# Patient Record
Sex: FEMALE | NOT HISPANIC OR LATINO | Employment: FULL TIME | URBAN - METROPOLITAN AREA
[De-identification: names, ages, dates, MRNs, and addresses within clinical notes are randomized per-mention and may not be internally consistent; named-entity substitution may affect disease eponyms.]

---

## 2017-01-04 ENCOUNTER — TELEPHONE (OUTPATIENT)
Dept: FAMILY MEDICINE | Facility: CLINIC | Age: 47
End: 2017-01-04

## 2017-01-04 NOTE — TELEPHONE ENCOUNTER
Patient has a referral to [facility] for  Dr. Gonzalez from 12/16/16. Calling to clarify if this is the right one prior to faxing.   Patient stated it was. Needing it to be faxed to [phone].  Vivi Bowman RN  [facility]

## 2017-01-04 NOTE — TELEPHONE ENCOUNTER
Reason for Call:  Needing a referral to Dr Gonzalez at the [facility](fax number is [phone]    Detailed comments: this is in regards to patient left heel pain    Phone Number Patient can be reached at: [phone]      Best Time: tommorrow    Can we leave a detailed message on this number? No        Call taken on 1/4/2017 at 4:27 PM by Erma Polk

## 2017-01-06 ENCOUNTER — PRE VISIT (OUTPATIENT)
Dept: ORTHOPEDICS | Facility: CLINIC | Age: 47
End: 2017-01-06

## 2017-01-06 NOTE — TELEPHONE ENCOUNTER
1.  Date/reason for appt: 1/13/17 -- audrey's deformity of left heel  2.  Referring provider: Vasquez Denson  3.  Call to patient (Yes / No - short description): no, referred  4.  Previous care at / records requested from: [facility] -- referral and records in [facility].

## 2017-01-11 ENCOUNTER — OFFICE VISIT (OUTPATIENT)
Dept: OTOLARYNGOLOGY | Facility: CLINIC | Age: 47
End: 2017-01-11
Payer: COMMERCIAL

## 2017-01-11 VITALS — WEIGHT: 184 LBS | BODY MASS INDEX: 33.86 KG/M2 | RESPIRATION RATE: 16 BRPM | HEIGHT: 62 IN

## 2017-01-11 DIAGNOSIS — R13.13 PHARYNGEAL DYSPHAGIA: ICD-10-CM

## 2017-01-11 DIAGNOSIS — E04.9 GOITER: Primary | ICD-10-CM

## 2017-01-11 PROCEDURE — 99213 OFFICE O/P EST LOW 20 MIN: CPT | Performed by: OTOLARYNGOLOGY

## 2017-01-11 ASSESSMENT — PAIN SCALES - GENERAL: PAINLEVEL: NO PAIN (0)

## 2017-01-11 NOTE — PROGRESS NOTES
Chief Complaint - 1.) dysphagia 2.) thyroid nodules    History of Present Illness - Rhonda Cordero is a 46 year old female who has been having over a year of dysphagia. Feels solids get stuck. No real pain. Swallow study was normal. She has a history of a multinodular goiter, but one prominent nodule on the left. FNA 3/2015 was benign. FNA of some lymph nodes were also benign 3/2016. Repeat u/s in 2/2016 showed no growth. TSH a few weeks ago was 1.22. No breathing issues. They deny symptoms of hoarseness, neck or throat pain, or hemoptysis. The patient notes a positive family history of thyroid cancer. She does eat late at night and lies down. However, she tried prilosec and this didn't seem to help. Also feels like she never had reflux. No globus.     Past Medical History -   Patient Active Problem List   Diagnosis     CARDIOVASCULAR SCREENING; LDL GOAL LESS THAN 130     Impingement syndrome, shoulder     Rotator cuff tendonitis     Hashimoto's thyroiditis     Nontoxic multinodular goiter     Shoulder joint pain     Gastroesophageal reflux disease, esophagitis presence not specified     Cervical adenopathy     Hypertension goal BP (blood pressure) < 140/90     Essential hypertension with goal blood pressure less than 140/90       Current Medications -   Current outpatient prescriptions:      clobetasol (TEMOVATE) 0.05 % cream, Apply sparingly to affected area twice daily for two weeks, then use twice a week, Disp: 60 g, Rfl: 2     lisinopril (PRINIVIL,ZESTRIL) 5 MG tablet, Take 1 tablet (5 mg) by mouth daily, Disp: 90 tablet, Rfl: 3     omeprazole 20 MG tablet, Take 1 tablet (20 mg) by mouth daily Take 30-60 minutes before a meal., Disp: 30 tablet, Rfl: 3     order for DME, Equipment being ordered: bilateral velcro wrist splints, Disp: 2 Device, Rfl: 0     aspirin 81 MG tablet, Take 1 tablet by mouth daily., Disp: 100 tablet, Rfl: 3     HYDROcodone-acetaminophen (NORCO) 5-325 MG per tablet, Take 1 tablet by  "mouth every 6 hours as needed for moderate to severe pain, Disp: 10 tablet, Rfl: 0    Allergies -   Allergies   Allergen Reactions     Advil [Alkylamines]      Cephalexin Itching       Social History -   History     Social History     Marital Status:      Spouse Name: N/A     Number of Children: 2     Years of Education: 12     Occupational History           Social History Main Topics     Smoking status: Never Smoker      Smokeless tobacco: Never Used     Alcohol Use: Yes      Comment: occ     Drug Use: No     Sexual Activity:     Partners: Male     Birth Control/ Protection: Surgical     Other Topics Concern     Parent/Sibling W/ Cabg, Mi Or Angioplasty Before 65f 55m? No      Service No     Blood Transfusions Yes     had 3 units 10/23/2009     Caffeine Concern No     Occupational Exposure No     Hobby Hazards No     Sleep Concern Not Asked     sleeps about 5-6 hours a night     Special Diet No     Exercise Yes     walk     Bike Helmet Not Asked     doesn't ride a bike     Seat Belt Yes     Self-Exams No     info given on SBE     Social History Narrative       Family History -   Family History   Problem Relation Age of Onset     CEREBROVASCULAR DISEASE Mother 50     CEREBROVASCULAR DISEASE Father 49     CANCER No family hx of      DIABETES No family hx of      Hypertension Father      Thyroid Disease No family hx of      Glaucoma No family hx of      Macular Degeneration No family hx of        Review of Systems - As per HPI and PMHx, otherwise 7 system review of the head and neck negative.    Physical Exam  Resp 16  Ht 1.575 m (5' 2\")  Wt 83.462 kg (184 lb)  BMI 33.65 kg/m2  LMP 11/23/2009  General - The patient is in no distress. Alert and oriented x3, answers questions and cooperates with examination appropriately.   Voice and Breathing - The patient was breathing comfortably without the use of accessory muscles. There was no wheezing, stridor, or stertor.  The patients voice was clear and " strong.  Head and Face - Normocephalic and atraumatic.    Eyes - Extraocular movements intact. Sclera were not icteric or injected, conjunctiva were pink and moist.  Neurologic - Cranial nerves II-XII are grossly intact. Specifically, the facial nerve is intact, House-Brackmann grade 1 of 6.   Mouth - Examination of the oral cavity showed pink, healthy oral mucosa. No lesions or ulcerations noted. The tongue was mobile and protrudes midline.  Oropharynx - The walls of the oropharynx were smooth, symmetric, and had no lesions or ulcerations. The uvula was midline and the palate raised symmetrically.   Neck -  Palpation of the occipital, submental, submandibular, internal jugular chain, and supraclavicular nodes did not demonstrate any abnormal lymph nodes or masses. The parotid glands were without masses. Palpation of the thyroid revealed a large prominent left thyroid nodule, firm, about 4 cm. There was no significant pain with palpation, but uncomfortable. The trachea was midline.      A/P - Rhonda Cordero is a 46 year old female with dysphagia that could represent growing of her left thyroid nodule. It doesn't seem to be reflux. prilosec didn't help. I will have her see Dr. Costa to see if she wants to have this removed.       Ernst Rosas MD  Otolaryngology  [facility]

## 2017-01-11 NOTE — NURSING NOTE
"Chief Complaint   Patient presents with     RECHECK     Throat/Reflux       Initial Resp 16  Ht 1.575 m (5' 2\")  Wt 83.462 kg (184 lb)  BMI 33.65 kg/m2  LMP 11/23/2009 Estimated body mass index is 33.65 kg/(m^2) as calculated from the following:    Height as of this encounter: 1.575 m (5' 2\").    Weight as of this encounter: 83.462 kg (184 lb).  BP completed using cuff size: NA (Not Taken)    Leslye Weiss MA    "

## 2017-01-11 NOTE — PATIENT INSTRUCTIONS
General Scheduling Information  To schedule your CT/MRI scan, please contact [place] at [phone]   [address] [place] [address]    To schedule your Surgery, please contact our Specialty Schedulers at [phone]    ENT Clinic Locations Clinic Hours Telephone Number     [facility]  [address] [place] [address]   Tuesday:       8:00am -- 4:00pm    Wednesday:  8:00am - 4:00pm   To schedule an appointment with   Dr. Rosas,   please contact our   Specialty Scheduling Department at:     [phone]       [facility]  [address] [place] [address]   Friday:          8:00am - 4:00pm         Urgent Care Locations Clinic Hours Telephone Numbers     [facility]  [address] [place] [address]     Monday-Friday:     11:00pm - 9:00pm    Saturday-Sunday:  9:00am - 5:00pm   [phone]     [place] [facility]  [address] [place] [address]     Monday-Friday:      5:00pm - 9:00pm     Saturday-Sunday:  9:00am - 5:00pm   [phone]

## 2017-01-11 NOTE — MR AVS SNAPSHOT
After Visit Summary   1/11/2017    Rhonda Cordero    MRN: [id number]           Patient Information     Date Of Birth          1970        Visit Information        Provider Department      1/11/2017 2:15 PM Ernst Rosas MD [facility]        Today's Diagnoses     Goiter    -  1     Pharyngeal dysphagia           Care Instructions    General Scheduling Information  To schedule your CT/MRI scan, please contact [place] [facility] at [phone] [address]  [place] [address]    To schedule your Surgery, please contact our Specialty Schedulers at [phone]    ENT Clinic Locations Clinic Hours Telephone Number     [facility]  [address]  [place] [address]   Tuesday:       8:00am -- 4:00pm    Wednesday:  8:00am - 4:00pm   To schedule an appointment with   Dr. Rosas,   please contact our   Specialty Scheduling Department at:     [phone]       [facility]  [address] [place] [address]   Friday:          8:00am - 4:00pm         Urgent Care Locations Clinic Hours Telephone Numbers     [facility]  [address] [place] [address]     Monday-Friday:     11:00pm - 9:00pm    Saturday-Sunday:  9:00am - 5:00pm   [phone]     [facility]  [address] [place] [address]     Monday-Friday:      5:00pm - 9:00pm     Saturday-Sunday:  9:00am - 5:00pm   [phone]             Follow-ups after your visit        Additional Services     OTOLARYNGOLOGY REFERRAL       Your provider has referred you to: [facility]: Adult [facility] Nose [facility] Th[facility] Clinic (Otolaryngology) - [place] [phone]  [email or web address]    Dr. Costa for thyroidectomy    Please be aware that coverage of these services is subject to the terms and limitations of your health insurance plan.  Call member services at your health plan with any benefit or coverage questions.      Please bring the  "following with you to your appointment:    (1) Any X-Rays, CTs or MRIs which have been performed.  Contact the facility where they were done to arrange for  prior to your scheduled appointment.   (2) List of current medications  (3) This referral request   (4) Any documents/labs given to you for this referral                  Your next 10 appointments already scheduled     Jan 13, 2017  2:20 PM   (Arrive by 2:05 PM)   NEW FOOT/ANKLE with Bacilio Meeks DPM   [facility] ([facility])    [address] [place] [address]  [address] [facility] [address] [phone]              Who to contact     If you have questions or need follow up information about today's clinic visit or your schedule please contact [facility] directly at [phone].  Normal or non-critical lab and imaging results will be communicated to you by MyChart, letter or phone within 4 business days after the clinic has received the results. If you do not hear from us within 7 days, please contact the clinic through MyChart or phone. If you have a critical or abnormal lab result, we will notify you by phone as soon as possible.  Submit refill requests through [employer] or call your pharmacy and they will forward the refill request to us. Please allow 3 business days for your refill to be completed.          Additional Information About Your Visit        Care EveryWhere ID     This is your Care EveryWhere ID. This could be used by other organizations to access your [facility] medical records  [id number]        Your Vitals Were     Respirations Height BMI (Body Mass Index) Last Period          16 1.575 m (5' 2\") 33.65 kg/m2 11/23/2009         Blood Pressure from Last 3 Encounters:   12/16/16 130/78   11/09/16 136/82   06/29/16 117/76    Weight from Last 3 Encounters:   01/11/17 83.462 kg (184 lb)   12/16/16 81.194 kg (179 lb)   11/09/16 82.101 kg (181 lb)              We Performed the " Following     OTOLARYNGOLOGY REFERRAL        Primary Care Provider Office Phone # Fax #    Vasquez Denson [phone]       [address] [address]        Thank you!     Thank you for choosing [facility]  for your care. Our goal is always to provide you with excellent care. Hearing back from our patients is one way we can continue to improve our services. Please take a few minutes to complete the written survey that you may receive in the mail after your visit with us. Thank you!             Your Updated Medication List - Protect others around you: Learn how to safely use, store and throw away your medicines at www.disposemymeds.org.          This list is accurate as of: 1/11/17  2:36 PM.  Always use your most recent med list.                   Brand Name Dispense Instructions for use    aspirin 81 MG tablet     100 tablet    Take 1 tablet by mouth daily.       clobetasol 0.05 % cream    TEMOVATE    60 g    Apply sparingly to affected area twice daily for two weeks, then use twice a week       HYDROcodone-acetaminophen 5-325 MG per tablet    NORCO    10 tablet    Take 1 tablet by mouth every 6 hours as needed for moderate to severe pain       lisinopril 5 MG tablet    PRINIVIL/ZESTRIL    90 tablet    Take 1 tablet (5 mg) by mouth daily       omeprazole 20 MG tablet     30 tablet    Take 1 tablet (20 mg) by mouth daily Take 30-60 minutes before a meal.       order for DME     2 Device    Equipment being ordered: bilateral velcro wrist splints

## 2017-01-13 ENCOUNTER — OFFICE VISIT (OUTPATIENT)
Dept: ORTHOPEDICS | Facility: CLINIC | Age: 47
End: 2017-01-13

## 2017-01-13 DIAGNOSIS — M92.62 HAGLUND'S DEFORMITY OF LEFT HEEL: Primary | ICD-10-CM

## 2017-01-13 DIAGNOSIS — M62.89 MUSCLE TIGHTNESS: ICD-10-CM

## 2017-01-13 ASSESSMENT — ENCOUNTER SYMPTOMS
JOINT SWELLING: 0
STIFFNESS: 0
MUSCLE CRAMPS: 0
MUSCLE WEAKNESS: 0
SINUS CONGESTION: 0
TROUBLE SWALLOWING: 1
SINUS PAIN: 0
BACK PAIN: 0
ARTHRALGIAS: 1
SORE THROAT: 1
NECK PAIN: 1
SMELL DISTURBANCE: 0
MYALGIAS: 0
HOARSE VOICE: 0
NECK MASS: 1
TASTE DISTURBANCE: 0

## 2017-01-13 NOTE — PROGRESS NOTES
Date of Service: 1/13/2017    Chief Complaint:   Chief Complaint   Patient presents with     Consult     Haglunds deformity of Left heel. Pt stated that she has a lot of pain in her Left heel. Referred by: Vasquez Denson.         HPI: Rhonda is a 46 year old female who presents today for further evaluation of Anup's deformity. Sue has been having heel pain for about 1 year. She noticed a few months ago that a bump appeared. She tried new tennis shoes and Dr. Willoughby's inserts without relief. She went to her PCP to be evaluated and he diagnosed a Anup's deformity and referred her to us. She brought her x-rays recently taken of her left foot. They show a prominence at the posterior calcaneus consistent with Anup's deformity. Denies swelling, redness, pain in the right foot or other bony abnormalities of her feet.    Review of Systems:   Answers for HPI/ROS submitted by the patient on 1/13/2017   General Symptoms: No  Skin Symptoms: No  HENT Symptoms: Yes  EYE SYMPTOMS: No  HEART SYMPTOMS: No  LUNG SYMPTOMS: No  INTESTINAL SYMPTOMS: No  URINARY SYMPTOMS: No  GYNECOLOGIC SYMPTOMS: No  BREAST SYMPTOMS: No  SKELETAL SYMPTOMS: Yes  BLOOD SYMPTOMS: No  NERVOUS SYSTEM SYMPTOMS: No  MENTAL HEALTH SYMPTOMS: No  Ear pain: No  Ear discharge: No  Hearing loss: No  Tinnitus: No  Nosebleeds: No  Congestion: No  Sinus pain: No  Trouble swallowing: Yes   Voice hoarseness: No  Mouth sores: No  Sore throat: Yes  Tooth pain: No  Gum tenderness: No  Bleeding gums: No  Change in taste: No  Change in sense of smell: No  Dry mouth: No  Hearing aid used: No  Neck lump: Yes  Back pain: No  Muscle aches: No  Neck pain: Yes  Swollen joints: No  Joint pain: Yes  Bone pain: Yes  Muscle cramps: No  Muscle weakness: No  Joint stiffness: No  Bone fracture: No        PMH:   Past Medical History   Diagnosis Date     Anemia      Hypertension goal BP (blood pressure) < 130/80 5/6/2011     Rotator cuff tendonitis 2/10/2015       PSxH:    Past Surgical History   Procedure Laterality Date     Laparoscopy,tubal cautery  2005     Salpingo oophorectomy,r/l/ellen  2005     left     C total abdom hysterectomy  12/9/09     [facility]       Allergies: Advil and Cephalexin    SH:   Social History     Social History     Marital Status:      Spouse Name: N/A     Number of Children: 2     Years of Education: 12     Occupational History           Social History Main Topics     Smoking status: Never Smoker      Smokeless tobacco: Never Used     Alcohol Use: Yes      Comment: occ     Drug Use: No     Sexual Activity:     Partners: Male     Birth Control/ Protection: Surgical     Other Topics Concern     Parent/Sibling W/ Cabg, Mi Or Angioplasty Before 65f 55m? No      Service No     Blood Transfusions Yes     had 3 units 10/23/2009     Caffeine Concern No     Occupational Exposure No     Hobby Hazards No     Sleep Concern Not Asked     sleeps about 5-6 hours a night     Special Diet No     Exercise Yes     walk     Bike Helmet Not Asked     doesn't ride a bike     Seat Belt Yes     Self-Exams No     info given on SBE     Social History Narrative     FH:   Family History   Problem Relation Age of Onset     CEREBROVASCULAR DISEASE Mother 50     CEREBROVASCULAR DISEASE Father 49     CANCER No family hx of      DIABETES No family hx of      Hypertension Father      Thyroid Disease No family hx of      Glaucoma No family hx of      Macular Degeneration No family hx of        Objective:  PT and DP pulses are 2/4 bilaterally. CRT is < 3 seconds. Positive pedal hair.   Gross sensation is intact bilaterally.   Equinus severe Left foot, mild right foot. No pain with active or passive ROM of the ankle, MTJ, 1st ray, or halluces bilaterally. Immobile, hard lump at Left lateral posterior calcaneus that is contiguous with bone of calcaneus.   Nails normal bilaterally. No open lesions are noted. Skin dry.    Assessment:   - Haglunds deformity  -  Equinus    Plan:  - Pt seen and evaluated. Diagnosis and treatment options discussed in detail with patient.  - X-rays reviewed with patient.  - Advised patient to purchase [employer]'s regular heel cups over the counter for both shoes. Wear at all times while ambulating.  - Educated patient on stretches for the calf muscles.   - NSAIDs or ice for pain.  - Gave patient work note to work only 5 hours per day for 2 weeks. Return to full 8 hours afterwards.  - We spoke about intermediate and long term planning. It is possible that she may fail all conservative treatment. If she has no relief from the treatments today, we can move to PT to see if we can increase her ankle dorsiflexion to decrease the tension at the insertion into the posterior calcaneus. If that also fails, consider surgical consult with Dr. Gonzalez.   - RTC 4-6 weeks.

## 2017-01-13 NOTE — Clinical Note
1/13/2017       RE: Rhonda Cordero  [address] [facility] [address]     Dear Colleague,    Thank you for referring your patient, Rhonda Cordero, to the [facility] at [facility]. Please see a copy of my visit note below.    Date of Service: 1/13/2017    Chief Complaint:   Chief Complaint   Patient presents with     Consult     Haglunds deformity of Left heel. Pt stated that she has a lot of pain in her Left heel. Referred by: Vasquez Denson.         HPI: Rhonda is a 46 year old female who presents today for further evaluation of Anup's deformity. Sue has been having heel pain for about 1 year. She noticed a few months ago that a bump appeared. She tried new tennis shoes and Dr. Willoughby's inserts without relief. She went to her PCP to be evaluated and he diagnosed a Anup's deformity and referred her to us. She brought her x-rays recently taken of her left foot. They show a prominence at the posterior calcaneus consistent with Anup's deformity. Denies swelling, redness, pain in the right foot or other bony abnormalities of her feet.    Review of Systems:   Answers for HPI/ROS submitted by the patient on 1/13/2017   General Symptoms: No  Skin Symptoms: No  HENT Symptoms: Yes  EYE SYMPTOMS: No  HEART SYMPTOMS: No  LUNG SYMPTOMS: No  INTESTINAL SYMPTOMS: No  URINARY SYMPTOMS: No  GYNECOLOGIC SYMPTOMS: No  BREAST SYMPTOMS: No  SKELETAL SYMPTOMS: Yes  BLOOD SYMPTOMS: No  NERVOUS SYSTEM SYMPTOMS: No  MENTAL HEALTH SYMPTOMS: No  Ear pain: No  Ear discharge: No  Hearing loss: No  Tinnitus: No  Nosebleeds: No  Congestion: No  Sinus pain: No  Trouble swallowing: Yes   Voice hoarseness: No  Mouth sores: No  Sore throat: Yes  Tooth pain: No  Gum tenderness: No  Bleeding gums: No  Change in taste: No  Change in sense of smell: No  Dry mouth: No  Hearing aid used: No  Neck lump: Yes  Back pain: No  Muscle aches: No  Neck pain: Yes  Swollen  joints: No  Joint pain: Yes  Bone pain: Yes  Muscle cramps: No  Muscle weakness: No  Joint stiffness: No  Bone fracture: No        PMH:   Past Medical History   Diagnosis Date     Anemia      Hypertension goal BP (blood pressure) < 130/80 5/6/2011     Rotator cuff tendonitis 2/10/2015       PSxH:   Past Surgical History   Procedure Laterality Date     Laparoscopy,tubal cautery  2005     Salpingo oophorectomy,r/l/ellen  2005     left     C total abdom hysterectomy  12/9/09     [facility]       Allergies: Advil and Cephalexin    SH:   Social History     Social History     Marital Status:      Spouse Name: N/A     Number of Children: 2     Years of Education: 12     Occupational History           Social History Main Topics     Smoking status: Never Smoker      Smokeless tobacco: Never Used     Alcohol Use: Yes      Comment: occ     Drug Use: No     Sexual Activity:     Partners: Male     Birth Control/ Protection: Surgical     Other Topics Concern     Parent/Sibling W/ Cabg, Mi Or Angioplasty Before 65f 55m? No      Service No     Blood Transfusions Yes     had 3 units 10/23/2009     Caffeine Concern No     Occupational Exposure No     Hobby Hazards No     Sleep Concern Not Asked     sleeps about 5-6 hours a night     Special Diet No     Exercise Yes     walk     Bike Helmet Not Asked     doesn't ride a bike     Seat Belt Yes     Self-Exams No     info given on SBE     Social History Narrative     FH:   Family History   Problem Relation Age of Onset     CEREBROVASCULAR DISEASE Mother 50     CEREBROVASCULAR DISEASE Father 49     CANCER No family hx of      DIABETES No family hx of      Hypertension Father      Thyroid Disease No family hx of      Glaucoma No family hx of      Macular Degeneration No family hx of        Objective:  PT and DP pulses are 2/4 bilaterally. CRT is < 3 seconds. Positive pedal hair.   Gross sensation is intact bilaterally.   Equinus severe Left foot, mild right foot. No pain  with active or passive ROM of the ankle, MTJ, 1st ray, or halluces bilaterally. Immobile, hard lump at Left lateral posterior calcaneus that is contiguous with bone of calcaneus.   Nails normal bilaterally. No open lesions are noted. Skin dry.    Assessment:   - Haglunds deformity  - Equinus    Plan:  - Pt seen and evaluated. Diagnosis and treatment options discussed in detail with patient.  - X-rays reviewed with patient.  - Advised patient to purchase [employer]'s regular heel cups over the counter for both shoes. Wear at all times while ambulating.  - Educated patient on stretches for the calf muscles.   - NSAIDs or ice for pain.  - Gave patient work note to work only 5 hours per day for 2 weeks. Return to full 8 hours afterwards.  - We spoke about intermediate and long term planning. It is possible that she may fail all conservative treatment. If she has no relief from the treatments today, we can move to PT to see if we can increase her ankle dorsiflexion to decrease the tension at the insertion into the posterior calcaneus. If that also fails, consider surgical consult with Dr. Gonzalez.   - RTC 4-6 weeks.            Again, thank you for allowing me to participate in the care of your patient.      Sincerely,    Bacilio Meeks DPM

## 2017-01-13 NOTE — NURSING NOTE
Reason For Visit:   Chief Complaint   Patient presents with     Consult     Haglunds deformity of Left heel. Pt stated that she has a lot of pain in her Left heel. Referred by: Vasquez Denson.        Primary MD: Vasquez Denson  Ref. MD: Vasquez Denson    Age: 46 year old    ?  No          Pain Assessment  Patient Currently in Pain: Denies (Pt stated that her heel hurts after walking. Especially after working an 8 hour shift. )                Current Outpatient Prescriptions   Medication Sig Dispense Refill     clobetasol (TEMOVATE) 0.05 % cream Apply sparingly to affected area twice daily for two weeks, then use twice a week 60 g 2     HYDROcodone-acetaminophen (NORCO) 5-325 MG per tablet Take 1 tablet by mouth every 6 hours as needed for moderate to severe pain 10 tablet 0     lisinopril (PRINIVIL,ZESTRIL) 5 MG tablet Take 1 tablet (5 mg) by mouth daily 90 tablet 3     omeprazole 20 MG tablet Take 1 tablet (20 mg) by mouth daily Take 30-60 minutes before a meal. 30 tablet 3     order for DME Equipment being ordered: bilateral velcro wrist splints 2 Device 0     aspirin 81 MG tablet Take 1 tablet by mouth daily. 100 tablet 3       Allergies   Allergen Reactions     Advil [Ibuprofen] Swelling     Hands swelled     Cephalexin Itching

## 2017-01-13 NOTE — Clinical Note
Work Restrictions    2017     Seen today: yes    Patient:  Rhonda Cordero  :   1970  MRN:     [id number]  Physician: BCAILIO CONTRERAS Gil is to be limited to 5 hour work days for 2 weeks.     Diagnosis: Anup's deformity Left     The next clinic appointment is scheduled for 4-6 weeks    Electronically signed by Bacilio Contreras DPM

## 2017-02-13 DIAGNOSIS — I10 HYPERTENSION GOAL BP (BLOOD PRESSURE) < 140/90: ICD-10-CM

## 2017-02-13 RX ORDER — LISINOPRIL 5 MG/1
5 TABLET ORAL DAILY
Qty: 90 TABLET | Refills: 1 | Status: SHIPPED | OUTPATIENT
Start: 2017-02-13 | End: 2017-08-30

## 2017-02-13 NOTE — TELEPHONE ENCOUNTER
Prescription approved per [facility] Refill Protocol.    ~Thank you  Tita Francisco, PharmD  Retail Pharmacist  For [facility]

## 2017-02-13 NOTE — TELEPHONE ENCOUNTER
Lisinopril 5mg      Last Written Prescription Date: 1/21/16  Last Fill Quantity: 90, # refills: 3  Last Office Visit with G, P or [facility] prescribing provider: 12/16/16       Potassium   Date Value Ref Range Status   12/16/2016 3.9 3.4 - 5.3 mmol/L Final     Creatinine   Date Value Ref Range Status   12/16/2016 0.62 0.52 - 1.04 mg/dL Final     BP Readings from Last 3 Encounters:   12/16/16 130/78   11/09/16 136/82   06/29/16 117/76     Fabien Chi CPhT  [facility]    On behalf of [facility]

## 2017-02-24 ENCOUNTER — OFFICE VISIT (OUTPATIENT)
Dept: ORTHOPEDICS | Facility: CLINIC | Age: 47
End: 2017-02-24

## 2017-02-24 VITALS — WEIGHT: 182.4 LBS | BODY MASS INDEX: 31.14 KG/M2 | HEIGHT: 64 IN

## 2017-02-24 DIAGNOSIS — M92.62 HAGLUND'S DEFORMITY OF LEFT HEEL: Primary | ICD-10-CM

## 2017-02-24 NOTE — MR AVS SNAPSHOT
"              After Visit Summary   2/24/2017    Rhonda Cordero    MRN: [id number]           Patient Information     Date Of Birth          1970        Visit Information        Provider Department      2/24/2017 8:00 AM Bacilio Meeks DPM [facility]        Today's Diagnoses     Anup's deformity of left heel    -  1       Follow-ups after your visit        Who to contact     Please call your clinic at [phone] to:    Ask questions about your health    Make or cancel appointments    Discuss your medicines    Learn about your test results    Speak to your doctor   If you have compliments or concerns about an experience at your clinic, or if you wish to file a complaint, please contact [facility] Patient Relations at [phone] or email us at [email or web address]         Additional Information About Your Visit        Care EveryWhere ID     This is your Care EveryWhere ID. This could be used by other organizations to access your [place] medical records  [id number]        Your Vitals Were     Height Last Period BMI (Body Mass Index)             1.613 m (5' 3.5\") 11/23/2009 31.8 kg/m2          Blood Pressure from Last 3 Encounters:   12/16/16 130/78   11/09/16 136/82   06/29/16 117/76    Weight from Last 3 Encounters:   02/24/17 82.7 kg (182 lb 6.4 oz)   01/11/17 83.5 kg (184 lb)   12/16/16 81.2 kg (179 lb)              Today, you had the following     No orders found for display       Primary Care Provider Office Phone # Fax #    Vasquez Denson [phone]       [facility] [address] [facility] [address]        Thank you!     Thank you for choosing [facility]  for your care. Our goal is always to provide you with excellent care. Hearing back from our patients is one way we can continue to improve our services. Please take a few minutes to complete the written survey that " you may receive in the mail after your visit with us. Thank you!             Your Updated Medication List - Protect others around you: Learn how to safely use, store and throw away your medicines at www.disposemymeds.org.          This list is accurate as of: 2/24/17  8:20 AM.  Always use your most recent med list.                   Brand Name Dispense Instructions for use    aspirin 81 MG tablet     100 tablet    Take 1 tablet by mouth daily.       clobetasol 0.05 % cream    TEMOVATE    60 g    Apply sparingly to affected area twice daily for two weeks, then use twice a week       HYDROcodone-acetaminophen 5-325 MG per tablet    NORCO    10 tablet    Take 1 tablet by mouth every 6 hours as needed for moderate to severe pain       lisinopril 5 MG tablet    PRINIVIL/ZESTRIL    90 tablet    Take 1 tablet (5 mg) by mouth daily       omeprazole 20 MG tablet     30 tablet    Take 1 tablet (20 mg) by mouth daily Take 30-60 minutes before a meal.       order for DME     2 Device    Equipment being ordered: bilateral velcro wrist splints

## 2017-02-24 NOTE — NURSING NOTE
"Reason For Visit:   Chief Complaint   Patient presents with     Foot Problems     f/u left foot       Ht 1.613 m (5' 3.5\")  Wt 82.7 kg (182 lb 6.4 oz)  LMP 11/23/2009  BMI 31.8 kg/m2    Pain Assessment  Patient Currently in Pain: No  "

## 2017-02-24 NOTE — PROGRESS NOTES
Chief Complaint: No chief complaint on file.         Allergies   Allergen Reactions     Advil [Ibuprofen] Swelling     Hands swelled     Cephalexin Itching         Subjective: Sue is a 46 year old female who presents to the clinic today for a follow up of left posterior heel pain. She is having no pain today! She relates that she did get the heel cups, but she didn't really like those. She switched to a pair of Sketchers and after she did that, the pain went away completely. She is back to working full time shifts. She is still stretching.    Objective    No pain noted with the left posterior calcaneus. No palpable bursa noted to the area. Equinus noted BL. No pain along the courses of the Achilles, peroneal, or PT tendons.     Assessment: left Anup's deformity - asymptomatic    Plan:   - Pt seen and evaluated  - Continue with shoes that do not irritate. Cont with stretching  - Pt to return to clinic PRN.

## 2017-02-24 NOTE — LETTER
2/24/2017       RE: Rhonda Cordero  [address] [facility] [address]     Dear Colleague,    Thank you for referring your patient, Rhonda Cordero, to the [facility] at [facility]. Please see a copy of my visit note below.    Chief Complaint: No chief complaint on file.         Allergies   Allergen Reactions     Advil [Ibuprofen] Swelling     Hands swelled     Cephalexin Itching         Subjective: Sue is a 46 year old female who presents to the clinic today for a follow up of left posterior heel pain. She is having no pain today! She relates that she did get the heel cups, but she didn't really like those. She switched to a pair of Sketchers and after she did that, the pain went away completely. She is back to working full time shifts. She is still stretching.    Objective    No pain noted with the left posterior calcaneus. No palpable bursa noted to the area. Equinus noted BL. No pain along the courses of the Achilles, peroneal, or PT tendons.     Assessment: left Anup's deformity - asymptomatic    Plan:   - Pt seen and evaluated  - Continue with shoes that do not irritate. Cont with stretching  - Pt to return to clinic PRN.       Again, thank you for allowing me to participate in the care of your patient.      Sincerely,    Bacilio Meeks DPM

## 2017-03-12 ENCOUNTER — OFFICE VISIT (OUTPATIENT)
Dept: URGENT CARE | Facility: URGENT CARE | Age: 47
End: 2017-03-12
Payer: COMMERCIAL

## 2017-03-12 VITALS
WEIGHT: 181 LBS | HEART RATE: 80 BPM | SYSTOLIC BLOOD PRESSURE: 132 MMHG | BODY MASS INDEX: 31.56 KG/M2 | TEMPERATURE: 97.4 F | OXYGEN SATURATION: 99 % | DIASTOLIC BLOOD PRESSURE: 80 MMHG

## 2017-03-12 DIAGNOSIS — J01.90 ACUTE SINUSITIS WITH SYMPTOMS > 10 DAYS: Primary | ICD-10-CM

## 2017-03-12 PROCEDURE — 99213 OFFICE O/P EST LOW 20 MIN: CPT | Performed by: FAMILY MEDICINE

## 2017-03-12 RX ORDER — DOXYCYCLINE 100 MG/1
100 CAPSULE ORAL 2 TIMES DAILY
Qty: 14 CAPSULE | Refills: 0 | Status: SHIPPED | OUTPATIENT
Start: 2017-03-12 | End: 2017-03-19

## 2017-03-12 NOTE — PROGRESS NOTES
"No chief complaint on file.      Initial LMP 11/23/2009 Estimated body mass index is 31.8 kg/(m^2) as calculated from the following:    Height as of 2/24/17: 1.613 m (5' 3.5\").    Weight as of 2/24/17: 82.7 kg (182 lb 6.4 oz).  Medication Reconciliation: figueroa Brar CMA        SUBJECTIVE:                                                    Rhonda Cordero is a 46 year old female who presents to clinic today for the following health issues:      RESPIRATORY SYMPTOMS      Duration:  2 weeks ago.     Description  nasal congestion, rhinorrhea, facial pain/pressure, chills, headache and fatigue/malaise    Severity: moderate    Accompanying signs and symptoms: None    History (predisposing factors):  none    Precipitating or alleviating factors: None    Therapies tried and outcome:  rest and fluids oral decongestant antihistamine     \"flu\" a couple of weeks ago however not tested and did not see the doctor.     Problem list and histories reviewed & adjusted, as indicated.  Additional history: as documented    Patient Active Problem List   Diagnosis     CARDIOVASCULAR SCREENING; LDL GOAL LESS THAN 130     Impingement syndrome, shoulder     Rotator cuff tendonitis     Hashimoto's thyroiditis     Nontoxic multinodular goiter     Shoulder joint pain     Gastroesophageal reflux disease, esophagitis presence not specified     Cervical adenopathy     Hypertension goal BP (blood pressure) < 140/90     Essential hypertension with goal blood pressure less than 140/90     Anup's deformity of left heel     Past Surgical History   Procedure Laterality Date     Laparoscopy,tubal cautery  2005     Salpingo oophorectomy,r/l/ellen  2005     left     [facility] abdom [facility]  12/9/09     [facility]       Social History   Substance Use Topics     Smoking status: Never Smoker     Smokeless tobacco: Never Used     Alcohol use Yes      Comment: occ     Family History   Problem Relation Age of Onset     CEREBROVASCULAR " DISEASE Mother 50     CEREBROVASCULAR DISEASE Father 49     Hypertension Father                    CANCER No family hx of      DIABETES No family hx of      Thyroid Disease No family hx of      Glaucoma No family hx of      Macular Degeneration No family hx of          Current Outpatient Prescriptions   Medication Sig Dispense Refill     lisinopril (PRINIVIL/ZESTRIL) 5 MG tablet Take 1 tablet (5 mg) by mouth daily 90 tablet 1     aspirin 81 MG tablet Take 1 tablet by mouth daily. (Patient not taking: Reported on 3/12/2017) 100 tablet 3     Allergies   Allergen Reactions     Advil [Ibuprofen] Swelling     Hands swelled     Cephalexin Itching       Reviewed and updated as needed this visit by clinical staff       Reviewed and updated as needed this visit by Provider         ROS:  Constitutional, HEENT, cardiovascular, pulmonary, gi and gu systems are negative, except as otherwise noted.    OBJECTIVE:                                                    /80 (BP Location: Left arm, Patient Position: Chair, Cuff Size: Adult Large)  Pulse 80  Temp 97.4  F (36.3  C) (Oral)  Wt 82.1 kg (181 lb)  LMP 11/23/2009  SpO2 99%  BMI 31.56 kg/m2  Body mass index is 31.56 kg/(m^2).  GENERAL: healthy, alert and no distress  NECK: no adenopathy, no asymmetry, masses, or scars and thyroid normal to palpation  Mucosal nasal congestion noted with erythema.   RESP: lungs clear to auscultation - no rales, rhonchi or wheezes  CV: regular rate and rhythm, normal S1 S2, no S3 or S4, no murmur, click or rub, no peripheral edema and peripheral pulses strong  ABDOMEN: soft, nontender, no hepatosplenomegaly, no masses and bowel sounds normal  MS: no gross musculoskeletal defects noted, no edema    Diagnostic Test Results:  none      ASSESSMENT/PLAN:                                                        ICD-10-CM    1. Acute sinusitis with symptoms > 10 days J01.90 doxycycline (VIBRAMYCIN) 100 MG capsule        PLAN  Adjunctive measures  discussed including nasal saline and OTC decongestant  Patient educational/instructional material provided including reasons for follow-up   The patient indicates understanding of these issues and agrees with the plan.    Anthony Morataya MD  [facility]

## 2017-03-12 NOTE — MR AVS SNAPSHOT
After Visit Summary   3/12/2017    Rhonda Cordero    MRN: [id number]           Patient Information     Date Of Birth          1970        Visit Information        Provider Department      3/12/2017 2:20 PM Anthony Morataya MD [facility]        Today's Diagnoses     Acute sinusitis with symptoms > 10 days    -  1       Follow-ups after your visit        Your next 10 appointments already scheduled     Mar 16, 2017  7:00 AM CDT   Office Visit with Vasquez Denson MD   [facility]    [address] [place] [address] [id number] [phone]           Bring a current list of meds and any records pertaining to this visit.  For Physicals, please bring immunization records and any forms needing to be filled out.  Please arrive 10 minutes early to complete paperwork.              Who to contact     If you have questions or need follow up information about today's clinic visit or your schedule please contact [facility] directly at [phone].  Normal or non-critical lab and imaging results will be communicated to you by MyChart, letter or phone within 4 business days after the clinic has received the results. If you do not hear from us within 7 days, please contact the clinic through MyChart or phone. If you have a critical or abnormal lab result, we will notify you by phone as soon as possible.  Submit refill requests through [employer] or call your pharmacy and they will forward the refill request to us. Please allow 3 business days for your refill to be completed.          Additional Information About Your Visit        Care EveryWhere ID     This is your Care EveryWhere ID. This could be used by other organizations to access your [place] medical records  [id number]        Your Vitals Were     Pulse Temperature Last Period Pulse Oximetry BMI (Body Mass Index)       80 97.4  F  (36.3  C) (Oral) 11/23/2009 99% 31.56 kg/m2        Blood Pressure from Last 3 Encounters:   03/12/17 132/80   12/16/16 130/78   11/09/16 136/82    Weight from Last 3 Encounters:   03/12/17 82.1 kg (181 lb)   02/24/17 82.7 kg (182 lb 6.4 oz)   01/11/17 83.5 kg (184 lb)              Today, you had the following     No orders found for display         Today's Medication Changes          These changes are accurate as of: 3/12/17  3:03 PM.  If you have any questions, ask your nurse or doctor.               Start taking these medicines.        Dose/Directions    doxycycline 100 MG capsule   Commonly known as:  VIBRAMYCIN   Used for:  Acute sinusitis with symptoms > 10 days   Started by:  Anthony Morataya MD        Dose:  100 mg   Take 1 capsule (100 mg) by mouth 2 times daily for 7 days   Quantity:  14 capsule   Refills:  0         Stop taking these medicines if you haven't already. Please contact your care team if you have questions.     clobetasol 0.05 % cream   Commonly known as:  TEMOVATE   Stopped by:  Anthony Morataya MD           HYDROcodone-acetaminophen 5-325 MG per tablet   Commonly known as:  NORCO   Stopped by:  Anthony Morataya MD           omeprazole 20 MG tablet   Stopped by:  Anthony Morataya MD           order for DME   Stopped by:  Anthony Morataya MD                Where to get your medicines      These medications were sent to [facility] - [place], [place] - [address]  [address], [facility] [address]     Phone:  [phone]     doxycycline 100 MG capsule                Primary Care Provider Office Phone # Fax #    Vasquez Denson [phone]       [facility] [address]  [facility] [address]        Thank you!     Thank you for choosing [facility]  for your care. Our goal is always to provide you with excellent care. Hearing back from our patients is one way we can  continue to improve our services. Please take a few minutes to complete the written survey that you may receive in the mail after your visit with us. Thank you!             Your Updated Medication List - Protect others around you: Learn how to safely use, store and throw away your medicines at www.disposemymeds.org.          This list is accurate as of: 3/12/17  3:03 PM.  Always use your most recent med list.                   Brand Name Dispense Instructions for use    aspirin 81 MG tablet     100 tablet    Take 1 tablet by mouth daily.       doxycycline 100 MG capsule    VIBRAMYCIN    14 capsule    Take 1 capsule (100 mg) by mouth 2 times daily for 7 days       lisinopril 5 MG tablet    PRINIVIL/ZESTRIL    90 tablet    Take 1 tablet (5 mg) by mouth daily

## 2017-04-19 ENCOUNTER — RADIANT APPOINTMENT (OUTPATIENT)
Dept: MAMMOGRAPHY | Facility: CLINIC | Age: 47
End: 2017-04-19
Payer: COMMERCIAL

## 2017-04-19 ENCOUNTER — OFFICE VISIT (OUTPATIENT)
Dept: OBGYN | Facility: CLINIC | Age: 47
End: 2017-04-19
Payer: COMMERCIAL

## 2017-04-19 VITALS
HEIGHT: 62 IN | DIASTOLIC BLOOD PRESSURE: 87 MMHG | SYSTOLIC BLOOD PRESSURE: 138 MMHG | OXYGEN SATURATION: 99 % | BODY MASS INDEX: 33.86 KG/M2 | WEIGHT: 184 LBS | HEART RATE: 76 BPM

## 2017-04-19 DIAGNOSIS — E04.9 THYROID ENLARGED: ICD-10-CM

## 2017-04-19 DIAGNOSIS — Z01.411 ENCOUNTER FOR GYNECOLOGICAL EXAMINATION WITH ABNORMAL FINDING: Primary | ICD-10-CM

## 2017-04-19 DIAGNOSIS — R10.32 LLQ ABDOMINAL PAIN: ICD-10-CM

## 2017-04-19 DIAGNOSIS — Z12.31 VISIT FOR SCREENING MAMMOGRAM: ICD-10-CM

## 2017-04-19 PROCEDURE — G0202 SCR MAMMO BI INCL CAD: HCPCS | Mod: TC

## 2017-04-19 PROCEDURE — 99396 PREV VISIT EST AGE 40-64: CPT | Performed by: OBSTETRICS & GYNECOLOGY

## 2017-04-19 NOTE — PROGRESS NOTES
Rhonda Cordero is a 46 year old female , who presents for annual exam.   No unusual bleeding, no incontinence, or unusual discharge.   She has been having some LLQ pain when she bends over or lifts things, like a gallon milk.  Upon asking about hot flashes, she notes that she has bad sweats and hot sensation.       Last cholesterol:   Recent Labs   Lab Test  13   0852  11   1015   CHOL  186  159   HDL  46*  51   LDL  123  98   TRIG  84  50   CHOLHDLRATIO  4.1  3.1     Past Medical History:   Diagnosis Date     Anemia      Hypertension goal BP (blood pressure) < 130/80 2011     Rotator cuff tendonitis 2/10/2015       Past Surgical History:   Procedure Laterality Date     C TOTAL ABDOM HYSTERECTOMY  09    [facility]     LAPAROSCOPY,TUBAL CAUTERY       SALPINGO OOPHORECTOMY,R/L/LUIS      left       Obstetric History       T0      TAB0   SAB0   E0   M0   L2       # Outcome Date GA Lbr Roc/2nd Weight Sex Delivery Anes PTL Lv   2 Para            1 Para                   Gyn History:  Gynecological History         Patient's last menstrual period was 2009.     no STD/no PID/no IUD      history of abnormal pap smear:  Last pap:   Lab Results   Component Value Date    PAP NIL 2014           Current Outpatient Prescriptions   Medication Sig Dispense Refill     lisinopril (PRINIVIL/ZESTRIL) 5 MG tablet Take 1 tablet (5 mg) by mouth daily 90 tablet 1     aspirin 81 MG tablet Take 1 tablet by mouth daily. 100 tablet 3       Allergies   Allergen Reactions     Advil [Ibuprofen] Swelling     Hands swelled     Cephalexin Itching       Social History     Social History     Marital status:      Spouse name: N/A     Number of children: 2     Years of education: 12     Occupational History      [employer]     Social History Main Topics     Smoking status: Never Smoker     Smokeless tobacco: Never Used     Alcohol use Yes      Comment: occ     Drug  "use: No     Sexual activity: Not Currently     Partners: Male     Birth control/ protection: Surgical     Other Topics Concern     Parent/Sibling W/ Cabg, Mi Or Angioplasty Before 65f 55m? No      Service No     Blood Transfusions Yes     had 3 units 10/23/2009     Caffeine Concern No     Occupational Exposure No     Hobby Hazards No     Sleep Concern Not Asked     sleeps about 5-6 hours a night     Special Diet No     Exercise Yes     walk     Bike Helmet Not Asked     doesn't ride a bike     Seat Belt Yes     Self-Exams No     info given on SBE     Social History Narrative       Family History   Problem Relation Age of Onset     CEREBROVASCULAR DISEASE Mother 50     CEREBROVASCULAR DISEASE Father 49     Hypertension Father                    CANCER No family hx of      DIABETES No family hx of      Thyroid Disease No family hx of      Glaucoma No family hx of      Macular Degeneration No family hx of          ROS:  All negative except as above.      EXAM:  /87 (BP Location: Right arm, Cuff Size: Adult Regular)  Pulse 76  Ht 5' 2\" (1.575 m)  Wt 184 lb (83.5 kg)  LMP 11/23/2009  SpO2 99%  Breastfeeding? No  BMI 33.65 kg/m2  General:  WNWD female, NAD  Alert  Oriented x 3  Gait:  Normal  Skin:  Normal skin turgor  Neurologic:  CN grossly intact, good sensation.    HEENT:  NC/AT, EOMI  Neck:  No masses palpated, symmetrical, carotids +2/4, no bruits heard  Heart:  RRR  Lungs:  Clear   Breasts:  Symmetrical, no dimpling noted, no masses palpated, no discharge expressed  Abdomen:  Non-tender, non-distended.  Vulva: No external lesions, normal hair distribution, no adenopathy  BUS:  Normal, no masses noted  Urethra:  No hypermobility noted  Urethral meatus:  No masses noted  Vagina: Moist, pink, no abnormal discharge, well rugated, no lesions  Cervix: absent   Uterus:  Absent   Ovaries: No mass, non-tender, mobile  Perianal:  No masses noted.    Rectal exam:  Declined   Extremities:  No clubbing, " cyanosis, or edema      ASSESSMENT/PLAN   Annual examination   LLQ pain, schedule for ultrasound    She had her mammogram today.  Results pending.   Low fat diet, weight bearing exercises and self breast exams on a monthly basis have been recommended.  I have discussed with patient the risks, benefits, medications, treatment options and modalities.   I have instructed the patient to call or schedule a follow-up appointment if any problems.

## 2017-04-19 NOTE — MR AVS SNAPSHOT
After Visit Summary   4/19/2017    Rhonda Cordero    MRN: [id number]           Patient Information     Date Of Birth          1970        Visit Information        Provider Department      4/19/2017 3:15 PM Cristobal Tobar MD [facility]        Today's Diagnoses     Encounter for gynecological examination with abnormal finding    -  1    Thyroid enlarged        LLQ abdominal pain          Care Instructions      Please call [phone] to schedule for the ultrasound              Follow-ups after your visit        Follow-up notes from your care team     Return in about 1 year (around 4/19/2018) for Physical Exam.      Your next 10 appointments already scheduled     May 08, 2017  3:00 PM CDT   (Arrive by 2:45 PM)   New Patient Visit with Skylar Costa MD   [facility] ([facility] [place])    [address] [place] [address] [phone]              Future tests that were ordered for you today     Open Future Orders        Priority Expected Expires Ordered    US Pelvic Complete with Transvaginal Routine  4/19/2018 4/19/2017            Who to contact     If you have questions or need follow up information about today's clinic visit or your schedule please contact [facility] directly at [phone].  Normal or non-critical lab and imaging results will be communicated to you by MyChart, letter or phone within 4 business days after the clinic has received the results. If you do not hear from us within 7 days, please contact the clinic through MyChart or phone. If you have a critical or abnormal lab result, we will notify you by phone as soon as possible.  Submit refill requests through [employer] or call your pharmacy and they will forward the refill request to us. Please allow 3 business days for your refill to be completed.          Additional Information About Your Visit        MyChart  "Information     [employer] lets you send messages to your doctor, view your test results, renew your prescriptions, schedule appointments and more. To sign up, go to [email or web address] . Click on \"Log in\" on the left side of the screen, which will take you to the Welcome page. Then click on \"Sign up Now\" on the right side of the page.     You will be asked to enter the access code listed below, as well as some personal information. Please follow the directions to create your username and password.     Your access code is: [email or web address]  Expires: 2017  4:06 PM     Your access code will  in 90 days. If you need help or a new code, please call your [place] clinic or [phone].        Care EveryWhere ID     This is your Care EveryWhere ID. This could be used by other organizations to access your [place] medical records  [id number]        Your Vitals Were     Pulse Height Last Period Pulse Oximetry Breastfeeding? BMI (Body Mass Index)    76 5' 2\" (1.575 m) 2009 99% No 33.65 kg/m2       Blood Pressure from Last 3 Encounters:   17 138/87   17 132/80   16 130/78    Weight from Last 3 Encounters:   17 184 lb (83.5 kg)   17 181 lb (82.1 kg)   17 182 lb 6.4 oz (82.7 kg)               Primary Care Provider Office Phone # Fax #    Vasquez Denson [phone]       [address] [address]        Thank you!     Thank you for choosing [facility]  for your care. Our goal is always to provide you with excellent care. Hearing back from our patients is one way we can continue to improve our services. Please take a few minutes to complete the written survey that you may receive in the mail after your visit with us. Thank you!             Your Updated Medication List - Protect others around you: Learn how to safely use, store and throw away your medicines at www.disposemymeds.org.          This " list is accurate as of: 4/19/17  4:06 PM.  Always use your most recent med list.                   Brand Name Dispense Instructions for use    aspirin 81 MG tablet     100 tablet    Take 1 tablet by mouth daily.       lisinopril 5 MG tablet    PRINIVIL/ZESTRIL    90 tablet    Take 1 tablet (5 mg) by mouth daily

## 2017-04-19 NOTE — NURSING NOTE
"Chief Complaint   Patient presents with     Physical     Annual Female       Initial /87 (BP Location: Right arm, Cuff Size: Adult Regular)  Pulse 76  Ht 5' 2\" (1.575 m)  Wt 184 lb (83.5 kg)  LMP 11/23/2009  SpO2 99%  Breastfeeding? No  BMI 33.65 kg/m2 Estimated body mass index is 33.65 kg/(m^2) as calculated from the following:    Height as of this encounter: 5' 2\" (1.575 m).    Weight as of this encounter: 184 lb (83.5 kg).  Medication Reconciliation: complete   ECTOR Tate 4/19/2017         "

## 2017-04-21 ENCOUNTER — RADIANT APPOINTMENT (OUTPATIENT)
Dept: ULTRASOUND IMAGING | Facility: CLINIC | Age: 47
End: 2017-04-21
Attending: OBSTETRICS & GYNECOLOGY
Payer: COMMERCIAL

## 2017-04-21 DIAGNOSIS — R10.32 LLQ ABDOMINAL PAIN: ICD-10-CM

## 2017-04-21 PROCEDURE — 76856 US EXAM PELVIC COMPLETE: CPT

## 2017-04-21 PROCEDURE — 76830 TRANSVAGINAL US NON-OB: CPT

## 2017-05-08 ENCOUNTER — OFFICE VISIT (OUTPATIENT)
Dept: OTOLARYNGOLOGY | Facility: CLINIC | Age: 47
End: 2017-05-08

## 2017-05-08 VITALS — HEIGHT: 63 IN | BODY MASS INDEX: 32.25 KG/M2 | WEIGHT: 182 LBS

## 2017-05-08 DIAGNOSIS — E04.2 NONTOXIC MULTINODULAR GOITER: Primary | ICD-10-CM

## 2017-05-08 ASSESSMENT — PAIN SCALES - GENERAL: PAINLEVEL: NO PAIN (0)

## 2017-05-08 NOTE — NURSING NOTE
Relevant Diagnosis: thyroid goiter   Teaching Topic: total thyroidectomy   Person(s) involved in teaching: Patient and       Teaching Concerns Addressed:  Pre op teaching included the need for an H&P, NPO status pre op, hospital routines, expected recovery, activity  restrictions, antimicrobial scrub, s/s of infection, pain control methods and the importance of follow up appointments.  The patient voiced an understanding of all instructions and will call with questions.     Motivation Level:  Asks Questions:   Yes  Eager to Learn:   Yes  Cooperative:   Yes  Receptive (willing/able to accept information):   Yes     Patient  demonstrates understanding of the following:  Reason for the appointment, diagnosis and treatment plan:   Yes  Knowledge of proper use of medications and conditions for which they are ordered (with special attention to potential side effects or drug interactions):   Yes  Which situations necessitate calling provider and whom to contact:   Yes        Proper use and care of  (medical equip, care aids, etc.):   NA  Nutritional needs and diet plan:   Yes  Pain management techniques:   Yes  Patient instructed on hand hygiene:  Yes  How and/when to access community resources:   NA     Infection Prevention:  Patient   demonstrates understanding of the following:  Surgical procedure site care taught   Signs and symptoms of infection taught Yes  Wound care taught Yes     Instructional Materials Used/Given: Pre op booklet.

## 2017-05-08 NOTE — PATIENT INSTRUCTIONS
Nurse teaching given on total thyroidectomy and the patient expresses understanding and acceptance of instructions. Alberto Lemons 5/8/2017 3:41 PM

## 2017-05-08 NOTE — MR AVS SNAPSHOT
After Visit Summary   5/8/2017    Rhonda Codrero    MRN: [id number]           Patient Information     Date Of Birth          1970        Visit Information        Provider Department      5/8/2017 3:00 PM Skylar Costa MD [facility]        Today's Diagnoses     Nontoxic multinodular goiter    -  1      Care Instructions    Nurse teaching given on total thyroidectomy and the patient expresses understanding and acceptance of instructions. Alberto JIM LagunaLemons 5/8/2017 3:41 PM        Follow-ups after your visit        Your next 10 appointments already scheduled     May 31, 2017  3:00 PM CDT   Office Visit with Cristobal Tobar MD   [facility] ([address] [id number] [phone]           Bring a current list of meds and any records pertaining to this visit.  For Physicals, please bring immunization records and any forms needing to be filled out.  Please arrive 10 minutes early to complete paperwork.            Aug 18, 2017  4:00 PM CDT   (Arrive by 3:45 PM)   PAC PHONE RN ASSESSMENT with  Pac Rn   [facility] ([facility] [place])    [address] [place] [address]  4th [facility] [address] [phone]           Note: this is not an onsite visit; there is no need to come to the facility.            Aug 22, 2017   Procedure with Skylar Costa MD   [facility] ([facility] [place])    [address] [place] [address]  5th [facility] [id number] [phone]           Located in the Clinics and Surgery Center at [address] [place] [address].   parking is very convenient and highly recommended.  is a $6 flat rate fee.  Both  and self parkers should enter the main arrival plaza from [facility]; parking attendants will direct you based on your parking preference.            Sep  "2017  1:30 PM CDT   (Arrive by 1:15 PM)   Return Visit with Skylar Costa MD   [facility] ([facility] [place])    [address] [address] [phone]              Who to contact     Please call your clinic at [phone] to:    Ask questions about your health    Make or cancel appointments    Discuss your medicines    Learn about your test results    Speak to your doctor   If you have compliments or concerns about an experience at your clinic, or if you wish to file a complaint, please contact [facility] Patient Relations at [phone] or email us at [email or web address]         Additional Information About Your Visit        [facility] Information     [employer] is an electronic gateway that provides easy, online access to your medical records. With [employer], you can request a clinic appointment, read your test results, renew a prescription or communicate with your care team.     To sign up for [employer] visit the website at [email or web address]   You will be asked to enter the access code listed below, as well as some personal information. Please follow the directions to create your username and password.     Your access code is: [email or web address]  Expires: 2017  4:06 PM     Your access code will  in 90 days. If you need help or a new code, please contact your [facility] Clinic or call [phone] for assistance.        Care EveryWhere ID     This is your Care EveryWhere ID. This could be used by other organizations to access your [place] medical records  [id number]        Your Vitals Were     Height Last Period BMI (Body Mass Index)             1.6 m (5' 2.99\") 2009 32.25 kg/m2          Blood Pressure from Last 3 Encounters:   17 138/87   17 132/80   16 130/78    Weight from Last 3 Encounters:   17 82.6 kg (182 lb)   17 83.5 " kg (184 lb)   03/12/17 82.1 kg (181 lb)              We Performed the Following     Diana-Operative Worksheet (Head & Neck)        Primary Care Provider Office Phone # Fax #    Vasquez Denson [phone]       [address] [facility] [address]        Thank you!     Thank you for choosing [facility]  for your care. Our goal is always to provide you with excellent care. Hearing back from our patients is one way we can continue to improve our services. Please take a few minutes to complete the written survey that you may receive in the mail after your visit with us. Thank you!             Your Updated Medication List - Protect others around you: Learn how to safely use, store and throw away your medicines at www.disposemymeds.org.          This list is accurate as of: 5/8/17 11:59 PM.  Always use your most recent med list.                   Brand Name Dispense Instructions for use    aspirin 81 MG tablet     100 tablet    Take 1 tablet by mouth daily.       lisinopril 5 MG tablet    PRINIVIL/ZESTRIL    90 tablet    Take 1 tablet (5 mg) by mouth daily

## 2017-05-08 NOTE — LETTER
5/8/2017     RE: Rhonda Cordero  [address]  [facility] [address]     Dear Colleague,    Thank you for referring your patient, Rhonda Cordero, to the [facility] at [facility]. Please see a copy of my visit note below.    REASON FOR CONSULTATION:  I was asked by Dr. Yonatan Rosas to see this patient for surgical options for symptomatic thyroid goiter.      This is a 46-year-old female who was actually noted to have a thyroid multinodular goiter in 2015.  The concerning nodules were biopsied noted to be benign.  This then led to repeat ultrasound 1 year later in 2016 that revealed no growth.  Her thyroid function tests have remained normal.  However, over the past year or so she states that she is having some more swallowing problem.  What has been described in physician's notes in the past is a dysphagia.  She feels as if the food gets stuck and she feels a lump every time with swallowing.  Swallow studies have been done and have been noted to be normal.  She had a flexible laryngoscopy with no abnormalities identified.  The patient denies any problems with voice changes.  She does state when she lies down she feels a fullness in her neck, almost like a pressure.  With the swallowing issues and this pressure, she has tried Prilosec but feels that this has not resolved her symptoms.      The patient has no symptoms of hypo- or hyperthyroidism.  She does have a positive family history of thyroid carcinoma.  No previous head and neck radiation exposure.  No previous thyroid carcinoma.      PAST MEDICAL, SURGICAL HISTORY AND MEDICATIONS:  Have been reviewed and are available in the EMR.      REVIEW OF SYSTEMS:  A 10-point review of systems is pertinent for that noted in the HPI.      PHYSICAL EXAMINATION:  Just inspecting the neck, she clearly has an enlarged thyroid gland also noted quite prominently with swallowing.  In palpating the neck, there  is an enlarged thyroid gland measuring about 4 cm in height and about 7 cm in width.  There are multiple nodules present within it that are soft.  The superior and inferior borders are palpable.  The patient does have positive Clinton sign.  Yet I feel this might be a subjective finding.      The laboratory data included a TSH from 2016, 1.22.  Radiographic images from 2016 identify a multinodular goiter, largest nodule on the right measuring about 1 cm.      ASSESSMENT:  Symptomatic thyroid goiter.      PLAN:  I had a long discussion with the patient that indeed this is a benign thyroid goiter that has not changed in size but is becoming more symptomatic.  In this case, it is reasonable to proceed with a total thyroidectomy.  I discussed with her and her daughter at length the surgical procedure as well as lifelong requirement of thyroid hormone replacement.  We also discussed the risks to include but not limited to bleeding, infection, injury to the recurrent laryngeal nerve or nerves, potential permanent hypocalcemia or loss of airway.  The patient appears aware of this and has agreed to proceed with surgery and she would like to schedule surgery in the near future.         SID ALLEN MD           D: 2017 15:20   T: 2017 15:50   MT: nh    Name:     DINESH MUÑIZ   MRN:      [id number]        Account:      [id number]   :      1970           Service Date: 2017    Document: [id number]

## 2017-05-19 NOTE — PROGRESS NOTES
REASON FOR CONSULTATION:  I was asked by Dr. Yonatan Rosas to see this patient for surgical options for symptomatic thyroid goiter.      This is a 46-year-old female who was actually noted to have a thyroid multinodular goiter in 2015.  The concerning nodules were biopsied noted to be benign.  This then led to repeat ultrasound 1 year later in 2016 that revealed no growth.  Her thyroid function tests have remained normal.  However, over the past year or so she states that she is having some more swallowing problem.  What has been described in physician's notes in the past is a dysphagia.  She feels as if the food gets stuck and she feels a lump every time with swallowing.  Swallow studies have been done and have been noted to be normal.  She had a flexible laryngoscopy with no abnormalities identified.  The patient denies any problems with voice changes.  She does state when she lies down she feels a fullness in her neck, almost like a pressure.  With the swallowing issues and this pressure, she has tried Prilosec but feels that this has not resolved her symptoms.      The patient has no symptoms of hypo- or hyperthyroidism.  She does have a positive family history of thyroid carcinoma.  No previous head and neck radiation exposure.  No previous thyroid carcinoma.      PAST MEDICAL, SURGICAL HISTORY AND MEDICATIONS:  Have been reviewed and are available in the EMR.      REVIEW OF SYSTEMS:  A 10-point review of systems is pertinent for that noted in the HPI.      PHYSICAL EXAMINATION:  Just inspecting the neck, she clearly has an enlarged thyroid gland also noted quite prominently with swallowing.  In palpating the neck, there is an enlarged thyroid gland measuring about 4 cm in height and about 7 cm in width.  There are multiple nodules present within it that are soft.  The superior and inferior borders are palpable.  The patient does have positive Javier sign.  Yet I feel this might be a subjective finding.      The  laboratory data included a TSH from 2016, 1.22.  Radiographic images from 2016 identify a multinodular goiter, largest nodule on the right measuring about 1 cm.      ASSESSMENT:  Symptomatic thyroid goiter.      PLAN:  I had a long discussion with the patient that indeed this is a benign thyroid goiter that has not changed in size but is becoming more symptomatic.  In this case, it is reasonable to proceed with a total thyroidectomy.  I discussed with her and her daughter at length the surgical procedure as well as lifelong requirement of thyroid hormone replacement.  We also discussed the risks to include but not limited to bleeding, infection, injury to the recurrent laryngeal nerve or nerves, potential permanent hypocalcemia or loss of airway.  The patient appears aware of this and has agreed to proceed with surgery and she would like to schedule surgery in the near future.         SID ALLEN MD             D: 2017 15:20   T: 2017 15:50   MT: nh      Name:     DINESH MUÑIZ   MRN:      [id number]        Account:      [id number]   :      1970           Service Date: 2017      Document: [id number]

## 2017-05-31 ENCOUNTER — OFFICE VISIT (OUTPATIENT)
Dept: OBGYN | Facility: CLINIC | Age: 47
End: 2017-05-31
Payer: COMMERCIAL

## 2017-05-31 VITALS
WEIGHT: 183 LBS | OXYGEN SATURATION: 96 % | SYSTOLIC BLOOD PRESSURE: 128 MMHG | DIASTOLIC BLOOD PRESSURE: 88 MMHG | BODY MASS INDEX: 32.43 KG/M2 | HEART RATE: 93 BPM

## 2017-05-31 DIAGNOSIS — R10.32 LLQ ABDOMINAL PAIN: Primary | ICD-10-CM

## 2017-05-31 PROCEDURE — 99214 OFFICE O/P EST MOD 30 MIN: CPT | Performed by: OBSTETRICS & GYNECOLOGY

## 2017-05-31 NOTE — PROGRESS NOTES
Rhonda Cordero is a 46 year old female,  who presents today for follow up of LLQ pain.  The pain can be sharp and occurs when she bends over or lifts things, even a gallon of milk, or with stretching.  No alleviating factors.  It does not seem to be associated with bowel function.   She had a left salpingo-oophorectomy and right salpingectomy with me, at [facility] in .  She had a total abdominal hysterectomy in , for symptomatic uterine leiomyoma, menorrhagia, anemia. I reviewed the operative report with her.  The operative report does suggests that she had a left ovary as well as the right.     The pathology results from the  surgery show a leiomyoma,  Mucinous cystadenoma of the left ovary, the left tube and the right tube.  [facility] did not go onto [facility] until Labor Day , so I am not able to review the operative report.    Last month after seeing me for her annual exam, the ultrasound was ordered. The following report and the ultrasound films are reviewed with the patient and her .      PELVIC ULTRASOUND   2017 1:19 PM  HISTORY: Left lower quadrant pain.  COMPARISON; None.  HISTORY: Patient states a history of hysterectomy and left oophorectomy.  TECHNIQUE: Transabdominal and endovaginal technique to better  visualize adnexa.  FINDINGS: 2.6 cm right ovary with 1.7 cm cyst. There is an oval  structure in the left adnexa region which would be consistent with a  left ovary, although indeterminate as the patient has a history of  left oophorectomy. No free fluid. Uterus is absent.  IMPRESSION:  1. Small right ovarian cyst. The indeterminate oval structure left  adnexa region 2 x 3.5 x 1.6 cm in size. Appearance is nonspecific, but  would be consistent with a left ovary, although since the patient has  a history of left oophorectomy, this is indeterminate.      US Pelvis Klycctha16/  [facility]  Result Narrative   PELVIC ULTRASOUND,  10/23/09     INDICATION: Bleeding, pain and tenderness.     TECHNIQUE: Endovaginal and transcutaneous pelvic ultrasound. Endovaginal   scan performed for evaluation of the uterus.     FINDINGS: There appears to be myomatous/fibroid involvement diffusely   throughout the uterus.  It is difficult to identify specific focal uterine fibroid, with overall heterogeneous hyperechoic appearance.  Overall uterine size difficult to measure, however, appears to be approximately  12.5 cm x 7.3 cm x 8.2 cm.  It is difficult to define an endometrial complex due to the distortion from the presumed multiple fibroids; however, this possibly measures about 5 mm on one of the views. Left ovary not seen.  No adnexal masses.  Small amount of free fluid.  There appears to be a 19 mm cyst with differential internal echoes suggestive of a  hemorrhagic cyst.  Normal flow to the right ovary identified with nothing  specific to suggest ovarian torsion.        Past Medical History:   Diagnosis Date     Anemia      Hypertension goal BP (blood pressure) < 130/80 5/6/2011     Rotator cuff tendonitis 2/10/2015     Past Surgical History:   Procedure Laterality Date     C TOTAL ABDOM HYSTERECTOMY  12/9/09    [facility]     LAPAROSCOPY,TUBAL CAUTERY  2005     SALPINGO OOPHORECTOMY,R/L/LUIS  2005    left        Allergies   Allergen Reactions     Advil [Ibuprofen] Swelling     Hands swelled     Cephalexin Itching       Current Outpatient Prescriptions   Medication Sig Dispense Refill     lisinopril (PRINIVIL/ZESTRIL) 5 MG tablet Take 1 tablet (5 mg) by mouth daily 90 tablet 1     aspirin 81 MG tablet Take 1 tablet by mouth daily. 100 tablet 3       Social History     Social History     Marital status:      Spouse name: N/A     Number of children: 2     Years of education: 12     Occupational History      [employer]     Social History Main Topics     Smoking status: Never Smoker     Smokeless tobacco: Never Used     Alcohol use Yes       Comment: occ     Drug use: No     Sexual activity: Not Currently     Partners: Male     Birth control/ protection: Surgical     Other Topics Concern     Parent/Sibling W/ Cabg, Mi Or Angioplasty Before 65f 55m? No      Service No     Blood Transfusions Yes     had 3 units 10/23/2009     Caffeine Concern No     Occupational Exposure No     Hobby Hazards No     Sleep Concern Not Asked     sleeps about 5-6 hours a night     Special Diet No     Exercise Yes     walk     Bike Helmet Not Asked     doesn't ride a bike     Seat Belt Yes     Self-Exams No     info given on SBE     Social History Narrative       Family History   Problem Relation Age of Onset     CEREBROVASCULAR DISEASE Mother 50     CEREBROVASCULAR DISEASE Father 49     Hypertension Father                    CANCER No family hx of      DIABETES No family hx of      Thyroid Disease No family hx of      Glaucoma No family hx of      Macular Degeneration No family hx of        Review of Systems:  10 point ROS of systems including Constitutional, Eyes, Respiratory, Cardiovascular, Gastroenterology, Genitourinary, Integumentary, Muscularskeletal, Psychiatric were all negative except for pertinent positives noted in my HPI and in the PMH.      Exam  /88 (BP Location: Right arm, Cuff Size: Adult Regular)  Pulse 93  Wt 183 lb (83 kg)  LMP 11/23/2009  SpO2 96%  BMI 32.43 kg/m2  General:  WNWD female, NAD  Alert  Oriented x 3  Gait:  Normal  Skin:  Normal skin turgor  HEENT:  NC/AT, EOMI  Abdomen:  Non-tender, non-distended.  Pelvic exam:  Not performed  Extremities:  No clubbing, no cyanosis and no edema.      Assessment  LLQ pain      Plan  We reviewed the operative reports and the pathology reports that are available.  I am not able to view the 2005 op report to determine if the ultrasound findings correlate with the prior surgeries.    I will attempt to have the paper chart sent to me and if not, then they will request the op report from [facility].       TT 25+ min  CT and review of records, greater than 80% (the majority of the time was used to review the op report, pathology results and the scans and symptoms)    Cristobal Tobar MD            Obtain the 2005 op report through [place] paper chart.

## 2017-05-31 NOTE — NURSING NOTE
"Chief Complaint   Patient presents with     RECHECK     follow up ultrasound       Initial /88 (BP Location: Right arm, Cuff Size: Adult Regular)  Pulse 93  Wt 183 lb (83 kg)  LMP 11/23/2009  SpO2 96%  BMI 32.43 kg/m2 Estimated body mass index is 32.43 kg/(m^2) as calculated from the following:    Height as of 5/8/17: 5' 2.99\" (1.6 m).    Weight as of this encounter: 183 lb (83 kg).  Medication Reconciliation: figueroa Tate MA 5/31/2017         "

## 2017-05-31 NOTE — MR AVS SNAPSHOT
After Visit Summary   5/31/2017    Rhonda Cordero    MRN: [id number]           Patient Information     Date Of Birth          1970        Visit Information        Provider Department      5/31/2017 3:00 PM Cristobal Tobar MD [facility]        Today's Diagnoses     LLQ abdominal pain    -  1       Follow-ups after your visit        Your next 10 appointments already scheduled     Aug 18, 2017  4:00 PM CDT   (Arrive by 3:45 PM)   PAC PHONE RN ASSESSMENT with Uc Pac Rn   [facility] Preoperative Assessment Center ([facility])    [address] [place] [address]  4th [facility] [address] [phone]           Note: this is not an onsite visit; there is no need to come to the facility.            Aug 22, 2017   Procedure with Skylar Costa MD   [facility] Surgery [facility] Procedure Center ([facility])    [address] [place] [address]  5th [facility] [id number] [phone]           Located in the Clinics and Surgery Center at [address] [place] [address].   parking is very convenient and highly recommended.  is a $6 flat rate fee.  Both  and self parkers should enter the main arrival plaza from [facility]; parking attendants will direct you based on your parking preference.            Sep 11, 2017  1:30 PM CDT   (Arrive by 1:15 PM)   Return Visit with Skylar Costa MD   [facility] Ear Nose and Throat ([facility])    [address] [place] [address] [phone]              Who to contact     If you have questions or need follow up information about today's clinic visit or your schedule please contact [facility] directly at [phone].  Normal or non-critical lab and imaging results will be communicated to you by MyChart, letter or phone within 4 business days after the clinic has received the results. If you do not hear  "from us within 7 days, please contact the clinic through [employer]iplex or phone. If you have a critical or abnormal lab result, we will notify you by phone as soon as possible.  Submit refill requests through [employer] or call your pharmacy and they will forward the refill request to us. Please allow 3 business days for your refill to be completed.          Additional Information About Your Visit        Value Payment SystemsharCree Information     Mobiplex lets you send messages to your doctor, view your test results, renew your prescriptions, schedule appointments and more. To sign up, go to [email or web address] . Click on \"Log in\" on the left side of the screen, which will take you to the Welcome page. Then click on \"Sign up Now\" on the right side of the page.     You will be asked to enter the access code listed below, as well as some personal information. Please follow the directions to create your username and password.     Your access code is: [email or web address]  Expires: 2017  4:06 PM     Your access code will  in 90 days. If you need help or a new code, please call your [place] clinic or [phone].        Care EveryWhere ID     This is your Care EveryWhere ID. This could be used by other organizations to access your [place] medical records  [id number]        Your Vitals Were     Pulse Last Period Pulse Oximetry BMI (Body Mass Index)          93 2009 96% 32.43 kg/m2         Blood Pressure from Last 3 Encounters:   17 128/88   17 138/87   17 132/80    Weight from Last 3 Encounters:   17 183 lb (83 kg)   17 182 lb (82.6 kg)   17 184 lb (83.5 kg)              Today, you had the following     No orders found for display       Primary Care Provider Office Phone # Fax #    Vasquez Denson [phone]       [address] [facility] [address]        Thank you!     Thank you for choosing [facility]  for your care. Our goal " is always to provide you with excellent care. Hearing back from our patients is one way we can continue to improve our services. Please take a few minutes to complete the written survey that you may receive in the mail after your visit with us. Thank you!             Your Updated Medication List - Protect others around you: Learn how to safely use, store and throw away your medicines at www.disposemymeds.org.          This list is accurate as of: 5/31/17 11:59 PM.  Always use your most recent med list.                   Brand Name Dispense Instructions for use    aspirin 81 MG tablet     100 tablet    Take 1 tablet by mouth daily.       lisinopril 5 MG tablet    PRINIVIL/ZESTRIL    90 tablet    Take 1 tablet (5 mg) by mouth daily

## 2017-08-11 ENCOUNTER — OFFICE VISIT (OUTPATIENT)
Dept: FAMILY MEDICINE | Facility: CLINIC | Age: 47
End: 2017-08-11
Payer: COMMERCIAL

## 2017-08-11 VITALS
SYSTOLIC BLOOD PRESSURE: 137 MMHG | DIASTOLIC BLOOD PRESSURE: 87 MMHG | HEART RATE: 71 BPM | OXYGEN SATURATION: 99 % | WEIGHT: 180.38 LBS | BODY MASS INDEX: 31.96 KG/M2 | TEMPERATURE: 98 F

## 2017-08-11 DIAGNOSIS — Z01.818 PREOP GENERAL PHYSICAL EXAM: Primary | ICD-10-CM

## 2017-08-11 DIAGNOSIS — E04.9 ENLARGED THYROID: ICD-10-CM

## 2017-08-11 LAB
ALBUMIN SERPL-MCNC: 3.4 G/DL (ref 3.4–5)
ALP SERPL-CCNC: 95 U/L (ref 40–150)
ALT SERPL W P-5'-P-CCNC: 39 U/L (ref 0–50)
ANION GAP SERPL CALCULATED.3IONS-SCNC: 5 MMOL/L (ref 3–14)
AST SERPL W P-5'-P-CCNC: 15 U/L (ref 0–45)
BASOPHILS # BLD AUTO: 0 10E9/L (ref 0–0.2)
BASOPHILS NFR BLD AUTO: 0.2 %
BILIRUB SERPL-MCNC: 0.3 MG/DL (ref 0.2–1.3)
BUN SERPL-MCNC: 19 MG/DL (ref 7–30)
CALCIUM SERPL-MCNC: 8.9 MG/DL (ref 8.5–10.1)
CHLORIDE SERPL-SCNC: 105 MMOL/L (ref 94–109)
CO2 SERPL-SCNC: 30 MMOL/L (ref 20–32)
CREAT SERPL-MCNC: 0.74 MG/DL (ref 0.52–1.04)
DIFFERENTIAL METHOD BLD: ABNORMAL
EOSINOPHIL # BLD AUTO: 0.2 10E9/L (ref 0–0.7)
EOSINOPHIL NFR BLD AUTO: 1.7 %
ERYTHROCYTE [DISTWIDTH] IN BLOOD BY AUTOMATED COUNT: 13.4 % (ref 10–15)
GFR SERPL CREATININE-BSD FRML MDRD: 84 ML/MIN/1.7M2
GLUCOSE SERPL-MCNC: 108 MG/DL (ref 70–99)
HCT VFR BLD AUTO: 43.3 % (ref 35–47)
HGB BLD-MCNC: 14.1 G/DL (ref 11.7–15.7)
LYMPHOCYTES # BLD AUTO: 4.1 10E9/L (ref 0.8–5.3)
LYMPHOCYTES NFR BLD AUTO: 36.6 %
MCH RBC QN AUTO: 27.5 PG (ref 26.5–33)
MCHC RBC AUTO-ENTMCNC: 32.6 G/DL (ref 31.5–36.5)
MCV RBC AUTO: 85 FL (ref 78–100)
MONOCYTES # BLD AUTO: 0.6 10E9/L (ref 0–1.3)
MONOCYTES NFR BLD AUTO: 5.2 %
NEUTROPHILS # BLD AUTO: 6.2 10E9/L (ref 1.6–8.3)
NEUTROPHILS NFR BLD AUTO: 56.3 %
PLATELET # BLD AUTO: 271 10E9/L (ref 150–450)
POTASSIUM SERPL-SCNC: 4.2 MMOL/L (ref 3.4–5.3)
PROT SERPL-MCNC: 8 G/DL (ref 6.8–8.8)
RBC # BLD AUTO: 5.12 10E12/L (ref 3.8–5.2)
SODIUM SERPL-SCNC: 140 MMOL/L (ref 133–144)
TSH SERPL DL<=0.005 MIU/L-ACNC: 1.03 MU/L (ref 0.4–4)
WBC # BLD AUTO: 11.1 10E9/L (ref 4–11)

## 2017-08-11 PROCEDURE — 99214 OFFICE O/P EST MOD 30 MIN: CPT | Performed by: FAMILY MEDICINE

## 2017-08-11 PROCEDURE — 80050 GENERAL HEALTH PANEL: CPT | Performed by: FAMILY MEDICINE

## 2017-08-11 PROCEDURE — 36415 COLL VENOUS BLD VENIPUNCTURE: CPT | Performed by: FAMILY MEDICINE

## 2017-08-11 ASSESSMENT — PAIN SCALES - GENERAL: PAINLEVEL: NO PAIN (0)

## 2017-08-11 NOTE — MR AVS SNAPSHOT
After Visit Summary   8/11/2017    Rhonda Cordero    MRN: [id number]           Patient Information     Date Of Birth          1970        Visit Information        Provider Department      8/11/2017 3:00 PM Vasquez Denson MD [facility]        Today's Diagnoses     Preop general physical exam    -  1    Enlarged thyroid          Care Instructions      Before Your Surgery      Call your surgeon if there is any change in your health. This includes signs of a cold or flu (such as a sore throat, runny nose, cough, rash or fever).    Do not smoke, drink alcohol or take over the counter medicine (unless your surgeon or primary care doctor tells you to) for the 24 hours before and after surgery.    If you take prescribed drugs: Follow your doctor s orders about which medicines to take and which to stop until after surgery.    Eating and drinking prior to surgery: follow the instructions from your surgeon    Take a shower or bath the night before surgery. Use the soap your surgeon gave you to gently clean your skin. If you do not have soap from your surgeon, use your regular soap. Do not shave or scrub the surgery site.  Wear clean pajamas and have clean sheets on your bed.       Hold aspirin for one week prior    Hold the lisinopril just the morning of procedure    If you need anything for pain the week prior, take tylenol ( acetaminophen )    We will send you lab results    Call with problems/ questions                    Follow-ups after your visit        Your next 10 appointments already scheduled     Aug 18, 2017  4:00 PM CDT   (Arrive by 3:45 PM)   PAC PHONE RN ASSESSMENT with Uc Pac Rn   [facility] Preoperative Assessment Center ([facility])    [address] [place] [address]  4th Floor  [facility] [address] [phone]           Note: this is not an onsite visit; there is no need to come to the facility.            Aug 22, 2017   Procedure with  "Skylar Costa MD   [facility] Surgery [facility] Procedure Center ([facility])    [address] [place] [address]  5th [facility] [address] [phone]           Located in the Clinics and Surgery Center at [address] [place] [address].   parking is very convenient and highly recommended.  is a $6 flat rate fee.  Both  and self parkers should enter the main arrival plaza from [facility]; parking attendants will direct you based on your parking preference.            Sep 11, 2017  1:30 PM CDT   (Arrive by 1:15 PM)   Return Visit with Skylar Costa MD   [facility] Ear Nose and Throat ([facility] [place])    [address] [place] [address]  4th [facility] [address] [phone]              Who to contact     If you have questions or need follow up information about today's clinic visit or your schedule please contact [facility] directly at [phone].  Normal or non-critical lab and imaging results will be communicated to you by SeeClickFixhart, letter or phone within 4 business days after the clinic has received the results. If you do not hear from us within 7 days, please contact the clinic through SeeClickFixhart or phone. If you have a critical or abnormal lab result, we will notify you by phone as soon as possible.  Submit refill requests through [employer]itter or call your pharmacy and they will forward the refill request to us. Please allow 3 business days for your refill to be completed.          Additional Information About Your Visit        Benefitter Information     Benefitter lets you send messages to your doctor, view your test results, renew your prescriptions, schedule appointments and more. To sign up, go to [email or web address] . Click on \"Log in\" on the left side of the screen, which will take you to the Welcome page. Then click on \"Sign up Now\" on the right side of the page.     You will be asked to enter the access " code listed below, as well as some personal information. Please follow the directions to create your username and password.     Your access code is: [email or web address]  Expires: 2017  3:28 PM     Your access code will  in 90 days. If you need help or a new code, please call your [place] clinic or [phone].        Care EveryWhere ID     This is your Care EveryWhere ID. This could be used by other organizations to access your [place] medical records  [id number]        Your Vitals Were     Pulse Temperature Last Period Pulse Oximetry Breastfeeding? BMI (Body Mass Index)    71 98  F (36.7  C) (Oral) 2009 99% No 31.96 kg/m2       Blood Pressure from Last 3 Encounters:   17 137/87   17 128/88   17 138/87    Weight from Last 3 Encounters:   17 180 lb 6 oz (81.8 kg)   17 183 lb (83 kg)   17 182 lb (82.6 kg)              We Performed the Following     CBC with platelets differential     Comprehensive metabolic panel     TSH with free T4 reflex        Primary Care Provider Office Phone # Fax #    Vasquez Denson [phone]       [address] [facility] [address]        Equal Access to Services     KAMRYN RODRIGUEZ : Hadii robert ku hadasho Soomaali, waaxda luqadaha, qaybta kaalmada adeegyada, keesha idiin haygregg tenorio . So [facility] [phone].    ATENCIÓN: Si habla español, tiene a soto disposición servicios gratuitos de asistencia lingüística. ame al [phone].    We comply with applicable federal civil rights laws and [place] laws. We do not discriminate on the basis of race, color, national origin, age, disability sex, sexual orientation or gender identity.            Thank you!     Thank you for choosing [facility]  for your care. Our goal is always to provide you with excellent care. Hearing back from our patients is one way we can continue to improve our services. Please take a few minutes to complete  the written survey that you may receive in the mail after your visit with us. Thank you!             Your Updated Medication List - Protect others around you: Learn how to safely use, store and throw away your medicines at www.disposemymeds.org.          This list is accurate as of: 8/11/17  3:28 PM.  Always use your most recent med list.                   Brand Name Dispense Instructions for use Diagnosis    aspirin 81 MG tablet     100 tablet    Take 1 tablet by mouth daily.    Family history of stroke       lisinopril 5 MG tablet    PRINIVIL/ZESTRIL    90 tablet    Take 1 tablet (5 mg) by mouth daily    Hypertension goal BP (blood pressure) < 140/90

## 2017-08-11 NOTE — LETTER
[facility]  [address] [place]  [address]  [phone]        August 15, 2017    Rhonda Cordero  [address] [facility] [address]        Dear Rhonda,    Your pre-op labs are okay.     Results for orders placed or performed in visit on 08/11/17   CBC with platelets differential   Result Value Ref Range    WBC 11.1 (H) 4.0 - 11.0 10e9/L    RBC Count 5.12 3.8 - 5.2 10e12/L    Hemoglobin 14.1 11.7 - 15.7 g/dL    Hematocrit 43.3 35.0 - 47.0 %    MCV 85 78 - 100 fl    MCH 27.5 26.5 - 33.0 pg    MCHC 32.6 31.5 - 36.5 g/dL    RDW 13.4 10.0 - 15.0 %    Platelet Count 271 150 - 450 10e9/L    Diff Method Automated Method     % Neutrophils 56.3 %    % Lymphocytes 36.6 %    % Monocytes 5.2 %    % Eosinophils 1.7 %    % Basophils 0.2 %    Absolute Neutrophil 6.2 1.6 - 8.3 10e9/L    Absolute Lymphocytes 4.1 0.8 - 5.3 10e9/L    Absolute Monocytes 0.6 0.0 - 1.3 10e9/L    Absolute Eosinophils 0.2 0.0 - 0.7 10e9/L    Absolute Basophils 0.0 0.0 - 0.2 10e9/L   Comprehensive metabolic panel   Result Value Ref Range    Sodium 140 133 - 144 mmol/L    Potassium 4.2 3.4 - 5.3 mmol/L    Chloride 105 94 - 109 mmol/L    Carbon Dioxide 30 20 - 32 mmol/L    Anion Gap 5 3 - 14 mmol/L    Glucose 108 (H) 70 - 99 mg/dL    Urea Nitrogen 19 7 - 30 mg/dL    Creatinine 0.74 0.52 - 1.04 mg/dL    GFR Estimate 84 >60 mL/min/1.7m2    GFR Estimate If Black >90   GFR Calc   >60 mL/min/1.7m2    Calcium 8.9 8.5 - 10.1 mg/dL    Bilirubin Total 0.3 0.2 - 1.3 mg/dL    Albumin 3.4 3.4 - 5.0 g/dL    Protein Total 8.0 6.8 - 8.8 g/dL    Alkaline Phosphatase 95 40 - 150 U/L    ALT 39 0 - 50 U/L    AST 15 0 - 45 U/L   TSH with free T4 reflex   Result Value Ref Range    TSH 1.03 0.40 - 4.00 mU/L       If you have any questions please call the clinic at [phone].    Sincerely,    Vasquez LOCKEL

## 2017-08-11 NOTE — PROGRESS NOTES
[address] [address]  [phone]  Dept: [phone]    PRE-OP EVALUATION:  Today's date: 2017    Rhonda Cordero (: 1970) presents for pre-operative evaluation assessment as requested by Dr. Costa.  She requires evaluation and anesthesia risk assessment prior to undergoing surgery/procedure for treatment of thyroid mass .  Proposed procedure: thyroidectomy    Date of Surgery/ Procedure: 2017  Time of Surgery/ Procedure: am  Hospital/Surgical Facility: [facility]  Fax number for surgical facility:   Primary Physician: Vasquez Denson  Type of Anesthesia Anticipated: General    Patient has a Health Care Directive or Living Will:  NO    Preop Questions 2017   1.  Do you have a history of heart attack, stroke, stent, bypass or surgery on an artery in the head, neck, heart or legs? No   2.  Do you ever have any pain or discomfort in your chest? No   3.  Do you have a history of  Heart Failure? No   4.   Are you troubled by shortness of breath when:  walking on a level surface, or up a slight hill, or at night? No   5.  Do you currently have a cold, bronchitis or other respiratory infection? No   6.  Do you have a cough, shortness of breath, or wheezing? No   7.  Do you sometimes get pains in the calves of your legs when you walk? No   8. Do you or anyone in your family have previous history of blood clots? No   9.  Do you or does anyone in your family have a serious bleeding problem such as prolonged bleeding following surgeries or cuts? No   10. Have you ever had problems with anemia or been told to take iron pills? No   11. Have you had any abnormal blood loss such as black, tarry or bloody stools, or abnormal vaginal bleeding? No   12. Have you ever had a blood transfusion? YES -  due to heavy uterine bleeding   13. Have you or any of your relatives ever had problems with anesthesia? No   14. Do you have  sleep apnea, excessive snoring or daytime drowsiness? No   15. Do you have any prosthetic heart valves? No   16. Do you have prosthetic joints? No   17. Is there any chance that you may be pregnant? No           HPI:                                                      Brief HPI related to upcoming procedure: 46 year old female with gradually enlarging thyroid for years.  Now even causing swallowing problems. To have thyroidectomy.           MEDICAL HISTORY:                                                    Patient Active Problem List    Diagnosis Date Noted     Anup's deformity of left heel 02/24/2017     Priority: Medium     Essential hypertension with goal blood pressure less than 140/90 11/09/2016     Priority: Medium     Hypertension goal BP (blood pressure) < 140/90 01/21/2016     Priority: Medium     Gastroesophageal reflux disease, esophagitis presence not specified 01/15/2016     Priority: Medium     Cervical adenopathy 01/15/2016     Priority: Medium     Shoulder joint pain 03/15/2015     Priority: Medium     Hashimoto's thyroiditis 03/04/2015     Priority: Medium     Nontoxic multinodular goiter 03/04/2015     Priority: Medium     Impingement syndrome, shoulder 02/10/2015     Priority: Medium     Rotator cuff tendonitis 02/10/2015     Priority: Medium     CARDIOVASCULAR SCREENING; LDL GOAL LESS THAN 130 09/13/2013     Priority: Medium      Past Medical History:   Diagnosis Date     Anemia      Hypertension goal BP (blood pressure) < 130/80 5/6/2011     Rotator cuff tendonitis 2/10/2015     Past Surgical History:   Procedure Laterality Date     C TOTAL ABDOM HYSTERECTOMY  12/9/09    [facility]     LAPAROSCOPY,TUBAL CAUTERY  2005     SALPINGO OOPHORECTOMY,R/L/LUIS  2005    left     Current Outpatient Prescriptions   Medication Sig Dispense Refill     lisinopril (PRINIVIL/ZESTRIL) 5 MG tablet Take 1 tablet (5 mg) by mouth daily 90 tablet 1     aspirin 81 MG tablet Take 1 tablet by mouth daily. 100  tablet 3     OTC products: None, except as noted above    Allergies   Allergen Reactions     Advil [Ibuprofen] Swelling     Hands swelled     Cephalexin Itching      Latex Allergy: NO    Social History   Substance Use Topics     Smoking status: Never Smoker     Smokeless tobacco: Never Used     Alcohol use Yes      Comment: occ     History   Drug Use No       REVIEW OF SYSTEMS:                                                    Constitutional, neuro, ENT, endocrine, pulmonary, cardiac, gastrointestinal, genitourinary, musculoskeletal, integument and psychiatric systems are negative, except as otherwise noted.    No recent illnesses          EXAM:                                                    /87 (BP Location: Left arm, Patient Position: Chair, Cuff Size: Adult Regular)  Pulse 71  Temp 98  F (36.7  C) (Oral)  Wt 180 lb 6 oz (81.8 kg)  LMP 11/23/2009  SpO2 99%  Breastfeeding? No  BMI 31.96 kg/m2    GENERAL APPEARANCE: healthy, alert and no distress     EYES: EOMI, PERRL     HENT: ear canals and TM's normal and nose and mouth without ulcers or lesions     NECK: no adenopathy and enlarged thyroid present     RESP: lungs clear to auscultation - no rales, rhonchi or wheezes     CV: regular rates and rhythm, normal S1 S2, no S3 or S4 and no murmur, click or rub     ABDOMEN:  soft, nontender, no HSM or masses and bowel sounds normal     MS: extremities normal- no gross deformities noted, no evidence of inflammation in joints, FROM in all extremities.     SKIN: no suspicious lesions or rashes     NEURO: Normal strength and tone, sensory exam grossly normal, mentation intact and speech normal     PSYCH: mentation appears normal. and affect normal/bright     LYMPHATICS: No axillary, cervical, or supraclavicular nodes    DIAGNOSTICS:                                                    No ekg needed for this patient    She did have normal stress echo several years ago    Labs drawn, pending        Recent Labs    Lab Test  12/16/16   1124  02/04/15   1604   HGB  13.8  13.4   PLT  285  256   NA  137  140   POTASSIUM  3.9  3.6   CR  0.62  0.54        IMPRESSION:                                                    Reason for surgery/procedure: enlarged thyroid, to have thyroidectomy  Diagnosis/reason for consult: pre-op clearance    The proposed surgical procedure is considered INTERMEDIATE risk.    REVISED CARDIAC RISK INDEX  The patient has the following serious cardiovascular risks for perioperative complications such as (MI, PE, VFib and 3  AV Block):  No serious cardiac risks  INTERPRETATION: 0 risks: Class I (very low risk - 0.4% complication rate)    The patient has the following additional risks for perioperative complications:  No identified additional risks      ICD-10-CM    1. Preop general physical exam Z01.818        RECOMMENDATIONS:                                                           --Patient is to take all scheduled medications on the day of surgery EXCEPT for modifications listed below.    Patient will hold lisinopril morning of procedure    Patient will  Hold aspirin for one week prior    Not on any ibuprofen etc.    APPROVAL GIVEN to proceed with proposed procedure, without further diagnostic evaluation, providing labs are okay.    No history of heart problems.  No suspicious symptoms.            Signed Electronically by: Vasquez Denson MD    Copy of this evaluation report is provided to requesting physician.    [facility] Preop Guidelines

## 2017-08-11 NOTE — NURSING NOTE
"Chief Complaint   Patient presents with     Pre-Op Exam       Initial /87 (BP Location: Left arm, Patient Position: Chair, Cuff Size: Adult Regular)  Pulse 71  Temp 98  F (36.7  C) (Oral)  Wt 180 lb 6 oz (81.8 kg)  LMP 11/23/2009  SpO2 99%  Breastfeeding? No  BMI 31.96 kg/m2 Estimated body mass index is 31.96 kg/(m^2) as calculated from the following:    Height as of 5/8/17: 5' 2.99\" (1.6 m).    Weight as of this encounter: 180 lb 6 oz (81.8 kg).  Medication Reconciliation: complete   Bette Houston CMA      "

## 2017-08-11 NOTE — PATIENT INSTRUCTIONS
Before Your Surgery      Call your surgeon if there is any change in your health. This includes signs of a cold or flu (such as a sore throat, runny nose, cough, rash or fever).    Do not smoke, drink alcohol or take over the counter medicine (unless your surgeon or primary care doctor tells you to) for the 24 hours before and after surgery.    If you take prescribed drugs: Follow your doctor s orders about which medicines to take and which to stop until after surgery.    Eating and drinking prior to surgery: follow the instructions from your surgeon    Take a shower or bath the night before surgery. Use the soap your surgeon gave you to gently clean your skin. If you do not have soap from your surgeon, use your regular soap. Do not shave or scrub the surgery site.  Wear clean pajamas and have clean sheets on your bed.       Hold aspirin for one week prior    Hold the lisinopril just the morning of procedure    If you need anything for pain the week prior, take tylenol ( acetaminophen )    We will send you lab results    Call with problems/ questions

## 2017-08-22 ENCOUNTER — HOSPITAL ENCOUNTER (OUTPATIENT)
Facility: AMBULATORY SURGERY CENTER | Age: 47
End: 2017-08-22
Attending: SURGERY

## 2017-08-22 ENCOUNTER — ANESTHESIA EVENT (OUTPATIENT)
Dept: SURGERY | Facility: AMBULATORY SURGERY CENTER | Age: 47
End: 2017-08-22

## 2017-08-22 ENCOUNTER — ANESTHESIA (OUTPATIENT)
Dept: SURGERY | Facility: AMBULATORY SURGERY CENTER | Age: 47
End: 2017-08-22

## 2017-08-22 VITALS
WEIGHT: 180.6 LBS | TEMPERATURE: 97.2 F | SYSTOLIC BLOOD PRESSURE: 138 MMHG | DIASTOLIC BLOOD PRESSURE: 87 MMHG | OXYGEN SATURATION: 95 % | HEIGHT: 62 IN | BODY MASS INDEX: 33.23 KG/M2 | RESPIRATION RATE: 18 BRPM

## 2017-08-22 DIAGNOSIS — E04.2 GOITER, NONTOXIC, MULTINODULAR: Primary | ICD-10-CM

## 2017-08-22 DIAGNOSIS — E04.2 NONTOXIC MULTINODULAR GOITER: ICD-10-CM

## 2017-08-22 LAB — PTH-INTACT SERPL-MCNC: <3 PG/ML (ref 12–72)

## 2017-08-22 RX ORDER — LEVOTHYROXINE SODIUM 137 UG/1
137 TABLET ORAL DAILY
Qty: 30 TABLET | Refills: 3 | Status: SHIPPED | OUTPATIENT
Start: 2017-08-22 | End: 2017-12-27

## 2017-08-22 RX ORDER — ONDANSETRON 4 MG/1
4 TABLET, ORALLY DISINTEGRATING ORAL EVERY 30 MIN PRN
Status: DISCONTINUED | OUTPATIENT
Start: 2017-08-22 | End: 2017-08-23 | Stop reason: HOSPADM

## 2017-08-22 RX ORDER — ONDANSETRON 2 MG/ML
INJECTION INTRAMUSCULAR; INTRAVENOUS PRN
Status: DISCONTINUED | OUTPATIENT
Start: 2017-08-22 | End: 2017-08-22

## 2017-08-22 RX ORDER — NALOXONE HYDROCHLORIDE 0.4 MG/ML
.1-.4 INJECTION, SOLUTION INTRAMUSCULAR; INTRAVENOUS; SUBCUTANEOUS
Status: DISCONTINUED | OUTPATIENT
Start: 2017-08-22 | End: 2017-08-23 | Stop reason: HOSPADM

## 2017-08-22 RX ORDER — HYDROCODONE BITARTRATE AND ACETAMINOPHEN 5; 325 MG/1; MG/1
1-2 TABLET ORAL EVERY 4 HOURS PRN
Qty: 20 TABLET | Refills: 0 | Status: SHIPPED | OUTPATIENT
Start: 2017-08-22 | End: 2017-08-29

## 2017-08-22 RX ORDER — LIDOCAINE 40 MG/G
CREAM TOPICAL
Status: DISCONTINUED | OUTPATIENT
Start: 2017-08-22 | End: 2017-08-22 | Stop reason: HOSPADM

## 2017-08-22 RX ORDER — FENTANYL CITRATE 50 UG/ML
25-50 INJECTION, SOLUTION INTRAMUSCULAR; INTRAVENOUS
Status: DISCONTINUED | OUTPATIENT
Start: 2017-08-22 | End: 2017-08-22 | Stop reason: HOSPADM

## 2017-08-22 RX ORDER — ACETAMINOPHEN 325 MG/1
975 TABLET ORAL ONCE
Status: COMPLETED | OUTPATIENT
Start: 2017-08-22 | End: 2017-08-22

## 2017-08-22 RX ORDER — ACETAMINOPHEN 325 MG/1
975 TABLET ORAL ONCE
Status: DISCONTINUED | OUTPATIENT
Start: 2017-08-22 | End: 2017-08-22 | Stop reason: HOSPADM

## 2017-08-22 RX ORDER — SODIUM CHLORIDE, SODIUM LACTATE, POTASSIUM CHLORIDE, CALCIUM CHLORIDE 600; 310; 30; 20 MG/100ML; MG/100ML; MG/100ML; MG/100ML
INJECTION, SOLUTION INTRAVENOUS CONTINUOUS
Status: DISCONTINUED | OUTPATIENT
Start: 2017-08-22 | End: 2017-08-23 | Stop reason: HOSPADM

## 2017-08-22 RX ORDER — LIDOCAINE HYDROCHLORIDE 20 MG/ML
INJECTION, SOLUTION INFILTRATION; PERINEURAL PRN
Status: DISCONTINUED | OUTPATIENT
Start: 2017-08-22 | End: 2017-08-22

## 2017-08-22 RX ORDER — PROPOFOL 10 MG/ML
INJECTION, EMULSION INTRAVENOUS CONTINUOUS PRN
Status: DISCONTINUED | OUTPATIENT
Start: 2017-08-22 | End: 2017-08-22

## 2017-08-22 RX ORDER — KETAMINE HYDROCHLORIDE 10 MG/ML
INJECTION, SOLUTION INTRAMUSCULAR; INTRAVENOUS PRN
Status: DISCONTINUED | OUTPATIENT
Start: 2017-08-22 | End: 2017-08-22

## 2017-08-22 RX ORDER — CALCITRIOL 0.25 UG/1
0.25 CAPSULE, LIQUID FILLED ORAL 3 TIMES DAILY
Qty: 90 CAPSULE | Refills: 3 | Status: SHIPPED | OUTPATIENT
Start: 2017-08-22 | End: 2018-02-07

## 2017-08-22 RX ORDER — GABAPENTIN 300 MG/1
300 CAPSULE ORAL ONCE
Status: DISCONTINUED | OUTPATIENT
Start: 2017-08-22 | End: 2017-08-22 | Stop reason: HOSPADM

## 2017-08-22 RX ORDER — BUPIVACAINE HYDROCHLORIDE 2.5 MG/ML
INJECTION, SOLUTION EPIDURAL; INFILTRATION; INTRACAUDAL PRN
Status: DISCONTINUED | OUTPATIENT
Start: 2017-08-22 | End: 2017-08-22 | Stop reason: HOSPADM

## 2017-08-22 RX ORDER — ONDANSETRON 2 MG/ML
4 INJECTION INTRAMUSCULAR; INTRAVENOUS EVERY 30 MIN PRN
Status: DISCONTINUED | OUTPATIENT
Start: 2017-08-22 | End: 2017-08-23 | Stop reason: HOSPADM

## 2017-08-22 RX ORDER — HYDROCODONE BITARTRATE AND ACETAMINOPHEN 5; 325 MG/1; MG/1
1 TABLET ORAL EVERY 6 HOURS PRN
Status: DISCONTINUED | OUTPATIENT
Start: 2017-08-22 | End: 2017-08-23 | Stop reason: HOSPADM

## 2017-08-22 RX ORDER — DEXAMETHASONE SODIUM PHOSPHATE 10 MG/ML
INJECTION, SOLUTION INTRAMUSCULAR; INTRAVENOUS PRN
Status: DISCONTINUED | OUTPATIENT
Start: 2017-08-22 | End: 2017-08-22

## 2017-08-22 RX ORDER — SODIUM CHLORIDE, SODIUM LACTATE, POTASSIUM CHLORIDE, CALCIUM CHLORIDE 600; 310; 30; 20 MG/100ML; MG/100ML; MG/100ML; MG/100ML
INJECTION, SOLUTION INTRAVENOUS CONTINUOUS
Status: DISCONTINUED | OUTPATIENT
Start: 2017-08-22 | End: 2017-08-22 | Stop reason: HOSPADM

## 2017-08-22 RX ORDER — ALBUMIN, HUMAN INJ 5% 5 %
SOLUTION INTRAVENOUS CONTINUOUS PRN
Status: DISCONTINUED | OUTPATIENT
Start: 2017-08-22 | End: 2017-08-22

## 2017-08-22 RX ORDER — OXYCODONE HYDROCHLORIDE 5 MG/1
5-10 TABLET ORAL
Status: DISCONTINUED | OUTPATIENT
Start: 2017-08-22 | End: 2017-08-23 | Stop reason: HOSPADM

## 2017-08-22 RX ORDER — GABAPENTIN 300 MG/1
300 CAPSULE ORAL ONCE
Status: COMPLETED | OUTPATIENT
Start: 2017-08-22 | End: 2017-08-22

## 2017-08-22 RX ORDER — PROPOFOL 10 MG/ML
INJECTION, EMULSION INTRAVENOUS PRN
Status: DISCONTINUED | OUTPATIENT
Start: 2017-08-22 | End: 2017-08-22

## 2017-08-22 RX ORDER — OXYCODONE HYDROCHLORIDE 5 MG/1
5 TABLET ORAL ONCE
Status: DISCONTINUED | OUTPATIENT
Start: 2017-08-22 | End: 2017-08-23 | Stop reason: HOSPADM

## 2017-08-22 RX ADMIN — SODIUM CHLORIDE, SODIUM LACTATE, POTASSIUM CHLORIDE, CALCIUM CHLORIDE: 600; 310; 30; 20 INJECTION, SOLUTION INTRAVENOUS at 06:57

## 2017-08-22 RX ADMIN — LIDOCAINE HYDROCHLORIDE 80 MG: 20 INJECTION, SOLUTION INFILTRATION; PERINEURAL at 09:06

## 2017-08-22 RX ADMIN — GABAPENTIN 300 MG: 300 CAPSULE ORAL at 06:56

## 2017-08-22 RX ADMIN — PROPOFOL 200 MCG/KG/MIN: 10 INJECTION, EMULSION INTRAVENOUS at 09:06

## 2017-08-22 RX ADMIN — Medication 150 MCG: at 09:35

## 2017-08-22 RX ADMIN — PROPOFOL 30 MG: 10 INJECTION, EMULSION INTRAVENOUS at 09:15

## 2017-08-22 RX ADMIN — HYDROCODONE BITARTRATE AND ACETAMINOPHEN 1 TABLET: 5; 325 TABLET ORAL at 11:58

## 2017-08-22 RX ADMIN — Medication 0.5 MG: at 09:29

## 2017-08-22 RX ADMIN — ACETAMINOPHEN 975 MG: 325 TABLET ORAL at 06:56

## 2017-08-22 RX ADMIN — DEXAMETHASONE SODIUM PHOSPHATE 10 MG: 10 INJECTION, SOLUTION INTRAMUSCULAR; INTRAVENOUS at 09:06

## 2017-08-22 RX ADMIN — ALBUMIN HUMAN: 50 SOLUTION INTRAVENOUS at 09:39

## 2017-08-22 RX ADMIN — Medication 0.5 MG: at 09:17

## 2017-08-22 RX ADMIN — PROPOFOL 70 MG: 10 INJECTION, EMULSION INTRAVENOUS at 09:29

## 2017-08-22 RX ADMIN — KETAMINE HYDROCHLORIDE 10 MG: 10 INJECTION, SOLUTION INTRAMUSCULAR; INTRAVENOUS at 10:49

## 2017-08-22 RX ADMIN — PROPOFOL: 10 INJECTION, EMULSION INTRAVENOUS at 10:14

## 2017-08-22 RX ADMIN — PROPOFOL: 10 INJECTION, EMULSION INTRAVENOUS at 09:42

## 2017-08-22 RX ADMIN — SODIUM CHLORIDE, SODIUM LACTATE, POTASSIUM CHLORIDE, CALCIUM CHLORIDE: 600; 310; 30; 20 INJECTION, SOLUTION INTRAVENOUS at 10:17

## 2017-08-22 RX ADMIN — PROPOFOL 170 MG: 10 INJECTION, EMULSION INTRAVENOUS at 09:06

## 2017-08-22 RX ADMIN — Medication 150 MCG: at 10:03

## 2017-08-22 RX ADMIN — Medication 100 MCG: at 09:26

## 2017-08-22 RX ADMIN — ONDANSETRON 4 MG: 2 INJECTION INTRAMUSCULAR; INTRAVENOUS at 09:06

## 2017-08-22 NOTE — ANESTHESIA PREPROCEDURE EVALUATION
Anesthesia Evaluation     . Pt has had prior anesthetic. Type: General    No history of anesthetic complications          ROS/MED HX    ENT/Pulmonary:     (+)OZZY risk factors hypertension, , . .    Neurologic:  - neg neurologic ROS     Cardiovascular: Comment: HTN:  Well-controlled.  On lisinopril 5 mg po daily as an outpatient.      (+) hypertension----. : . . . :. . Previous cardiac testing date:results:Stress Testdate:11/16/2012 results:Normal exercise stress echocardiogram. date: results: date: results:          METS/Exercise Tolerance:  >4 METS   Hematologic:  - neg hematologic  ROS       Musculoskeletal:  - neg musculoskeletal ROS       GI/Hepatic:  - neg GI/hepatic ROS       Renal/Genitourinary:  - ROS Renal section negative       Endo:     (+) Obesity, .      Psychiatric:  - neg psychiatric ROS       Infectious Disease:  - neg infectious disease ROS       Malignancy:      - no malignancy   Other:    - neg other ROS                 Physical Exam  Normal systems: pulmonary and dental    Airway   Mallampati: II  TM distance: >3 FB  Neck ROM: full    Dental     Cardiovascular   Rhythm and rate: regular and normal      Pulmonary                     Anesthesia Plan      History & Physical Review  History and physical reviewed and following examination; no interval change.    ASA Status:  2 .    NPO Status:  > 8 hours    Plan for General and ETT with Intravenous induction. Maintenance will be Balanced.    PONV prophylaxis:  Ondansetron (or other 5HT-3) and Dexamethasone or Solumedrol  Additional equipment: Videolaryngoscope     Plan for GETA with videolaryngoscope at induction, PIV x 1, continuous remifentanil infusion secondary to request for recurrent laryngeal nerve monitoring.      Fabricio Sanchez MD  Anesthesiologist  8:09 AM  August 22, 2017        Postoperative Care  Postoperative pain management:  Multi-modal analgesia.      Consents  Anesthetic plan, risks, benefits and alternatives discussed with:   Patient.  Use of blood products discussed: No .   .                          .

## 2017-08-22 NOTE — IP AVS SNAPSHOT
MRN:[id number]                      After Visit Summary   8/22/2017    Rhonda Cordero    MRN: [id number]           Thank you!     Thank you for choosing [facility] for your care. Our goal is always to provide you with excellent care. Hearing back from our patients is one way we can continue to improve our services. Please take a few minutes to complete the written survey that you may receive in the mail after you visit with us. Thank you!        Patient Information     Date Of Birth          1970        About your hospital stay     You were admitted on:  August 22, 2017 You last received care in the:  [facility]    You were discharged on:  August 22, 2017       Who to Call     For medical emergencies, please call 911.  For non-urgent questions about your medical care, please call your primary care provider or clinic, [phone]  For questions related to your surgery, please call your surgery clinic        Attending Provider     Provider Specialty    Skylar Costa MD General Surgery       Primary Care Provider Office Phone # Fax #    Vasquez Denson [phone]      After Care Instructions     Diet Instructions       Resume pre procedure diet            Discharge Instructions - Lifting restrictions       Can lift as tolerated.            No Alcohol       No alcohol while taking pain medications.            Notify Physician        If bleeding or dressing becomes loose or dislodged            Wound care       You may shower 24-48 hours after the procedure. Do not soak your neck in water for at least two weeks.                  Your next 10 appointments already scheduled     Sep 11, 2017  1:30 PM CDT   (Arrive by 1:15 PM)   Return Visit with Skylar Costa MD   [facility] ([facility])    [address] [place] [address] [phone]              Further  "instructions from your care team       [facility]  Home Care Following Anesthesia  For 24 hours after surgery:  1. Get plenty of rest.  A responsible adult must stay with you for at least 24 hours after you leave the surgery center.  2. Do not drive or use heavy equipment.  If you have weakness or tingling, don't drive or use heavy equipment until this feeling goes away.   3. Do not drink alcohol.   4. Avoid strenuous or risky activities.  Ask for help when climbing stairs.  5. You may feel lightheaded.  IF so, sit for a few minutes before standing.  Have someone help you get up.   6. If you have nausea (feel sick to your stomach): Drink only clear liquids such as apple juice, ginger ale, broth or 7-Up.  Rest may also help.  Be sure to drink enough fluids.  Move to a regular diet as you feel able.   7. You may have a slight fever.  Call the doctor if your fever is over 100 F (37.7 C) (taken under the tongue) or lasts longer than 24 hours.  8. You may have a dry mouth, a sore throat, muscle aches or trouble sleeping. These should go away after 24 hours.  9. Do not make important or legal decisions.        Today you received a Marcaine or bupivacaine block to numb the nerves near your surgery site.  This is a block using local anesthetic or \"numbing\" medication injected around the nerves to anesthetize or \"numb\" the area supplied by those nerves.  This block is injected into the muscle layer near your surgical site.  The medication may numb the location where you had surgery for 6-18 hours, but may last up to 24 hours.  If your surgical site is an arm or leg you should be careful with your affected limb, since it is possible to injure your limb without being aware of it due to the numbing.  Until full feeling returns, you should guard against bumping or hitting your limb, and avoid extreme hot or cold temperatures on the skin.  As the block wears off, the feeling will return as a " tingling or prickly sensation near your surgical site.  You will experience more discomfort from your incision as the feeling returns.  You may want to take a pain pill (a narcotic or Tylenol if this was prescribed by your surgeon) when you start to experience mild pain before the pain beccomes more severe.  If your pain medications do not control your pain you should notifiy your surgeon.    Tips for taking pain medications  To get the best pain relief possible, remember these points:    Take pain medications as directed, before pain becomes severe.    Pain medication can upset your stomach: taking it with food may help.    Constipation is a common side effect of pain medication. Drink plenty of  fluids.    Eat foods high in fiber. Take a stool softener if recommended by your doctor or pharmacist.    Do not drink alcohol, drive or operate machinery while taking pain medications.    Ask about other ways to control pain, such as with heat, ice or relaxation.    Tylenol/Acetaminophen Consumption  To help encourage the safe use of acetaminophen, the makers of TYLENOL  have lowered the maximum daily dose for single-ingredient Extra Strength TYLENOL  (acetaminophen) products sold in the [place] from 8 pills per day (4,000 mg) to 6 pills per day (3,000 mg). The dosing interval has also changed from 2 pills every 4-6 hours to 2 pills every 6 hours.    If you feel your pain relief is insufficient, you may take Tylenol/Acetaminophen in addition to your narcotic pain medication.     Be careful not to exceed 3,000 mg of Tylenol/Acetaminophen in a 24 hour period from all sources.    If you are taking extra strength Tylenol/acetaminophen (500 mg), the maximum dose is 6 tablets in 24 hours.    If you are taking regular strength acetaminophen (325 mg), the maximum dose is 9 tablets in 24 hours.    Call a doctor for any of the followin. Signs of infection (fever, growing tenderness at the surgery site, a large amount of drainage  "or bleeding, severe pain, foul-smelling drainage, redness, swelling).  2. It has been over 8 to 10 hours since surgery and you are still not able to urinate (pass water).  3. Headache for over 24 hours.  4. Numbness, tingling or weakness the day after surgery (if you had spinal anesthesia).  Your doctor is:  Dr. Skylar Costa, ENT Otolaryngology: [phone]                 Or dial [phone] and ask for the resident on call for:  ENT Otolaryngology  For emergency care, call the:  [facility]:  [phone] (TTY for hearing impaired: [phone])                Pending Results     Date and Time Order Name Status Description    2017 1130 Parathyroid Hormone Intact In process     2017 1051 Surgical pathology exam In process             Admission Information     Date & Time Provider Department Dept. Phone    2017 Skylar Costa MD [facility] [phone]      Your Vitals Were     Blood Pressure Temperature Respirations Height Weight Last Period    130/64 98.4  F (36.9  C) (Temporal) 18 1.575 m (5' 2\") 81.9 kg (180 lb 9.6 oz) 2009    Pulse Oximetry BMI (Body Mass Index)                100% 33.03 kg/m2          [employer] Information     BagThat is an electronic gateway that provides easy, online access to your medical records. With BagThat, you can request a clinic appointment, read your test results, renew a prescription or communicate with your care team.     To sign up for BagThat visit the website at [email or web address]   You will be asked to enter the access code listed below, as well as some personal information. Please follow the directions to create your username and password.     Your access code is: [email or web address]  Expires: 2017  3:28 PM     Your access code will  in 90 days. If you need help or a new code, please contact your [facility] or call [phone] for assistance.        Care " EveryWhere ID     This is your Care EveryWhere ID. This could be used by other organizations to access your [facility] medical records  [id number]        Equal Access to Services     KAMRYN RODRIGUEZ : Nan Brewer, estela flores, katjaepi zamanlevtrenton mejiaashlitrenton, waxgato abiin hayaabreann mejiachristiana khoury la'chinmaybreann triana. So [facility] [phone].    ATENCIÓN: Si habla español, tiene a soto disposición servicios gratuitos de asistencia lingüística. Llame al [phone].    We comply with applicable federal civil rights laws and [place] laws. We do not discriminate on the basis of race, color, national origin, age, disability sex, sexual orientation or gender identity.               Review of your medicines      START taking        Dose / Directions    calcitRIOL 0.25 MCG capsule   Commonly known as:  ROCALTROL   Used for:  Goiter, nontoxic, multinodular, Nontoxic multinodular goiter        Dose:  0.25 mcg   Take 1 capsule (0.25 mcg) by mouth 3 times daily   Quantity:  90 capsule   Refills:  3       calcium carbonate antacid 1000 MG Chew   Used for:  Goiter, nontoxic, multinodular, Nontoxic multinodular goiter        Dose:  2 tablet   Take 2 tablets by mouth 3 times daily   Quantity:  60 tablet   Refills:  3       HYDROcodone-acetaminophen 5-325 MG per tablet   Commonly known as:  NORCO   Used for:  Goiter, nontoxic, multinodular        Dose:  1-2 tablet   Take 1-2 tablets by mouth every 4 hours as needed for moderate to severe pain Maximum 12 tablet(s) per day   Quantity:  20 tablet   Refills:  0       levothyroxine 137 MCG tablet   Commonly known as:  SYNTHROID/LEVOTHROID   Used for:  Goiter, nontoxic, multinodular, Nontoxic multinodular goiter        Dose:  137 mcg   Take 1 tablet (137 mcg) by mouth daily   Quantity:  30 tablet   Refills:  3         CONTINUE these medicines which have NOT CHANGED        Dose / Directions    lisinopril 5 MG tablet   Commonly known as:  PRINIVIL/ZESTRIL   Used for:  Hypertension goal BP (blood  pressure) < 140/90        Dose:  5 mg   Take 1 tablet (5 mg) by mouth daily   Quantity:  90 tablet   Refills:  1         STOP taking     aspirin 81 MG tablet                Where to get your medicines      Some of these will need a paper prescription and others can be bought over the counter. Ask your nurse if you have questions.     Bring a paper prescription for each of these medications     calcitRIOL 0.25 MCG capsule    calcium carbonate antacid 1000 MG Chew    HYDROcodone-acetaminophen 5-325 MG per tablet    levothyroxine 137 MCG tablet                Protect others around you: Learn how to safely use, store and throw away your medicines at www.disposemymeds.org.             Medication List: This is a list of all your medications and when to take them. Check marks below indicate your daily home schedule. Keep this list as a reference.      Medications           Morning Afternoon Evening Bedtime As Needed    calcitRIOL 0.25 MCG capsule   Commonly known as:  ROCALTROL   Take 1 capsule (0.25 mcg) by mouth 3 times daily                                calcium carbonate antacid 1000 MG Chew   Take 2 tablets by mouth 3 times daily                                HYDROcodone-acetaminophen 5-325 MG per tablet   Commonly known as:  NORCO   Take 1-2 tablets by mouth every 4 hours as needed for moderate to severe pain Maximum 12 tablet(s) per day                                levothyroxine 137 MCG tablet   Commonly known as:  SYNTHROID/LEVOTHROID   Take 1 tablet (137 mcg) by mouth daily                                lisinopril 5 MG tablet   Commonly known as:  PRINIVIL/ZESTRIL   Take 1 tablet (5 mg) by mouth daily

## 2017-08-22 NOTE — ANESTHESIA POSTPROCEDURE EVALUATION
Patient: Rhonda Cordero    Procedure(s):  Total Thyroidectomy - Wound Class: I-Clean    Diagnosis:Thyroid Goiter  Diagnosis Additional Information: No value filed.    Anesthesia Type:  General, ETT    Note:  Anesthesia Post Evaluation    Patient location during evaluation: Phase 2  Patient participation: Able to fully participate in evaluation  Level of consciousness: awake and alert  Pain management: adequate  Airway patency: patent  Cardiovascular status: acceptable and hemodynamically stable  Respiratory status: acceptable  Hydration status: acceptable  PONV: none     Anesthetic complications: None          Last vitals:  Vitals:    08/22/17 1130 08/22/17 1140 08/22/17 1149   BP: 130/64 (!) 138/92 143/86   Resp: 18 18 18   Temp:  36.6  C (97.9  F) 36.2  C (97.2  F)   SpO2: 100% 97% 95%         Electronically Signed By: Fabricio Sanchez MD  August 22, 2017  11:58 AM

## 2017-08-22 NOTE — ANESTHESIA CARE TRANSFER NOTE
Patient: Rhonda Cordero    Procedure(s):  Total Thyroidectomy - Wound Class: I-Clean    Diagnosis: Thyroid Goiter  Diagnosis Additional Information: No value filed.    Anesthesia Type:   General, ETT     Note:  Airway :Face Mask  Patient transferred to:PACU  Comments: Paitent to PACU on 10L O2 via FM.Patient is awake and opens eyes to command. Report to RN and transfer of care. BP: 132/82 HR: 82 RR: 20 Temp: 98.4 SpO2: 100% in RA      Vitals: (Last set prior to Anesthesia Care Transfer)    CRNA VITALS  8/22/2017 1046 - 8/22/2017 1121      8/22/2017             Pulse: 72    SpO2: 99 %    Resp Rate (observed): 30                Electronically Signed By: MADELEINE Weldon CRNA  August 22, 2017  11:21 AM

## 2017-08-22 NOTE — IP AVS SNAPSHOT
[facility]    [address] [place] [address]    Phone:  [phone]    Fax:  [phone]                                       After Visit Summary   8/22/2017    Rhonda Cordero    MRN: [id number]           After Visit Summary Signature Page     I have received my discharge instructions, and my questions have been answered. I have discussed any challenges I see with this plan with the nurse or doctor.    ..........................................................................................................................................  Patient/Patient Representative Signature      ..........................................................................................................................................  Patient Representative Print Name and Relationship to Patient    ..................................................               ................................................  Date                                            Time    ..........................................................................................................................................  Reviewed by Signature/Title    ...................................................              ..............................................  Date                                                            Time

## 2017-08-22 NOTE — BRIEF OP NOTE
[facility]    Orthopaedic Surgery  Brief Operative Note    Pre-operative diagnosis: Thyroid Goiter   Post-operative diagnosis Nontoxic, multinodular goiter   Procedure: Procedure(s):  Total Thyroidectomy - Wound Class: I-Clean   Surgeon: Skylar Costa MD   Assistants(s): Rose Raya MD   Anesthesia: General    Estimated blood loss: 10ml   Total IV fluids: (See anesthesia record)   Blood transfusion: (See anesthesia record)   Total urine output: (See anesthesia record)   Drains: None   Specimens:   ID Type Source Tests Collected by Time Destination   A :  Tissue Thyroid SURGICAL PATHOLOGY EXAM Skylar Costa MD 8/22/2017 10:23 AM       Findings: Multinodular goiter. See full operative report for details   Complications: None.    Implants: None

## 2017-08-22 NOTE — DISCHARGE INSTRUCTIONS
"[facility]  Home Care Following Anesthesia  For 24 hours after surgery:  1. Get plenty of rest.  A responsible adult must stay with you for at least 24 hours after you leave the surgery center.  2. Do not drive or use heavy equipment.  If you have weakness or tingling, don't drive or use heavy equipment until this feeling goes away.   3. Do not drink alcohol.   4. Avoid strenuous or risky activities.  Ask for help when climbing stairs.  5. You may feel lightheaded.  IF so, sit for a few minutes before standing.  Have someone help you get up.   6. If you have nausea (feel sick to your stomach): Drink only clear liquids such as apple juice, ginger ale, broth or 7-Up.  Rest may also help.  Be sure to drink enough fluids.  Move to a regular diet as you feel able.   7. You may have a slight fever.  Call the doctor if your fever is over 100 F (37.7 C) (taken under the tongue) or lasts longer than 24 hours.  8. You may have a dry mouth, a sore throat, muscle aches or trouble sleeping. These should go away after 24 hours.  9. Do not make important or legal decisions.        Today you received a Marcaine or bupivacaine block to numb the nerves near your surgery site.  This is a block using local anesthetic or \"numbing\" medication injected around the nerves to anesthetize or \"numb\" the area supplied by those nerves.  This block is injected into the muscle layer near your surgical site.  The medication may numb the location where you had surgery for 6-18 hours, but may last up to 24 hours.  If your surgical site is an arm or leg you should be careful with your affected limb, since it is possible to injure your limb without being aware of it due to the numbing.  Until full feeling returns, you should guard against bumping or hitting your limb, and avoid extreme hot or cold temperatures on the skin.  As the block wears off, the feeling will return as a tingling or prickly sensation near your " surgical site.  You will experience more discomfort from your incision as the feeling returns.  You may want to take a pain pill (a narcotic or Tylenol if this was prescribed by your surgeon) when you start to experience mild pain before the pain beccomes more severe.  If your pain medications do not control your pain you should notifiy your surgeon.    Tips for taking pain medications  To get the best pain relief possible, remember these points:    Take pain medications as directed, before pain becomes severe.    Pain medication can upset your stomach: taking it with food may help.    Constipation is a common side effect of pain medication. Drink plenty of  fluids.    Eat foods high in fiber. Take a stool softener if recommended by your doctor or pharmacist.    Do not drink alcohol, drive or operate machinery while taking pain medications.    Ask about other ways to control pain, such as with heat, ice or relaxation.    Tylenol/Acetaminophen Consumption  To help encourage the safe use of acetaminophen, the makers of TYLENOL  have lowered the maximum daily dose for single-ingredient Extra Strength TYLENOL  (acetaminophen) products sold in the [place] from 8 pills per day (4,000 mg) to 6 pills per day (3,000 mg). The dosing interval has also changed from 2 pills every 4-6 hours to 2 pills every 6 hours.    If you feel your pain relief is insufficient, you may take Tylenol/Acetaminophen in addition to your narcotic pain medication.     Be careful not to exceed 3,000 mg of Tylenol/Acetaminophen in a 24 hour period from all sources.    If you are taking extra strength Tylenol/acetaminophen (500 mg), the maximum dose is 6 tablets in 24 hours.    If you are taking regular strength acetaminophen (325 mg), the maximum dose is 9 tablets in 24 hours.    You can resume normal activities as tolerated. You can resume a regular diet as tolerated. Please do not drive a motor vehicle while taking pain medications. Your follow-up  appointment has been scheduled for you in two weeks.    Call a doctor for any of the followin. Signs of infection (fever, growing tenderness at the surgery site, a large amount of drainage or bleeding, severe pain, foul-smelling drainage, redness, swelling).  2. It has been over 8 to 10 hours since surgery and you are still not able to urinate (pass water).  3. Headache for over 24 hours.  4. Numbness, tingling or weakness the day after surgery (if you had spinal anesthesia).  Your doctor is:  Dr. Skylar Costa, ENT Otolaryngology: [phone]  Or dial [phone] and ask for the resident on call for:  ENT Otolaryngology  For emergency care, call the:  [facility]:  [phone] (TTY for hearing impaired: [phone])

## 2017-08-24 LAB — COPATH REPORT: NORMAL

## 2017-08-30 ENCOUNTER — TELEPHONE (OUTPATIENT)
Dept: OTOLARYNGOLOGY | Facility: CLINIC | Age: 47
End: 2017-08-30

## 2017-08-30 NOTE — TELEPHONE ENCOUNTER
This pt's  called ENT triage to request a return to work letter.  Pt would like to return to work on Monday.  RN will contact [employer] for Dr. Costa to assist in obtaining documentation.    Yanique BROWNN, RN  [phone]  [facility]   Head & Neck Surgery   8/30/2017 3:44 PM

## 2017-09-03 NOTE — OP NOTE
DATE OF PROCEDURE:  08/22/2017      PREOPERATIVE DIAGNOSIS:  Symptomatic thyroid goiter.      POSTOPERATIVE DIAGNOSIS:  Symptomatic thyroid goiter.      SURGICAL PROCEDURE PERFORMED:  Total thyroidectomy with 60 minutes of intraoperative recurrent laryngeal nerve monitoring.      SURGEON:  Skylar Costa MD      ASSISTANT:  Rose Raya MD      ANESTHESIA:  General endotracheal with nerve monitoring endotracheal tube.      COMPLICATIONS:  None.      ESTIMATED BLOOD LOSS:  Less than 5 mL.      CLINICAL INDICATIONS FOR THE PROCEDURE:  This is a 46-year-old female who was noted to have a multinodular goiter many years ago.  She felt that it is now causing her to feel as if she has food gets stuck in her throat and a lump each time with swallowing.  It is becoming more symptomatic.  Previous biopsies of these nodules have been benign.  Based on the fact that there were symptoms, the patient felt that she had incapacitating symptoms, and it was recommended the patient undergo a total thyroidectomy.  The surgical procedure was discussed with the patient, including but not limited to the risks of bleeding, infection, injury to the recurrent laryngeal nerve or nerves, potential permanent hypocalcemia.  The patient is aware of this and agreed to proceed with surgery and consent was obtained.  The site was marked.      DETAILS OF THE PROCEDURE:  The patient was brought to the operating room in stable condition, placed on the operating table in supine position.  After appropriate general anesthesia was obtained, the patient was prepped and draped in sterile fashion.  A timeout was then performed.  A 4 cm incision was made over the anterior neck following a natural skin crease.  The platysma was then divided and subplatysmal planes were then created.  The strap muscles were divided at the midline and retracted laterally.  We concentrated on dissecting the right lobe of thyroid gland first.  As we entered the neck, the  superior pole of the right lobe of the thyroid gland was retracted inferolaterally.  The superior thyroidal vessels were then skeletonized, clipped and then divided.  We carried our dissection inferolaterally to identify the right middle thyroid vein.  This was then skeletonized, clipped and divided.  As I rotated the gland from lateral to medial manner, I identified the right superior parathyroid gland.  A portion of this was adherent to the thyroid.  However, I was able to salvage almost all of it and maintain its viability.  The right recurrent laryngeal nerve was then identified as it entered into the cricothyroid membrane.  We followed this from a superior to inferior manner, dissecting the thyroid gland off it anteriorly.  We were able to dissect the right inferior parathyroid gland and maintain its viability.  The right inferior thyroidal vessels were skeletonized, clipped and divided.  We continued rotating the gland from lateral to medial manner, dissecting of the anterior portion of the trachea.  The left lobe of the thyroid gland was dissected in a similar manner.  However, the left parathyroid glands did not appear to be adherent to the thyroid gland.  Once the thyroid gland was completely removed, the area was copiously irrigated.  All 4 parathyroid glands appeared to be intact and viable at the conclusion of the case.  The left parathyroid glands appeared more viable than those on the right.  Both right and left recurrent laryngeal nerves were intact visibly and confirmed to be intact with nerve stimulation.  The strap muscles were then approximated at the midline using a 3-0 chromic interrupted suture.  The platysma was approximated using a 3-0 chromic interrupted suture.  The skin incision was approximated using 5-0 Monocryl running subcuticular suture.  The wound was dressed with Dermabond.  The patient was then extubated and returned to the recovery room in stable condition.  I was present during  the entire surgical procedure.         SID ALLEN MD             D: 2017 00:54   T: 2017 09:29   MT: dolores      Name:     DINESH MUÑIZ   MRN:      [id number]        Account:        [id number]   :      1970           Procedure Date: 2017      Document: [id number]

## 2017-09-11 ENCOUNTER — OFFICE VISIT (OUTPATIENT)
Dept: OTOLARYNGOLOGY | Facility: CLINIC | Age: 47
End: 2017-09-11

## 2017-09-11 VITALS — WEIGHT: 180 LBS | BODY MASS INDEX: 33.13 KG/M2 | HEIGHT: 62 IN

## 2017-09-11 DIAGNOSIS — E89.0 S/P COMPLETE THYROIDECTOMY: Primary | ICD-10-CM

## 2017-09-11 LAB — CALCIUM SERPL-MCNC: 7.7 MG/DL (ref 8.5–10.1)

## 2017-09-11 ASSESSMENT — PAIN SCALES - GENERAL: PAINLEVEL: MILD PAIN (2)

## 2017-09-11 NOTE — MR AVS SNAPSHOT
After Visit Summary   2017    Rhonda Cordero    MRN: [id number]           Patient Information     Date Of Birth          1970        Visit Information        Provider Department      2017 1:30 PM Skylar Costa MD [facility]        Today's Diagnoses     S/P complete thyroidectomy    -  1      Care Instructions    Follow up with Dr Costa is as needed. For any questions or concerns please call the RN care coordinator.   Alberto Lemons RN  [phone]              Follow-ups after your visit        Your next 10 appointments already scheduled     Sep 27, 2017  1:00 PM CDT   New Visit with Mary Pryor OD   [facility] ([facility])    [address] [place] [address] [phone]              Who to contact     Please call your clinic at [phone] to:    Ask questions about your health    Make or cancel appointments    Discuss your medicines    Learn about your test results    Speak to your doctor   If you have compliments or concerns about an experience at your clinic, or if you wish to file a complaint, please contact [facility] Patient Relations at [phone] or email us at [email or web address]         Additional Information About Your Visit        MyChart Information     Feedback-Machine is an electronic gateway that provides easy, online access to your medical records. With Feedback-Machine, you can request a clinic appointment, read your test results, renew a prescription or communicate with your care team.     To sign up for CareOnet visit the website at [email or web address]   You will be asked to enter the access code listed below, as well as some personal information. Please follow the directions to create your username and password.     Your access code is: [email or web address]  Expires: 2017  3:28 PM     Your access code will  in 90 days. If you need help or a  "new code, please contact your [facility] or call [phone] for assistance.        Care EveryWhere ID     This is your Care EveryWhere ID. This could be used by other organizations to access your [facility] medical records  [id number]        Your Vitals Were     Height Last Period BMI (Body Mass Index)             1.575 m (5' 2.01\") 11/23/2009 32.91 kg/m2          Blood Pressure from Last 3 Encounters:   08/22/17 138/87   08/11/17 137/87   05/31/17 128/88    Weight from Last 3 Encounters:   09/11/17 81.6 kg (180 lb)   08/22/17 81.9 kg (180 lb 9.6 oz)   08/11/17 81.8 kg (180 lb 6 oz)              We Performed the Following     Calcium        Primary Care Provider Office Phone # Fax #    Vasquez Denson [phone]       [address] [facility] [place] [address]        Equal Access to Services     [facility] Center[facility] : Hadii aad ku hadasho Soomaali, waaxda luqadaha, qaybta kaalmada adeegyada, waxay idiin haychinmayn erendira tenorio . So [facility] [place] [phone].    ATENCIÓN: Si habla español, tiene a soto disposición servicios gratuitos de asistencia lingüística. Llame al [phone].    We comply with applicable federal civil rights laws and [place] laws. We do not discriminate on the basis of race, color, national origin, age, disability sex, sexual orientation or gender identity.            Thank you!     Thank you for choosing [facility]  for your care. Our goal is always to provide you with excellent care. Hearing back from our patients is one way we can continue to improve our services. Please take a few minutes to complete the written survey that you may receive in the mail after your visit with us. Thank you!             Your Updated Medication List - Protect others around you: Learn how to safely use, store and throw away your medicines at www.disposemymeds.org.          This list is accurate as of: 9/11/17 11:59 PM.  Always use your most recent " med list.                   Brand Name Dispense Instructions for use Diagnosis    calcitRIOL 0.25 MCG capsule    ROCALTROL    90 capsule    Take 1 capsule (0.25 mcg) by mouth 3 times daily    Goiter, nontoxic, multinodular, Nontoxic multinodular goiter       calcium carbonate antacid 1000 MG Chew     60 tablet    Take 2 tablets by mouth 3 times daily    Goiter, nontoxic, multinodular, Nontoxic multinodular goiter       levothyroxine 137 MCG tablet    SYNTHROID/LEVOTHROID    30 tablet    Take 1 tablet (137 mcg) by mouth daily    Goiter, nontoxic, multinodular, Nontoxic multinodular goiter       lisinopril 5 MG tablet    PRINIVIL/ZESTRIL    90 tablet    TAKE ONE TABLET (5 MG) BY MOUTH EVERY DAY    Hypertension goal BP (blood pressure) < 140/90

## 2017-09-11 NOTE — LETTER
2017       RE: Dinesh Muñiz   [address]  [facility] [address]     Dear Colleague,    Thank you for referring your patient, Dinesh Muñiz, to the [facility] at [facility]. Please see a copy of my visit note below.    HISTORY OF PRESENT ILLNESS:  This is a 46-year-old female who underwent a total thyroidectomy on 2017 for symptomatic thyroid nodules.  She was discharged to home the same day.  Since the surgery, she has had no problems with inspiration or swallowing, but does admit that her voice sounds deeper to her.  She gets some voice fatigue, particularly at nighttime.  She has no problems with her voice during the day or with coughing.  No signs of aspiration.  She tolerates thin liquids and regular food without problems.  She has no symptoms of hypocalcemia.  No symptoms of hypo or hyperthyroidism.      MEDICATIONS:  Levothyroxine 137 mcg daily, 2 Tums 3 times a day and calcitriol 0.25 t.i.d.      PHYSICAL EXAMINATION:  The wound is healing well.  The Dermabond has already come off.  There is no evidence of hematoma, seroma or infection.      Pathology was reviewed with the patient.  No evidence of malignancy.      ASSESSMENT:  Followup status post total thyroidectomy.      PLAN:   1.  I reviewed with the patient wound management and wound massage.   2.  Today, we will check a calcium to determine if we can wean her calcium or not.   3.  The patient should have thyroid function tests checked at 6-8 weeks postop.  She will have them checked at her [facility].   4.  Regarding her voice, we discussed that if she is not completely satisfied with her voice then we would do an evaluation at about 2 months postop.         SID ALLEN MD             D: 2017 14:26   T: 2017 14:49   MT: tracee      Name:     DINESH MUÑIZ   MRN:      [id number]        Account:      [id number]   :       1970           Service Date: 09/11/2017      Document: [id number]

## 2017-09-11 NOTE — PROGRESS NOTES
HISTORY OF PRESENT ILLNESS:  This is a 46-year-old female who underwent a total thyroidectomy on 2017 for symptomatic thyroid nodules.  She was discharged to home the same day.  Since the surgery, she has had no problems with inspiration or swallowing, but does admit that her voice sounds deeper to her.  She gets some voice fatigue, particularly at nighttime.  She has no problems with her voice during the day or with coughing.  No signs of aspiration.  She tolerates thin liquids and regular food without problems.  She has no symptoms of hypocalcemia.  No symptoms of hypo or hyperthyroidism.      MEDICATIONS:  Levothyroxine 137 mcg daily, 2 Tums 3 times a day and calcitriol 0.25 t.i.d.      PHYSICAL EXAMINATION:  The wound is healing well.  The Dermabond has already come off.  There is no evidence of hematoma, seroma or infection.      Pathology was reviewed with the patient.  No evidence of malignancy.      ASSESSMENT:  Followup status post total thyroidectomy.      PLAN:   1.  I reviewed with the patient wound management and wound massage.   2.  Today, we will check a calcium to determine if we can wean her calcium or not.   3.  The patient should have thyroid function tests checked at 6-8 weeks postop.  She will have them checked at her [facility].   4.  Regarding her voice, we discussed that if she is not completely satisfied with her voice then we would do an evaluation at about 2 months postop.         SID ALLEN MD             D: 2017 14:26   T: 2017 14:49   MT: tracee      Name:     DINESH MUÑIZ   MRN:      [id number]        Account:      [id number]   :      1970           Service Date: 2017      Document: [id number]

## 2017-09-11 NOTE — PATIENT INSTRUCTIONS
Follow up with Dr Costa is as needed. For any questions or concerns please call the RN care coordinator.   Alberto Lemons RN  [phone]

## 2017-09-12 ENCOUNTER — TELEPHONE (OUTPATIENT)
Dept: FAMILY MEDICINE | Facility: CLINIC | Age: 47
End: 2017-09-12

## 2017-09-12 NOTE — TELEPHONE ENCOUNTER
Forms received from: [employer]   Phone number listed: [phone]   Fax listed: [phone]  Date received: 9-12-17  Form description: STD  Once forms are completed, please return to Nellie via fax.  Is patient requesting to be contacted when forms are completed: n/a  Form placed: provider desk  Lupis Draper

## 2017-09-26 ENCOUNTER — OFFICE VISIT (OUTPATIENT)
Dept: FAMILY MEDICINE | Facility: CLINIC | Age: 47
End: 2017-09-26
Payer: COMMERCIAL

## 2017-09-26 VITALS — HEART RATE: 64 BPM | OXYGEN SATURATION: 100 % | WEIGHT: 178 LBS | TEMPERATURE: 98.3 F | BODY MASS INDEX: 32.55 KG/M2

## 2017-09-26 DIAGNOSIS — S11.90XA OPEN NECK WOUND, INITIAL ENCOUNTER: Primary | ICD-10-CM

## 2017-09-26 DIAGNOSIS — E83.51 HYPOCALCEMIA: ICD-10-CM

## 2017-09-26 DIAGNOSIS — E89.0 HISTORY OF THYROIDECTOMY: ICD-10-CM

## 2017-09-26 DIAGNOSIS — R53.83 FATIGUE, UNSPECIFIED TYPE: ICD-10-CM

## 2017-09-26 PROBLEM — Z98.890 HISTORY OF THYROIDECTOMY: Status: ACTIVE | Noted: 2017-09-26

## 2017-09-26 PROBLEM — Z90.89 HISTORY OF THYROIDECTOMY: Status: ACTIVE | Noted: 2017-09-26

## 2017-09-26 LAB
ALBUMIN SERPL-MCNC: 3.5 G/DL (ref 3.4–5)
ALP SERPL-CCNC: 87 U/L (ref 40–150)
ALT SERPL W P-5'-P-CCNC: 28 U/L (ref 0–50)
ANION GAP SERPL CALCULATED.3IONS-SCNC: 8 MMOL/L (ref 3–14)
AST SERPL W P-5'-P-CCNC: 13 U/L (ref 0–45)
BASOPHILS # BLD AUTO: 0 10E9/L (ref 0–0.2)
BASOPHILS NFR BLD AUTO: 0.2 %
BILIRUB SERPL-MCNC: 0.4 MG/DL (ref 0.2–1.3)
BUN SERPL-MCNC: 19 MG/DL (ref 7–30)
CALCIUM SERPL-MCNC: 8.1 MG/DL (ref 8.5–10.1)
CHLORIDE SERPL-SCNC: 104 MMOL/L (ref 94–109)
CO2 SERPL-SCNC: 28 MMOL/L (ref 20–32)
CREAT SERPL-MCNC: 0.5 MG/DL (ref 0.52–1.04)
DIFFERENTIAL METHOD BLD: ABNORMAL
EOSINOPHIL # BLD AUTO: 0.2 10E9/L (ref 0–0.7)
EOSINOPHIL NFR BLD AUTO: 1.6 %
ERYTHROCYTE [DISTWIDTH] IN BLOOD BY AUTOMATED COUNT: 13 % (ref 10–15)
GFR SERPL CREATININE-BSD FRML MDRD: >90 ML/MIN/1.7M2
GLUCOSE SERPL-MCNC: 101 MG/DL (ref 70–99)
GRAM STN SPEC: NORMAL
GRAM STN SPEC: NORMAL
HCT VFR BLD AUTO: 39.6 % (ref 35–47)
HGB BLD-MCNC: 12.9 G/DL (ref 11.7–15.7)
LYMPHOCYTES # BLD AUTO: 5.2 10E9/L (ref 0.8–5.3)
LYMPHOCYTES NFR BLD AUTO: 40.3 %
Lab: NORMAL
MCH RBC QN AUTO: 27.5 PG (ref 26.5–33)
MCHC RBC AUTO-ENTMCNC: 32.6 G/DL (ref 31.5–36.5)
MCV RBC AUTO: 84 FL (ref 78–100)
MONOCYTES # BLD AUTO: 0.8 10E9/L (ref 0–1.3)
MONOCYTES NFR BLD AUTO: 6 %
NEUTROPHILS # BLD AUTO: 6.7 10E9/L (ref 1.6–8.3)
NEUTROPHILS NFR BLD AUTO: 51.9 %
PLATELET # BLD AUTO: 243 10E9/L (ref 150–450)
POTASSIUM SERPL-SCNC: 4.5 MMOL/L (ref 3.4–5.3)
PROT SERPL-MCNC: 7.9 G/DL (ref 6.8–8.8)
PTH-INTACT SERPL-MCNC: 7 PG/ML (ref 12–72)
RBC # BLD AUTO: 4.69 10E12/L (ref 3.8–5.2)
SODIUM SERPL-SCNC: 140 MMOL/L (ref 133–144)
SPECIMEN SOURCE: NORMAL
T4 FREE SERPL-MCNC: 1.67 NG/DL (ref 0.76–1.46)
TSH SERPL DL<=0.005 MIU/L-ACNC: 0.02 MU/L (ref 0.4–4)
WBC # BLD AUTO: 12.9 10E9/L (ref 4–11)

## 2017-09-26 PROCEDURE — 99213 OFFICE O/P EST LOW 20 MIN: CPT | Performed by: FAMILY MEDICINE

## 2017-09-26 PROCEDURE — 83970 ASSAY OF PARATHORMONE: CPT | Performed by: FAMILY MEDICINE

## 2017-09-26 PROCEDURE — 87070 CULTURE OTHR SPECIMN AEROBIC: CPT | Performed by: FAMILY MEDICINE

## 2017-09-26 PROCEDURE — 36415 COLL VENOUS BLD VENIPUNCTURE: CPT | Performed by: FAMILY MEDICINE

## 2017-09-26 PROCEDURE — 80050 GENERAL HEALTH PANEL: CPT | Performed by: FAMILY MEDICINE

## 2017-09-26 PROCEDURE — 87205 SMEAR GRAM STAIN: CPT | Performed by: FAMILY MEDICINE

## 2017-09-26 PROCEDURE — 84439 ASSAY OF FREE THYROXINE: CPT | Performed by: FAMILY MEDICINE

## 2017-09-26 ASSESSMENT — PAIN SCALES - GENERAL: PAINLEVEL: NO PAIN (0)

## 2017-09-26 NOTE — LETTER
[facility]  [address] [facility] [place]  [address]  [phone]        September 29, 2017    Rhonda Cordero  [address] [facility] [address]        Dear Rhonda,    Here are the results we discussed by phone.     No infection based on wound culture.  See the surgeon as planned.     Also call and schedule an appointment with endocrinology to address the thyroid and parathyroid labs.     Results for orders placed or performed in visit on 09/26/17   TSH   Result Value Ref Range    TSH 0.02 (L) 0.40 - 4.00 mU/L   T4 free   Result Value Ref Range    T4 Free 1.67 (H) 0.76 - 1.46 ng/dL   Comprehensive metabolic panel   Result Value Ref Range    Sodium 140 133 - 144 mmol/L    Potassium 4.5 3.4 - 5.3 mmol/L    Chloride 104 94 - 109 mmol/L    Carbon Dioxide 28 20 - 32 mmol/L    Anion Gap 8 3 - 14 mmol/L    Glucose 101 (H) 70 - 99 mg/dL    Urea Nitrogen 19 7 - 30 mg/dL    Creatinine 0.50 (L) 0.52 - 1.04 mg/dL    GFR Estimate >90 >60 mL/min/1.7m2    GFR Estimate If Black >90 >60 mL/min/1.7m2    Calcium 8.1 (L) 8.5 - 10.1 mg/dL    Bilirubin Total 0.4 0.2 - 1.3 mg/dL    Albumin 3.5 3.4 - 5.0 g/dL    Protein Total 7.9 6.8 - 8.8 g/dL    Alkaline Phosphatase 87 40 - 150 U/L    ALT 28 0 - 50 U/L    AST 13 0 - 45 U/L   CBC with platelets differential   Result Value Ref Range    WBC 12.9 (H) 4.0 - 11.0 10e9/L    RBC Count 4.69 3.8 - 5.2 10e12/L    Hemoglobin 12.9 11.7 - 15.7 g/dL    Hematocrit 39.6 35.0 - 47.0 %    MCV 84 78 - 100 fl    MCH 27.5 26.5 - 33.0 pg    MCHC 32.6 31.5 - 36.5 g/dL    RDW 13.0 10.0 - 15.0 %    Platelet Count 243 150 - 450 10e9/L    Diff Method Automated Method     % Neutrophils 51.9 %    % Lymphocytes 40.3 %    % Monocytes 6.0 %    % Eosinophils 1.6 %    % Basophils 0.2 %    Absolute Neutrophil 6.7 1.6 - 8.3 10e9/L    Absolute Lymphocytes 5.2 0.8 - 5.3 10e9/L    Absolute Monocytes 0.8 0.0 - 1.3 10e9/L    Absolute Eosinophils 0.2 0.0 - 0.7 10e9/L    Absolute  Basophils 0.0 0.0 - 0.2 10e9/L   Parathyroid Hormone Intact   Result Value Ref Range    Parathyroid Hormone Intact 7 (L) 12 - 72 pg/mL   Wound Culture Aerobic Bacterial   Result Value Ref Range    Specimen Description Neck Wound     Special Requests Specimen collected in eSwab transport (white cap)     Culture Micro No growth    Gram stain   Result Value Ref Range    Specimen Description Neck Wound     Special Requests Specimen collected in eSwab transport (white cap)     Gram Stain No organisms seen     Gram Stain No  WBC'S seen          If you have any questions please call the clinic at [phone].    Sincerely,    Vasquez Denson MD  [facility]

## 2017-09-26 NOTE — PATIENT INSTRUCTIONS
We will notify you of culture results and lab results    Hold off on antibiotics    Call for endocrinology consult

## 2017-09-26 NOTE — NURSING NOTE
"Chief Complaint   Patient presents with     Surgical Followup     Health Maintenance       Initial Pulse 64  Temp 98.3  F (36.8  C) (Oral)  Wt 178 lb (80.7 kg)  LMP 11/23/2009  SpO2 100%  Breastfeeding? No  BMI 32.55 kg/m2 Estimated body mass index is 32.55 kg/(m^2) as calculated from the following:    Height as of 9/11/17: 5' 2.01\" (1.575 m).    Weight as of this encounter: 178 lb (80.7 kg).  Medication Reconciliation: complete   Bette Houston CMA      "

## 2017-09-26 NOTE — MR AVS SNAPSHOT
After Visit Summary   9/26/2017    Rhonda Cordero    MRN: [id number]           Patient Information     Date Of Birth          1970        Visit Information        Provider Department      9/26/2017 8:00 AM Vasquez Denson MD [facility]'s Diagnoses     Open neck wound, initial encounter    -  1    History of thyroidectomy        Hypocalcemia        Fatigue, unspecified type          Care Instructions    We will notify you of culture results and lab results    Hold off on antibiotics    Call for endocrinology consult           Follow-ups after your visit        Additional Services     ENDOCRINOLOGY ADULT REFERRAL       Your provider has referred you to: FMG: [facility] [phone]   http://www.[facility]/  FMG: [facility] [phone]   http://www.[place].[facility]/[place]/  FMG: [facility] [phone]   http://www.[facility]/[place]/  UMP: Endocrinology and Diabetes Clinic [facility] [phone]   http://www.[facility]/endocrinology-and-diabetes-clinic/  UMP: [facility] [phone]   http://www.[facility].org/[facility]/irzzp-uctix-sgotpcd-[place]/  N: Endocrinology Clinic [facility] [phone]   [email or web address]  Chet Bronson M.D. [facility] [phone]  [place] [phone]  [place] [phone]      Please be aware that coverage of these services is subject to the terms and limitations of your health insurance plan.  Call member services at your health plan with any benefit or coverage questions.      Please bring the following to your appointment:    >>   Any x-rays, CTs or MRIs which have been performed.  Contact the facility where they were done to arrange for  prior to your scheduled appointment.    >>   List of  "current medications   >>   This referral request   >>   Any documents/labs given to you for this referral                  Your next 10 appointments already scheduled     Sep 27, 2017  1:00 PM CDT   New Visit with Mary Pryor OD   [facility]    [address] [place] [address]  [place] [id number] [phone]              Who to contact     If you have questions or need follow up information about today's clinic visit or your schedule please contact [facility] directly at [phone].  Normal or non-critical lab and imaging results will be communicated to you by [employer], letter or phone within 4 business days after the clinic has received the results. If you do not hear from us within 7 days, please contact the clinic through [employer] or phone. If you have a critical or abnormal lab result, we will notify you by phone as soon as possible.  Submit refill requests through Naonext or call your pharmacy and they will forward the refill request to us. Please allow 3 business days for your refill to be completed.          Additional Information About Your Visit        MyChart Information     Naonext lets you send messages to your doctor, view your test results, renew your prescriptions, schedule appointments and more. To sign up, go to [email or web address] . Click on \"Log in\" on the left side of the screen, which will take you to the Welcome page. Then click on \"Sign up Now\" on the right side of the page.     You will be asked to enter the access code listed below, as well as some personal information. Please follow the directions to create your username and password.     Your access code is: [email or web address]  Expires: 2017  3:28 PM     Your access code will  in 90 days. If you need help or a new code, please call your [facility] or [phone].        Care EveryWhere ID     This is your Care EveryWhere ID. This could be used by other " organizations to access your [place] medical records  [id number]        Your Vitals Were     Pulse Temperature Last Period Pulse Oximetry Breastfeeding? BMI (Body Mass Index)    64 98.3  F (36.8  C) (Oral) 11/23/2009 100% No 32.55 kg/m2       Blood Pressure from Last 3 Encounters:   08/22/17 138/87   08/11/17 137/87   05/31/17 128/88    Weight from Last 3 Encounters:   09/26/17 178 lb (80.7 kg)   09/11/17 180 lb (81.6 kg)   08/22/17 180 lb 9.6 oz (81.9 kg)              We Performed the Following     CBC with platelets differential     Comprehensive metabolic panel     ENDOCRINOLOGY ADULT REFERRAL     Gram stain     Parathyroid Hormone Intact     T4 free     TSH     Wound Culture Aerobic Bacterial        Primary Care Provider Office Phone # Fax #    Vasquez Denson [phone]       [address] [facility] [address]        Equal Access to Services     KAMRYN RODRIGUEZ : Hadii aad ku hadasho Solaura, waaxda luqadaha, qaybta kaalmada adeegyada, keesha tenorio . So [facility] [phone].    ATENCIÓN: Si habla español, tiene a soto disposición servicios gratuitos de asistencia lingüística. Llame al [phone].    We comply with applicable federal civil rights laws and [place] laws. We do not discriminate on the basis of race, color, national origin, age, disability sex, sexual orientation or gender identity.            Thank you!     Thank you for choosing [facility]  for your care. Our goal is always to provide you with excellent care. Hearing back from our patients is one way we can continue to improve our services. Please take a few minutes to complete the written survey that you may receive in the mail after your visit with us. Thank you!             Your Updated Medication List - Protect others around you: Learn how to safely use, store and throw away your medicines at www.disposemymeds.org.          This list is accurate as of: 9/26/17   8:28 AM.  Always use your most recent med list.                   Brand Name Dispense Instructions for use Diagnosis    calcitRIOL 0.25 MCG capsule    ROCALTROL    90 capsule    Take 1 capsule (0.25 mcg) by mouth 3 times daily    Goiter, nontoxic, multinodular, Nontoxic multinodular goiter       calcium carbonate antacid 1000 MG Chew     60 tablet    Take 2 tablets by mouth 3 times daily    Goiter, nontoxic, multinodular, Nontoxic multinodular goiter       levothyroxine 137 MCG tablet    SYNTHROID/LEVOTHROID    30 tablet    Take 1 tablet (137 mcg) by mouth daily    Goiter, nontoxic, multinodular, Nontoxic multinodular goiter       lisinopril 5 MG tablet    PRINIVIL/ZESTRIL    90 tablet    TAKE ONE TABLET (5 MG) BY MOUTH EVERY DAY    Hypertension goal BP (blood pressure) < 140/90

## 2017-09-26 NOTE — PROGRESS NOTES
SUBJECTIVE:   Rhonda Cordero is a 46 year old female who presents to clinic today for the following health issues:       Her wound from her throat surgery has been draining and burning in the area since Sunday.     none    Problem list and histories reviewed & adjusted, as indicated.  Additional history: as documented         Reviewed and updated as needed this visit by clinical staff  Tobacco  Allergies  Meds  Problems  Med Hx  Surg Hx  Fam Hx  Soc Hx        Reviewed and updated as needed this visit by Provider          surgery was August 22, over a month    2 days ago started draining    Spot on right side of wound    Some pain, minor       Full physical not done     Mentation and affect are fine    No tremor of speech or extremity    On neck overall well healed wound present horizontal on neck, in skin fold    On right side of wound is an area that is just slightly open.  Can't really see inside at all.  On expressing pressure toward this, only obtiain a drop of clearish fluid.   Serosanguinous.    No pus, no odor, no surrounding redness    Labs pending    ASSESSMENT / PLAN:  (S11.90XA) Open neck wound, initial encounter  (primary encounter diagnosis)  Comment: did do swab of area but hold off on antibiotics for now.    Plan: Wound Culture Aerobic Bacterial, Gram stain         Follow up if not resolving     (E89.0) History of thyroidectomy  Comment: of note advised patient she should see endocrinology.  S/p thyroidectomy and apparently they had to remove a parathyroid gland also.  Calcium off last time checked  Plan: ENDOCRINOLOGY ADULT REFERRAL, TSH, T4 free             (E83.51) Hypocalcemia  Comment: as above   Plan: ENDOCRINOLOGY ADULT REFERRAL, Parathyroid         Hormone Intact        Patient to schedule     (R53.83) Fatigue, unspecified type  Comment: check   Plan: Comprehensive metabolic panel, CBC with         platelets differential        Labs pending       I reviewed the patient's  medications, allergies, medical history, family history, and social history.    Vasquez Denson MD

## 2017-09-27 ENCOUNTER — OFFICE VISIT (OUTPATIENT)
Dept: OPTOMETRY | Facility: CLINIC | Age: 47
End: 2017-09-27
Payer: COMMERCIAL

## 2017-09-27 DIAGNOSIS — Z01.00 EXAMINATION OF EYES AND VISION: Primary | ICD-10-CM

## 2017-09-27 DIAGNOSIS — H52.4 PRESBYOPIA: ICD-10-CM

## 2017-09-27 PROCEDURE — 92015 DETERMINE REFRACTIVE STATE: CPT | Performed by: OPTOMETRIST

## 2017-09-27 PROCEDURE — 92014 COMPRE OPH EXAM EST PT 1/>: CPT | Performed by: OPTOMETRIST

## 2017-09-27 ASSESSMENT — TONOMETRY
OD_IOP_MMHG: 17
IOP_METHOD: APPLANATION
OS_IOP_MMHG: 16

## 2017-09-27 ASSESSMENT — SLIT LAMP EXAM - LIDS
COMMENTS: NORMAL
COMMENTS: NORMAL

## 2017-09-27 ASSESSMENT — REFRACTION_WEARINGRX
OD_SPHERE: +1.25
OS_CYLINDER: SPHERE
OS_SPHERE: +1.25
OD_CYLINDER: SPHERE
SPECS_TYPE: READERS

## 2017-09-27 ASSESSMENT — VISUAL ACUITY
OD_SC: 20/80
OS_SC: 20/20
OD_SC: 20/20
METHOD: SNELLEN - LINEAR
OS_SC: 20/60

## 2017-09-27 ASSESSMENT — REFRACTION_MANIFEST
OD_CYLINDER: SPHERE
OD_ADD: +1.50
OS_SPHERE: PLANO
OS_ADD: +1.50
OD_SPHERE: PLANO
OS_CYLINDER: SPHERE

## 2017-09-27 ASSESSMENT — EXTERNAL EXAM - RIGHT EYE: OD_EXAM: NORMAL

## 2017-09-27 ASSESSMENT — CONF VISUAL FIELD
OS_NORMAL: 1
OD_NORMAL: 1

## 2017-09-27 ASSESSMENT — EXTERNAL EXAM - LEFT EYE: OS_EXAM: NORMAL

## 2017-09-27 ASSESSMENT — CUP TO DISC RATIO
OD_RATIO: 0.3
OS_RATIO: 0.3

## 2017-09-27 NOTE — MR AVS SNAPSHOT
"              After Visit Summary   9/27/2017    Rhonda Cordero    MRN: [id number]           Patient Information     Date Of Birth          1970        Visit Information        Provider Department      9/27/2017 1:00 PM Mary Pryor OD [facility]        Today's Diagnoses     Presbyopia    -  1      Care Instructions        A final glasses prescription was given for reading, but okay to continue with over the counter reading glasses, +1.50  Return to clinic 1 year for Comprehensive Vision Exam      Mary Pryor O.D  [facility]  [address] [place] [address]. [place]  [place]  [address]    [phone]                  Follow-ups after your visit        Follow-up notes from your care team     Return in about 1 year (around 9/27/2018) for Eye Exam.      Who to contact     If you have questions or need follow up information about today's clinic visit or your schedule please contact [facility] directly at [phone].  Normal or non-critical lab and imaging results will be communicated to you by Bizakhart, letter or phone within 4 business days after the clinic has received the results. If you do not hear from us within 7 days, please contact the clinic through Bizakhart or phone. If you have a critical or abnormal lab result, we will notify you by phone as soon as possible.  Submit refill requests through Cell[employer] or call your pharmacy and they will forward the refill request to us. Please allow 3 business days for your refill to be completed.          Additional Information About Your Visit        Bizakhart Information     CellVir lets you send messages to your doctor, view your test results, renew your prescriptions, schedule appointments and more. To sign up, go to [email or web address] . Click on \"Log in\" on the left side of the screen, which will take you to the Welcome page. Then click on \"Sign up Now\" on the right side of the page.     You will be asked to enter the " access code listed below, as well as some personal information. Please follow the directions to create your username and password.     Your access code is: [email or web address]  Expires: 2017  3:28 PM     Your access code will  in 90 days. If you need help or a new code, please call your [place] clinic or [phone].        Care EveryWhere ID     This is your Care EveryWhere ID. This could be used by other organizations to access your [place] medical records  [id number]        Your Vitals Were     Last Period                   2009            Blood Pressure from Last 3 Encounters:   17 138/87   17 137/87   17 128/88    Weight from Last 3 Encounters:   17 80.7 kg (178 lb)   17 81.6 kg (180 lb)   17 81.9 kg (180 lb 9.6 oz)              We Performed the Following     EYE EXAM (SIMPLE-NONBILLABLE)     REFRACTION        Primary Care Provider Office Phone # Fax #    Vasquez Denson [phone]       [address] [facility] [address]        Equal Access to Services     [facility] AH: Hadii aad ku hadasho Soomaali, waaxda luqadaha, qaybta kaalmada adeegyada, keesha alexin haychinmayn erendira tenorio . So [facility] [place] [phone].    ATENCIÓN: Si habla español, tiene a soto disposición servicios gratuitos de asistencia lingüística. Llame al [phone].    We comply with applicable federal civil rights laws and [place] laws. We do not discriminate on the basis of race, color, national origin, age, disability sex, sexual orientation or gender identity.            Thank you!     Thank you for choosing [facility] [place]  for your care. Our goal is always to provide you with excellent care. Hearing back from our patients is one way we can continue to improve our services. Please take a few minutes to complete the written survey that you may receive in the mail after your visit with us. Thank you!             Your Updated Medication List - Protect  others around you: Learn how to safely use, store and throw away your medicines at www.disposemymeds.org.          This list is accurate as of: 9/27/17  1:48 PM.  Always use your most recent med list.                   Brand Name Dispense Instructions for use Diagnosis    calcitRIOL 0.25 MCG capsule    ROCALTROL    90 capsule    Take 1 capsule (0.25 mcg) by mouth 3 times daily    Goiter, nontoxic, multinodular, Nontoxic multinodular goiter       calcium carbonate antacid 1000 MG Chew     60 tablet    Take 2 tablets by mouth 3 times daily    Goiter, nontoxic, multinodular, Nontoxic multinodular goiter       levothyroxine 137 MCG tablet    SYNTHROID/LEVOTHROID    30 tablet    Take 1 tablet (137 mcg) by mouth daily    Goiter, nontoxic, multinodular, Nontoxic multinodular goiter       lisinopril 5 MG tablet    PRINIVIL/ZESTRIL    90 tablet    TAKE ONE TABLET (5 MG) BY MOUTH EVERY DAY    Hypertension goal BP (blood pressure) < 140/90

## 2017-09-27 NOTE — PATIENT INSTRUCTIONS
A final glasses prescription was given for reading, but okay to continue with over the counter reading glasses, +1.50  Return to clinic 1 year for Comprehensive Vision Exam      Mary Pryor O.D  [address]  [place]  [address]    [phone]

## 2017-09-27 NOTE — PROGRESS NOTES
Chief Complaint   Patient presents with     COMPREHENSIVE EYE EXAM      Accompanied by self  Last Eye Exam: 1 years ago  Dilated Previously: Yes    What are you currently using to see?  glasses       Distance Vision Acuity: Satisfied with vision    Near Vision Acuity: Not satisfied     Eye Comfort: good  Do you use eye drops? 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       OBJECTIVE: See Ophthalmology exam    ASSESSMENT:    ICD-10-CM    1. Examination of eyes and vision Z01.00    2. Presbyopia H52.4 EYE EXAM (SIMPLE-NONBILLABLE)     REFRACTION      PLAN:   A final glasses prescription was given for reading, but okay to continue with over the counter reading glasses, +1.50  Return to clinic 1 year for Comprehensive Vision Exam      Mary Pryor O.D  [address]  [address]    [phone]

## 2017-09-28 ENCOUNTER — TELEPHONE (OUTPATIENT)
Dept: FAMILY MEDICINE | Facility: CLINIC | Age: 47
End: 2017-09-28

## 2017-09-28 LAB
BACTERIA SPEC CULT: NO GROWTH
Lab: NORMAL
SPECIMEN SOURCE: NORMAL

## 2017-09-28 NOTE — TELEPHONE ENCOUNTER
Reason for Call:  Request for results:    Name of test or procedure: Patient called for test results done at most recent appointment.  Surgical would is worsening.     Date of test of procedure: 9/26/17    Location of the test or procedure: [facility]    OK to leave the result message on voice mail or with a family member? YES    Phone number Patient can be reached at:  Home number on file [phone] (home)    Additional comments: Any    Call taken on 9/28/2017 at 3:26 PM by Mohsen Josue

## 2017-09-28 NOTE — TELEPHONE ENCOUNTER
Labs not yet reviewed by Dr. Denson who saw patient on 9/26 for draining neck wound.    See note from that visit:    On neck overall well healed wound present horizontal on neck, in skin fold     On right side of wound is an area that is just slightly open.  Can't really see inside at all.  On expressing pressure toward this, only obtiain a drop of clearish fluid.   Serosanguinous.     No pus, no odor, no surrounding redness     Labs pending     ASSESSMENT / PLAN:  (S11.90XA) Open neck wound, initial encounter  (primary encounter diagnosis)  Comment: did do swab of area but hold off on antibiotics for now.    Plan: Wound Culture Aerobic Bacterial, Gram stain         Follow up if not resolving             Wound culture is negative but not resulted by provider.    Attempted to call patient at home number, left message on answering service requesting call back to clinic to discuss the status of her wound and advise provider will respond with results.  Routed to Dr. Denson to advise on the lab results.  Annika Nice RN  [facility]

## 2017-09-29 NOTE — PROGRESS NOTES
Here are the results we discussed by phone.    No infection based on wound culture.  See the surgeon as planned.    Also call and schedule an appointment with endocrinology to address the thyroid and parathyroid labs.    Vasquez Denson MD

## 2017-09-29 NOTE — TELEPHONE ENCOUNTER
Patient called back.    Discussed in detail the lab results.    Patient has appointment to see surgeon early next week.    No infection based on wound culture.    Also I urged patient to schedule with endocrinology to address parathyroid and thyroid issues      Vasquez Denson MD

## 2017-12-27 ENCOUNTER — TELEPHONE (OUTPATIENT)
Dept: FAMILY MEDICINE | Facility: CLINIC | Age: 47
End: 2017-12-27

## 2017-12-27 DIAGNOSIS — E04.2 NONTOXIC MULTINODULAR GOITER: ICD-10-CM

## 2017-12-27 DIAGNOSIS — E04.2 GOITER, NONTOXIC, MULTINODULAR: ICD-10-CM

## 2017-12-27 RX ORDER — LEVOTHYROXINE SODIUM 137 UG/1
137 TABLET ORAL DAILY
Qty: 30 TABLET | Refills: 0 | Status: SHIPPED | OUTPATIENT
Start: 2017-12-27 | End: 2018-01-08

## 2017-12-27 NOTE — TELEPHONE ENCOUNTER
Requested Prescriptions   Pending Prescriptions Disp Refills     levothyroxine (SYNTHROID/LEVOTHROID) 137 MCG tablet 30 tablet 3     Sig: Take 1 tablet (137 mcg) by mouth daily    There is no refill protocol information for this order        levothyroxine     Last Written Prescription Date: 8/22/2017  Last Quantity: 30, # refills: 3  Last Office Visit with G, [facility] or [facility] prescribing provider: 9/26/2017        TSH   Date Value Ref Range Status   09/26/2017 0.02 (L) 0.40 - 4.00 mU/L Final     Routing refill request to provider for review/approval because:  Labs out of range:  TSH      Latrice Dial RN  [facility]

## 2017-12-27 NOTE — TELEPHONE ENCOUNTER
Reason for Call:  Medication or medication refill:    Do you use a [place] [facility]?  Name of the pharmacy and phone number for the current request:  [facility]     Name of the medication requested: levothyroxine (SYNTHROID/LEVOTHROID) 137 MCG tablet    Other request:     Can we leave a detailed message on this number? YES    Phone number patient can be reached at: Home number on file [phone] (home)    Best Time:     Call taken on 12/27/2017 at 4:56 PM by Юлия Sage

## 2017-12-28 NOTE — TELEPHONE ENCOUNTER
I okayed one month, but please call patient to clarify if she saw the endocrinologist or not.  We had done a referral for in late September.  She had abnormal thyroid and also parathyroid labs.  If she hasn't already, she should see the endocrinologist.  If needed give her the numbers to call ( listed on referral from Sept 26).    Thanks    Vasquez Denson MD

## 2017-12-28 NOTE — TELEPHONE ENCOUNTER
Attempt # 1  Called patient at home number.  Was call answered?  Yes, relayed below information and gave phone number for [place] at request of patient. Strongly encouraged patient to see the Endocrinologist. Patient verbalized understanding and agreement with plan.     Latrice Dial RN  [facility]        Note     Your provider has referred you to: FMG: [facility] [phone]   http://www.[facility]/  FMG: [facility] [phone]   http://www.[place].[facility]/[place]/  FMG: [facility] [phone]   http://www.[place].[facility]/[place]/  UMP: Endocrinology and Diabetes Clinic [facility] [phone]   http://www.[facility].org/[facility]/endocrinology-and-diabetes-clinic/  UMP: [facility] [place] [phone]   http://www.[facility].org/Clinics/cxpex-xaxri-ubsjooq-[place]/  N: Endocrinology Clinic [facility] [phone]   [email or web address]  [facility] [place] [phone]  [place] [phone]  [place] [phone]

## 2018-01-08 ENCOUNTER — OFFICE VISIT (OUTPATIENT)
Dept: FAMILY MEDICINE | Facility: CLINIC | Age: 48
End: 2018-01-08
Payer: COMMERCIAL

## 2018-01-08 VITALS
OXYGEN SATURATION: 97 % | HEART RATE: 80 BPM | HEIGHT: 62 IN | DIASTOLIC BLOOD PRESSURE: 87 MMHG | SYSTOLIC BLOOD PRESSURE: 138 MMHG | BODY MASS INDEX: 32.57 KG/M2 | WEIGHT: 177 LBS | TEMPERATURE: 97.9 F

## 2018-01-08 DIAGNOSIS — L85.3 XEROSIS CUTIS: ICD-10-CM

## 2018-01-08 DIAGNOSIS — E89.0 HISTORY OF THYROIDECTOMY: Primary | ICD-10-CM

## 2018-01-08 DIAGNOSIS — E04.2 GOITER, NONTOXIC, MULTINODULAR: ICD-10-CM

## 2018-01-08 DIAGNOSIS — E04.2 NONTOXIC MULTINODULAR GOITER: ICD-10-CM

## 2018-01-08 DIAGNOSIS — R53.83 FATIGUE, UNSPECIFIED TYPE: ICD-10-CM

## 2018-01-08 DIAGNOSIS — E83.51 HYPOCALCEMIA: ICD-10-CM

## 2018-01-08 DIAGNOSIS — R73.01 IMPAIRED FASTING GLUCOSE: ICD-10-CM

## 2018-01-08 DIAGNOSIS — I10 ESSENTIAL HYPERTENSION WITH GOAL BLOOD PRESSURE LESS THAN 140/90: ICD-10-CM

## 2018-01-08 LAB
ALBUMIN SERPL-MCNC: 3.8 G/DL (ref 3.4–5)
ALP SERPL-CCNC: 96 U/L (ref 40–150)
ALT SERPL W P-5'-P-CCNC: 31 U/L (ref 0–50)
ANION GAP SERPL CALCULATED.3IONS-SCNC: 11 MMOL/L (ref 3–14)
AST SERPL W P-5'-P-CCNC: 15 U/L (ref 0–45)
BASOPHILS # BLD AUTO: 0 10E9/L (ref 0–0.2)
BASOPHILS NFR BLD AUTO: 0.2 %
BILIRUB SERPL-MCNC: 0.2 MG/DL (ref 0.2–1.3)
BUN SERPL-MCNC: 22 MG/DL (ref 7–30)
CALCIUM SERPL-MCNC: 8.5 MG/DL (ref 8.5–10.1)
CHLORIDE SERPL-SCNC: 104 MMOL/L (ref 94–109)
CO2 SERPL-SCNC: 26 MMOL/L (ref 20–32)
CREAT SERPL-MCNC: 0.69 MG/DL (ref 0.52–1.04)
DIFFERENTIAL METHOD BLD: ABNORMAL
EOSINOPHIL # BLD AUTO: 0.2 10E9/L (ref 0–0.7)
EOSINOPHIL NFR BLD AUTO: 1.9 %
ERYTHROCYTE [DISTWIDTH] IN BLOOD BY AUTOMATED COUNT: 13 % (ref 10–15)
GFR SERPL CREATININE-BSD FRML MDRD: >90 ML/MIN/1.7M2
GLUCOSE SERPL-MCNC: 83 MG/DL (ref 70–99)
HBA1C MFR BLD: 5.8 % (ref 4.3–6)
HCT VFR BLD AUTO: 42.5 % (ref 35–47)
HGB BLD-MCNC: 13.6 G/DL (ref 11.7–15.7)
LYMPHOCYTES # BLD AUTO: 5.5 10E9/L (ref 0.8–5.3)
LYMPHOCYTES NFR BLD AUTO: 44.9 %
MCH RBC QN AUTO: 26.5 PG (ref 26.5–33)
MCHC RBC AUTO-ENTMCNC: 32 G/DL (ref 31.5–36.5)
MCV RBC AUTO: 83 FL (ref 78–100)
MONOCYTES # BLD AUTO: 0.6 10E9/L (ref 0–1.3)
MONOCYTES NFR BLD AUTO: 5.2 %
NEUTROPHILS # BLD AUTO: 5.9 10E9/L (ref 1.6–8.3)
NEUTROPHILS NFR BLD AUTO: 47.8 %
PLATELET # BLD AUTO: 297 10E9/L (ref 150–450)
POTASSIUM SERPL-SCNC: 4.7 MMOL/L (ref 3.4–5.3)
PROT SERPL-MCNC: 8.4 G/DL (ref 6.8–8.8)
PTH-INTACT SERPL-MCNC: 14 PG/ML (ref 12–72)
RBC # BLD AUTO: 5.13 10E12/L (ref 3.8–5.2)
SODIUM SERPL-SCNC: 141 MMOL/L (ref 133–144)
T4 FREE SERPL-MCNC: 1.66 NG/DL (ref 0.76–1.46)
TSH SERPL DL<=0.005 MIU/L-ACNC: <0.01 MU/L (ref 0.4–4)
WBC # BLD AUTO: 12.3 10E9/L (ref 4–11)

## 2018-01-08 PROCEDURE — 36415 COLL VENOUS BLD VENIPUNCTURE: CPT | Performed by: FAMILY MEDICINE

## 2018-01-08 PROCEDURE — 80050 GENERAL HEALTH PANEL: CPT | Performed by: FAMILY MEDICINE

## 2018-01-08 PROCEDURE — 83970 ASSAY OF PARATHORMONE: CPT | Performed by: FAMILY MEDICINE

## 2018-01-08 PROCEDURE — 84439 ASSAY OF FREE THYROXINE: CPT | Performed by: FAMILY MEDICINE

## 2018-01-08 PROCEDURE — 99214 OFFICE O/P EST MOD 30 MIN: CPT | Performed by: FAMILY MEDICINE

## 2018-01-08 PROCEDURE — 83036 HEMOGLOBIN GLYCOSYLATED A1C: CPT | Performed by: FAMILY MEDICINE

## 2018-01-08 RX ORDER — LEVOTHYROXINE SODIUM 137 UG/1
137 TABLET ORAL DAILY
Qty: 30 TABLET | Refills: 0 | Status: SHIPPED | OUTPATIENT
Start: 2018-01-08 | End: 2018-02-21 | Stop reason: DRUGHIGH

## 2018-01-08 RX ORDER — LISINOPRIL 10 MG/1
10 TABLET ORAL DAILY
Qty: 90 TABLET | Refills: 1 | Status: SHIPPED | OUTPATIENT
Start: 2018-01-08 | End: 2018-07-20

## 2018-01-08 ASSESSMENT — PAIN SCALES - GENERAL: PAINLEVEL: NO PAIN (0)

## 2018-01-08 NOTE — NURSING NOTE
"Chief Complaint   Patient presents with     Derm Problem     Health Maintenance       Initial /83 (BP Location: Left arm, Patient Position: Chair, Cuff Size: Adult Regular)  Pulse 80  Temp 97.9  F (36.6  C) (Oral)  Ht 5' 2.01\" (1.575 m)  Wt 177 lb (80.3 kg)  LMP 11/23/2009  SpO2 97%  Breastfeeding? No  BMI 32.37 kg/m2 Estimated body mass index is 32.37 kg/(m^2) as calculated from the following:    Height as of this encounter: 5' 2.01\" (1.575 m).    Weight as of this encounter: 177 lb (80.3 kg).  Medication Reconciliation: figueroa Houston CMA      "

## 2018-01-08 NOTE — LETTER
[facility]  [address] [place]  [address]  [phone]        January 10, 2018    Rhonda Cordero  [address]  [facility] [id number]        Dear Rhonda,    The thyroid tests are similar to last time.  As we discussed, advise you see an endocrinologist soon.   Call to schedule appointment.  If you can't get in with them in the next few weeks, let me know.     Other labs are stable.     Results for orders placed or performed in visit on 01/08/18   TSH   Result Value Ref Range    TSH <0.01 (L) 0.40 - 4.00 mU/L   T4 free   Result Value Ref Range    T4 Free 1.66 (H) 0.76 - 1.46 ng/dL   Comprehensive metabolic panel   Result Value Ref Range    Sodium 141 133 - 144 mmol/L    Potassium 4.7 3.4 - 5.3 mmol/L    Chloride 104 94 - 109 mmol/L    Carbon Dioxide 26 20 - 32 mmol/L    Anion Gap 11 3 - 14 mmol/L    Glucose 83 70 - 99 mg/dL    Urea Nitrogen 22 7 - 30 mg/dL    Creatinine 0.69 0.52 - 1.04 mg/dL    GFR Estimate >90 >60 mL/min/1.7m2    GFR Estimate If Black >90 >60 mL/min/1.7m2    Calcium 8.5 8.5 - 10.1 mg/dL    Bilirubin Total 0.2 0.2 - 1.3 mg/dL    Albumin 3.8 3.4 - 5.0 g/dL    Protein Total 8.4 6.8 - 8.8 g/dL    Alkaline Phosphatase 96 40 - 150 U/L    ALT 31 0 - 50 U/L    AST 15 0 - 45 U/L   Parathyroid Hormone Intact   Result Value Ref Range    Parathyroid Hormone Intact 14 12 - 72 pg/mL   CBC with platelets differential   Result Value Ref Range    WBC 12.3 (H) 4.0 - 11.0 10e9/L    RBC Count 5.13 3.8 - 5.2 10e12/L    Hemoglobin 13.6 11.7 - 15.7 g/dL    Hematocrit 42.5 35.0 - 47.0 %    MCV 83 78 - 100 fl    MCH 26.5 26.5 - 33.0 pg    MCHC 32.0 31.5 - 36.5 g/dL    RDW 13.0 10.0 - 15.0 %    Platelet Count 297 150 - 450 10e9/L    Diff Method Automated Method     % Neutrophils 47.8 %    % Lymphocytes 44.9 %    % Monocytes 5.2 %    % Eosinophils 1.9 %    % Basophils 0.2 %    Absolute Neutrophil 5.9 1.6 - 8.3 10e9/L    Absolute Lymphocytes 5.5 (H) 0.8 - 5.3 10e9/L     Absolute Monocytes 0.6 0.0 - 1.3 10e9/L    Absolute Eosinophils 0.2 0.0 - 0.7 10e9/L    Absolute Basophils 0.0 0.0 - 0.2 10e9/L   Hemoglobin A1c   Result Value Ref Range    Hemoglobin A1C 5.8 4.3 - 6.0 %       If you have any questions please call the clinic at [phone].    Sincerely,    Vasquez LOCKEL

## 2018-01-08 NOTE — PATIENT INSTRUCTIONS
Use lots of moisturizing lotion    Try not to scratch at skin    We will send you lab results    See ENT     Call to schedule endocrinology appointment    Increase lisinopril to 10 mg daily

## 2018-01-08 NOTE — MR AVS SNAPSHOT
After Visit Summary   1/8/2018    Rhonda Cordero    MRN: [id number]           Patient Information     Date Of Birth          1970        Visit Information        Provider Department      1/8/2018 2:40 PM Vasquez Denson MD [facility]        Today's Diagnoses     History of thyroidectomy    -  1    Hypocalcemia        Impaired fasting glucose        Fatigue, unspecified type        Xerosis cutis        Goiter, nontoxic, multinodular        Nontoxic multinodular goiter        Essential hypertension with goal blood pressure less than 140/90          Care Instructions    Use lots of moisturizing lotion    Try not to scratch at skin    We will send you lab results    See ENT     Call to schedule endocrinology appointment    Increase lisinopril to 10 mg daily              Follow-ups after your visit        Additional Services     ENDOCRINOLOGY ADULT REFERRAL       Your provider has referred you to: FMG: [facility] [phone]   [email or web address]  [facility]: [facility] [phone]   [email or web address]  FMG: [facility] [phone]   [email or web address]  UMP: [facility] [phone]   [email or web address]  [place]: [facility] [phone]   [email or web address]  N: End[facility]ology Clinic [facility] [phone]   [email or web address]  Chet Bronson M.D. PA [facility] [place] [phone]  [place] [phone]  [place] [phone]      Please be aware that coverage of these services is subject to the terms and limitations of your health insurance plan.  Call member services at your health plan with any benefit or coverage  "questions.      Please bring the following to your appointment:    >>   Any x-rays, CTs or MRIs which have been performed.  Contact the facility where they were done to arrange for  prior to your scheduled appointment.    >>   List of current medications   >>   This referral request   >>   Any documents/labs given to you for this referral                  Your next 10 appointments already scheduled     Gerhard 10, 2018  2:30 PM CST   Return Visit with Ernst Rosas MD   [facility] ([facility]    [address] [place] [id number] [phone]              Who to contact     If you have questions or need follow up information about today's clinic visit or your schedule please contact [facility] directly at [phone].  Normal or non-critical lab and imaging results will be communicated to you by MyChart, letter or phone within 4 business days after the clinic has received the results. If you do not hear from us within 7 days, please contact the clinic through MyChart or phone. If you have a critical or abnormal lab result, we will notify you by phone as soon as possible.  Submit refill requests through [employer] or call your pharmacy and they will forward the refill request to us. Please allow 3 business days for your refill to be completed.          Additional Information About Your Visit        [employer] Information     [employer] lets you send messages to your doctor, view your test results, renew your prescriptions, schedule appointments and more. To sign up, go to [email or web address] . Click on \"Log in\" on the left side of the screen, which will take you to the Welcome page. Then click on \"Sign up Now\" on the right side of the page.     You will be asked to enter the access code listed below, as well as some personal information. Please follow the directions to create your username and password.     Your access code is: " [email or web address]  Expires: 2018  3:04 PM     Your access code will  in 90 days. If you need help or a new code, please call your [place] clinic or [phone].        Care EveryWhere ID     This is your Care EveryWhere ID. This could be used by other organizations to access your [place] medical records  [id number]        Your Vitals Were     Pulse Temperature Height Last Period Pulse Oximetry Breastfeeding?    80 97.9  F (36.6  C) (Oral) 5' 2.01\" (1.575 m) 2009 97% No    BMI (Body Mass Index)                   32.37 kg/m2            Blood Pressure from Last 3 Encounters:   18 138/87   17 138/87   17 137/87    Weight from Last 3 Encounters:   18 177 lb (80.3 kg)   17 178 lb (80.7 kg)   17 180 lb (81.6 kg)              We Performed the Following     CBC with platelets differential     Comprehensive metabolic panel     ENDOCRINOLOGY ADULT REFERRAL     Hemoglobin A1c     Parathyroid Hormone Intact     T4 free     TSH          Today's Medication Changes          These changes are accurate as of: 18  3:07 PM.  If you have any questions, ask your nurse or doctor.               These medicines have changed or have updated prescriptions.        Dose/Directions    lisinopril 10 MG tablet   Commonly known as:  PRINIVIL/ZESTRIL   This may have changed:  See the new instructions.   Used for:  Essential hypertension with goal blood pressure less than 140/90   Changed by:  Vasquez Denson MD        Dose:  10 mg   Take 1 tablet (10 mg) by mouth daily   Quantity:  90 tablet   Refills:  1            Where to get your medicines      These medications were sent to [facility] [place], [place] - [address]  [address], [facility] [address]     Phone:  [phone]     levothyroxine 137 MCG tablet    lisinopril 10 MG tablet                Primary Care Provider Office Phone # Fax #    Vasquez Denson [phone] " [phone]       [address] [facility] [address]        Equal Access to Services     KAMRYN RODRIGUEZ : Hadii aad ku hadclarkreji Solaura, waaxda luqadaha, qaybta kaalmada adeyuan, keesha isra jessicabreann mejiachristiana moralesolga triana. So [facility] [phone].    ATENCIÓN: Si habla español, tiene a soto disposición servicios gratuitos de asistencia lingüística. Llame al [phone].    We comply with applicable federal civil rights laws and [place] laws. We do not discriminate on the basis of race, color, national origin, age, disability, sex, sexual orientation, or gender identity.            Thank you!     Thank you for choosing [facility]  for your care. Our goal is always to provide you with excellent care. Hearing back from our patients is one way we can continue to improve our services. Please take a few minutes to complete the written survey that you may receive in the mail after your visit with us. Thank you!             Your Updated Medication List - Protect others around you: Learn how to safely use, store and throw away your medicines at www.disposemymeds.org.          This list is accurate as of: 1/8/18  3:07 PM.  Always use your most recent med list.                   Brand Name Dispense Instructions for use Diagnosis    calcitRIOL 0.25 MCG capsule    ROCALTROL    90 capsule    Take 1 capsule (0.25 mcg) by mouth 3 times daily    Goiter, nontoxic, multinodular, Nontoxic multinodular goiter       calcium carbonate antacid 1000 MG Chew     60 tablet    Take 2 tablets by mouth 3 times daily    Goiter, nontoxic, multinodular, Nontoxic multinodular goiter       levothyroxine 137 MCG tablet    SYNTHROID/LEVOTHROID    30 tablet    Take 1 tablet (137 mcg) by mouth daily    Goiter, nontoxic, multinodular, Nontoxic multinodular goiter       lisinopril 10 MG tablet    PRINIVIL/ZESTRIL    90 tablet    Take 1 tablet (10 mg) by mouth daily    Essential hypertension with goal blood pressure less  than 140/90

## 2018-01-08 NOTE — PROGRESS NOTES
SUBJECTIVE:   Rhonda Cordero is a 47 year old female who presents to clinic today for the following health issues:       Rash on back and arm  Discuss blood work    none    Problem list and histories reviewed & adjusted, as indicated.  Additional history: as documented         Reviewed and updated as needed this visit by clinical staff       Reviewed and updated as needed this visit by Provider          nose running all the time when not at home    Had flu type symptoms xmas and new years weeks    Itch all over 2 months      Had small areas of irritation, but now bigger     Not using lotion    Generally, energy level has been good    Tired today    Sleeping well    Full physical not done     Mentation and affect are fine    No tremor of speech or extremity    Patient has quite dry skin throughout    Several mildly rough areas of skin texture wise but not much in distinctive rash    Neck area looks okay    No obvious mass; nontender    Reviewed labs in detail    ASSESSMENT / PLAN:  (E89.0) History of thyroidectomy  (primary encounter diagnosis)  Comment: prudent to see endocrinology again. Patient to schedule.  Did refill med, check lab  Plan: ENDOCRINOLOGY ADULT REFERRAL, TSH, T4 free             (E83.51) Hypocalcemia  Comment: check labs  Plan: ENDOCRINOLOGY ADULT REFERRAL, Comprehensive         metabolic panel, Parathyroid Hormone Intact        See endocrinology     (R73.01) Impaired fasting glucose  Comment: check   Plan: Hemoglobin A1c             (R53.83) Fatigue, unspecified type  Comment: check   Plan: CBC with platelets differential             (L85.3) Xerosis cutis  Comment: increase lotion usage and don't scratch   Plan: follow up prn     (E04.2) Goiter, nontoxic, multinodular  Comment: needs refill   Plan: levothyroxine (SYNTHROID/LEVOTHROID) 137 MCG         tablet        But also see above     (E04.2) Nontoxic multinodular goiter  Comment:    Plan: levothyroxine (SYNTHROID/LEVOTHROID) 137 MCG          tablet        As above     (I10) Essential hypertension with goal blood pressure less than 140/90  Comment:  Increase lisinopril to 10 mg   Plan: lisinopril (PRINIVIL/ZESTRIL) 10 MG tablet               I reviewed the patient's medications, allergies, medical history, family history, and social history.    Vasquez Denson MD

## 2018-01-10 ENCOUNTER — OFFICE VISIT (OUTPATIENT)
Dept: OTOLARYNGOLOGY | Facility: CLINIC | Age: 48
End: 2018-01-10
Payer: COMMERCIAL

## 2018-01-10 VITALS — HEIGHT: 62 IN | BODY MASS INDEX: 32.9 KG/M2 | TEMPERATURE: 98.4 F | WEIGHT: 178.8 LBS

## 2018-01-10 DIAGNOSIS — E89.0 S/P COMPLETE THYROIDECTOMY: ICD-10-CM

## 2018-01-10 DIAGNOSIS — R07.0 THROAT PAIN: Primary | ICD-10-CM

## 2018-01-10 PROCEDURE — 99213 OFFICE O/P EST LOW 20 MIN: CPT | Performed by: OTOLARYNGOLOGY

## 2018-01-10 NOTE — PROGRESS NOTES
The thyroid tests are similar to last time.  As we discussed, advise you see an endocrinologist soon.   Call to schedule appointment.  If you can't get in with them in the next few weeks, let me know.    Other labs are stable.    Vasuqez Denson MD

## 2018-01-10 NOTE — LETTER
1/10/2018         RE: Rhonda Cordero  [address]  [facility] [address]        Dear Colleague,    Thank you for referring your patient, Rhonda Cordero, to the [facility]. Please see a copy of my visit note below.    Chief Complaint - left throat pain    History of Present Illness - Rhonda Cordero is a 47 year old female who presents with a left throat pain for one week. She feels maybe mild fever. Has been feeling ill for 2 weeks, fatigue. CBC was mildly elevated. TSH <0.01 and T4 1.66. Voicing has seemed normal. Has lost a little weight. No cough, but some odynaphagia. She has a history of goiter removed by Dr. Costa 8/2017. No issues from this.     Past Medical History -   Patient Active Problem List   Diagnosis     CARDIOVASCULAR SCREENING; LDL GOAL LESS THAN 130     Impingement syndrome, shoulder     Rotator cuff tendonitis     Hashimoto's thyroiditis     Nontoxic multinodular goiter     Shoulder joint pain     Gastroesophageal reflux disease, esophagitis presence not specified     Cervical adenopathy     Hypertension goal BP (blood pressure) < 140/90     Essential hypertension with goal blood pressure less than 140/90     Anup's deformity of left heel     History of thyroidectomy       Current Medications -   Current Outpatient Prescriptions:      levothyroxine (SYNTHROID/LEVOTHROID) 137 MCG tablet, Take 1 tablet (137 mcg) by mouth daily, Disp: 30 tablet, Rfl: 0     lisinopril (PRINIVIL/ZESTRIL) 10 MG tablet, Take 1 tablet (10 mg) by mouth daily, Disp: 90 tablet, Rfl: 1     calcium carbonate antacid 1000 MG CHEW, Take 2 tablets by mouth 3 times daily, Disp: 60 tablet, Rfl: 3     calcitRIOL (ROCALTROL) 0.25 MCG capsule, Take 1 capsule (0.25 mcg) by mouth 3 times daily, Disp: 90 capsule, Rfl: 3    Allergies -   Allergies   Allergen Reactions     Advil [Ibuprofen] Swelling     Hands swelled     Cephalexin Itching       Social History -   Social History  "    Social History     Marital status:      Spouse name: N/A     Number of children: 2     Years of education: 12     Occupational History      [employer]     Social History Main Topics     Smoking status: Never Smoker     Smokeless tobacco: Never Used     Alcohol use Yes      Comment: occ     Drug use: No     Sexual activity: Not Currently     Partners: Male     Birth control/ protection: Surgical     Other Topics Concern     Parent/Sibling W/ Cabg, Mi Or Angioplasty Before 65f 55m? No      Service No     Blood Transfusions Yes     had 3 units 10/23/2009     Caffeine Concern No     Occupational Exposure No     Hobby Hazards No     Sleep Concern Not Asked     sleeps about 5-6 hours a night     Special Diet No     Exercise Yes     walk     Bike Helmet Not Asked     doesn't ride a bike     Seat Belt Yes     Self-Exams No     info given on SBE     Social History Narrative       Family History -   Family History   Problem Relation Age of Onset     CEREBROVASCULAR DISEASE Mother 50     CEREBROVASCULAR DISEASE Father 49     Hypertension Father      CANCER No family hx of      DIABETES No family hx of      Thyroid Disease No family hx of      Glaucoma No family hx of      Macular Degeneration No family hx of        Review of Systems - As per HPI and PMHx, otherwise 7 system review is negative.    Physical Exam  Temp 98.4  F (36.9  C) (Oral)  Ht 1.575 m (5' 2\")  Wt 81.1 kg (178 lb 12.8 oz)  LMP 11/23/2009  BMI 32.7 kg/m2  General - The patient is in no distress. Alert and oriented to person and place, answers questions and cooperates with examination appropriately.   Voice and Breathing - The patient was breathing comfortably without the use of accessory muscles. There was no wheezing, stridor, or stertor.  The patients voice was clear and strong.  Eyes - Extraocular movements intact.  Sclera were not icteric or injected, conjunctiva were pink and moist.  Mouth - Examination of the oral cavity " showed pink, healthy oral mucosa. No lesions or ulcerations noted.  The tongue was mobile and midline.  Throat - The walls of the oropharynx were smooth, symmetric, and had no lesions or ulcerations.  The tonsillar pillars and soft palate were symmetric.  The uvula was midline on elevation.   Nose - External contour is symmetric, no gross deflection or scars.  Nasal mucosa is pink and moist with no abnormal mucus.  The septum was midline and non-obstructive, turbinates of normal size and position.  No polyps, masses, or purulence noted on examination.  Neck -  Palpation of the occipital, submental, submandibular, internal jugular chain, and supraclavicular nodes did not demonstrate any abnormal lymph nodes or masses. No parotid masses. Palpation of the thyroid was soft and smooth, with no nodules or goiter appreciated.  The trachea was mobile and midline.  Neurologic - CN II-XII are grossly intact, no focal neurologic deficits.       A/P - Rhonda Cordero is a 47 year old female with left throat pain. It came on with her illness and has only bee a week. I recommend warm salt water gargles and warm drinks like tea and honey. It should improve, if not, return. Has elevated T4 from synthroid. She can see our new endocrinologist.     Dr. Ernst Rosas  Otolaryngology  [facility]      Again, thank you for allowing me to participate in the care of your patient.        Sincerely,        Ernst Rosas MD

## 2018-01-10 NOTE — PROGRESS NOTES
Chief Complaint - left throat pain    History of Present Illness - Rhonda Cordero is a 47 year old female who presents with a left throat pain for one week. She feels maybe mild fever. Has been feeling ill for 2 weeks, fatigue. CBC was mildly elevated. TSH <0.01 and T4 1.66. Voicing has seemed normal. Has lost a little weight. No cough, but some odynaphagia. She has a history of goiter removed by Dr. Costa 8/2017. No issues from this.     Past Medical History -   Patient Active Problem List   Diagnosis     CARDIOVASCULAR SCREENING; LDL GOAL LESS THAN 130     Impingement syndrome, shoulder     Rotator cuff tendonitis     Hashimoto's thyroiditis     Nontoxic multinodular goiter     Shoulder joint pain     Gastroesophageal reflux disease, esophagitis presence not specified     Cervical adenopathy     Hypertension goal BP (blood pressure) < 140/90     Essential hypertension with goal blood pressure less than 140/90     Anup's deformity of left heel     History of thyroidectomy       Current Medications -   Current Outpatient Prescriptions:      levothyroxine (SYNTHROID/LEVOTHROID) 137 MCG tablet, Take 1 tablet (137 mcg) by mouth daily, Disp: 30 tablet, Rfl: 0     lisinopril (PRINIVIL/ZESTRIL) 10 MG tablet, Take 1 tablet (10 mg) by mouth daily, Disp: 90 tablet, Rfl: 1     calcium carbonate antacid 1000 MG CHEW, Take 2 tablets by mouth 3 times daily, Disp: 60 tablet, Rfl: 3     calcitRIOL (ROCALTROL) 0.25 MCG capsule, Take 1 capsule (0.25 mcg) by mouth 3 times daily, Disp: 90 capsule, Rfl: 3    Allergies -   Allergies   Allergen Reactions     Advil [Ibuprofen] Swelling     Hands swelled     Cephalexin Itching       Social History -   Social History     Social History     Marital status:      Spouse name: N/A     Number of children: 2     Years of education: 12     Occupational History      [employer]     Social History Main Topics     Smoking status: Never Smoker     Smokeless tobacco: Never  "Used     Alcohol use Yes      Comment: occ     Drug use: No     Sexual activity: Not Currently     Partners: Male     Birth control/ protection: Surgical     Other Topics Concern     Parent/Sibling W/ Cabg, Mi Or Angioplasty Before 65f 55m? No      Service No     Blood Transfusions Yes     had 3 units 10/23/2009     Caffeine Concern No     Occupational Exposure No     Hobby Hazards No     Sleep Concern Not Asked     sleeps about 5-6 hours a night     Special Diet No     Exercise Yes     walk     Bike Helmet Not Asked     doesn't ride a bike     Seat Belt Yes     Self-Exams No     info given on SBE     Social History Narrative       Family History -   Family History   Problem Relation Age of Onset     CEREBROVASCULAR DISEASE Mother 50     CEREBROVASCULAR DISEASE Father 49     Hypertension Father      CANCER No family hx of      DIABETES No family hx of      Thyroid Disease No family hx of      Glaucoma No family hx of      Macular Degeneration No family hx of        Review of Systems - As per HPI and PMHx, otherwise 7 system review is negative.    Physical Exam  Temp 98.4  F (36.9  C) (Oral)  Ht 1.575 m (5' 2\")  Wt 81.1 kg (178 lb 12.8 oz)  LMP 11/23/2009  BMI 32.7 kg/m2  General - The patient is in no distress. Alert and oriented to person and place, answers questions and cooperates with examination appropriately.   Voice and Breathing - The patient was breathing comfortably without the use of accessory muscles. There was no wheezing, stridor, or stertor.  The patients voice was clear and strong.  Eyes - Extraocular movements intact.  Sclera were not icteric or injected, conjunctiva were pink and moist.  Mouth - Examination of the oral cavity showed pink, healthy oral mucosa. No lesions or ulcerations noted.  The tongue was mobile and midline.  Throat - The walls of the oropharynx were smooth, symmetric, and had no lesions or ulcerations.  The tonsillar pillars and soft palate were symmetric.  The uvula " was midline on elevation.   Nose - External contour is symmetric, no gross deflection or scars.  Nasal mucosa is pink and moist with no abnormal mucus.  The septum was midline and non-obstructive, turbinates of normal size and position.  No polyps, masses, or purulence noted on examination.  Neck -  Palpation of the occipital, submental, submandibular, internal jugular chain, and supraclavicular nodes did not demonstrate any abnormal lymph nodes or masses. No parotid masses. Palpation of the thyroid was soft and smooth, with no nodules or goiter appreciated.  The trachea was mobile and midline.  Neurologic - CN II-XII are grossly intact, no focal neurologic deficits.       A/P - Rhonda Cordero is a 47 year old female with left throat pain. It came on with her illness and has only bee a week. I recommend warm salt water gargles and warm drinks like tea and honey. It should improve, if not, return. Has elevated T4 from synthroid. She can see our new endocrinologist.     Dr. Ernst Rosas  Otolaryngology  [facility]

## 2018-01-10 NOTE — PATIENT INSTRUCTIONS
General Scheduling Information  To schedule your CT/MRI scan, please contact [place] [facility] at [phone]   [address] [place] [address]    To schedule your Surgery, please contact our Specialty Schedulers at [phone]    ENT Clinic Locations Clinic Hours Telephone Number     [facility]  [address] [place] [address]   Tuesday:       8:00am -- 4:00pm    Wednesday:  8:00am - 4:00pm   To schedule an appointment with   Dr. Rosas,   please contact our   Specialty Scheduling Department at:     [phone]       [facility]  [address] [place] [address]   Friday:          8:00am - 4:00pm         Urgent Care Locations Clinic Hours Telephone Numbers     [place] [facility]  [address] [place] [address]     Monday-Friday:     11:00pm - 9:00pm    Saturday-Sunday:  9:00am - 5:00pm   [phone]     [place] [facility]  [address] [place] [address]     Monday-Friday:      5:00pm - 9:00pm     Saturday-Sunday:  9:00am - 5:00pm   [phone]

## 2018-01-10 NOTE — MR AVS SNAPSHOT
After Visit Summary   1/10/2018    Rhonda Cordero    MRN: [id number]           Patient Information     Date Of Birth          1970        Visit Information        Provider Department      1/10/2018 2:30 PM [facility]        Today's Diagnoses     Throat pain    -  1    S/P complete thyroidectomy          Care Instructions    General Scheduling Information  To schedule your CT/MRI scan, please contact [place] [facility] at [phone] [address] [place] [address]    To schedule your Surgery, please contact our Specialty Schedulers at [phone]    ENT Clinic Locations Clinic Hours Telephone Number     [facility]  [address]  [place] [address]   Tuesday:       8:00am -- 4:00pm    Wednesday:  8:00am - 4:00pm   To schedule an appointment with   Dr. Rosas,   please contact our   Specialty Scheduling Department at:     [phone]       [facility]  [address]   [place] [address]   Friday:          8:00am - 4:00pm         Urgent Care Locations Clinic Hours Telephone Numbers     [place]  [address]  [place] [address]     Monday-Friday:     11:00pm - 9:00pm    Saturday-Sunday:  9:00am - 5:00pm   [phone]     [place]  [address]   [place] [address]     Monday-Friday:      5:00pm - 9:00pm     Saturday-Sunday:  9:00am - 5:00pm   [phone]                 Follow-ups after your visit        Who to contact     If you have questions or need follow up information about today's clinic visit or your schedule please contact [facility] directly at [phone].  Normal or non-critical lab and imaging results will be communicated to you by MyChart, letter or phone within 4 business days after the clinic has received the results. If you do not hear from us within 7 days, please contact the clinic through MyChart or phone. If you have a critical or abnormal lab  "result, we will notify you by phone as soon as possible.  Submit refill requests through [facility] or call your pharmacy and they will forward the refill request to us. Please allow 3 business days for your refill to be completed.          Additional Information About Your Visit        [employer] Information     [facility] lets you send messages to your doctor, view your test results, renew your prescriptions, schedule appointments and more. To sign up, go to [email or web address] . Click on \"Log in\" on the left side of the screen, which will take you to the Welcome page. Then click on \"Sign up Now\" on the right side of the page.     You will be asked to enter the access code listed below, as well as some personal information. Please follow the directions to create your username and password.     Your access code is: [email or web address]  Expires: 2018  3:04 PM     Your access code will  in 90 days. If you need help or a new code, please call your [facility] clinic or [phone].        Care EveryWhere ID     This is your Care EveryWhere ID. This could be used by other organizations to access your [place] medical records  [id number]        Your Vitals Were     Temperature Height Last Period BMI (Body Mass Index)          98.4  F (36.9  C) (Oral) 1.575 m (5' 2\") 2009 32.7 kg/m2         Blood Pressure from Last 3 Encounters:   18 138/87   17 138/87   17 137/87    Weight from Last 3 Encounters:   01/10/18 81.1 kg (178 lb 12.8 oz)   18 80.3 kg (177 lb)   17 80.7 kg (178 lb)              Today, you had the following     No orders found for display       Primary Care Provider Office Phone # Fax #    Vasquez Denson [phone]       [address] [facility] [address]        Equal Access to Services     KAMRYN RODRIGUEZ AH: Hadii robert Brewer, estela flores, keesha aranash la'aan ah. So [facility] " [phone].    ATENCIÓN: Si patrice mancia, tiene a soto disposición servicios gratuitos de asistencia lingüística. Yaritza goins [phone].    We comply with applicable federal civil rights laws and [place] laws. We do not discriminate on the basis of race, color, national origin, age, disability, sex, sexual orientation, or gender identity.            Thank you!     Thank you for choosing [facility]  for your care. Our goal is always to provide you with excellent care. Hearing back from our patients is one way we can continue to improve our services. Please take a few minutes to complete the written survey that you may receive in the mail after your visit with us. Thank you!             Your Updated Medication List - Protect others around you: Learn how to safely use, store and throw away your medicines at www.disposemymeds.org.          This list is accurate as of: 1/10/18  5:27 PM.  Always use your most recent med list.                   Brand Name Dispense Instructions for use Diagnosis    calcitRIOL 0.25 MCG capsule    ROCALTROL    90 capsule    Take 1 capsule (0.25 mcg) by mouth 3 times daily    Goiter, nontoxic, multinodular, Nontoxic multinodular goiter       calcium carbonate antacid 1000 MG Chew     60 tablet    Take 2 tablets by mouth 3 times daily    Goiter, nontoxic, multinodular, Nontoxic multinodular goiter       levothyroxine 137 MCG tablet    SYNTHROID/LEVOTHROID    30 tablet    Take 1 tablet (137 mcg) by mouth daily    Goiter, nontoxic, multinodular, Nontoxic multinodular goiter       lisinopril 10 MG tablet    PRINIVIL/ZESTRIL    90 tablet    Take 1 tablet (10 mg) by mouth daily    Essential hypertension with goal blood pressure less than 140/90

## 2018-02-07 ENCOUNTER — OFFICE VISIT (OUTPATIENT)
Dept: FAMILY MEDICINE | Facility: CLINIC | Age: 48
End: 2018-02-07
Payer: COMMERCIAL

## 2018-02-07 ENCOUNTER — RADIANT APPOINTMENT (OUTPATIENT)
Dept: GENERAL RADIOLOGY | Facility: CLINIC | Age: 48
End: 2018-02-07
Attending: FAMILY MEDICINE
Payer: COMMERCIAL

## 2018-02-07 VITALS
SYSTOLIC BLOOD PRESSURE: 144 MMHG | BODY MASS INDEX: 32.74 KG/M2 | WEIGHT: 179 LBS | OXYGEN SATURATION: 99 % | HEART RATE: 86 BPM | TEMPERATURE: 99 F | DIASTOLIC BLOOD PRESSURE: 92 MMHG

## 2018-02-07 DIAGNOSIS — M79.671 PAIN OF RIGHT HEEL: ICD-10-CM

## 2018-02-07 DIAGNOSIS — M79.671 PAIN OF RIGHT HEEL: Primary | ICD-10-CM

## 2018-02-07 DIAGNOSIS — Z23 TETANUS-DIPHTHERIA (TD) VACCINATION: ICD-10-CM

## 2018-02-07 PROCEDURE — 73650 X-RAY EXAM OF HEEL: CPT | Mod: RT

## 2018-02-07 PROCEDURE — 90714 TD VACC NO PRESV 7 YRS+ IM: CPT | Performed by: FAMILY MEDICINE

## 2018-02-07 PROCEDURE — 90471 IMMUNIZATION ADMIN: CPT | Performed by: FAMILY MEDICINE

## 2018-02-07 PROCEDURE — 99213 OFFICE O/P EST LOW 20 MIN: CPT | Mod: 25 | Performed by: FAMILY MEDICINE

## 2018-02-07 ASSESSMENT — PAIN SCALES - GENERAL: PAINLEVEL: EXTREME PAIN (9)

## 2018-02-07 NOTE — NURSING NOTE
"Chief Complaint   Patient presents with     Foot Pain     heel pain     Health Maintenance       Initial BP (!) 144/92 (BP Location: Left arm, Patient Position: Chair, Cuff Size: Adult Regular)  Pulse 86  Temp 99  F (37.2  C) (Oral)  Wt 179 lb (81.2 kg)  LMP 11/23/2009  SpO2 99%  Breastfeeding? No  BMI 32.74 kg/m2 Estimated body mass index is 32.74 kg/(m^2) as calculated from the following:    Height as of 1/10/18: 5' 2\" (1.575 m).    Weight as of this encounter: 179 lb (81.2 kg).  Medication Reconciliation: complete   Bette [place] CMA      "

## 2018-02-07 NOTE — PATIENT INSTRUCTIONS
See podiatry     Whatever cushion/ insole helps the most is okay for now    Could try some over the counter ibuprofen with food as needed

## 2018-02-07 NOTE — PROGRESS NOTES
SUBJECTIVE:   Rhonda Cordero is a 47 year old female who presents to clinic today for the following health issues:       Right heel pain for the past 3 weeks    none    Problem list and histories reviewed & adjusted, as indicated.  Additional history: as documented         Reviewed and updated as needed this visit by clinical staff       Reviewed and updated as needed this visit by Provider          no history of heel pain    No trauma    Tried different shoes, insoles, gels, etc      Even had to use cane      Patient very sore over center of plantar heel area.  Not tender over insertion of plantar fascia nor over fascia itself    No swelling/ discoloration noted    Also some soreness on back and sides of calcaneus    No point tenderness over achilles    Achilles not swollen    Good dors pedis pulses bilat    Sensation and strength are fine bilat feet    No point tenderness elsewhere except as noted above    ASSESSMENT / PLAN:  (M79.671) Pain of right heel  (primary encounter diagnosis)  Comment: prudent to have patient see podiatry since she has already tried insoles/ gel cushions/ heel cups/ chanaged shoes  Plan: XR Calcaneus Right G/E 2 Views, PODIATRY/FOOT &        ANKLE SURGERY REFERRAL             (Z23) Tetanus-diphtheria (Td) vaccination  Comment: needs   Plan: VACCINE ADMINISTRATION, INITIAL, TD (ADULT, 7+)        PRESERVE FREE        Given       I reviewed the patient's medications, allergies, medical history, family history, and social history.    Vasquez Denson MD

## 2018-02-07 NOTE — MR AVS SNAPSHOT
After Visit Summary   2/7/2018    Rhonda Cordero    MRN: [id number]           Patient Information     Date Of Birth          1970        Visit Information        Provider Department      2/7/2018 3:00 PM Vasquez Denson MD [facility]        Today's Diagnoses     Pain of right heel    -  1    Tetanus-diphtheria (Td) vaccination          Care Instructions    See podiatry     Whatever cushion/ insole helps the most is okay for now    Could try some over the counter ibuprofen with food as needed              Follow-ups after your visit        Additional Services     PODIATRY/FOOT & ANKLE SURGERY REFERRAL       Your provider has referred you to: [facility] [phone]   [email or web address]  [facility]: [facility] [place] [facility] [place] [phone]   [email or web address]  FMG: [facility] [phone]   [email or web address]/  FMG: [facility] [phone]   [email or web address]/  FMG: [facility] [phone]   [email or web address]/[place]/  FMG: [facility] [phone]   [email or web address]/    Please be aware that coverage of these services is subject to the terms and limitations of your health insurance plan.  Call member services at your health plan with any benefit or coverage questions.      Please bring the following to your appointment:  >>   Any x-rays, CTs or MRIs which have been performed.  Contact the facility where they were done to arrange for  prior to your scheduled appointment.    >>   List of current medications   >>   This referral request   >>   Any documents/labs given to you for this referral                  Your next 10 appointments already scheduled     Feb  "15, 2018  5:15 PM CST   [facility] Otis [facility] DPM   [facility]    [address]  [place] [address] [phone]            2018  2:30 PM CST   New Visit with Fabricio Zhou MD   [facility] ([facility]    [address]  [place] [address] [phone]              Who to contact     If you have questions or need follow up information about today's clinic visit or your schedule please contact [facility] directly at [phone].  Normal or non-critical lab and imaging results will be communicated to you by [employer], letter or phone within 4 business days after the clinic has received the results. If you do not hear from us within 7 days, please contact the clinic through MyChart or phone. If you have a critical or abnormal lab result, we will notify you by phone as soon as possible.  Submit refill requests through [facility] or call your pharmacy and they will forward the refill request to us. Please allow 3 business days for your refill to be completed.          Additional Information About Your Visit        MyChart Information     [facility] lets you send messages to your doctor, view your test results, renew your prescriptions, schedule appointments and more. To sign up, go to [email or web address] . Click on \"Log in\" on the left side of the screen, which will take you to the Welcome page. Then click on \"Sign up Now\" on the right side of the page.     You will be asked to enter the access code listed below, as well as some personal information. Please follow the directions to create your username and password.     Your access code is: [email or web address]  Expires: 2018  3:04 PM     Your access code will  in 90 days. If you need help or a new code, please call your [facility] or [phone].        Care EveryWhere ID     This is your Care EveryWhere ID. This could be used " by other organizations to access your [place] medical records  [id number]        Your Vitals Were     Pulse Temperature Last Period Pulse Oximetry Breastfeeding? BMI (Body Mass Index)    86 99  F (37.2  C) (Oral) 11/23/2009 99% No 32.74 kg/m2       Blood Pressure from Last 3 Encounters:   02/07/18 (!) 144/92   01/08/18 138/87   08/22/17 138/87    Weight from Last 3 Encounters:   02/07/18 179 lb (81.2 kg)   01/10/18 178 lb 12.8 oz (81.1 kg)   01/08/18 177 lb (80.3 kg)              We Performed the Following     PODIATRY/FOOT & ANKLE SURGERY REFERRAL     TD (ADULT, 7+) PRESERVE FREE     VACCINE ADMINISTRATION, INITIAL        Primary Care Provider Office Phone # Fax #    Vasquez Denson [phone]       [address] [facility] [address]        Equal Access to Services     KAMRYN RODRIGUEZ : Hadii robert reeveso Solaura, waaxda luqadaha, qaybta kaalmada adeegyada, keesha tenorio . So [facility] [phone].    ATENCIÓN: Si habla español, tiene a soto disposición servicios gratuitos de asistencia lingüística. Llame al [phone].    We comply with applicable federal civil rights laws and [place] laws. We do not discriminate on the basis of race, color, national origin, age, disability, sex, sexual orientation, or gender identity.            Thank you!     Thank you for choosing [facility]  for your care. Our goal is always to provide you with excellent care. Hearing back from our patients is one way we can continue to improve our services. Please take a few minutes to complete the written survey that you may receive in the mail after your visit with us. Thank you!             Your Updated Medication List - Protect others around you: Learn how to safely use, store and throw away your medicines at www.disposemymeds.org.          This list is accurate as of 2/7/18  3:37 PM.  Always use your most recent med list.                   Brand Name  Dispense Instructions for use Diagnosis    calcium carbonate antacid 1000 MG Chew     60 tablet    Take 2 tablets by mouth 3 times daily    Goiter, nontoxic, multinodular, Nontoxic multinodular goiter       levothyroxine 137 MCG tablet    SYNTHROID/LEVOTHROID    30 tablet    Take 1 tablet (137 mcg) by mouth daily    Goiter, nontoxic, multinodular, Nontoxic multinodular goiter       lisinopril 10 MG tablet    PRINIVIL/ZESTRIL    90 tablet    Take 1 tablet (10 mg) by mouth daily    Essential hypertension with goal blood pressure less than 140/90

## 2018-02-15 ENCOUNTER — OFFICE VISIT (OUTPATIENT)
Dept: PODIATRY | Facility: CLINIC | Age: 48
End: 2018-02-15
Payer: COMMERCIAL

## 2018-02-15 VITALS — BODY MASS INDEX: 32.74 KG/M2 | WEIGHT: 179 LBS | OXYGEN SATURATION: 100 % | HEART RATE: 74 BPM

## 2018-02-15 DIAGNOSIS — M72.2 PLANTAR FASCIAL FIBROMATOSIS: Primary | ICD-10-CM

## 2018-02-15 PROCEDURE — 29540 STRAPPING ANKLE &/FOOT: CPT | Mod: RT | Performed by: PODIATRIST

## 2018-02-15 PROCEDURE — 99243 OFF/OP CNSLTJ NEW/EST LOW 30: CPT | Mod: 25 | Performed by: PODIATRIST

## 2018-02-15 RX ORDER — METHYLPREDNISOLONE 4 MG
4 TABLET, DOSE PACK ORAL SEE ADMIN INSTRUCTIONS
Qty: 21 TABLET | Refills: 0 | Status: SHIPPED | OUTPATIENT
Start: 2018-02-15 | End: 2019-01-16

## 2018-02-15 NOTE — PATIENT INSTRUCTIONS
We wish you continued good healing. If you have any questions or concerns, please do not hesitate to contact us at [phone]    Please remember to call and schedule a follow up appointment if one was recommended at your earliest convenience.   PODIATRY CLINIC HOURS  TELEPHONE NUMBER    Dr. Otis Carson D.P.M [facility]    Clinics:  [facility]    [facility]   Tuesday 1PM-6PM  [place]  Wednesday 7AM-2PM  [facility]  Thursday 10AM-6PM  [place]  Friday 7AM-3PM  [place]  Specialty schedulers:   [phone] to make an appointment with any Specialty Provider.        Urgent Care locations:    [facility] Monday-Friday 5 pm - 9 pm. Saturday-Sunday 9 am -5pm    Monday-Friday 11 am - 9 pm Saturday 9 am - 5 pm     Monday-Sunday 12 noon-8PM [phone]     If you need a medication refill, please contact us you may need lab work and/or a follow up visit prior to your refill (i.e. Antifungal medications).    [employer] (secure e-mail communication and access to your chart) to send a message or to make an appointment.    If MRI needed please call [place] [facility] at [phone]        Weight management plan: Patient was referred to their PCP to discuss a diet and exercise plan.

## 2018-02-15 NOTE — LETTER
2/15/2018         RE: Rhonda Cordero  [address]  [place] [address]        Dear Colleague,    Thank you for referring your patient, Rhonda Cordero, to the [facility]. Please see a copy of my visit note below.    Subjective:    Patient is seen today in consult from Dr. Denson with a 1 month(s) hx of left heel pain.  Points to the plantarmedial calcaneal tubercle.  Most painful upon rising in a.m. or after prolonged sitting.  Aggravated by activity and relieved by rest.  Has tried changing shoewear, OTC inserts, without much success.  Denies erythema, edema, ecchymosis, numbness, loss of strength.  Standing 100 % of time at work, 9 hour days, 6 days per week.  Wears slippers around the house.  Also has history of Anup's.  Taking tylenol for pain.        A 10-point review of systems was performed and is positive for that noted in the HPI and as seen below.  All other areas are negative.        Allergies   Allergen Reactions     Advil [Ibuprofen] Swelling     Hands swelled     Cephalexin Itching       Current Outpatient Prescriptions   Medication Sig Dispense Refill     methylPREDNISolone (MEDROL DOSEPAK) 4 MG tablet Take 1 tablet (4 mg) by mouth See Admin Instructions follow package directions 21 tablet 0     levothyroxine (SYNTHROID/LEVOTHROID) 137 MCG tablet Take 1 tablet (137 mcg) by mouth daily 30 tablet 0     lisinopril (PRINIVIL/ZESTRIL) 10 MG tablet Take 1 tablet (10 mg) by mouth daily 90 tablet 1     calcium carbonate antacid 1000 MG CHEW Take 2 tablets by mouth 3 times daily 60 tablet 3       Patient Active Problem List   Diagnosis     CARDIOVASCULAR SCREENING; LDL GOAL LESS THAN 130     Impingement syndrome, shoulder     Rotator cuff tendonitis     Hashimoto's thyroiditis     Nontoxic multinodular goiter     Shoulder joint pain     Gastroesophageal reflux disease, esophagitis presence not specified     Cervical adenopathy     Hypertension goal BP (blood pressure)  < 140/90     Essential hypertension with goal blood pressure less than 140/90     Anup's deformity of left heel     History of thyroidectomy       Past Medical History:   Diagnosis Date     Anemia      Hypertension goal BP (blood pressure) < 130/80 5/6/2011     Rotator cuff tendonitis 2/10/2015       Past Surgical History:   Procedure Laterality Date     C TOTAL ABDOM HYSTERECTOMY  12/9/09    [facility]     LAPAROSCOPY,TUBAL CAUTERY  2005     SALPINGO OOPHORECTOMY,R/L/LUIS  2005    left     THYROIDECTOMY N/A 8/22/2017    Procedure: THYROIDECTOMY;  Total Thyroidectomy;  Surgeon: Skylar Costa MD;  Location:  OR       Family History   Problem Relation Age of Onset     CEREBROVASCULAR DISEASE Mother 50     CEREBROVASCULAR DISEASE Father 49     Hypertension Father      CANCER No family hx of      DIABETES No family hx of      Thyroid Disease No family hx of      Glaucoma No family hx of      Macular Degeneration No family hx of        Social History   Substance Use Topics     Smoking status: Never Smoker     Smokeless tobacco: Never Used     Alcohol use Yes      Comment: occ         Objective:    Vitals: Pulse 74  Wt 179 lb (81.2 kg)  LMP 11/23/2009  SpO2 100%  BMI 32.74 kg/m2  BMI: Body mass index is 32.74 kg/(m^2).  Height: Data Unavailable    Constitutional/ general:  Pt is in no apparent distress, appears well-nourished.  Cooperative with history and physical exam.     Psych:  The patient answered questions appropriately.  Normal affect.  Seems to have reasonable expectations, in terms of treatment.     Eyes:  Visual scanning/ tracking without deficit.     Ears:  Response to auditory stimuli is normal.  No  hearing aid devices.  Auricles in proper alignment.     Lymphatic:  Popliteal lymph nodes not enlarged.     Lungs:  Non labored breathing, non labored speech. No cough.  No audible wheezing. Even, quiet breathing.       Vascular:  Pedal pulses are palpable bilaterally for both the DP and PT  arteries.  CFT < 3 sec.  No edema.  Pedal hair growth noted.     Neuro:  Alert and oriented x 3. Coordinated gait.  Light touch sensation is intact to the L4, L5, S1 distributions. No obvious deficits.  No evidence of neurological-based weakness, spasticity, or contracture in the lower extremities.     Derm: Normal texture and turgor.  No erythema, ecchymosis, or cyanosis.  No open lesions.     Musculoskeletal:    Lower extremity muscle strength is normal.  Patient is ambulatory without an assistive device or brace.  No gross deformities.      Normal arch with weightbearing.   ROM is within normal limits.  Negative tinel's sign.  No side to side compression pain of the calcaneus.  Pain upon palpation to the right plantarmedial  of the calcaneus.  No erythema, edema, ecchymosis, or subcutaneous masses noted.  No pain on palpation or stressing any tendons.  Negative Tinel's sign      Radiographic Exam:  X-Ray Findings:  I personally reviewed the films.  normal    Assessment:  Plantar Fasciitis right foot     Plan:  X-rays personally reviewed.  Discussed etiology and treatment options with the patient.  The potential causes and nature of plantar fasciitis were discussed with the patient.  We reviewed the natural history/prognosis of the condition and risks if left untreated.  These include chronic pain, other sites of pain due to gait changes, and potential plantar fascial rupture.      We discussed possible causes of the condition as it relates to the patients specific situation.      Conservative treatment options were reviewed:  appropriate shoes, avoidance of barefoot walking, inserts/orthoses, stretching, ice, massage, immobilization and NSAIDs.     We also reviewed the options of injection therapy and surgery.  However, it was made clear that surgery is only considered when conservative therapy fails.  The risks and benefits of injection therapy, and surgery were discussed.  Dispensed handout.       After  thorough discussion and answering all questions, the patient elected to modifying activities, supportive shoes, ice, stretching, and not going barefoot.  Good house shoes at all times and I made recommendations.  Will get better work shoes.  Lo dye strap applied to foot.  Rx for orthotics.  Rx for medrol dosepak and discussed possible side effects.   Thank you for allowing me participate in the care of this patient.        Otis Carson DPM, FACFAS      Again, thank you for allowing me to participate in the care of your patient.        Sincerely,        Otis Carson DPM

## 2018-02-15 NOTE — MR AVS SNAPSHOT
After Visit Summary   2/15/2018    Rhonda Cordero    MRN: [id number]           Patient Information     Date Of Birth          1970        Visit Information        Provider Department      2/15/2018 5:15 PM Otis Carson DPM [facility]        Care Instructions    We wish you continued good healing. If you have any questions or concerns, please do not hesitate to contact us at [phone]    Please remember to call and schedule a follow up appointment if one was recommended at your earliest convenience.   PODIATRY CLINIC HOURS  TELEPHONE NUMBER    Dr. Otis Carson DTomaszPNALDO FAC FAS    Clinics:  [facility]    [facility]   Tuesday 1PM-6PM  [place] [facility]  Wednesday 7AM-2PM  [place]  Thursday 10AM-6PM  [place]  Friday 7AM-3PM  [place]  Specialty schedulers:   [phone] to make an appointment with any Specialty Provider.        Urgent Care locations:    [facility] Monday-Friday 5 pm - 9 pm. Saturday-Sunday 9 am -5pm    Monday-Friday 11 am - 9 pm Saturday 9 am - 5 pm     Monday-Sunday 12 noon-8PM [phone]     If you need a medication refill, please contact us you may need lab work and/or a follow up visit prior to your refill (i.e. Antifungal medications).    Oakmonkeyhart (secure e-mail communication and access to your chart) to send a message or to make an appointment.    If MRI needed please call [facility] at [phone]        Weight management plan: Patient was referred to their PCP to discuss a diet and exercise plan.            Follow-ups after your visit        Your next 10 appointments already scheduled     Feb 15, 2018  5:15 PM CST   [facility]   [facility]    [address] [address] [phone]            Feb 21, 2018   "2:30 PM CST   New Visit with Fabricio Zhou MD   [facility] ([facility]    [address]  [place] [address] [phone]              Who to contact     If you have questions or need follow up information about today's clinic visit or your schedule please contact [facility] directly at [phone].  Normal or non-critical lab and imaging results will be communicated to you by Nouvolahart, letter or phone within 4 business days after the clinic has received the results. If you do not hear from us within 7 days, please contact the clinic through Nouvolahart or phone. If you have a critical or abnormal lab result, we will notify you by phone as soon as possible.  Submit refill requests through Nexterra or call your pharmacy and they will forward the refill request to us. Please allow 3 business days for your refill to be completed.          Additional Information About Your Visit        NouvolaharCoinify Information     Nexterra lets you send messages to your doctor, view your test results, renew your prescriptions, schedule appointments and more. To sign up, go to [email or web address] . Click on \"Log in\" on the left side of the screen, which will take you to the Welcome page. Then click on \"Sign up Now\" on the right side of the page.     You will be asked to enter the access code listed below, as well as some personal information. Please follow the directions to create your username and password.     Your access code is: [email or web address]  Expires: 2018  3:04 PM     Your access code will  in 90 days. If you need help or a new code, please call your [place] clinic or [phone].        Care EveryWhere ID     This is your Care EveryWhere ID. This could be used by other organizations to access your [place] medical records  [id number]        Your Vitals Were     Pulse Last Period Pulse Oximetry BMI (Body Mass Index)          74 2009 100% 32.74 kg/m2         " Blood Pressure from Last 3 Encounters:   02/07/18 (!) 144/92   01/08/18 138/87   08/22/17 138/87    Weight from Last 3 Encounters:   02/15/18 179 lb (81.2 kg)   02/07/18 179 lb (81.2 kg)   01/10/18 178 lb 12.8 oz (81.1 kg)              Today, you had the following     No orders found for display       Primary Care Provider Office Phone # Fax #    Vasquez Denson [phone]       [address] [facility] [address]        Equal Access to Services     [facility]: Hadii aad ku hadasho Soomaali, waaxda luqadaha, qaybta kaalmada adechristianayada, keesha tenorio . So [facility] [phone].    ATENCIÓN: Si habla español, tiene a soto disposición servicios gratuitos de asistencia lingüística. [facility] [phone].    We comply with applicable federal civil rights laws and [place] laws. We do not discriminate on the basis of race, color, national origin, age, disability, sex, sexual orientation, or gender identity.            Thank you!     Thank you for choosing [facility] [place]  for your [facility]. Our goal is always to provide you with excellent care. Hearing back from our patients is one way we can continue to improve our services. Please take a few minutes to complete the written survey that you may receive in the mail after your visit with us. Thank you!             Your Updated Medication List - Protect others around you: Learn how to safely use, store and throw away your medicines at www.disposemymeds.org.          This list is accurate as of 2/15/18  5:03 PM.  Always use your most recent med list.                   Brand Name Dispense Instructions for use Diagnosis    calcium carbonate antacid 1000 MG Chew     60 tablet    Take 2 tablets by mouth 3 times daily    Goiter, nontoxic, multinodular, Nontoxic multinodular goiter       levothyroxine 137 MCG tablet    SYNTHROID/LEVOTHROID    30 tablet    Take 1 tablet (137 mcg) by mouth daily    Goiter, nontoxic,  multinodular, Nontoxic multinodular goiter       lisinopril 10 MG tablet    PRINIVIL/ZESTRIL    90 tablet    Take 1 tablet (10 mg) by mouth daily    Essential hypertension with goal blood pressure less than 140/90

## 2018-02-15 NOTE — PROGRESS NOTES
Subjective:    Patient is seen today in consult from Dr. Denson with a 1 month(s) hx of right heel pain.  Points to the plantarmedial calcaneal tubercle.  Most painful upon rising in a.m. or after prolonged sitting.  Aggravated by activity and relieved by rest.  Has tried changing shoewear, OTC inserts, without much success.  Denies erythema, edema, ecchymosis, numbness, loss of strength.  Standing 100 % of time at work, 9 hour days, 6 days per week.  Wears slippers around the house.  Also has history of Anup's.  Taking tylenol for pain.        A 10-point review of systems was performed and is positive for that noted in the HPI and as seen below.  All other areas are negative.        Allergies   Allergen Reactions     Advil [Ibuprofen] Swelling     Hands swelled     Cephalexin Itching       Current Outpatient Prescriptions   Medication Sig Dispense Refill     methylPREDNISolone (MEDROL DOSEPAK) 4 MG tablet Take 1 tablet (4 mg) by mouth See Admin Instructions follow package directions 21 tablet 0     levothyroxine (SYNTHROID/LEVOTHROID) 137 MCG tablet Take 1 tablet (137 mcg) by mouth daily 30 tablet 0     lisinopril (PRINIVIL/ZESTRIL) 10 MG tablet Take 1 tablet (10 mg) by mouth daily 90 tablet 1     calcium carbonate antacid 1000 MG CHEW Take 2 tablets by mouth 3 times daily 60 tablet 3       Patient Active Problem List   Diagnosis     CARDIOVASCULAR SCREENING; LDL GOAL LESS THAN 130     Impingement syndrome, shoulder     Rotator cuff tendonitis     Hashimoto's thyroiditis     Nontoxic multinodular goiter     Shoulder joint pain     Gastroesophageal reflux disease, esophagitis presence not specified     Cervical adenopathy     Hypertension goal BP (blood pressure) < 140/90     Essential hypertension with goal blood pressure less than 140/90     Anup's deformity of left heel     History of thyroidectomy       Past Medical History:   Diagnosis Date     Anemia      Hypertension goal BP (blood pressure) < 130/80  5/6/2011     Rotator cuff tendonitis 2/10/2015       Past Surgical History:   Procedure Laterality Date     C TOTAL ABDOM HYSTERECTOMY  12/9/09    [facility]     LAPAROSCOPY,TUBAL CAUTERY  2005     SALPINGO OOPHORECTOMY,R/L/LUIS  2005    left     THYROIDECTOMY N/A 8/22/2017    Procedure: THYROIDECTOMY;  Total Thyroidectomy;  Surgeon: Skylar Costa MD;  Location: [facility]       Family History   Problem Relation Age of Onset     CEREBROVASCULAR DISEASE Mother 50     CEREBROVASCULAR DISEASE Father 49     Hypertension Father      CANCER No family hx of      DIABETES No family hx of      Thyroid Disease No family hx of      Glaucoma No family hx of      Macular Degeneration No family hx of        Social History   Substance Use Topics     Smoking status: Never Smoker     Smokeless tobacco: Never Used     Alcohol use Yes      Comment: occ         Objective:    Vitals: Pulse 74  Wt 179 lb (81.2 kg)  LMP 11/23/2009  SpO2 100%  BMI 32.74 kg/m2  BMI: Body mass index is 32.74 kg/(m^2).  Height: Data Unavailable    Constitutional/ general:  Pt is in no apparent distress, appears well-nourished.  Cooperative with history and physical exam.     Psych:  The patient answered questions appropriately.  Normal affect.  Seems to have reasonable expectations, in terms of treatment.     Eyes:  Visual scanning/ tracking without deficit.     Ears:  Response to auditory stimuli is normal.  No  hearing aid devices.  Auricles in proper alignment.     Lymphatic:  Popliteal lymph nodes not enlarged.     Lungs:  Non labored breathing, non labored speech. No cough.  No audible wheezing. Even, quiet breathing.       Vascular:  Pedal pulses are palpable bilaterally for both the DP and PT arteries.  CFT < 3 sec.  No edema.  Pedal hair growth noted.     Neuro:  Alert and oriented x 3. Coordinated gait.  Light touch sensation is intact to the L4, L5, S1 distributions. No obvious deficits.  No evidence of neurological-based weakness,  spasticity, or contracture in the lower extremities.     Derm: Normal texture and turgor.  No erythema, ecchymosis, or cyanosis.  No open lesions.     Musculoskeletal:    Lower extremity muscle strength is normal.  Patient is ambulatory without an assistive device or brace.  No gross deformities.      Normal arch with weightbearing.   ROM is within normal limits.  Negative tinel's sign.  No side to side compression pain of the calcaneus.  Pain upon palpation to the right plantarmedial  of the calcaneus.  No erythema, edema, ecchymosis, or subcutaneous masses noted.  No pain on palpation or stressing any tendons.  Negative Tinel's sign      Radiographic Exam:  X-Ray Findings:  I personally reviewed the films.  normal    Assessment:  Plantar Fasciitis right foot     Plan:  X-rays personally reviewed.  Discussed etiology and treatment options with the patient.  The potential causes and nature of plantar fasciitis were discussed with the patient.  We reviewed the natural history/prognosis of the condition and risks if left untreated.  These include chronic pain, other sites of pain due to gait changes, and potential plantar fascial rupture.      We discussed possible causes of the condition as it relates to the patients specific situation.      Conservative treatment options were reviewed:  appropriate shoes, avoidance of barefoot walking, inserts/orthoses, stretching, ice, massage, immobilization and NSAIDs.     We also reviewed the options of injection therapy and surgery.  However, it was made clear that surgery is only considered when conservative therapy fails.  The risks and benefits of injection therapy, and surgery were discussed.  Dispensed handout.       After thorough discussion and answering all questions, the patient elected to modifying activities, supportive shoes, ice, stretching, and not going barefoot.  Good house shoes at all times and I made recommendations.  Will get better work shoes.  Lo dye strap  applied to foot.  Rx for orthotics.  Rx for medrol dosepak and discussed possible side effects.   Thank you for allowing me participate in the care of this patient.        Otis Carson DPM, FACFAS

## 2018-02-21 ENCOUNTER — OFFICE VISIT (OUTPATIENT)
Dept: ENDOCRINOLOGY | Facility: CLINIC | Age: 48
End: 2018-02-21
Attending: FAMILY MEDICINE
Payer: COMMERCIAL

## 2018-02-21 VITALS
BODY MASS INDEX: 34.36 KG/M2 | HEART RATE: 64 BPM | WEIGHT: 182 LBS | HEIGHT: 61 IN | SYSTOLIC BLOOD PRESSURE: 172 MMHG | DIASTOLIC BLOOD PRESSURE: 93 MMHG

## 2018-02-21 DIAGNOSIS — Z86.39 HISTORY OF GOITER: ICD-10-CM

## 2018-02-21 DIAGNOSIS — E83.51 HYPOCALCEMIA: ICD-10-CM

## 2018-02-21 DIAGNOSIS — E89.0 POSTOPERATIVE HYPOTHYROIDISM: Primary | ICD-10-CM

## 2018-02-21 PROCEDURE — 99243 OFF/OP CNSLTJ NEW/EST LOW 30: CPT | Performed by: INTERNAL MEDICINE

## 2018-02-21 RX ORDER — LEVOTHYROXINE SODIUM 125 UG/1
125 TABLET ORAL DAILY
Qty: 30 TABLET | Refills: 11 | Status: SHIPPED | OUTPATIENT
Start: 2018-02-21 | End: 2018-04-29

## 2018-02-21 NOTE — PATIENT INSTRUCTIONS
Discontinue the levothyroxine 0.137 mg pills  Lower dose as levothyroxine 0.125 mg pill each morning  Restart calcium 500 mg as two pills at suppertime daily  Monitor for symptom changes, call if questions  See Dr. DE LEON in approx 6 weeks

## 2018-02-21 NOTE — NURSING NOTE
"Chief Complaint   Patient presents with     Thyroid Problem       Initial BP (!) 172/93 (BP Location: Left arm, Cuff Size: Adult Regular)  Pulse 64  Ht 5' 1\" (1.549 m)  Wt 182 lb (82.6 kg)  LMP 11/23/2009  BMI 34.39 kg/m2 Estimated body mass index is 34.39 kg/(m^2) as calculated from the following:    Height as of this encounter: 5' 1\" (1.549 m).    Weight as of this encounter: 182 lb (82.6 kg).  Medication Reconciliation: complete       Paula Blackwood      "

## 2018-02-21 NOTE — MR AVS SNAPSHOT
"              After Visit Summary   2/21/2018    Rhonda Cordero    MRN: [id number]           Patient Information     Date Of Birth          1970        Visit Information        Provider Department      2/21/2018 2:30 PM Fabricio Zhou MD [facility]        Care Instructions    Discontinue the levothyroxine 0.137 mg pills  Lower dose as levothyroxine 0.125 mg pill each morning  Restart calcium 500 mg as two pills at suppertime daily  Monitor for symptom changes, call if questions  See Dr. DE LEON in approx 6 weeks          Follow-ups after your visit        Follow-up notes from your care team     Return in about 6 weeks (around 4/4/2018).      Who to contact     If you have questions or need follow up information about today's clinic visit or your schedule please contact [facility] directly at [phone].  Normal or non-critical lab and imaging results will be communicated to you by MyChart, letter or phone within 4 business days after the clinic has received the results. If you do not hear from us within 7 days, please contact the clinic through [place] or phone. If you have a critical or abnormal lab result, we will notify you by phone as soon as possible.  Submit refill requests through Fourandhalf or call your pharmacy and they will forward the refill request to us. Please allow 3 business days for your refill to be completed.          Additional Information About Your Visit        MyChart Information     Fourandhalf lets you send messages to your doctor, view your test results, renew your prescriptions, schedule appointments and more. To sign up, go to [email or web address] . Click on \"Log in\" on the left side of the screen, which will take you to the Welcome page. Then click on \"Sign up Now\" on the right side of the page.     You will be asked to enter the access code listed below, as well as some personal information. Please follow the directions to create your username and " "password.     Your access code is: [email or web address]  Expires: 2018  3:04 PM     Your access code will  in 90 days. If you need help or a new code, please call your [place] clinic or [phone].        Care EveryWhere ID     This is your Care EveryWhere ID. This could be used by other organizations to access your [place] medical records  [id number]        Your Vitals Were     Pulse Height Last Period BMI (Body Mass Index)          64 5' 1\" (1.549 m) 2009 34.39 kg/m2         Blood Pressure from Last 3 Encounters:   18 (!) 172/93   18 (!) 144/92   18 138/87    Weight from Last 3 Encounters:   18 182 lb (82.6 kg)   02/15/18 179 lb (81.2 kg)   18 179 lb (81.2 kg)              Today, you had the following     No orders found for display       Primary Care Provider Office Phone # Fax #    Vasquez Denson [phone]       [address] [address]        Equal Access to Services     [facility]: Hadii aad ku hadasho Sooliviaali, waaxda luqadaha, qaybta kaalmada adeegyada, keesha tenorio ah. So [facility] [phone].    ATENCIÓN: Si habla español, tiene a soto disposición servicios gratuitos de asistencia lingüística. Ll[facility] [phone].    We comply with applicable federal civil rights laws and [place] laws. We do not discriminate on the basis of race, color, national origin, age, disability, sex, sexual orientation, or gender identity.            Thank you!     Thank you for choosing [facility]  for your care. Our goal is always to provide you with excellent care. Hearing back from our patients is one way we can continue to improve our services. Please take a few minutes to complete the written survey that you may receive in the mail after your visit with us. Thank you!             Your Updated Medication List - Protect others around you: Learn how to safely use, store and throw away your medicines at " www.disposemymeds.org.          This list is accurate as of 2/21/18  3:02 PM.  Always use your most recent med list.                   Brand Name Dispense Instructions for use Diagnosis    calcium carbonate antacid 1000 MG Chew     60 tablet    Take 2 tablets by mouth 3 times daily    Goiter, nontoxic, multinodular, Nontoxic multinodular goiter       levothyroxine 137 MCG tablet    SYNTHROID/LEVOTHROID    30 tablet    Take 1 tablet (137 mcg) by mouth daily    Goiter, nontoxic, multinodular, Nontoxic multinodular goiter       lisinopril 10 MG tablet    PRINIVIL/ZESTRIL    90 tablet    Take 1 tablet (10 mg) by mouth daily    Essential hypertension with goal blood pressure less than 140/90       methylPREDNISolone 4 MG tablet    MEDROL DOSEPAK    21 tablet    Take 1 tablet (4 mg) by mouth See Admin Instructions follow package directions    Plantar fascial fibromatosis

## 2018-02-21 NOTE — LETTER
2/21/2018         RE: hRonda Cordero  [address] [facility] [id number]        Dear Colleague,    Thank you for referring your patient, Rhonda Cordero, to the [facility]. Please see a copy of my visit note below.    Name: Rhonda Cordero    Patient seen at the request of Vasquez Denson for evaluation of postsurgical hypothyroidism, hypocalcemia     Chief Complaint   Patient presents with     Thyroid Problem       HPI:  Recent issues:  Patient had thyroidectomy surgery 8/2017, then postop hypothyroidism and hypocalcemia        ~2015.  Noticed left thyroid lump, then medical evaluations  Recalls periodic thyroid u/s imaging x4, also thyroid FNA biopsies:  2/6/15 Thyroid u/s:   Right lobe 5.0x 2.3x 2.0 cm and left lobe 4.6x 3.2x 2.4 cm   Right lobe nodules:  0.7 cm RML, 0.9 cm RLL   Left lobe nodules:  0.9 cm BROOKE, 2.4 cm LML   Left side of isthmus:  0.9 cm nodule  3/3/15 labs included high TPOAb 620  3/2015. Left thyroid lobe nodule FNA:  Benign  3/2016 Thyroid nodule FNA:  Benign  No previous use of thyroid medication  Progressive growth, then problems with food sticking sensation during swallowing  1/11/17. Saw Dr. 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 Juares  8/22/17. Thyroid surgery at [facility] with Dr. RICHARD Juares  Path:     Benign MNG   Focal lymphocytic thyroiditis   2 fragments of benign parathyroid tissue, 3 benign LN's   Negative for malignancy  Postop treatment with levothyroxine, recalls the 0.137 mg QD dose   Also took calcium 3-4x/day postop for few weeks, then discontinued  9/26/17 labs:  TSH 0.02, FT4 1.67, Ca 8.1, PTH 7, alb 3.5  1/8/18 labs:  TSH <0.01, FT4 1.66, Ca 8.5, PTH 14, alb 3.8  FamHx thyroid cancer:  yes  Current dose:  Levothyroxine 0.137 mg QAM  Not taking any calcium supplements    Sees Dr. Vasquez Denson for gen med evaluations    PMH/PSH:  Past Medical History:   Diagnosis Date      Anemia      Hypertension goal BP (blood pressure) < 130/80 5/6/2011     Rotator cuff tendonitis 2/10/2015     Past Surgical History:   Procedure Laterality Date     C TOTAL ABDOM HYSTERECTOMY  12/9/09    [facility]     LAPAROSCOPY,TUBAL CAUTERY  2005     SALPINGO OOPHORECTOMY,R/L/LUIS  2005    left     THYROIDECTOMY N/A 8/22/2017    Procedure: THYROIDECTOMY;  Total Thyroidectomy;  Surgeon: Skylar Costa MD;  [facility]       Family Hx:  Family History   Problem Relation Age of Onset     CEREBROVASCULAR DISEASE Mother 50     CEREBROVASCULAR DISEASE Father 49     Hypertension Father      CANCER No family hx of      DIABETES No family hx of      Thyroid Disease No family hx of      Glaucoma No family hx of      Macular Degeneration No family hx of          Social Hx:  Social History     Social History     Marital status:      Spouse name: N/A     Number of children: 2     Years of education: 12     Occupational History      [employer]     Social History Main Topics     Smoking status: Never Smoker     Smokeless tobacco: Never Used     Alcohol use Yes      Comment: occ     Drug use: No     Sexual activity: Not Currently     Partners: Male     Birth control/ protection: Surgical     Other Topics Concern     Parent/Sibling W/ Cabg, Mi Or Angioplasty Before 65f 55m? No      Service No     Blood Transfusions Yes     had 3 units 10/23/2009     Caffeine Concern No     Occupational Exposure No     Hobby Hazards No     Sleep Concern Not Asked     sleeps about 5-6 hours a night     Special Diet No     Exercise Yes     walk     Bike Helmet Not Asked     doesn't ride a bike     Seat Belt Yes     Self-Exams No     info given on SBE     Social History Narrative          MEDICATIONS:  has a current medication list which includes the following prescription(s): methylprednisolone, levothyroxine, lisinopril, and calcium carbonate antacid.    ROS:     ROS: 10 point ROS neg other than the symptoms  "noted above in the HPI.    GENERAL: gradual weight gain, some fatigue; denies fevers, chills, night sweats.   HEENT: no recent dysphagia, odonophagia, diplopia, neck pain  THYROID:  no apparent hyper or hypothyroid symptoms  CV: no chest pain, pressure, palpitations  LUNGS: no SOB, DEAN, cough, wheezing   ABDOMEN: no diarrhea, constipation, abdominal pain  EXTREMITIES: occasional hand tremors; no rashes, ulcers, edema  NEUROLOGY: no headaches, denies changes in vision, tingling, extremitiy numbness   MSK: no muscle aches or pains, weakness  SKIN: no rashes or lesions  :  No menses since hysterectomy ~2005, denies hotflashes  PSYCH:  Some anxiety; denies depression  ENDOCRINE: no heat or cold intolerance    Physical Exam   VS: BP (!) 172/93 (BP Location: Left arm, Cuff Size: Adult Regular)  Pulse 64  Ht 5' 1\" (1.549 m)  Wt 182 lb (82.6 kg)  LMP 11/23/2009  BMI 34.39 kg/m2  GENERAL: AXOX3, NAD, well dressed, answering questions appropriately, appears stated age.  THYROID:  Low horizontal anterior neck scar healed, no palpable thyroid tissue or neck nodules  HEENT: neck non-tender, no exopthalmous, no proptosis, EOMI; negative Chvostek's sign at face  CV: RRR, no rubs, gallops, no murmurs  LUNGS: CTAB, no wheezes, rales, or ronchi  ABDOMEN: obese, soft, nontender, nondistended, no organomegaly  EXTREMITIES: no edema, +pedal pulses, no lesions  NEUROLOGY: CN grossly intact, no tremors  MSK: grossly intact  SKIN: dark skin complexion; no rashes, no lesions    LABS:    All pertinent notes, labs, and images personally reviewed by me.     A/P:  Encounter Diagnoses   Name Primary?     Postoperative hypothyroidism Yes     History of goiter      Hypocalcemia        Comments:  I am pleased patient feeling well postop, though suspect her morning fatigue, mild tremors and anxiety relate to the elevated thyroid levels.  Recent calcium level normal, no symptoms of hypocalcemia    Plan:  Discussed general issues with the " postsurgical hypothyroidism diagnosis and management  We reviewed thyroid gland anatomy and hormone physiology  Discussed lab tests used to assess patient thyroid hormone levels  Discussed potential symptoms of high and low thyroid hormone levels  Reviewed treatment options including levothyroxine medication, ideal dosing    Recommend:  Previous high-normal FT4 and low TSH levels indicate overtreatment with levothyroxine medication  Stop the levothyroxine 0.137 mg dose  Change to levothyroxine 0.125 mg po QAM, new Rx sent to her [facility] pharmacy  Restart calcium 500 mg as two tabs QPM, don't take at same time as levothyroxine  Monitor for symptom changes    Needs repeat BP testing with her PCP, may need dose adjustment of ACEI or addition of second med  Keep focus on diet, exercise, weight management  May be due for baseline DEXA scan imaging, review with PCP or GYN     Addressed patient questions today    Labs ordered today: No orders of the defined types were placed in this encounter.    Radiology/Consults ordered today: None    More than 50% of the time spent with Ms. Cordero on counseling / coordinating her care.  Total appointment time was 45 minutes.    Follow-up:  6 weeks    Fabricio Zhou MD  Endocrinology  [facility]  CC: Vasquez Denson       Again, thank you for allowing me to participate in the care of your patient.        Sincerely,        Fabricio Zhou MD

## 2018-02-21 NOTE — PROGRESS NOTES
Name: Rhonda Cordero    Patient seen at the request of Vasquez Denson for evaluation of postsurgical hypothyroidism, hypocalcemia     Chief Complaint   Patient presents with     Thyroid Problem       HPI:  Recent issues:  Patient had thyroidectomy surgery 8/2017, then postop hypothyroidism and hypocalcemia        ~2015.  Noticed left thyroid lump, then medical evaluations  Recalls periodic thyroid u/s imaging x4, also thyroid FNA biopsies:  2/6/15 Thyroid u/s:   Right lobe 5.0x 2.3x 2.0 cm and left lobe 4.6x 3.2x 2.4 cm   Right lobe nodules:  0.7 cm RML, 0.9 cm RLL   Left lobe nodules:  0.9 cm BROOKE, 2.4 cm LML   Left side of isthmus:  0.9 cm nodule  3/3/15 labs included high TPOAb 620  3/2015. Left thyroid lobe nodule FNA:  Benign  3/2016 Thyroid nodule FNA:  Benign  No previous use of thyroid medication  Progressive growth, then problems with food sticking sensation during swallowing  1/11/17. Saw Dr. 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 Juares  8/22/17. Thyroid surgery at [facility] with Dr. RICHARD Juares  Path:     Benign MNG   Focal lymphocytic thyroiditis   2 fragments of benign parathyroid tissue, 3 benign LN's   Negative for malignancy  Postop treatment with levothyroxine, recalls the 0.137 mg QD dose   Also took calcium 3-4x/day postop for few weeks, then discontinued  9/26/17 labs:  TSH 0.02, FT4 1.67, Ca 8.1, PTH 7, alb 3.5  1/8/18 labs:  TSH <0.01, FT4 1.66, Ca 8.5, PTH 14, alb 3.8  FamHx thyroid cancer:  yes  Current dose:  Levothyroxine 0.137 mg QAM  Not taking any calcium supplements    Sees Dr. Vasquez Denson for gen med evaluations    PMH/PSH:  Past Medical History:   Diagnosis Date     Anemia      Hypertension goal BP (blood pressure) < 130/80 5/6/2011     Rotator cuff tendonitis 2/10/2015     Past Surgical History:   Procedure Laterality Date     C TOTAL ABDOM HYSTERECTOMY  12/9/09    [facility]     LAPAROSCOPY,TUBAL CAUTERY   2005     SALPINGO OOPHORECTOMY,R/L/LUIS  2005    left     THYROIDECTOMY N/A 8/22/2017    Procedure: THYROIDECTOMY;  Total Thyroidectomy;  Surgeon: Skylar Costa MD;  [facility]       Family Hx:  Family History   Problem Relation Age of Onset     CEREBROVASCULAR DISEASE Mother 50     CEREBROVASCULAR DISEASE Father 49     Hypertension Father      CANCER No family hx of      DIABETES No family hx of      Thyroid Disease No family hx of      Glaucoma No family hx of      Macular Degeneration No family hx of          Social Hx:  Social History     Social History     Marital status:      Spouse name: N/A     Number of children: 2     Years of education: 12     Occupational History      [employer]     Social History Main Topics     Smoking status: Never Smoker     Smokeless tobacco: Never Used     Alcohol use Yes      Comment: occ     Drug use: No     Sexual activity: Not Currently     Partners: Male     Birth control/ protection: Surgical     Other Topics Concern     Parent/Sibling W/ Cabg, Mi Or Angioplasty Before 65f 55m? No      Service No     Blood Transfusions Yes     had 3 units 10/23/2009     Caffeine Concern No     Occupational Exposure No     Hobby Hazards No     Sleep Concern Not Asked     sleeps about 5-6 hours a night     Special Diet No     Exercise Yes     walk     Bike Helmet Not Asked     doesn't ride a bike     Seat Belt Yes     Self-Exams No     info given on SBE     Social History Narrative          MEDICATIONS:  has a current medication list which includes the following prescription(s): methylprednisolone, levothyroxine, lisinopril, and calcium carbonate antacid.    ROS:     ROS: 10 point ROS neg other than the symptoms noted above in the HPI.    GENERAL: gradual weight gain, some fatigue; denies fevers, chills, night sweats.   HEENT: no recent dysphagia, odonophagia, diplopia, neck pain  THYROID:  no apparent hyper or hypothyroid symptoms  CV: no chest pain, pressure,  "palpitations  LUNGS: no SOB, DEAN, cough, wheezing   ABDOMEN: no diarrhea, constipation, abdominal pain  EXTREMITIES: occasional hand tremors; no rashes, ulcers, edema  NEUROLOGY: no headaches, denies changes in vision, tingling, extremitiy numbness   MSK: no muscle aches or pains, weakness  SKIN: no rashes or lesions  :  No menses since hysterectomy ~2005, denies hotflashes  PSYCH:  Some anxiety; denies depression  ENDOCRINE: no heat or cold intolerance    Physical Exam   VS: BP (!) 172/93 (BP Location: Left arm, Cuff Size: Adult Regular)  Pulse 64  Ht 5' 1\" (1.549 m)  Wt 182 lb (82.6 kg)  LMP 11/23/2009  BMI 34.39 kg/m2  GENERAL: AXOX3, NAD, well dressed, answering questions appropriately, appears stated age.  THYROID:  Low horizontal anterior neck scar healed, no palpable thyroid tissue or neck nodules  HEENT: neck non-tender, no exopthalmous, no proptosis, EOMI; negative Chvostek's sign at face  CV: RRR, no rubs, gallops, no murmurs  LUNGS: CTAB, no wheezes, rales, or ronchi  ABDOMEN: obese, soft, nontender, nondistended, no organomegaly  EXTREMITIES: no edema, +pedal pulses, no lesions  NEUROLOGY: CN grossly intact, no tremors  MSK: grossly intact  SKIN: dark skin complexion; no rashes, no lesions    LABS:    All pertinent notes, labs, and images personally reviewed by me.     A/P:  Encounter Diagnoses   Name Primary?     Postoperative hypothyroidism Yes     History of goiter      Hypocalcemia        Comments:  I am pleased patient feeling well postop, though suspect her morning fatigue, mild tremors and anxiety relate to the elevated thyroid levels.  Recent calcium level normal, no symptoms of hypocalcemia    Plan:  Discussed general issues with the postsurgical hypothyroidism diagnosis and management  We reviewed thyroid gland anatomy and hormone physiology  Discussed lab tests used to assess patient thyroid hormone levels  Discussed potential symptoms of high and low thyroid hormone levels  Reviewed " treatment options including levothyroxine medication, ideal dosing    Recommend:  Previous high-normal FT4 and low TSH levels indicate overtreatment with levothyroxine medication  Stop the levothyroxine 0.137 mg dose  Change to levothyroxine 0.125 mg po QAM, new Rx sent to her [facility] pharmacy  Restart calcium 500 mg as two tabs QPM, don't take at same time as levothyroxine  Monitor for symptom changes    Needs repeat BP testing with her PCP, may need dose adjustment of ACEI or addition of second med  Keep focus on diet, exercise, weight management  May be due for baseline DEXA scan imaging, review with PCP or GYN     Addressed patient questions today    Labs ordered today: No orders of the defined types were placed in this encounter.    Radiology/Consults ordered today: None    More than 50% of the time spent with Ms. Cordero on counseling / coordinating her care.  Total appointment time was 45 minutes.    Follow-up:  6 weeks    Fabricio Zhou MD  Endocrinology  [facility]  CC: Vasquez Denson

## 2018-04-18 ENCOUNTER — OFFICE VISIT (OUTPATIENT)
Dept: ENDOCRINOLOGY | Facility: CLINIC | Age: 48
End: 2018-04-18
Payer: COMMERCIAL

## 2018-04-18 VITALS
WEIGHT: 178 LBS | BODY MASS INDEX: 33.61 KG/M2 | SYSTOLIC BLOOD PRESSURE: 154 MMHG | DIASTOLIC BLOOD PRESSURE: 92 MMHG | HEART RATE: 78 BPM | HEIGHT: 61 IN

## 2018-04-18 DIAGNOSIS — Z86.39 H/O THYROID NODULE: ICD-10-CM

## 2018-04-18 DIAGNOSIS — E89.0 POSTOPERATIVE HYPOTHYROIDISM: ICD-10-CM

## 2018-04-18 DIAGNOSIS — E06.3 HASHIMOTO'S THYROIDITIS: Primary | ICD-10-CM

## 2018-04-18 DIAGNOSIS — E83.51 HYPOCALCEMIA: ICD-10-CM

## 2018-04-18 DIAGNOSIS — I10 HYPERTENSION GOAL BP (BLOOD PRESSURE) < 140/90: ICD-10-CM

## 2018-04-18 LAB
CALCIUM SERPL-MCNC: 8.2 MG/DL (ref 8.5–10.1)
PTH-INTACT SERPL-MCNC: 23 PG/ML (ref 18–80)
T4 FREE SERPL-MCNC: 1.52 NG/DL (ref 0.76–1.46)
TSH SERPL DL<=0.005 MIU/L-ACNC: <0.01 MU/L (ref 0.4–4)

## 2018-04-18 PROCEDURE — 36415 COLL VENOUS BLD VENIPUNCTURE: CPT | Performed by: INTERNAL MEDICINE

## 2018-04-18 PROCEDURE — 82310 ASSAY OF CALCIUM: CPT | Performed by: INTERNAL MEDICINE

## 2018-04-18 PROCEDURE — 99214 OFFICE O/P EST MOD 30 MIN: CPT | Performed by: INTERNAL MEDICINE

## 2018-04-18 PROCEDURE — 84439 ASSAY OF FREE THYROXINE: CPT | Performed by: INTERNAL MEDICINE

## 2018-04-18 PROCEDURE — 83970 ASSAY OF PARATHORMONE: CPT | Performed by: INTERNAL MEDICINE

## 2018-04-18 PROCEDURE — 84443 ASSAY THYROID STIM HORMONE: CPT | Performed by: INTERNAL MEDICINE

## 2018-04-18 NOTE — PATIENT INSTRUCTIONS
Continue current levothyroxine 0.125 mg daily dose  Take the calcium supplement every evening... Don't forget   OK to try the gummy bear calcium gel tablets or the Viactiv flavored calcium tablets  Don't take the calcium pills at same time as the thyroid pill

## 2018-04-18 NOTE — MR AVS SNAPSHOT
After Visit Summary   4/18/2018    Rhonda Cordero    MRN: [id number]           Patient Information     Date Of Birth          1970        Visit Information        Provider Department      4/18/2018 2:30 PM Fabricio Zhou MD [facility]        Today's Diagnoses     Hashimoto's thyroiditis    -  1    Postoperative hypothyroidism        H/O thyroid nodule        Hypocalcemia          Care Instructions    Continue current levothyroxine 0.125 mg daily dose  Take the calcium supplement every evening... Don't forget   OK to try the gummy bear calcium gel tablets or the Viactiv flavored calcium tablets  Don't take the calcium pills at same time as the thyroid pill          Follow-ups after your visit        Your next 10 appointments already scheduled     Apr 25, 2018  2:30 PM CDT   (Arrive by 2:15 PM)   MA SCREENING DIGITAL BILATERAL with [id number]   [place] [facility] ([facility])    [address] [place] [id number]   [phone]           Do not use any powder, lotion or deodorant under your arms or on your breast. If you do, we will ask you to remove it before your exam.  Wear comfortable, two-piece clothing.  If you have any allergies, tell your care team.  Bring any previous mammograms from other facilities or have them mailed to the breast center.            Apr 25, 2018  3:00 PM CDT   PHYSICAL with Cristobal Tobar MD   [place] [facility] ([facility]    [address] [place] [id number] [phone]              Who to contact     If you have questions or need follow up information about today's clinic visit or your schedule please contact [facility] directly at [phone].  Normal or non-critical lab and imaging results will be communicated to you by MyChart, letter or phone within 4 business days after the clinic has received the results. If you do not hear from us within 7 days, please  "contact the clinic through [facility] or phone. If you have a critical or abnormal lab result, we will notify you by phone as soon as possible.  Submit refill requests through [facility] or call your pharmacy and they will forward the refill request to us. Please allow 3 business days for your refill to be completed.          Additional Information About Your Visit        [employer] Information     [facility] lets you send messages to your doctor, view your test results, renew your prescriptions, schedule appointments and more. To sign up, go to [email or web address] . Click on \"Log in\" on the left side of the screen, which will take you to the Welcome page. Then click on \"Sign up Now\" on the right side of the page.     You will be asked to enter the access code listed below, as well as some personal information. Please follow the directions to create your username and password.     Your access code is: [email or web address]  Expires: 2018  2:52 PM     Your access code will  in 90 days. If you need help or a new code, please call your [place] clinic or [phone].        Care EveryWhere ID     This is your Care EveryWhere ID. This could be used by other organizations to access your [place] medical records  [id number]        Your Vitals Were     Pulse Height Last Period BMI (Body Mass Index)          78 5' 1\" (1.549 m) 2009 33.63 kg/m2         Blood Pressure from Last 3 Encounters:   18 (!) 174/92   18 (!) 172/93   18 (!) 144/92    Weight from Last 3 Encounters:   18 178 lb (80.7 kg)   18 182 lb (82.6 kg)   02/15/18 179 lb (81.2 kg)              We Performed the Following     Calcium     Parathyroid Hormone Intact     T4, free     TSH        Primary Care Provider Office Phone # Fax #    Vasquez Denson [phone]       [address] [facility] [id number]        Equal Access to Services     EMY RODRIGUEZ AH: Hadii estela Salter, " sreedhar zamanlevjason beckettgato abiin hayaan adeeg kharash la'aan ah. So [facility] [phone].    ATENCIÓN: Si patrice mancia, tiene a soto disposición servicios gratuitos de asistencia lingüística. Yaritza al [phone].    We comply with applicable federal civil rights laws and [place] laws. We do not discriminate on the basis of race, color, national origin, age, disability, sex, sexual orientation, or gender identity.            Thank you!     Thank you for choosing [facility]  for your care. Our goal is always to provide you with excellent care. Hearing back from our patients is one way we can continue to improve our services. Please take a few minutes to complete the written survey that you may receive in the mail after your visit with us. Thank you!             Your Updated Medication List - Protect others around you: Learn how to safely use, store and throw away your medicines at www.disposemymeds.org.          This list is accurate as of 4/18/18  2:52 PM.  Always use your most recent med list.                   Brand Name Dispense Instructions for use Diagnosis    calcium carbonate antacid 1000 MG Chew     60 tablet    Take 2 tablets by mouth 3 times daily    Goiter, nontoxic, multinodular, Nontoxic multinodular goiter       levothyroxine 125 MCG tablet    SYNTHROID/LEVOTHROID    30 tablet    Take 1 tablet (125 mcg) by mouth daily    Postoperative hypothyroidism       lisinopril 10 MG tablet    PRINIVIL/ZESTRIL    90 tablet    Take 1 tablet (10 mg) by mouth daily    Essential hypertension with goal blood pressure less than 140/90       methylPREDNISolone 4 MG tablet    MEDROL DOSEPAK    21 tablet    Take 1 tablet (4 mg) by mouth See Admin Instructions follow package directions    Plantar fascial fibromatosis

## 2018-04-18 NOTE — NURSING NOTE
"Chief Complaint   Patient presents with     Thyroid Problem       Initial BP (!) 174/92 (BP Location: Right arm, Cuff Size: Adult Regular)  Pulse 78  Ht 5' 1\" (1.549 m)  Wt 178 lb (80.7 kg)  LMP 11/23/2009  BMI 33.63 kg/m2 Estimated body mass index is 33.63 kg/(m^2) as calculated from the following:    Height as of this encounter: 5' 1\" (1.549 m).    Weight as of this encounter: 178 lb (80.7 kg).  Medication Reconciliation: complete       Paula Blackwood      "

## 2018-04-18 NOTE — PROGRESS NOTES
Name: Rhonda Cordero    Chief Complaint   Patient presents with     Thyroid Problem     HPI:  Recent issues:  Patient had thyroidectomy surgery 8/2017, then postop hypothyroidism and hypocalcemia        ~2015.  Noticed left thyroid lump, then medical evaluations  Recalls periodic thyroid u/s imaging x4, also thyroid FNA biopsies:  2/6/15 Thyroid u/s:   Right lobe 5.0x 2.3x 2.0 cm and left lobe 4.6x 3.2x 2.4 cm   Right lobe nodules:  0.7 cm RML, 0.9 cm RLL   Left lobe nodules:  0.9 cm BROOKE, 2.4 cm LML   Left side of isthmus:  0.9 cm nodule  3/3/15 labs included high TPOAb 620  3/2015. Left thyroid lobe nodule FNA:  Benign  3/2016 Thyroid nodule FNA:  Benign  No previous use of thyroid medication  Progressive growth, then problems with food sticking sensation during swallowing  1/11/17. Saw Dr. 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 Juares  8/22/17. Thyroid surgery at [facility] with Dr. RICHARD Juares  Path:     Benign MNG   Focal lymphocytic thyroiditis   2 fragments of benign parathyroid tissue, 3 benign LN's   Negative for malignancy  Postop treatment with levothyroxine, recalls the 0.137 mg QD dose   Also took calcium 3-4x/day postop for few weeks, then discontinued  9/26/17 labs:  TSH 0.02, FT4 1.67, Ca 8.1, PTH 7, alb 3.5  1/8/18 labs:  TSH <0.01, FT4 1.66, Ca 8.5, PTH 14, alb 3.8  FamHx thyroid cancer:  Yes    2/21/18. Initial evaluation with me  Dose reduction of thyroid medication, started calcium supplement use  Calcium supplement dose:  Infrequent dosing as 500 mg 2-tabs po QPM  Current dose:  Levothyroxine 0.125 mg QAM    Sees Dr. Vasquez Denson for general medicine evaluations    PMH/PSH:  Past Medical History:   Diagnosis Date     Anemia      Hypertension goal BP (blood pressure) < 130/80 5/6/2011     Rotator cuff tendonitis 2/10/2015     Past Surgical History:   Procedure Laterality Date     C TOTAL ABDOM HYSTERECTOMY  12/9/09    [place]  [facility]     LAPAROSCOPY,TUBAL CAUTERY  2005     SALPINGO OOPHORECTOMY,R/L/LUIS  2005    left     THYROIDECTOMY N/A 8/22/2017    Procedure: THYROIDECTOMY;  Total Thyroidectomy;  Surgeon: Skylar Costa MD;  [facility]       Family Hx:  Family History   Problem Relation Age of Onset     CEREBROVASCULAR DISEASE Mother 50     CEREBROVASCULAR DISEASE Father 49     Hypertension Father      CANCER No family hx of      DIABETES No family hx of      Thyroid Disease No family hx of      Glaucoma No family hx of      Macular Degeneration No family hx of          Social Hx:  Social History     Social History     Marital status:      Spouse name: N/A     Number of children: 2     Years of education: 12     Occupational History      [employer]     Social History Main Topics     Smoking status: Never Smoker     Smokeless tobacco: Never Used     Alcohol use Yes      Comment: occ     Drug use: No     Sexual activity: Not Currently     Partners: Male     Birth control/ protection: Surgical     Other Topics Concern     Parent/Sibling W/ Cabg, Mi Or Angioplasty Before 65f 55m? No      Service No     Blood Transfusions Yes     had 3 units 10/23/2009     Caffeine Concern No     Occupational Exposure No     Hobby Hazards No     Sleep Concern Not Asked     sleeps about 5-6 hours a night     Special Diet No     Exercise Yes     walk     Bike Helmet Not Asked     doesn't ride a bike     Seat Belt Yes     Self-Exams No     info given on SBE     Social History Narrative          MEDICATIONS:  has a current medication list which includes the following prescription(s): calcium carbonate antacid, levothyroxine, lisinopril, and methylprednisolone.    ROS:     ROS: 10 point ROS neg other than the symptoms noted above in the HPI.    GENERAL: fatigue, gradual weight gain; denies fevers, chills, night sweats.   HEENT: no recent dysphagia, odonophagia, diplopia, neck pain  THYROID:  no apparent hyper or hypothyroid  "symptoms  CV: no chest pain, pressure, palpitations  LUNGS: no SOB, DEAN, cough, wheezing   ABDOMEN: no diarrhea, constipation, abdominal pain  EXTREMITIES: occasional hand tremors; no rashes, ulcers, edema  NEUROLOGY: hand tingling; no headaches, denies changes in vision  MSK: no muscle aches or pains, weakness  SKIN: no rashes or lesions  :  No menses since hysterectomy ~2005, denies hotflashes  PSYCH:  Some anxiety; denies depression  ENDOCRINE: no heat or cold intolerance    Physical Exam   VS: BP (!) 154/92 (BP Location: Right arm, Cuff Size: Adult Regular)  Pulse 78  Ht 5' 1\" (1.549 m)  Wt 178 lb (80.7 kg)  LMP 11/23/2009  BMI 33.63 kg/m2  GENERAL: AXOX3, NAD, well dressed, answering questions appropriately, appears stated age.  THYROID:  Low horizontal anterior neck scar healed, no palpable thyroid tissue or neck nodules  HEENT: neck non-tender, no exopthalmous, no proptosis, EOMI  ABDOMEN: obese, soft, nontender, nondistended, no organomegaly  EXTREMITIES: no edema, +pedal pulses, no lesions  NEUROLOGY: mildly positive left Chvostek's sign; CN grossly intact, no tremors  SKIN: dark skin complexion; no rashes, no lesions    LABS:    All pertinent notes, labs, and images personally reviewed by me.     A/P:  Encounter Diagnoses   Name Primary?     Hashimoto's thyroiditis Yes     Postoperative hypothyroidism      H/O thyroid nodule      Hypocalcemia      Hypertension goal BP (blood pressure) < 140/90      Comments:  Reviewed health history, thyroid and calcium issues  Suspect hand tingling, fatigue relates to the low calcium levels, postop hypoparathyroidism    Plan:  Discussed general issues with the postsurgical hypothyroidism diagnosis and management  We discussed lab tests used to assess patient thyroid hormone levels  Discussed potential symptoms of high and low thyroid hormone levels  Reviewed treatment options including levothyroxine medication, ideal dosing    Recommend:  Continue the current " (lower dose) levothyroxine 0.125 mg po QAM  Needs better compliance with the calcium supplement dosing   Restart calcium 500 mg as two tabs QPM, don't take at same time as levothyroxine   If calcium carbonate bad taste, use gummy bear calcium or Viactiv (needs 1000 mg daily dose)  Monitor for symptom changes    Keep focus on diet, exercise, weight management  May be due for baseline DEXA scan imaging, review with PCP or GYN   Patient to follow up with Primary Care provider regarding elevated blood pressure.  Needs repeat BP testing with her PCP, may need dose adjustment of ACEI or addition of second med    Addressed patient questions today    Labs ordered today:   Orders Placed This Encounter   Procedures     TSH     T4, free     Calcium     Parathyroid Hormone Intact     Radiology/Consults ordered today: None    More than 50% of the time spent with Ms. Cordero on counseling / coordinating her care.  Total appointment time was 25 minutes.    Follow-up:  3 mo.    Fabricio Zhou MD  Endocrinology  [facility]

## 2018-04-25 ENCOUNTER — RADIANT APPOINTMENT (OUTPATIENT)
Dept: MAMMOGRAPHY | Facility: CLINIC | Age: 48
End: 2018-04-25
Payer: COMMERCIAL

## 2018-04-25 ENCOUNTER — OFFICE VISIT (OUTPATIENT)
Dept: OBGYN | Facility: CLINIC | Age: 48
End: 2018-04-25
Payer: COMMERCIAL

## 2018-04-25 VITALS
SYSTOLIC BLOOD PRESSURE: 125 MMHG | OXYGEN SATURATION: 100 % | DIASTOLIC BLOOD PRESSURE: 89 MMHG | WEIGHT: 177.6 LBS | BODY MASS INDEX: 33.56 KG/M2 | HEART RATE: 98 BPM

## 2018-04-25 DIAGNOSIS — L29.3 ITCHY SKIN OF ANUS AND GENITALS: ICD-10-CM

## 2018-04-25 DIAGNOSIS — Z12.31 VISIT FOR SCREENING MAMMOGRAM: ICD-10-CM

## 2018-04-25 DIAGNOSIS — Z01.411 ENCOUNTER FOR GYNECOLOGICAL EXAMINATION WITH ABNORMAL FINDING: Primary | ICD-10-CM

## 2018-04-25 PROCEDURE — 99396 PREV VISIT EST AGE 40-64: CPT | Performed by: OBSTETRICS & GYNECOLOGY

## 2018-04-25 PROCEDURE — 77067 SCR MAMMO BI INCL CAD: CPT | Mod: TC

## 2018-04-25 RX ORDER — CLOBETASOL PROPIONATE 0.5 MG/G
CREAM TOPICAL
Qty: 60 G | Refills: 2 | Status: SHIPPED | OUTPATIENT
Start: 2018-04-25 | End: 2022-08-12

## 2018-04-25 NOTE — PROGRESS NOTES
Rhonda Cordero is a 47 year old female , who presents for annual exam.   No unusual bleeding, no incontinence, or unusual discharge.   She has had some back skin itching and rash.  She will make appointment with FP or with Derm.     Last cholesterol:   Recent Labs   Lab Test  13   0852  11   1015   CHOL  186  159   HDL  46*  51   LDL  123  98   TRIG  84  50   CHOLHDLRATIO  4.1  3.1     Past Medical History:   Diagnosis Date     Anemia      Hypertension goal BP (blood pressure) < 130/80 2011     Rotator cuff tendonitis 2/10/2015       Past Surgical History:   Procedure Laterality Date     C TOTAL ABDOM HYSTERECTOMY  09    [facility]     LAPAROSCOPY,TUBAL CAUTERY       SALPINGO OOPHORECTOMY,R/L/LUIS      left     THYROIDECTOMY N/A 2017    Procedure: THYROIDECTOMY;  Total Thyroidectomy;  Surgeon: Skylar Costa MD;  [facility]       Obstetric History       T0      L2     SAB0   TAB0   Ectopic0   Multiple0   Live Births0       # Outcome Date GA Lbr Roc/2nd Weight Sex Delivery Anes PTL Lv   2 Para            1 Para                   Gyn History:  Gynecological History         Patient's last menstrual period was 2009.     no STD /no PID/no IUD      history of abnormal pap smear:  no  Last pap:   Lab Results   Component Value Date    PAP NIL 2014           Current Outpatient Prescriptions   Medication Sig Dispense Refill     calcium carbonate antacid 1000 MG CHEW Take 2 tablets by mouth 3 times daily 60 tablet 3     levothyroxine (SYNTHROID/LEVOTHROID) 125 MCG tablet Take 1 tablet (125 mcg) by mouth daily 30 tablet 11     lisinopril (PRINIVIL/ZESTRIL) 10 MG tablet Take 1 tablet (10 mg) by mouth daily 90 tablet 1     methylPREDNISolone (MEDROL DOSEPAK) 4 MG tablet Take 1 tablet (4 mg) by mouth See Admin Instructions follow package directions (Patient not taking: Reported on 2018) 21 tablet 0       Allergies   Allergen  Reactions     Advil [Ibuprofen] Swelling     Hands swelled     Cephalexin Itching       Social History     Social History     Marital status:      Spouse name: N/A     Number of children: 2     Years of education: 12     Occupational History      [employer]pre[employer])     Social History Main Topics     Smoking status: Never Smoker     Smokeless tobacco: Never Used     Alcohol use Yes      Comment: occ     Drug use: No     Sexual activity: Not Currently     Partners: Male     Birth control/ protection: Surgical     Other Topics Concern     Parent/Sibling W/ Cabg, Mi Or Angioplasty Before 65f 55m? No      Service No     Blood Transfusions Yes     had 3 units 10/23/2009     Caffeine Concern No     Occupational Exposure No     Hobby Hazards No     Sleep Concern Not Asked     sleeps about 5-6 hours a night     Special Diet No     Exercise Yes     walk     Bike Helmet Not Asked     doesn't ride a bike     Seat Belt Yes     Self-Exams No     info given on SBE     Social History Narrative       Family History   Problem Relation Age of Onset     CEREBROVASCULAR DISEASE Mother 50     CEREBROVASCULAR DISEASE Father 49     Hypertension Father      CANCER No family hx of      DIABETES No family hx of      Thyroid Disease No family hx of      Glaucoma No family hx of      Macular Degeneration No family hx of          ROS:  All negative except as above.      EXAM:  /89  Pulse 98  Wt 177 lb 9.6 oz (80.6 kg)  LMP 11/23/2009  SpO2 100%  BMI 33.56 kg/m2  General:  WNWD female, NAD  Alert  Oriented x 3  Gait:  Normal  Skin:  Normal skin turgor  Neurologic:  CN grossly intact, good sensation.    HEENT:  NC/AT, EOMI  Neck:  No masses palpated, symmetrical, carotids +2/4, no bruits heard  Heart:  RRR  Lungs:  Clear   Breasts:  Symmetrical, no dimpling noted, no masses palpated, no discharge expressed  Back:  Rash seen  Abdomen:  Non-tender, non-distended.  Vulva: No external lesions, normal hair distribution, but  shaved, no adenopathy  BUS:  Normal, no masses noted  Urethra:  No hypermobility noted  Urethral meatus:  No masses noted  Vagina: Moist, pink, no abnormal discharge, well rugated, no lesions  Cervix: absent   Uterus: absent   Ovaries/Bimanual: No mass, non-tender, mobile  Perianal:  No masses noted.   Extremities:  Small area on the right medial thigh that has appearance of fungal infection of skin.  No clubbing, cyanosis, or edema      ASSESSMENT/PLAN   Annual examination   Skin rash of back, recommend to see FP or Derm  Fungal rash on the medial right thigh, about 2.5 cm.  Recommend to use Monistat topical.  If no benefit, then needs to be seen.   Low fat diet, weight bearing exercises and self breast exams on a monthly basis have been recommended.  I have discussed with patient the risks, benefits, medications, treatment options and modalities.   I have instructed the patient to call or schedule a follow-up appointment if any problems.

## 2018-04-25 NOTE — MR AVS SNAPSHOT
"              After Visit Summary   4/25/2018    Rhonda Cordero    MRN: [id number]           Patient Information     Date Of Birth          1970        Visit Information        Provider Department      4/25/2018 3:00 PM Cristobal Tobar MD [facility]        Today's Diagnoses     Encounter for gynecological examination with abnormal finding    -  1    Itchy skin of anus and genitals           Follow-ups after your visit        Follow-up notes from your care team     Return in about 1 year (around 4/25/2019), or if symptoms worsen or fail to improve, for Physical Exam.      Who to contact     If you have questions or need follow up information about today's clinic visit or your schedule please contact [facility] directly at [phone].  Normal or non-critical lab and imaging results will be communicated to you by MyChart, letter or phone within 4 business days after the clinic has received the results. If you do not hear from us within 7 days, please contact the clinic through MyChart or phone. If you have a critical or abnormal lab result, we will notify you by phone as soon as possible.  Submit refill requests through [employer] or call your pharmacy and they will forward the refill request to us. Please allow 3 business days for your refill to be completed.          Additional Information About Your Visit        MyChart Information     [employer] lets you send messages to your doctor, view your test results, renew your prescriptions, schedule appointments and more. To sign up, go to [email or web address] . Click on \"Log in\" on the left side of the screen, which will take you to the Welcome page. Then click on \"Sign up Now\" on the right side of the page.     You will be asked to enter the access code listed below, as well as some personal information. Please follow the directions to create your username and password.     Your access code is: [email or web address]  Expires: " 2018  2:52 PM     Your access code will  in 90 days. If you need help or a new code, please call your [facility] clinic or [phone].        Care EveryWhere ID     This is your Care EveryWhere ID. This could be used by other organizations to access your [facility] medical records  [id number]        Your Vitals Were     Pulse Last Period Pulse Oximetry BMI (Body Mass Index)          98 2009 100% 33.56 kg/m2         Blood Pressure from Last 3 Encounters:   18 125/89   18 (!) 154/92   18 (!) 172/93    Weight from Last 3 Encounters:   18 177 lb 9.6 oz (80.6 kg)   18 178 lb (80.7 kg)   18 182 lb (82.6 kg)              Today, you had the following     No orders found for display         Today's Medication Changes          These changes are accurate as of 18  3:40 PM.  If you have any questions, ask your nurse or doctor.               Start taking these medicines.        Dose/Directions    clobetasol 0.05 % cream   Commonly known as:  TEMOVATE   Used for:  Itchy skin of anus and genitals   Started by:  Cristobal Tobar MD        Apply sparingly to affected area twice daily for two weeks, then use twice a week   Quantity:  60 g   Refills:  2            Where to get your medicines      These medications were sent to [facility] [place], [place] - [address]  [address], [facility] [address]     Phone:  [phone]     clobetasol 0.05 % cream                Primary Care Provider Office Phone # Fax #    Vasquez Denson [phone]       [address] [facility] [address]        Equal Access to Services     KAMRYN RODRIGUEZ AH: Nan Brewer, estela flores, sreedhar kaalmada domitila, keesha hernandez [facility] [phone].    ATENCIÓN: Si habla español, tiene a soto disposición servicios gratuitos de asistencia lingüística. Llame al  [phone].    We comply with applicable federal civil rights laws and [place] laws. We do not discriminate on the basis of race, color, national origin, age, disability, sex, sexual orientation, or gender identity.            Thank you!     Thank you for choosing [facility]  for your care. Our goal is always to provide you with excellent care. Hearing back from our patients is one way we can continue to improve our services. Please take a few minutes to complete the written survey that you may receive in the mail after your visit with us. Thank you!             Your Updated Medication List - Protect others around you: Learn how to safely use, store and throw away your medicines at www.disposemymeds.org.          This list is accurate as of 4/25/18  3:40 PM.  Always use your most recent med list.                   Brand Name Dispense Instructions for use Diagnosis    calcium carbonate antacid 1000 MG Chew     60 tablet    Take 2 tablets by mouth 3 times daily    Goiter, nontoxic, multinodular, Nontoxic multinodular goiter       clobetasol 0.05 % cream    TEMOVATE    60 g    Apply sparingly to affected area twice daily for two weeks, then use twice a week    Itchy skin of anus and genitals       levothyroxine 125 MCG tablet    SYNTHROID/LEVOTHROID    30 tablet    Take 1 tablet (125 mcg) by mouth daily    Postoperative hypothyroidism       lisinopril 10 MG tablet    PRINIVIL/ZESTRIL    90 tablet    Take 1 tablet (10 mg) by mouth daily    Essential hypertension with goal blood pressure less than 140/90       methylPREDNISolone 4 MG tablet    MEDROL DOSEPAK    21 tablet    Take 1 tablet (4 mg) by mouth See Admin Instructions follow package directions    Plantar fascial fibromatosis

## 2018-04-25 NOTE — PROGRESS NOTES
"Chief Complaint   Patient presents with     Physical       Initial /89  Pulse 98  Wt 177 lb 9.6 oz (80.6 kg)  LMP 11/23/2009  SpO2 100%  BMI 33.56 kg/m2 Estimated body mass index is 33.56 kg/(m^2) as calculated from the following:    Height as of 4/18/18: 5' 1\" (1.549 m).    Weight as of this encounter: 177 lb 9.6 oz (80.6 kg).  Medication Reconciliation: complete     Tesha Bhatia CMA      "

## 2018-04-29 DIAGNOSIS — E89.0 POSTOPERATIVE HYPOTHYROIDISM: ICD-10-CM

## 2018-04-29 RX ORDER — LEVOTHYROXINE SODIUM 112 UG/1
112 TABLET ORAL DAILY
Qty: 30 TABLET | Refills: 11 | Status: SHIPPED | OUTPATIENT
Start: 2018-04-29 | End: 2019-05-14

## 2018-07-25 ENCOUNTER — TELEPHONE (OUTPATIENT)
Dept: FAMILY MEDICINE | Facility: CLINIC | Age: 48
End: 2018-07-25

## 2018-07-26 NOTE — TELEPHONE ENCOUNTER
Reason for Call:  Medication or medication refill:    Do you use a [facility]?  Name of the pharmacy and phone number for the current request:  [facility]     Name of the medication requested: Lisinopril    Other request:     Can we leave a detailed message on this number? Not Applicable    Phone number patient can be reached at: Home number on file [phone] (home)    Best Time:     Call taken on 7/25/2018 at 7:55 PM by Mariangel Yarbrough

## 2018-07-26 NOTE — TELEPHONE ENCOUNTER
Refills were sent to pharmacy on 7/20/18.    Called patient at [phone] (home). Left message on vociemail to return phone call to triage line at [phone].  Skylar Aggarwal RN [facility] Triage.

## 2018-07-31 NOTE — TELEPHONE ENCOUNTER
Attempt # 2  Called patient at home number.  Was call answered?  No answer, left message to call nurse line at [phone]    Latrice Dial RN  [facility]

## 2018-07-31 NOTE — TELEPHONE ENCOUNTER
Called patient at [phone] (home) to notify her of refill sent 7/20. Unable to reach, patient/family was instructed to return call to [facility] RN directly on the RN call back line at [phone].    Vivi Bowman RN  [facility]

## 2018-08-01 NOTE — TELEPHONE ENCOUNTER
Nurse contacted pharmacy and patient picked up this medication on 7/27 no need to contact patient.    Latrice Dial RN  [facility]

## 2018-09-19 ENCOUNTER — OFFICE VISIT (OUTPATIENT)
Dept: ENDOCRINOLOGY | Facility: CLINIC | Age: 48
End: 2018-09-19
Payer: COMMERCIAL

## 2018-09-19 VITALS
HEART RATE: 69 BPM | HEIGHT: 61 IN | DIASTOLIC BLOOD PRESSURE: 82 MMHG | SYSTOLIC BLOOD PRESSURE: 140 MMHG | BODY MASS INDEX: 33.42 KG/M2 | WEIGHT: 177 LBS

## 2018-09-19 DIAGNOSIS — E89.0 POSTOPERATIVE HYPOTHYROIDISM: Primary | ICD-10-CM

## 2018-09-19 DIAGNOSIS — E83.51 HYPOCALCEMIA: ICD-10-CM

## 2018-09-19 LAB
CALCIUM SERPL-MCNC: 7.4 MG/DL (ref 8.5–10.1)
PHOSPHATE SERPL-MCNC: 5.2 MG/DL (ref 2.5–4.5)
PTH-INTACT SERPL-MCNC: 29 PG/ML (ref 18–80)
T4 FREE SERPL-MCNC: 1.35 NG/DL (ref 0.76–1.46)
TSH SERPL DL<=0.005 MIU/L-ACNC: 0.06 MU/L (ref 0.4–4)

## 2018-09-19 PROCEDURE — 82310 ASSAY OF CALCIUM: CPT | Performed by: INTERNAL MEDICINE

## 2018-09-19 PROCEDURE — 84100 ASSAY OF PHOSPHORUS: CPT | Performed by: INTERNAL MEDICINE

## 2018-09-19 PROCEDURE — 83970 ASSAY OF PARATHORMONE: CPT | Performed by: INTERNAL MEDICINE

## 2018-09-19 PROCEDURE — 84443 ASSAY THYROID STIM HORMONE: CPT | Performed by: INTERNAL MEDICINE

## 2018-09-19 PROCEDURE — 99214 OFFICE O/P EST MOD 30 MIN: CPT | Performed by: INTERNAL MEDICINE

## 2018-09-19 PROCEDURE — 84439 ASSAY OF FREE THYROXINE: CPT | Performed by: INTERNAL MEDICINE

## 2018-09-19 PROCEDURE — 36415 COLL VENOUS BLD VENIPUNCTURE: CPT | Performed by: INTERNAL MEDICINE

## 2018-09-19 NOTE — PROGRESS NOTES
Name: Rhonda Cordero    Chief Complaint   Patient presents with     Thyroid Problem     Postsurgical hypothyroidism     HPI:  Recent issues:  Patient had thyroidectomy surgery 8/2017, then postop hypothyroidism and hypocalcemia        ~2015.  Noticed left thyroid lump, then medical evaluations  Recalls periodic thyroid u/s imaging x4, also thyroid FNA biopsies:  2/6/15 Thyroid u/s:   Right lobe 5.0x 2.3x 2.0 cm and left lobe 4.6x 3.2x 2.4 cm   Right lobe nodules:  0.7 cm RML, 0.9 cm RLL   Left lobe nodules:  0.9 cm BROOKE, 2.4 cm LML   Left side of isthmus:  0.9 cm nodule  3/3/15 labs included high TPOAb 620  3/2015. Left thyroid lobe nodule FNA:  Benign  3/2016 Thyroid nodule FNA:  Benign  No previous use of thyroid medication  Progressive growth, then problems with food sticking sensation during swallowing  1/11/17. Saw Dr. 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 [place]  8/22/17.  Total thyroidectomy surgery at [facility] with Dr. RICHARD [place]ares  Path:     Benign MNG, no evidence for malignancy   Focal lymphocytic thyroiditis   2 fragments of benign parathyroid tissue, 3 benign LN's  Postop treatment with levothyroxine, recalls the 0.137 mg QD dose   Also took calcium 3-4x/day postop for few weeks, then discontinued  9/26/17 labs:  TSH 0.02, FT4 1.67, Ca 8.1, PTH 7, alb 3.5  1/8/18 labs:  TSH <0.01, FT4 1.66, Ca 8.5, PTH 14, alb 3.8  FamHx thyroid cancer:  Yes    2/21/18. Initial evaluation with me  Dose reduction of thyroid medication, started calcium supplement use  Calcium supplement dose:  Infrequent dosing as 500 mg 2-tabs po QPM  Current dose:  Levothyroxine 0.112 mg QAM      , lives in [place], [place]  Sees Dr. Vasquez Denson for general medicine evaluations    PMH/PSH:  Past Medical History:   Diagnosis Date     Anemia      Hypertension goal BP (blood pressure) < 130/80 5/6/2011     Hypocalcemia      Multinodular goiter      Postsurgical  hypothyroidism      Rotator cuff tendonitis 2/10/2015     Past Surgical History:   Procedure Laterality Date     C TOTAL ABDOM HYSTERECTOMY  12/9/09    [facility]     LAPAROSCOPY,TUBAL CAUTERY  2005     SALPINGO OOPHORECTOMY,R/L/LUIS  2005    left     THYROIDECTOMY N/A 8/22/2017    Procedure: THYROIDECTOMY;  Total Thyroidectomy;  Surgeon: Skylar Costa MD;  Location: [facility] OR       Family Hx:  Family History   Problem Relation Age of Onset     Cerebrovascular Disease Mother 50     Cerebrovascular Disease Father 49     Hypertension Father      Cancer No family hx of      Diabetes No family hx of      Thyroid Disease No family hx of      Glaucoma No family hx of      Macular Degeneration No family hx of          Social Hx:  Social History     Social History     Marital status:      Spouse name: N/A     Number of children: 2     Years of education: 12     Occupational History      [employer]     Social History Main Topics     Smoking status: Never Smoker     Smokeless tobacco: Never Used     Alcohol use Yes      Comment: occ     Drug use: No     Sexual activity: Not Currently     Partners: Male     Birth control/ protection: Surgical     Other Topics Concern     Parent/Sibling W/ Cabg, Mi Or Angioplasty Before 65f 55m? No      Service No     Blood Transfusions Yes     had 3 units 10/23/2009     Caffeine Concern No     Occupational Exposure No     Hobby Hazards No     Sleep Concern Not Asked     sleeps about 5-6 hours a night     Special Diet No     Exercise Yes     walk     Bike Helmet Not Asked     doesn't ride a bike     Seat Belt Yes     Self-Exams No     info given on SBE     Social History Narrative          MEDICATIONS:  has a current medication list which includes the following prescription(s): calcium carbonate antacid, clobetasol, levothyroxine, lisinopril, and methylprednisolone.    ROS:     ROS: 10 point ROS neg other than the symptoms noted above in the HPI.    GENERAL: less  "fatigue, gradual weight gain; denies fevers, chills, night sweats.   HEENT: no recent dysphagia, odonophagia, diplopia, neck pain  THYROID:  no apparent hyper or hypothyroid symptoms  CV: no chest pain, pressure, palpitations  LUNGS: no SOB, DEAN, cough, wheezing   ABDOMEN: no diarrhea, constipation, abdominal pain  EXTREMITIES: occasional hand tremors; no rashes, ulcers, edema  NEUROLOGY: hand tingling; no headaches, denies changes in vision  MSK: no muscle aches or pains, weakness  SKIN: no rashes or lesions  :  No menses since hysterectomy ~2005, denies hotflashes  PSYCH:  occasional anxiety; denies depression  ENDOCRINE: no heat or cold intolerance    Physical Exam   VS: /82  Pulse 69  Ht 1.549 m (5' 1\")  Wt 80.3 kg (177 lb)  LMP 11/23/2009  BMI 33.44 kg/m2  GENERAL: AXOX3, NAD, well dressed, answering questions appropriately, appears stated age.  THYROID:  low horizontal anterior neck scar healed, no palpable thyroid tissue or neck nodules  HEENT: neck non-tender, no exopthalmous, no proptosis, EOMI  ABDOMEN: obese, soft, nontender, nondistended, no organomegaly  EXTREMITIES: no edema or extremity tremors  NEUROLOGY: negative Chvostek's sign; CN grossly intact  SKIN: dark skin complexion; no rashes, no lesions    LABS:    All pertinent notes, labs, and images personally reviewed by me.     A/P:  Encounter Diagnoses   Name Primary?     Postoperative hypothyroidism Yes     Hypocalcemia      Comments:  Reviewed health history, thyroid and calcium issues  Suspect hand tingling, fatigue relates to the low calcium levels, postop hypoparathyroidism    Plan:  Discussed general issues with the postsurgical hypothyroidism diagnosis and management  We discussed lab tests used to assess patient thyroid hormone levels  Discussed potential symptoms of high and low thyroid hormone levels  Reviewed treatment options including levothyroxine medication, ideal dosing    Recommend:  Continue the current (lower dose) " levothyroxine 0.112 mg po QAM  Continue calcium (gummy bear) 500 mg as two tabs QPM, don't take at same time as levothyroxine  Monitor for symptom changes    Keep focus on diet, exercise, weight management  May be due for baseline DEXA scan imaging, review with PCP or GYN    Addressed patient questions today    Labs ordered today:   Orders Placed This Encounter   Procedures     TSH     T4 free     Phosphorus     Parathyroid Hormone Intact     Calcium     Radiology/Consults ordered today: None    More than 50% of the time spent with Ms. Codrero on counseling / coordinating her care.  Total appointment time was 25 minutes.    Follow-up:  3-4 mo.    Fabricio Zhou MD  Endocrinology  [facility]

## 2018-09-24 ENCOUNTER — OFFICE VISIT (OUTPATIENT)
Dept: FAMILY MEDICINE | Facility: CLINIC | Age: 48
End: 2018-09-24
Payer: COMMERCIAL

## 2018-09-24 VITALS
OXYGEN SATURATION: 99 % | DIASTOLIC BLOOD PRESSURE: 80 MMHG | BODY MASS INDEX: 33.44 KG/M2 | SYSTOLIC BLOOD PRESSURE: 147 MMHG | WEIGHT: 177 LBS | TEMPERATURE: 97.5 F | HEART RATE: 72 BPM

## 2018-09-24 DIAGNOSIS — I10 HYPERTENSION GOAL BP (BLOOD PRESSURE) < 140/90: ICD-10-CM

## 2018-09-24 DIAGNOSIS — M72.2 PLANTAR FASCIITIS OF RIGHT FOOT: ICD-10-CM

## 2018-09-24 DIAGNOSIS — L30.9 DERMATITIS: Primary | ICD-10-CM

## 2018-09-24 PROCEDURE — 99213 OFFICE O/P EST LOW 20 MIN: CPT | Performed by: FAMILY MEDICINE

## 2018-09-24 RX ORDER — TRIAMCINOLONE ACETONIDE 1 MG/G
CREAM TOPICAL
Qty: 80 G | Refills: 1 | Status: SHIPPED | OUTPATIENT
Start: 2018-09-24 | End: 2023-05-05

## 2018-09-24 RX ORDER — AMLODIPINE BESYLATE 5 MG/1
5 TABLET ORAL DAILY
Qty: 30 TABLET | Refills: 1 | Status: SHIPPED | OUTPATIENT
Start: 2018-09-24 | End: 2018-11-25

## 2018-09-24 NOTE — PATIENT INSTRUCTIONS
Use triamcinalone cream up to 3x daily to rash areas    Try not to scratch    Use moisturizing lotion    Stay on lisinopril    Start amlodipine 5 mg once daily    See Dr. Denson in 1-2 months, sooner if needed     See podiatry for the continuing plantar fasciitis

## 2018-09-24 NOTE — MR AVS SNAPSHOT
After Visit Summary   9/24/2018    Rhonda Cordero    MRN: [id number]           Patient Information     Date Of Birth          1970        Visit Information        Provider Department      9/24/2018 3:00 PM Vasquez Denson MD [facility]        Today's Diagnoses     Dermatitis    -  1    Plantar fasciitis of right foot        Hypertension goal BP (blood pressure) < 140/90          Care Instructions    Use triamcinalone cream up to 3x daily to rash areas    Try not to scratch    Use moisturizing lotion    Stay on lisinopril    Start amlodipine 5 mg once daily    See Dr. Denson in 1-2 months, sooner if needed     See podiatry for the continuing plantar fasciitis             Follow-ups after your visit        Additional Services     PODIATRY/FOOT & ANKLE SURGERY REFERRAL       Your provider has referred you to: [facility]: [facility] [phone]   [email or web address]  [facility] [facility] [phone]   [email or web address]  [facility]: [facility] [phone]   [email or web address]    Please be aware that coverage of these services is subject to the terms and limitations of your health insurance plan.  Call member services at your health plan with any benefit or coverage questions.      Please bring the following to your appointment:  >>   Any x-rays, CTs or MRIs which have been performed.  Contact the facility where they were done to arrange for  prior to your scheduled appointment.    >>   List of current medications   >>   This referral request   >>   Any documents/labs given to you for this referral                  Your next 10 appointments already scheduled     Jan 16, 2019  3:30 PM CST   Return Visit with Fabricio Zhou MD   [facility]    [address]  [place] [id number] [phone]              Who to contact     If you have questions or need follow up information about today's clinic visit or your schedule please contact [facility] directly at [phone].  Normal or non-critical lab and imaging results will be communicated to you by MyChart, letter or phone within 4 business days after the clinic has received the results. If you do not hear from us within 7 days, please contact the clinic through MyChart or phone. If you have a critical or abnormal lab result, we will notify you by phone as soon as possible.  Submit refill requests through [employer] or call your pharmacy and they will forward the refill request to us. Please allow 3 business days for your refill to be completed.          Additional Information About Your Visit        Care EveryWhere ID     This is your Care EveryWhere ID. This could be used by other organizations to access your [facility] medical records  [id number]        Your Vitals Were     Pulse Temperature Last Period Pulse Oximetry Breastfeeding? BMI (Body Mass Index)    72 97.5  F (36.4  C) (Oral) 11/23/2009 99% No 33.44 kg/m2       Blood Pressure from Last 3 Encounters:   09/24/18 147/80   09/19/18 140/82   04/25/18 125/89    Weight from Last 3 Encounters:   09/24/18 177 lb (80.3 kg)   09/19/18 177 lb (80.3 kg)   04/25/18 177 lb 9.6 oz (80.6 kg)              We Performed the Following     PODIATRY/FOOT & ANKLE SURGERY REFERRAL          Today's Medication Changes          These changes are accurate as of 9/24/18  3:43 PM.  If you have any questions, ask your nurse or doctor.               Start taking these medicines.        Dose/Directions    amLODIPine 5 MG tablet   Commonly known as:  NORVASC   Used for:  Hypertension goal BP (blood pressure) < 140/90   Started by:  Vasquez Denson MD        Dose:  5 mg   Take 1 tablet (5 mg) by mouth daily   Quantity:  30 tablet   Refills:  1       triamcinolone 0.1  % cream   Commonly known as:  KENALOG   Used for:  Dermatitis   Started by:  Vasquez Denson MD        Apply sparingly to affected area three times daily as needed   Quantity:  80 g   Refills:  1            Where to get your medicines      These medications were sent to [facility] - [place], [place] - [address]  [address], [place]. \[facility]"" [address]     Phone:  [phone]     amLODIPine 5 MG tablet    triamcinolone 0.1 % cream                Primary Care Provider Office Phone # Fax #    Vasquez Denson [phone]       [address] [facility] [address]        Equal Access to Services     [facility]: Hadii aad ku hadasho Soomaali, waaxda luqadaha, qaybta kaalmada adeegyada, keesha alexin haygregg tenorio . So [facility] [phone].    ATENCIÓN: Si habla español, tiene a soto disposición servicios gratuitos de asistencia lingüística. [facility] [phone].    We comply with applicable federal civil rights laws and [place] laws. We do not discriminate on the basis of race, color, national origin, age, disability, sex, sexual orientation, or gender identity.            Thank you!     Thank you for choosing [facility]  for your care. Our goal is always to provide you with excellent care. Hearing back from our patients is one way we can continue to improve our services. Please take a few minutes to complete the written survey that you may receive in the mail after your visit with us. Thank you!             Your Updated Medication List - Protect others around you: Learn how to safely use, store and throw away your medicines at www.disposemymeds.org.          This list is accurate as of 9/24/18  3:43 PM.  Always use your most recent med list.                   Brand Name Dispense Instructions for use Diagnosis    amLODIPine 5 MG tablet    NORVASC    30 tablet    Take 1 tablet (5 mg) by mouth daily     Hypertension goal BP (blood pressure) < 140/90       calcium carbonate antacid 1000 MG Chew     60 tablet    Take 2 tablets by mouth 3 times daily    Goiter, nontoxic, multinodular, Nontoxic multinodular goiter       clobetasol 0.05 % cream    TEMOVATE    60 g    Apply sparingly to affected area twice daily for two weeks, then use twice a week    Itchy skin of anus and genitals       levothyroxine 112 MCG tablet    SYNTHROID/LEVOTHROID    30 tablet    Take 1 tablet (112 mcg) by mouth daily    Postoperative hypothyroidism       lisinopril 10 MG tablet    PRINIVIL/ZESTRIL    90 tablet    TAKE ONE TABLET BY MOUTH EVERY DAY    Essential hypertension with goal blood pressure less than 140/90       methylPREDNISolone 4 MG tablet    MEDROL DOSEPAK    21 tablet    Take 1 tablet (4 mg) by mouth See Admin Instructions follow package directions    Plantar fascial fibromatosis       triamcinolone 0.1 % cream    KENALOG    80 g    Apply sparingly to affected area three times daily as needed    Dermatitis

## 2018-09-24 NOTE — PROGRESS NOTES
SUBJECTIVE:   Rhonda Cordero is a 47 year old female who presents to clinic today for the following health issues:       Itchy skin on back    none    Problem list and histories reviewed & adjusted, as indicated.  Additional history: as documented         Reviewed and updated as needed this visit by clinical staff  Allergies  Meds       Reviewed and updated as needed this visit by Provider          itchy on back only    Months     Not getting worse    Scratch at it a lot     Physical Exam   Constitutional: She is oriented to person, place, and time and well-developed, well-nourished, and in no distress. No distress.   HENT:   Head: Normocephalic and atraumatic.   Neck: Carotid bruit is not present.   Cardiovascular: Normal rate, regular rhythm, normal heart sounds and intact distal pulses.  Exam reveals no gallop and no friction rub.    No murmur heard.  Pulmonary/Chest: Effort normal and breath sounds normal.   Musculoskeletal: She exhibits no edema.   Neurological: She is alert and oriented to person, place, and time.   Skin: She is not diaphoretic.   Psychiatric: Mood and affect normal.         On low back and a little on upper arm is nonspecific dermatitis    Some evidence of excoriation    No generalized swelling  No cellulitis  Not tender or warm    Tender over right foot over insertion of plantar fascia on medial calcaneus;  No tenderness elsewhere    ASSESSMENT / PLAN:  (L30.9) Dermatitis  (primary encounter diagnosis)  Comment: use triamcinalone cream, try not to scratch.  Plan: triamcinolone (KENALOG) 0.1 % cream,         DERMATOLOGY REFERRAL        If not improving then see dermatology.  Patient can call to schedule    (M72.2) Plantar fasciitis of right foot  Comment: discussed in detail.  Urged patient to go back to podiatry   Plan: PODIATRY/FOOT & ANKLE SURGERY REFERRAL             (I10) Hypertension goal BP (blood pressure) < 140/90  Comment: add 2nd agent to blood pressure   Plan: amLODIPine  (NORVASC) 5 MG tablet        See us in next couple months to recheck        I reviewed the patient's medications, allergies, medical history, family history, and social history.    Vasquez Denson MD

## 2018-10-02 DIAGNOSIS — E83.51 HYPOCALCEMIA: ICD-10-CM

## 2018-10-02 DIAGNOSIS — E89.0 POSTOPERATIVE HYPOTHYROIDISM: Primary | ICD-10-CM

## 2018-11-25 DIAGNOSIS — I10 HYPERTENSION GOAL BP (BLOOD PRESSURE) < 140/90: ICD-10-CM

## 2018-11-26 RX ORDER — AMLODIPINE BESYLATE 5 MG/1
TABLET ORAL
Qty: 30 TABLET | Refills: 1 | Status: SHIPPED | OUTPATIENT
Start: 2018-11-26 | End: 2019-01-31

## 2018-11-26 NOTE — TELEPHONE ENCOUNTER
Okayed refills but advise clinic appointment in next couple months    Please inform patient    Vasquez Denson MD

## 2018-11-26 NOTE — TELEPHONE ENCOUNTER
Routing refill request to provider for review/approval because does not meet protocol:  Blood pressures out of range:  Per below      Route to PCP    Meme Rodriguez RN

## 2019-01-16 ENCOUNTER — OFFICE VISIT (OUTPATIENT)
Dept: ENDOCRINOLOGY | Facility: CLINIC | Age: 49
End: 2019-01-16
Payer: COMMERCIAL

## 2019-01-16 VITALS
HEART RATE: 72 BPM | DIASTOLIC BLOOD PRESSURE: 86 MMHG | SYSTOLIC BLOOD PRESSURE: 135 MMHG | HEIGHT: 61 IN | BODY MASS INDEX: 34.36 KG/M2 | WEIGHT: 182 LBS

## 2019-01-16 DIAGNOSIS — Z86.39 H/O THYROID NODULE: ICD-10-CM

## 2019-01-16 DIAGNOSIS — E83.51 HYPOCALCEMIA: ICD-10-CM

## 2019-01-16 DIAGNOSIS — E89.0 POSTOPERATIVE HYPOTHYROIDISM: Primary | ICD-10-CM

## 2019-01-16 DIAGNOSIS — E06.3 HASHIMOTO'S THYROIDITIS: ICD-10-CM

## 2019-01-16 LAB
CALCIUM SERPL-MCNC: 7.5 MG/DL (ref 8.5–10.1)
PTH-INTACT SERPL-MCNC: 30 PG/ML (ref 18–80)
T4 FREE SERPL-MCNC: 1.33 NG/DL (ref 0.76–1.46)
TSH SERPL DL<=0.005 MIU/L-ACNC: 0.23 MU/L (ref 0.4–4)

## 2019-01-16 PROCEDURE — 83970 ASSAY OF PARATHORMONE: CPT | Performed by: INTERNAL MEDICINE

## 2019-01-16 PROCEDURE — 82310 ASSAY OF CALCIUM: CPT | Performed by: INTERNAL MEDICINE

## 2019-01-16 PROCEDURE — 84443 ASSAY THYROID STIM HORMONE: CPT | Performed by: INTERNAL MEDICINE

## 2019-01-16 PROCEDURE — 36415 COLL VENOUS BLD VENIPUNCTURE: CPT | Performed by: INTERNAL MEDICINE

## 2019-01-16 PROCEDURE — 84439 ASSAY OF FREE THYROXINE: CPT | Performed by: INTERNAL MEDICINE

## 2019-01-16 PROCEDURE — 99214 OFFICE O/P EST MOD 30 MIN: CPT | Performed by: INTERNAL MEDICINE

## 2019-01-16 ASSESSMENT — MIFFLIN-ST. JEOR: SCORE: 1392.93

## 2019-01-16 NOTE — PROGRESS NOTES
Name: Rhonda Cordero    Chief Complaint   Patient presents with     Thyroid Problem     hypothyroidism      HPI:  Recent issues:  Here for f/u thyroid evaluation, along with her  today.  Previous thyroidectomy surgery 8/2017, then postop hypothyroidism and hypocalcemia        ~2015.  Noticed left thyroid lump, then medical evaluations  Recalls periodic thyroid u/s imaging x4, also thyroid FNA biopsies:  2/6/15 Thyroid u/s:   Right lobe 5.0x 2.3x 2.0 cm and left lobe 4.6x 3.2x 2.4 cm   Right lobe nodules:  0.7 cm RML, 0.9 cm RLL   Left lobe nodules:  0.9 cm BROOKE, 2.4 cm LML   Left side of isthmus:  0.9 cm nodule  3/3/15 labs included high TPOAb 620  3/2015. Left thyroid lobe nodule FNA:  Benign  3/2016 Thyroid nodule FNA:  Benign  No previous use of thyroid medication  Progressive growth, then problems with food sticking sensation during swallowing  1/11/17. Saw Dr. 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 [place]  8/22/17.  Total thyroidectomy surgery at [facility] with Dr. RICHARD [place]  Path:     Benign MNG, no evidence for malignancy   Focal lymphocytic thyroiditis   2 fragments of benign parathyroid tissue, 3 benign LN's  Postop treatment with levothyroxine, recalls the 0.137 mg QD dose   Also took calcium 3-4x/day postop for few weeks, then discontinued  9/26/17 labs:  TSH 0.02, FT4 1.67, Ca 8.1, PTH 7, alb 3.5  1/8/18 labs:  TSH <0.01, FT4 1.66, Ca 8.5, PTH 14, alb 3.8  FamHx thyroid cancer:  Yes    2/21/18. Initial evaluation with me  Dose reduction of thyroid medication, started calcium supplement use  Calcium supplement dose:  Infrequent dosing as 500 mg 2-tabs po QPM  Subsequent dose increase with gummy bear calcum chews  Recent [facility] labs include:  Lab Results   Component Value Date    TSH 0.23 (L) 01/16/2019    T4 1.33 01/16/2019    FT3 2.6 04/29/2015     (H) 03/03/2015     Lab Results   Component Value Date     01/08/2018     POTASSIUM 4.7 01/08/2018    CHLORIDE 104 01/08/2018    CO2 26 01/08/2018    ANIONGAP 11 01/08/2018    GLC 83 01/08/2018    BUN 22 01/08/2018    CR 0.69 01/08/2018    GFRESTIMATED >90 01/08/2018    GFRESTBLACK >90 01/08/2018    CAROLE 7.5 (L) 01/16/2019    TSH 0.23 (L) 01/16/2019    PTHI 29 09/19/2018     Current dose:  Levothyroxine 0.112 mg QAM   Gummy Bear calcium 3 chews QHS      , lives in [place], [place]  Sees Dr. Vasquez Denson for general medicine evaluations    PMH/PSH:  Past Medical History:   Diagnosis Date     Anemia      Hypertension goal BP (blood pressure) < 130/80 5/6/2011     Hypocalcemia      Multinodular goiter      Postsurgical hypothyroidism      Rotator cuff tendonitis 2/10/2015     Past Surgical History:   Procedure Laterality Date     C TOTAL ABDOM HYSTERECTOMY  12/9/09    [facility]     LAPAROSCOPY,TUBAL CAUTERY  2005     SALPINGO OOPHORECTOMY,R/L/LUIS  2005    left     THYROIDECTOMY N/A 8/22/2017    Procedure: THYROIDECTOMY;  Total Thyroidectomy;  Surgeon: Skylar Costa MD;  Location:  OR       Family Hx:  Family History   Problem Relation Age of Onset     Cerebrovascular Disease Mother 50     Cerebrovascular Disease Father 49     Hypertension Father      Cancer No family hx of      Diabetes No family hx of      Thyroid Disease No family hx of      Glaucoma No family hx of      Macular Degeneration No family hx of          Social Hx:  Social History     Socioeconomic History     Marital status:      Spouse name: Not on file     Number of children: 2     Years of education: 12     Highest education level: Not on file   Social Needs     Financial resource strain: Not on file     Food insecurity - worry: Not on file     Food insecurity - inability: Not on file     Transportation needs - medical: Not on file     Transportation needs - non-medical: Not on file   Occupational History     Employer: [employer]   Tobacco Use     Smoking status: Never Smoker      "Smokeless tobacco: Never Used   Substance and Sexual Activity     Alcohol use: Yes     Comment: occ     Drug use: No     Sexual activity: Not Currently     Partners: Male     Birth control/protection: Surgical   Other Topics Concern     Parent/sibling w/ CABG, MI or angioplasty before 65F 55M? No      Service No     Blood Transfusions Yes     Comment: had 3 units 10/23/2009     Caffeine Concern No     Occupational Exposure No     Hobby Hazards No     Sleep Concern Not Asked     Comment: sleeps about 5-6 hours a night     Stress Concern Not Asked     Weight Concern Not Asked     Special Diet No     Back Care Not Asked     Exercise Yes     Comment: walk     Bike Helmet Not Asked     Comment: doesn't ride a bike     Seat Belt Yes     Self-Exams No     Comment: info given on SBE   Social History Narrative     Not on file          MEDICATIONS:  has a current medication list which includes the following prescription(s): amlodipine, calcium carbonate antacid, clobetasol, levothyroxine, lisinopril, and triamcinolone.    ROS:     ROS: 10 point ROS neg other than the symptoms noted above in the HPI.    GENERAL: less fatigue, gradual weight gain; denies fevers, chills, night sweats.   HEENT: no recent dysphagia, odonophagia, diplopia, neck pain  THYROID:  no apparent hyper or hypothyroid symptoms  CV: no chest pain, pressure, palpitations  LUNGS: no SOB, DEAN, cough, wheezing   ABDOMEN: no diarrhea, constipation, abdominal pain  EXTREMITIES: occasional hand tremors; no rashes, ulcers, edema  NEUROLOGY: hands tingling at night; no headaches, denies changes in vision  MSK: no muscle aches or pains, weakness  SKIN: no rashes or lesions  :  No menses since hysterectomy ~2005, denies hotflashes  PSYCH:  occasional anxiety; denies depression  ENDOCRINE: no heat or cold intolerance    Physical Exam   VS: /86   Pulse 72   Ht 1.549 m (5' 1\")   Wt 82.6 kg (182 lb)   LMP 11/23/2009   BMI 34.39 kg/m    GENERAL: AXOX3, " NAD, well dressed, answering questions appropriately, appears stated age.  THYROID:  low horizontal anterior neck scar healed, no palpable thyroid tissue or neck nodules  HEENT: neck non-tender, no exopthalmous, no proptosis, EOMI  ABDOMEN: obese, soft, nontender, nondistended, no organomegaly  EXTREMITIES: no edema or extremity tremors  NEUROLOGY: negative Chvostek's sign; CN grossly intact  SKIN: dark skin complexion; no rashes, no lesions    LABS:    All pertinent notes, labs, and images personally reviewed by me.     A/P:  Encounter Diagnoses   Name Primary?     Postoperative hypothyroidism Yes     Hypocalcemia      Hashimoto's thyroiditis      H/O thyroid nodule      Comments:  Reviewed health history, thyroid and calcium issues    Plan:  Discussed general issues with the postsurgical hypothyroidism diagnosis and management  We discussed lab tests used to assess patient thyroid hormone levels  Discussed potential symptoms of high and low thyroid hormone levels  Reviewed treatment options including levothyroxine medication, ideal dosing    Recommend:  Continue the current levothyroxine 0.112 mg po QAM  Continue calcium (gummy bear) 500 mg as 3-tabs QPM, don't take at same time as levothyroxine  Monitor for symptom changes     Keep focus on diet, exercise, weight management  May be due for baseline DEXA scan imaging, review with PCP or GYN    Addressed patient questions today    Labs ordered today:   Orders Placed This Encounter   Procedures     TSH     T4 free     Calcium     Parathyroid Hormone Intact     Radiology/Consults ordered today: None    More than 50% of the time spent with Ms. Cordero on counseling / coordinating her care.  Total appointment time was 25 minutes.    Follow-up:  1 yr or prn    Fabricio Zhou MD  Endocrinology  [facility]

## 2019-01-27 DIAGNOSIS — E83.51 HYPOCALCEMIA: ICD-10-CM

## 2019-01-27 DIAGNOSIS — E89.0 POSTOPERATIVE HYPOTHYROIDISM: Primary | ICD-10-CM

## 2019-01-30 ENCOUNTER — OFFICE VISIT (OUTPATIENT)
Dept: OPTOMETRY | Facility: CLINIC | Age: 49
End: 2019-01-30
Payer: COMMERCIAL

## 2019-01-30 DIAGNOSIS — H52.4 PRESBYOPIA: ICD-10-CM

## 2019-01-30 DIAGNOSIS — Z01.00 ENCOUNTER FOR EXAMINATION OF EYES AND VISION WITHOUT ABNORMAL FINDINGS: Primary | ICD-10-CM

## 2019-01-30 DIAGNOSIS — H52.13 MYOPIA OF BOTH EYES: ICD-10-CM

## 2019-01-30 DIAGNOSIS — H52.223 REGULAR ASTIGMATISM OF BOTH EYES: ICD-10-CM

## 2019-01-30 PROCEDURE — 92015 DETERMINE REFRACTIVE STATE: CPT | Performed by: OPTOMETRIST

## 2019-01-30 PROCEDURE — 92014 COMPRE OPH EXAM EST PT 1/>: CPT | Performed by: OPTOMETRIST

## 2019-01-30 ASSESSMENT — TONOMETRY
OD_IOP_MMHG: 20
IOP_METHOD: APPLANATION
OS_IOP_MMHG: 19

## 2019-01-30 ASSESSMENT — CUP TO DISC RATIO
OS_RATIO: 0.3
OD_RATIO: 0.35

## 2019-01-30 ASSESSMENT — REFRACTION_MANIFEST
OS_SPHERE: -0.75
OS_AXIS: 180
OD_AXIS: 170
OD_ADD: +2.00
OS_CYLINDER: +0.50
OD_SPHERE: -0.50
OS_ADD: +2.00
OD_CYLINDER: +1.00

## 2019-01-30 ASSESSMENT — VISUAL ACUITY
METHOD: SNELLEN - LINEAR
OD_SC: 20/40
OS_SC: 20/80
OS_SC: 20/20
OD_SC: 20/120

## 2019-01-30 ASSESSMENT — CONF VISUAL FIELD
OS_NORMAL: 1
OD_NORMAL: 1

## 2019-01-30 ASSESSMENT — REFRACTION_WEARINGRX
OD_CYLINDER: SPHERE
OS_SPHERE: +1.50
OS_CYLINDER: SPHERE
OD_SPHERE: +1.50

## 2019-01-30 ASSESSMENT — SLIT LAMP EXAM - LIDS
COMMENTS: NORMAL
COMMENTS: NORMAL

## 2019-01-30 ASSESSMENT — EXTERNAL EXAM - LEFT EYE: OS_EXAM: NORMAL

## 2019-01-30 ASSESSMENT — EXTERNAL EXAM - RIGHT EYE: OD_EXAM: NORMAL

## 2019-01-30 NOTE — PATIENT INSTRUCTIONS
Rhonda was advised of today's exam findings.  Optional to fill new glasses prescription, mild glasses prescription   Okay to use over the counter reading glasses as needed- Recommend +1.25 power.   Copy of glasses Rx provided today.  Return in 1-2 years for eye exam, or sooner if needed.    The affects of the dilating drops last for 4- 6 hours.  You will be more sensitive to light and vision will be blurry up close.  Mydriatic sunglasses were given if needed.      Major Beasley O.D.  [address]. [facility] [place]  [address]    [phone]

## 2019-01-30 NOTE — LETTER
1/30/2019         RE: Rhonda Cordero  [address] [facility] [place] [address]        Dear Colleague,    Thank you for referring your patient, Rhonda Cordero, to the [facility]. Please see a copy of my visit note below.    Chief Complaint   Patient presents with     Annual Eye Exam      Accompanied by self  Last Eye Exam: 2 years ago  Dilated Previously: Yes    What are you currently using to see?  Readers +1.25       Distance Vision Acuity: Satisfied with vision    Near Vision Acuity: Not satisfied     Eye Comfort: good  Do you use eye drops? 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       OBJECTIVE: See Ophthalmology exam    ASSESSMENT:    ICD-10-CM    1. Encounter for examination of eyes and vision without abnormal findings Z01.00    2. Myopia of both eyes H52.13    3. Regular astigmatism of both eyes H52.223    4. Presbyopia H52.4       PLAN:     Patient Instructions   Rhonda was advised of today's exam findings.  Optional to fill new glasses prescription, mild glasses prescription   Okay to use over the counter reading glasses as needed- Recommend +1.25 power.   Copy of glasses Rx provided today.  Return in 1-2 years for eye exam, or sooner if needed.    The affects of the dilating drops last for 4- 6 hours.  You will be more sensitive to light and vision will be blurry up close.  Mydriatic sunglasses were given if needed.      Major Beasley O.D.  [address]  [place]  [address] [phone]           Again, thank you for allowing me to participate in the care of your patient.        Sincerely,        Major Beasley, CHRISTIE

## 2019-01-31 DIAGNOSIS — I10 HYPERTENSION GOAL BP (BLOOD PRESSURE) < 140/90: ICD-10-CM

## 2019-02-01 RX ORDER — AMLODIPINE BESYLATE 5 MG/1
TABLET ORAL
Qty: 30 TABLET | Refills: 0 | Status: SHIPPED | OUTPATIENT
Start: 2019-02-01 | End: 2019-02-12

## 2019-02-01 NOTE — TELEPHONE ENCOUNTER
Routing refill request to provider for review/approval because:  Overdue for lab.    Skylar Aggarwal, ROBERT [facility] Triage.

## 2019-02-01 NOTE — TELEPHONE ENCOUNTER
"Requested Prescriptions   Pending Prescriptions Disp Refills     amLODIPine (NORVASC) 5 MG tablet [Pharmacy Med Name: AMLODIPINE BESYLATE 5MG TABS] 30 tablet 1    Last Written Prescription Date:  11-26-18  Last Fill Quantity: 30,  # refills: 1   Last office visit: 9/24/2018 with prescribing provider:  9-24-18   Future Office Visit:     Sig: TAKE ONE TABLET BY MOUTH EVERY DAY    Calcium Channel Blockers Protocol  Failed - 1/31/2019  9:41 PM       Failed - Normal serum creatinine on file in past 12 months    Recent Labs   Lab Test 01/08/18  1514   CR 0.69            Passed - Blood pressure under 140/90 in past 12 months    BP Readings from Last 3 Encounters:   01/16/19 135/86   09/24/18 147/80   09/19/18 140/82                Passed - Recent (12 mo) or future (30 days) visit within the authorizing provider's specialty    Patient had office visit in the last 12 months or has a visit in the next 30 days with authorizing provider or within the authorizing provider's specialty.  See \"Patient Info\" tab in inbasket, or \"Choose Columns\" in Meds & Orders section of the refill encounter.             Passed - Medication is active on med list       Passed - Patient is age 18 or older       Passed - No active pregnancy on record       Passed - No positive pregnancy test in past 12 months          "

## 2019-02-12 ENCOUNTER — OFFICE VISIT (OUTPATIENT)
Dept: FAMILY MEDICINE | Facility: CLINIC | Age: 49
End: 2019-02-12
Payer: COMMERCIAL

## 2019-02-12 VITALS
SYSTOLIC BLOOD PRESSURE: 137 MMHG | BODY MASS INDEX: 34.01 KG/M2 | OXYGEN SATURATION: 100 % | WEIGHT: 180 LBS | DIASTOLIC BLOOD PRESSURE: 82 MMHG | HEART RATE: 65 BPM | TEMPERATURE: 97.4 F

## 2019-02-12 DIAGNOSIS — R53.83 FATIGUE, UNSPECIFIED TYPE: ICD-10-CM

## 2019-02-12 DIAGNOSIS — Z13.6 CARDIOVASCULAR SCREENING; LDL GOAL LESS THAN 100: ICD-10-CM

## 2019-02-12 DIAGNOSIS — Z00.00 ENCOUNTER FOR PREVENTATIVE ADULT HEALTH CARE EXAMINATION: Primary | ICD-10-CM

## 2019-02-12 DIAGNOSIS — Z12.31 VISIT FOR SCREENING MAMMOGRAM: ICD-10-CM

## 2019-02-12 DIAGNOSIS — R73.01 IMPAIRED FASTING GLUCOSE: ICD-10-CM

## 2019-02-12 DIAGNOSIS — I10 HYPERTENSION GOAL BP (BLOOD PRESSURE) < 140/90: ICD-10-CM

## 2019-02-12 LAB
ALBUMIN SERPL-MCNC: 3.8 G/DL (ref 3.4–5)
ALP SERPL-CCNC: 95 U/L (ref 40–150)
ALT SERPL W P-5'-P-CCNC: 28 U/L (ref 0–50)
ANION GAP SERPL CALCULATED.3IONS-SCNC: 7 MMOL/L (ref 3–14)
AST SERPL W P-5'-P-CCNC: 17 U/L (ref 0–45)
BASOPHILS # BLD AUTO: 0 10E9/L (ref 0–0.2)
BASOPHILS NFR BLD AUTO: 0.3 %
BILIRUB SERPL-MCNC: 0.4 MG/DL (ref 0.2–1.3)
BUN SERPL-MCNC: 14 MG/DL (ref 7–30)
CALCIUM SERPL-MCNC: 8.4 MG/DL (ref 8.5–10.1)
CHLORIDE SERPL-SCNC: 104 MMOL/L (ref 94–109)
CHOLEST SERPL-MCNC: 212 MG/DL
CO2 SERPL-SCNC: 29 MMOL/L (ref 20–32)
CREAT SERPL-MCNC: 0.52 MG/DL (ref 0.52–1.04)
DIFFERENTIAL METHOD BLD: ABNORMAL
EOSINOPHIL # BLD AUTO: 0.2 10E9/L (ref 0–0.7)
EOSINOPHIL NFR BLD AUTO: 1.7 %
ERYTHROCYTE [DISTWIDTH] IN BLOOD BY AUTOMATED COUNT: 13.2 % (ref 10–15)
GFR SERPL CREATININE-BSD FRML MDRD: >90 ML/MIN/{1.73_M2}
GLUCOSE SERPL-MCNC: 106 MG/DL (ref 70–99)
HBA1C MFR BLD: 6.2 % (ref 0–5.6)
HCT VFR BLD AUTO: 43.1 % (ref 35–47)
HDLC SERPL-MCNC: 54 MG/DL
HGB BLD-MCNC: 13.7 G/DL (ref 11.7–15.7)
LDLC SERPL CALC-MCNC: 142 MG/DL
LYMPHOCYTES # BLD AUTO: 4.8 10E9/L (ref 0.8–5.3)
LYMPHOCYTES NFR BLD AUTO: 40.4 %
MCH RBC QN AUTO: 27 PG (ref 26.5–33)
MCHC RBC AUTO-ENTMCNC: 31.8 G/DL (ref 31.5–36.5)
MCV RBC AUTO: 85 FL (ref 78–100)
MONOCYTES # BLD AUTO: 0.6 10E9/L (ref 0–1.3)
MONOCYTES NFR BLD AUTO: 4.7 %
NEUTROPHILS # BLD AUTO: 6.3 10E9/L (ref 1.6–8.3)
NEUTROPHILS NFR BLD AUTO: 52.9 %
NONHDLC SERPL-MCNC: 158 MG/DL
PLATELET # BLD AUTO: 304 10E9/L (ref 150–450)
POTASSIUM SERPL-SCNC: 4.7 MMOL/L (ref 3.4–5.3)
PROT SERPL-MCNC: 8.5 G/DL (ref 6.8–8.8)
RBC # BLD AUTO: 5.08 10E12/L (ref 3.8–5.2)
SODIUM SERPL-SCNC: 140 MMOL/L (ref 133–144)
TRIGL SERPL-MCNC: 78 MG/DL
WBC # BLD AUTO: 11.9 10E9/L (ref 4–11)

## 2019-02-12 PROCEDURE — 83036 HEMOGLOBIN GLYCOSYLATED A1C: CPT | Performed by: FAMILY MEDICINE

## 2019-02-12 PROCEDURE — 80053 COMPREHEN METABOLIC PANEL: CPT | Performed by: FAMILY MEDICINE

## 2019-02-12 PROCEDURE — 36415 COLL VENOUS BLD VENIPUNCTURE: CPT | Performed by: FAMILY MEDICINE

## 2019-02-12 PROCEDURE — 82306 VITAMIN D 25 HYDROXY: CPT | Performed by: FAMILY MEDICINE

## 2019-02-12 PROCEDURE — 85025 COMPLETE CBC W/AUTO DIFF WBC: CPT | Performed by: FAMILY MEDICINE

## 2019-02-12 PROCEDURE — 99396 PREV VISIT EST AGE 40-64: CPT | Performed by: FAMILY MEDICINE

## 2019-02-12 PROCEDURE — 80061 LIPID PANEL: CPT | Performed by: FAMILY MEDICINE

## 2019-02-12 RX ORDER — LISINOPRIL 10 MG/1
10 TABLET ORAL DAILY
Qty: 90 TABLET | Refills: 3 | Status: SHIPPED | OUTPATIENT
Start: 2019-02-12 | End: 2020-04-28

## 2019-02-12 RX ORDER — AMLODIPINE BESYLATE 5 MG/1
5 TABLET ORAL DAILY
Qty: 90 TABLET | Refills: 3 | Status: SHIPPED | OUTPATIENT
Start: 2019-02-12 | End: 2020-05-13

## 2019-02-12 ASSESSMENT — ENCOUNTER SYMPTOMS
HEARTBURN: 0
DIZZINESS: 0
COUGH: 1
ABDOMINAL PAIN: 0
MYALGIAS: 1
WEAKNESS: 1
PARESTHESIAS: 1
HEMATOCHEZIA: 0
HEMATURIA: 0
DYSURIA: 0
FEVER: 0
BREAST MASS: 0
SHORTNESS OF BREATH: 0
SORE THROAT: 1
EYE PAIN: 0
FREQUENCY: 0
NERVOUS/ANXIOUS: 0
JOINT SWELLING: 1
DIARRHEA: 0
PALPITATIONS: 0
NAUSEA: 0
ARTHRALGIAS: 1
HEADACHES: 0
CONSTIPATION: 0

## 2019-02-12 ASSESSMENT — PAIN SCALES - GENERAL: PAINLEVEL: NO PAIN (0)

## 2019-02-12 NOTE — PATIENT INSTRUCTIONS
Keep working on healthy diet/exercise     We will send you lab results    Stay on same medications    Schedule mammogram to be done after April 26    Call or return to clinic with problems/ questions

## 2019-02-12 NOTE — PROGRESS NOTES
SUBJECTIVE:   CC: Rhonda Cordero is an 48 year old woman who presents for preventive health visit.     Physical   Annual:     Getting at least 3 servings of Calcium per day:  Yes    Bi-annual eye exam:  Yes    Dental care twice a year:  Yes    Sleep apnea or symptoms of sleep apnea:  None    Diet:  Regular (no restrictions)    Frequency of exercise:  None    Taking medications regularly:  Yes    Medication side effects:  None    Additional concerns today:  No    PHQ-2 Total Score: 0          none    Today's PHQ-2 Score:   PHQ-2 ( 1999 Pfizer) 2/12/2019   Q1: Little interest or pleasure in doing things 0   Q2: Feeling down, depressed or hopeless 0   PHQ-2 Score 0   Q1: Little interest or pleasure in doing things Not at all   Q2: Feeling down, depressed or hopeless Not at all   PHQ-2 Score 0       Abuse: Current or Past(Physical, Sexual or Emotional)- No  Do you feel safe in your environment? Yes    Social History     Tobacco Use     Smoking status: Never Smoker     Smokeless tobacco: Never Used   Substance Use Topics     Alcohol use: Yes     Comment: occ     Alcohol Use 2/12/2019   If you drink alcohol do you typically have greater than 3 drinks per day OR greater than 7 drinks per week? No   No flowsheet data found.    Reviewed orders with patient.  Reviewed health maintenance and updated orders accordingly - Yes       Mammogram Screening: Patient under age 50, mutual decision reflected in health maintenance.      Pertinent mammograms are reviewed under the imaging tab.  History of abnormal Pap smear:  No , had hysterectomy    PAP / HPV 2/21/2014 1/25/2011 10/30/2009   PAP NIL NIL NIL     Reviewed and updated as needed this visit by clinical staff  Tobacco  Allergies  Meds  Med Hx  Surg Hx  Fam Hx  Soc Hx        Reviewed and updated as needed this visit by Provider            Review of Systems   Constitutional: Negative for fever.   HENT: Positive for ear pain, hearing loss and sore throat. Negative  for congestion.    Eyes: Negative for pain and visual disturbance.   Respiratory: Positive for cough. Negative for shortness of breath.    Cardiovascular: Negative for chest pain, palpitations and peripheral edema.   Gastrointestinal: Negative for abdominal pain, constipation, diarrhea, heartburn, hematochezia and nausea.   Breasts:  Negative for tenderness, breast mass and discharge.   Genitourinary: Negative for dysuria, frequency, genital sores, hematuria, pelvic pain, urgency, vaginal bleeding and vaginal discharge.   Musculoskeletal: Positive for arthralgias, joint swelling and myalgias.   Skin: Positive for rash.   Neurological: Positive for weakness and paresthesias. Negative for dizziness and headaches.   Psychiatric/Behavioral: Positive for mood changes. The patient is not nervous/anxious.       has to get 2 wisdom teeth out    Left ear pain for a couple months    Cough/ cold symptoms since last week    Left knee hurting    Stiff after work sometimes    No exercise outside of work    Rash comes and goes    Some tingling in hands when sleeping sometimes,  Not during day    Mood variable    biofreeze helps the left knee     OBJECTIVE:   /57 (BP Location: Right arm, Patient Position: Chair, Cuff Size: Adult Regular)   Pulse 65   Temp 97.4  F (36.3  C) (Oral)   Wt 81.6 kg (180 lb)   LMP 11/23/2009   SpO2 100%   BMI 34.01 kg/m    Physical Exam  GENERAL: healthy, alert and no distress  EYES: Eyes grossly normal to inspection, PERRL and conjunctivae and sclerae normal  HENT: ear canals and TM's normal, nose and mouth without ulcers or lesions  NECK: no adenopathy, no asymmetry, masses, or scars and thyroid normal to palpation  RESP: lungs clear to auscultation - no rales, rhonchi or wheezes  CV: regular rate and rhythm, normal S1 S2, no S3 or S4, no murmur, click or rub, no peripheral edema and peripheral pulses strong  ABDOMEN: soft, nontender, no hepatosplenomegaly, no masses and bowel sounds  "normal  MS: no gross musculoskeletal defects noted, no edema  SKIN: no suspicious lesions or rashes  NEURO: Normal strength and tone, mentation intact and speech normal  PSYCH: mentation appears normal, affect normal/bright    Diagnostic Test Results:  none     ASSESSMENT/PLAN:   Rhonda was seen today for physical and health maintenance.    Diagnoses and all orders for this visit:    Encounter for preventative adult health care examination    Visit for screening mammogram  -     *MA Screening Digital Bilateral; Future    Hypertension goal BP (blood pressure) < 140/90  -     amLODIPine (NORVASC) 5 MG tablet; Take 1 tablet (5 mg) by mouth daily  -     lisinopril (PRINIVIL/ZESTRIL) 10 MG tablet; Take 1 tablet (10 mg) by mouth daily    Fatigue, unspecified type  -     Comprehensive metabolic panel  -     Vitamin D Deficiency  -     CBC with platelets differential    CARDIOVASCULAR SCREENING; LDL GOAL LESS THAN 100  -     Lipid panel reflex to direct LDL Fasting    Impaired fasting glucose  -     Hemoglobin A1c    Other orders  -     Cancel: HIV Antigen Antibody Combo  -     Cancel: Lipid panel reflex to direct LDL Fasting  -     Cancel: Basic metabolic panel    overall patient stable  Discussed multiple issues with patient  Knee is manageable but 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    Weight as of this encounter: 81.6 kg (180 lb).      Weight management plan: Discussed healthy diet and exercise " guidelines     reports that  has never smoked. she has never used smokeless tobacco.      Counseling Resources:  ATP IV Guidelines  Pooled Cohorts Equation Calculator  Breast Cancer Risk Calculator  FRAX Risk Assessment  ICSI Preventive Guidelines  Dietary Guidelines for Americans, 2010  USDA's MyPlate  ASA Prophylaxis  Lung CA Screening    Vasquez Denson MD  [facility]

## 2019-02-12 NOTE — LETTER
[address] [address] [phone]              2019    To whom it may concern:    Rhonda Cordero (  [birth year] ) is a long term patient of mine.    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            Sincerely,                    Vasquez Denson MD

## 2019-02-13 LAB — DEPRECATED CALCIDIOL+CALCIFEROL SERPL-MC: 30 UG/L (ref 20–75)

## 2019-02-14 ENCOUNTER — TELEPHONE (OUTPATIENT)
Dept: FAMILY MEDICINE | Facility: CLINIC | Age: 49
End: 2019-02-14

## 2019-02-14 NOTE — TELEPHONE ENCOUNTER
Attempt # 1  Called patient at home [phone]  Was call answered?  No answer, left message to call nurse line at [phone]    Latrice Dial RN  [facility]

## 2019-02-14 NOTE — TELEPHONE ENCOUNTER
Patient returned call, I advised her of result per provider and plan.    Patient verbalized understanding of and agreement with plan.    Annika Nice RN  [facility]

## 2019-02-14 NOTE — RESULT ENCOUNTER NOTE
"Your cholesterol is moderately elevated.    The  Hemoglobin a1c is in the \"prediabetes\" range.    Keep working on healthy diet/exercise and weight loss as you are able.    Other labs are fine.    Vasquez Denson MD  "

## 2019-05-01 ENCOUNTER — ANCILLARY PROCEDURE (OUTPATIENT)
Dept: MAMMOGRAPHY | Facility: CLINIC | Age: 49
End: 2019-05-01
Payer: COMMERCIAL

## 2019-05-01 ENCOUNTER — OFFICE VISIT (OUTPATIENT)
Dept: OBGYN | Facility: CLINIC | Age: 49
End: 2019-05-01
Payer: COMMERCIAL

## 2019-05-01 VITALS
HEART RATE: 75 BPM | BODY MASS INDEX: 33.42 KG/M2 | HEIGHT: 62 IN | WEIGHT: 181.6 LBS | SYSTOLIC BLOOD PRESSURE: 135 MMHG | OXYGEN SATURATION: 100 % | DIASTOLIC BLOOD PRESSURE: 85 MMHG

## 2019-05-01 DIAGNOSIS — B36.9 FUNGAL INFECTION OF SKIN: ICD-10-CM

## 2019-05-01 DIAGNOSIS — Z12.31 VISIT FOR SCREENING MAMMOGRAM: ICD-10-CM

## 2019-05-01 DIAGNOSIS — Z01.411 ENCOUNTER FOR GYNECOLOGICAL EXAMINATION WITH ABNORMAL FINDING: Primary | ICD-10-CM

## 2019-05-01 PROCEDURE — 99396 PREV VISIT EST AGE 40-64: CPT | Performed by: OBSTETRICS & GYNECOLOGY

## 2019-05-01 PROCEDURE — 77067 SCR MAMMO BI INCL CAD: CPT | Mod: TC | Performed by: FAMILY MEDICINE

## 2019-05-01 PROCEDURE — 77063 BREAST TOMOSYNTHESIS BI: CPT | Mod: TC | Performed by: FAMILY MEDICINE

## 2019-05-01 RX ORDER — KETOCONAZOLE 20 MG/G
CREAM TOPICAL DAILY
Qty: 60 G | Refills: 1 | Status: SHIPPED | OUTPATIENT
Start: 2019-05-01 | End: 2022-08-12

## 2019-05-01 ASSESSMENT — MIFFLIN-ST. JEOR: SCORE: 1406.98

## 2019-05-01 NOTE — PROGRESS NOTES
Rhonda Cordero is a 48 year old female , who presents for annual exam.   No unusual bleeding, no incontinence, or unusual discharge.   She has a skin rash under the breasts and in the groin.  She has used the Clobetasol and the itching continues.  Her  gave her a small tube of clotrimazole but she doesn't feel it helped much.   Last cholesterol:   Recent Labs   Lab Test 19  0806 13  0852 11  1015   CHOL 212* 186 159   HDL 54 46* 51   * 123 98   TRIG 78 84 50   CHOLHDLRATIO  --  4.1 3.1     Past Medical History:   Diagnosis Date     Anemia      Hypertension goal BP (blood pressure) < 130/80 2011     Hypocalcemia      Multinodular goiter      Postsurgical hypothyroidism      Rotator cuff tendonitis 2/10/2015       Past Surgical History:   Procedure Laterality Date     C TOTAL ABDOM HYSTERECTOMY  09    [facility]     LAPAROSCOPY,TUBAL CAUTERY       SALPINGO OOPHORECTOMY,R/L/LUIS      left     THYROIDECTOMY N/A 2017    Procedure: THYROIDECTOMY;  Total Thyroidectomy;  Surgeon: Skylar Costa MD;  [facility]: [facility]       OB History    Para Term  AB Living   2 2 0 0 0 2   SAB TAB Ectopic Multiple Live Births   0 0 0 0 0      # Outcome Date GA Lbr Roc/2nd Weight Sex Delivery Anes PTL Lv   2 Para            1 Para                Gyn History:  Gynecological History         Patient's last menstrual period was 2009.      history of abnormal pap smear:    Last pap:   Lab Results   Component Value Date    PAP NIL 2014           Current Outpatient Medications   Medication Sig Dispense Refill     amLODIPine (NORVASC) 5 MG tablet Take 1 tablet (5 mg) by mouth daily 90 tablet 3     calcium carbonate antacid 1000 MG CHEW Take 2 tablets by mouth 3 times daily 60 tablet 3     clobetasol (TEMOVATE) 0.05 % cream Apply sparingly to affected area twice daily for two weeks, then use twice a week 60 g 2     levothyroxine  (SYNTHROID/LEVOTHROID) 112 MCG tablet Take 1 tablet (112 mcg) by mouth daily 30 tablet 11     lisinopril (PRINIVIL/ZESTRIL) 10 MG tablet Take 1 tablet (10 mg) by mouth daily 90 tablet 3     triamcinolone (KENALOG) 0.1 % cream Apply sparingly to affected area three times daily as needed (Patient not taking: Reported on 5/1/2019) 80 g 1       Allergies   Allergen Reactions     Advil [Ibuprofen] Swelling     Hands swelled     Cephalexin Itching       Social History     Socioeconomic History     Marital status:      Spouse name: Not on file     Number of children: 2     Years of education: 12     Highest education level: Not on file   Occupational History     Employer: [employer]   Social Needs     Financial resource strain: Not on file     Food insecurity:     Worry: Not on file     Inability: Not on file     Transportation needs:     Medical: Not on file     Non-medical: Not on file   Tobacco Use     Smoking status: Never Smoker     Smokeless tobacco: Never Used   Substance and Sexual Activity     Alcohol use: Yes     Comment: occ     Drug use: No     Comment: occasional/ rare     Sexual activity: Not Currently     Partners: Male     Birth control/protection: Surgical   Lifestyle     Physical activity:     Days per week: Not on file     Minutes per session: Not on file     Stress: Not on file   Relationships     Social connections:     Talks on phone: Not on file     Gets together: Not on file     Attends Jew service: Not on file     Active member of club or organization: Not on file     Attends meetings of clubs or organizations: Not on file     Relationship status: Not on file     Intimate partner violence:     Fear of current or ex partner: Not on file     Emotionally abused: Not on file     Physically abused: Not on file     Forced sexual activity: Not on file   Other Topics Concern     Parent/sibling w/ CABG, MI or angioplasty before 65F 55M? No      Service No     Blood Transfusions  "Yes     Comment: had 3 units 10/23/2009     Caffeine Concern No     Occupational Exposure No     Hobby Hazards No     Sleep Concern Not Asked     Comment: sleeps about 5-6 hours a night     Stress Concern Not Asked     Weight Concern Not Asked     Special Diet No     Back Care Not Asked     Exercise Yes     Comment: walk     Bike Helmet Not Asked     Comment: doesn't ride a bike     Seat Belt Yes     Self-Exams No     Comment: info given on SBE   Social History Narrative     Not on file       Family History   Problem Relation Age of Onset     Cerebrovascular Disease Mother 50     Cerebrovascular Disease Father 49         49     Hypertension Father      Cancer No family hx of      Diabetes No family hx of      Thyroid Disease No family hx of      Glaucoma No family hx of      Macular Degeneration No family hx of          ROS:  All negative except as above.      EXAM:  /85 (BP Location: Right arm, Cuff Size: Adult Regular)   Pulse 75   Ht 1.575 m (5' 2\")   Wt 82.4 kg (181 lb 9.6 oz)   LMP 2009   SpO2 100%   BMI 33.22 kg/m    General:  WNWD female, NAD  Alert  Oriented x 3  Gait:  Normal  Skin:  Normal skin turgor.  Evidence of fungal infection under the breasts and in the groin.   Neurologic:  CN grossly intact, good sensation.    HEENT:  NC/AT, EOMI  Neck:  No masses palpated, symmetrical, carotids +2/4, no bruits heard  Heart:  RRR  Lungs:  Clear   Breasts:  Not performed today  Abdomen:  Non-tender, non-distended.  Vulva: fungal skin rash, normal hair distribution, no adenopathy  BUS:  Normal, no masses noted  Urethra:  No hypermobility noted  Urethral meatus:  No masses noted  Vagina: Moist, pink, no abnormal discharge, well rugated, no lesions  Cervix: absent   Uterus: absent   Ovaries/Bimanual: No masses, non-tender, mobile  Perianal:  No masses noted.    Extremities:  No clubbing, cyanosis, or edema      ASSESSMENT/PLAN   Annual examination   Mammogram today  Nizoral cream.  Derm referral " if cream does not help.   Low fat diet, weight bearing exercises and self breast exams on a monthly basis have been recommended.  I have discussed with patient the risks, benefits, medications, treatment options and modalities.   I have instructed the patient to call or schedule a follow-up appointment if any problems.

## 2019-05-14 DIAGNOSIS — E89.0 POSTOPERATIVE HYPOTHYROIDISM: ICD-10-CM

## 2019-05-15 RX ORDER — LEVOTHYROXINE SODIUM 112 UG/1
TABLET ORAL
Qty: 30 TABLET | Refills: 0 | Status: SHIPPED | OUTPATIENT
Start: 2019-05-15 | End: 2019-06-15 | Stop reason: DRUGHIGH

## 2019-05-15 NOTE — TELEPHONE ENCOUNTER
"Medication is being filled for 1 time refill only due to:  upcoming appt, last TSH abnormal   Ene WILSON RN    Last Written Prescription Date:  4/29/2018  Last Fill Quantity: 30,  # refills: 11   Last office visit: 2/12/2019 with prescribing provider:     Future Office Visit:   Next 5 appointments (look out 90 days)    May 29, 2019  3:30 PM CDT  Return Visit with Fabricio Zhou MD  [facility] ([facility] [address] [facility] [id number] [phone]         Requested Prescriptions   Pending Prescriptions Disp Refills     levothyroxine (SYNTHROID/LEVOTHROID) 112 MCG tablet [Pharmacy Med Name: LEVOTHYROXINE SODIUM 112MCG TABS] 30 tablet 11     Sig: TAKE ONE TABLET BY MOUTH EVERY DAY       Thyroid Protocol Failed - 5/14/2019  9:05 PM        Failed - Normal TSH on file in past 12 months     Recent Labs   Lab Test 01/16/19  1540   TSH 0.23*              Passed - Patient is 12 years or older        Passed - Recent (12 mo) or future (30 days) visit within the authorizing provider's specialty     Patient had office visit in the last 12 months or has a visit in the next 30 days with authorizing provider or within the authorizing provider's specialty.  See \"Patient Info\" tab in inbasket, or \"Choose Columns\" in Meds & Orders section of the refill encounter.              Passed - Medication is active on med list        Passed - No active pregnancy on record     If patient is pregnant or has had a positive pregnancy test, please check TSH.          Passed - No positive pregnancy test in past 12 months     If patient is pregnant or has had a positive pregnancy test, please check TSH.            "

## 2019-05-29 ENCOUNTER — OFFICE VISIT (OUTPATIENT)
Dept: ENDOCRINOLOGY | Facility: CLINIC | Age: 49
End: 2019-05-29
Payer: COMMERCIAL

## 2019-05-29 VITALS
SYSTOLIC BLOOD PRESSURE: 134 MMHG | HEART RATE: 76 BPM | DIASTOLIC BLOOD PRESSURE: 85 MMHG | WEIGHT: 183 LBS | HEIGHT: 62 IN | BODY MASS INDEX: 33.68 KG/M2

## 2019-05-29 DIAGNOSIS — E83.51 HYPOCALCEMIA: ICD-10-CM

## 2019-05-29 DIAGNOSIS — E89.0 POSTOPERATIVE HYPOTHYROIDISM: Primary | ICD-10-CM

## 2019-05-29 LAB
CALCIUM SERPL-MCNC: 8.4 MG/DL (ref 8.5–10.1)
PTH-INTACT SERPL-MCNC: 20 PG/ML (ref 18–80)
T4 FREE SERPL-MCNC: 1.33 NG/DL (ref 0.76–1.46)
TSH SERPL DL<=0.005 MIU/L-ACNC: 0.08 MU/L (ref 0.4–4)

## 2019-05-29 PROCEDURE — 82310 ASSAY OF CALCIUM: CPT | Performed by: INTERNAL MEDICINE

## 2019-05-29 PROCEDURE — 84439 ASSAY OF FREE THYROXINE: CPT | Performed by: INTERNAL MEDICINE

## 2019-05-29 PROCEDURE — 84443 ASSAY THYROID STIM HORMONE: CPT | Performed by: INTERNAL MEDICINE

## 2019-05-29 PROCEDURE — 36415 COLL VENOUS BLD VENIPUNCTURE: CPT | Performed by: INTERNAL MEDICINE

## 2019-05-29 PROCEDURE — 83970 ASSAY OF PARATHORMONE: CPT | Performed by: INTERNAL MEDICINE

## 2019-05-29 PROCEDURE — 99213 OFFICE O/P EST LOW 20 MIN: CPT | Performed by: INTERNAL MEDICINE

## 2019-05-29 ASSESSMENT — MIFFLIN-ST. JEOR: SCORE: 1413.33

## 2019-05-29 NOTE — PATIENT INSTRUCTIONS
Continue the levothyroxine 0.112 mg daily dose  Stop the calcium tablets and    switch back to the gummy bear calcium chews 3 each evening    Next appointment with Dr. Zhou 12/2019, [facility] or [facility] office    I will be reassigned from the [facility] to the [facility] starting in August 2019.  I will no longer be seeing patients in [place] after August 1, 2019.  My work schedule:  [facility] -Monday, Tuesday, Friday and [facility]- Wednesdays    A new endocrinologist will be working at [facility] and [facility], starting in late July or early August 2019.  You may schedule future appointments with the new endocrinologist or travel to the [facility] or [facility] to see me for evaluations.   Appointment scheduling:  [facility]    [phone]  [facility]):  [phone]  [facility]   [phone]  [facility]): [phone]

## 2019-05-29 NOTE — PROGRESS NOTES
Name: Rhonda Cordero    Chief Complaint   Patient presents with     Postoperative Hypothyroidism     HPI:  Recent issues:  Here for f/u thyroid evaluation, along with her  today.  Previous thyroidectomy surgery 8/2017, then postop hypothyroidism and hypocalcemia        ~2015.  Noticed left thyroid lump, then medical evaluations  Recalls periodic thyroid u/s imaging x4, also thyroid FNA biopsies:  2/6/15 Thyroid u/s:   Right lobe 5.0x 2.3x 2.0 cm and left lobe 4.6x 3.2x 2.4 cm   Right lobe nodules:  0.7 cm RML, 0.9 cm RLL   Left lobe nodules:  0.9 cm BROOKE, 2.4 cm LML   Left side of isthmus:  0.9 cm nodule  3/3/15 labs included high TPOAb 620  3/2015. Left thyroid lobe nodule FNA:  Benign  3/2016 Thyroid nodule FNA:  Benign  No previous use of thyroid medication  Progressive growth, then problems with food sticking sensation during swallowing  1/11/17. Saw Dr. 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 Juares  8/22/17.  Total thyroidectomy surgery   Procedure at [facility] with Dr. RICHARD Juares  Path:     Benign MNG, no evidence for malignancy   Focal lymphocytic thyroiditis   2 fragments of benign parathyroid tissue, 3 benign LN's    Postop treatment with levothyroxine, recalls the 0.137 mg QD dose   Also took calcium 3-4x/day postop for few weeks, then discontinued  9/26/17 labs:  TSH 0.02, FT4 1.67, Ca 8.1, PTH 7, alb 3.5  1/8/18 labs:  TSH <0.01, FT4 1.66, Ca 8.5, PTH 14, alb 3.8  FamHx thyroid cancer:  Yes    2/21/18. Initial evaluation with me  Dose reduction of thyroid medication, started calcium supplement use  Calcium supplement dose:  Infrequent dosing as 500 mg 2-tabs po QPM  Subsequent dose increase with gummy bear calcum chews  Recent [facility] labs include:  Lab Results   Component Value Date    TSH 0.23 (L) 01/16/2019    T4 1.33 01/16/2019    FT3 2.6 04/29/2015     (H) 03/03/2015     Lab Results   Component Value Date      2019    POTASSIUM 4.7 2019    CHLORIDE 104 2019    CO2 29 2019    ANIONGAP 7 2019     (H) 2019    BUN 14 2019    CR 0.52 2019    GFRESTIMATED >90 2019    GFRESTBLACK >90 2019    CAROLE 8.4 (L) 2019    TSH 0.23 (L) 2019    VITDT 30 2019    PTHI 30 2019     Changed from gummy bear calcium to calcium tablets  Taking calcium 3 (large) calcium tabs each evening  Current dose:  Levothyroxine 0.112 mg QAM      , lives in [place], [place]  Sees Dr. Vasquez Denson for general medicine evaluations    PMH/PSH:  Past Medical History:   Diagnosis Date     Anemia      Hypertension goal BP (blood pressure) < 130/80 2011     Hypocalcemia      Multinodular goiter      Postsurgical hypothyroidism      Rotator cuff tendonitis 2/10/2015     Past Surgical History:   Procedure Laterality Date     C TOTAL ABDOM HYSTERECTOMY  09    [facility]     LAPAROSCOPY,TUBAL CAUTERY       SALPINGO OOPHORECTOMY,R/L/LUIS      left     THYROIDECTOMY N/A 2017    Procedure: THYROIDECTOMY;  Total Thyroidectomy;  Surgeon: Skylar Costa MD;  Location:  OR       Family Hx:  Family History   Problem Relation Age of Onset     Cerebrovascular Disease Mother 50     Cerebrovascular Disease Father 49         49     Hypertension Father      Cancer No family hx of      Diabetes No family hx of      Thyroid Disease No family hx of      Glaucoma No family hx of      Macular Degeneration No family hx of          Social Hx:  Social History     Socioeconomic History     Marital status:      Spouse name: Not on file     Number of children: 2     Years of education: 12     Highest education level: Not on file   Occupational History     Employer: [employer]   Social Needs     Financial resource strain: Not on file     Food insecurity:     Worry: Not on file     Inability: Not on file     Transportation needs:     Medical: Not on  file     Non-medical: Not on file   Tobacco Use     Smoking status: Never Smoker     Smokeless tobacco: Never Used   Substance and Sexual Activity     Alcohol use: Yes     Comment: occ     Drug use: No     Comment: occasional/ rare     Sexual activity: Not Currently     Partners: Male     Birth control/protection: Surgical   Lifestyle     Physical activity:     Days per week: Not on file     Minutes per session: Not on file     Stress: Not on file   Relationships     Social connections:     Talks on phone: Not on file     Gets together: Not on file     Attends Episcopalian service: Not on file     Active member of club or organization: Not on file     Attends meetings of clubs or organizations: Not on file     Relationship status: Not on file     Intimate partner violence:     Fear of current or ex partner: Not on file     Emotionally abused: Not on file     Physically abused: Not on file     Forced sexual activity: Not on file   Other Topics Concern     Parent/sibling w/ CABG, MI or angioplasty before 65F 55M? No      Service No     Blood Transfusions Yes     Comment: had 3 units 10/23/2009     Caffeine Concern No     Occupational Exposure No     Hobby Hazards No     Sleep Concern Not Asked     Comment: sleeps about 5-6 hours a night     Stress Concern Not Asked     Weight Concern Not Asked     Special Diet No     Back Care Not Asked     Exercise Yes     Comment: walk     Bike Helmet Not Asked     Comment: doesn't ride a bike     Seat Belt Yes     Self-Exams No     Comment: info given on SBE   Social History Narrative     Not on file          MEDICATIONS:  has a current medication list which includes the following prescription(s): amlodipine, calcium carbonate antacid, clobetasol, ketoconazole, levothyroxine, lisinopril, and triamcinolone.    ROS:     ROS: 10 point ROS neg other than the symptoms noted above in the HPI.    GENERAL: less fatigue, gradual weight gain; denies fevers, chills, night sweats.  "  HEENT: no recent dysphagia, odonophagia, diplopia, neck pain  THYROID:  no apparent hyper or hypothyroid symptoms  CV: no chest pain, pressure, palpitations  LUNGS: no SOB, DEAN, cough, wheezing   ABDOMEN: no diarrhea, constipation, abdominal pain  EXTREMITIES: occasional hand tremors; no rashes, ulcers, edema  NEUROLOGY: hands tingling at night; no headaches, denies changes in vision  MSK: no muscle aches or pains, weakness  SKIN: no rashes or lesions  :  No menses since hysterectomy ~2005, denies hotflashes  PSYCH:  occasional anxiety; denies depression  ENDOCRINE: no heat or cold intolerance    Physical Exam   VS: /85   Pulse 76   Ht 1.575 m (5' 2\")   Wt 83 kg (183 lb)   LMP 11/23/2009   BMI 33.47 kg/m    GENERAL: AXOX3, NAD, well dressed, answering questions appropriately, appears stated age.  THYROID:  low horizontal anterior neck scar healed, no palpable thyroid tissue or neck nodules  HEENT: neck non-tender, no exopthalmous, no proptosis, EOMI  ABDOMEN: obese, soft, nontender, nondistended, no organomegaly  EXTREMITIES: no edema or extremity tremors  NEUROLOGY: CN grossly intact  SKIN: dark skin complexion; no rashes, no lesions    LABS:    All pertinent notes, labs, and images personally reviewed by me.     A/P:  Encounter Diagnoses   Name Primary?     Postoperative hypothyroidism Yes     Hypocalcemia      Comments:  Reviewed health history, thyroid and calcium issues    Plan:  Discussed general issues with the postsurgical hypothyroidism diagnosis and management  We discussed lab tests used to assess patient thyroid hormone levels  Discussed potential symptoms of high and low thyroid hormone levels  Reviewed treatment options including levothyroxine medication, ideal dosing    Recommend:  Continue the current levothyroxine 0.112 mg po QAM  Will need updated levothyroxine Rx soon  Stop the calcium tablets and change back to the calcium (gummy bear) 500 mg as 3-tabs QPM, don't take at same time " as levothyroxine  Monitor for symptom changes  Keep focus on diet, exercise, weight management    May be due for baseline DEXA scan imaging, review with PCP or GYN  Addressed patient questions today    Patient Instructions   Continue the levothyroxine 0.112 mg daily dose  Stop the calcium tablets and    switch back to the gummy bear calcium chews 3 each evening    Next appointment with Dr. Zhou 12/2019, [facility] or [facility] office    I will be reassigned from the [facility] to the [facility] starting in August 2019.  I will no longer be seeing patients in [place] after August 1, 2019.  My work schedule:  [facility] -Monday, Tuesday, Friday and [facility]- Wednesdays    A new endocrinologist will be working at [facility] and [facility], starting in late July or early August 2019.  You may schedule future appointments with the new endocrinologist or travel to the [facility] or [facility] to see me for evaluations.   Appointment scheduling:  [facility]    [phone]  [facility]):  [phone]  [facility]   [phone]  [facility]): [phone]        Labs ordered today:   Orders Placed This Encounter   Procedures     TSH     T4 free     Calcium     Parathyroid Hormone Intact     Radiology/Consults ordered today: None    More than 50% of the time spent with Ms. Cordero on counseling / coordinating her care.  Total appointment time was 20 minutes.    Follow-up:  6 mo    Fabricio Zhou MD  Endocrinology  [facility]/[place]    Copy:  GABRIEL Denson

## 2019-06-12 DIAGNOSIS — E89.0 POSTOPERATIVE HYPOTHYROIDISM: ICD-10-CM

## 2019-06-13 NOTE — TELEPHONE ENCOUNTER
"Last Written Prescription Date:  5/15/19  Last Fill Quantity: 30 tablet,  # refills: 0   Last office visit: 5/29/2019 with prescribing provider:  bAelardo   Future Office Visit:      Requested Prescriptions   Pending Prescriptions Disp Refills     levothyroxine (SYNTHROID/LEVOTHROID) 112 MCG tablet [Pharmacy Med Name: LEVOTHYROXINE SODIUM 112MCG TABS] 30 tablet 0     Sig: TAKE ONE TABLET BY MOUTH ONCE DAILY       Thyroid Protocol Failed - 6/12/2019  9:36 PM        Failed - Normal TSH on file in past 12 months     Recent Labs   Lab Test 05/29/19  1558   TSH 0.08*              Passed - Patient is 12 years or older        Passed - Recent (12 mo) or future (30 days) visit within the authorizing provider's specialty     Patient had office visit in the last 12 months or has a visit in the next 30 days with authorizing provider or within the authorizing provider's specialty.  See \"Patient Info\" tab in inbasket, or \"Choose Columns\" in Meds & Orders section of the refill encounter.              Passed - Medication is active on med list        Passed - No active pregnancy on record     If patient is pregnant or has had a positive pregnancy test, please check TSH.          Passed - No positive pregnancy test in past 12 months     If patient is pregnant or has had a positive pregnancy test, please check TSH.            "

## 2019-06-13 NOTE — TELEPHONE ENCOUNTER
TL  Refill request for levothyroxine  TSH out of range, do you want to adjust dose?  Thank you,  Karen Flores RN

## 2019-06-15 DIAGNOSIS — E89.0 POSTOPERATIVE HYPOTHYROIDISM: Primary | ICD-10-CM

## 2019-06-15 RX ORDER — LEVOTHYROXINE SODIUM 112 UG/1
TABLET ORAL
Qty: 30 TABLET | Refills: 0 | OUTPATIENT
Start: 2019-06-15

## 2019-06-15 RX ORDER — LEVOTHYROXINE SODIUM 100 UG/1
100 TABLET ORAL DAILY
Qty: 90 TABLET | Refills: 3 | Status: SHIPPED | OUTPATIENT
Start: 2019-06-15 | End: 2020-12-13

## 2019-06-17 NOTE — TELEPHONE ENCOUNTER
"Dr Zhou denied 112mcg dose request  Sent in Rx Saturday for 100mcg instead  Tried to call pt to inform her of change     not on [employer] answered  He's very upset - states when pt calls back we better answer her  He states she works until 5pm  Told him FNA can assist then if after 5pm    States he's contacted his  about Turner's level of care  They've been waiting for lab results from 5/29/2019  States \"this is life and death, you can't mess with people like this\"    Essence Barker informed of above  Ene WILSON RN      "

## 2019-06-17 NOTE — TELEPHONE ENCOUNTER
Pt called back today regarding medication & this writer informed pt of dose change & new refill sent to pharmacy.  Pt is aware and will .  No other concerns.

## 2019-06-18 NOTE — TELEPHONE ENCOUNTER
Messages noted  This patient had abnormal thyroid levels and needed dose reduction of the levothyroxine medication... This is why the original pharmacy levothyroxine Rx denied.  We had sent lab results message callback to patient this morning, also lab letter mailed today.    I called patient back this evening but no answer, VM message left.      ARNULFO Zhou MD  Endocrinology   [facility] [place]

## 2019-11-08 ENCOUNTER — OFFICE VISIT (OUTPATIENT)
Dept: ENDOCRINOLOGY | Facility: CLINIC | Age: 49
End: 2019-11-08
Payer: COMMERCIAL

## 2019-11-08 VITALS
SYSTOLIC BLOOD PRESSURE: 123 MMHG | WEIGHT: 187.2 LBS | HEART RATE: 65 BPM | BODY MASS INDEX: 34.24 KG/M2 | OXYGEN SATURATION: 99 % | DIASTOLIC BLOOD PRESSURE: 86 MMHG

## 2019-11-08 DIAGNOSIS — R53.83 FATIGUE, UNSPECIFIED TYPE: ICD-10-CM

## 2019-11-08 DIAGNOSIS — E89.0 POSTOPERATIVE HYPOTHYROIDISM: Primary | ICD-10-CM

## 2019-11-08 DIAGNOSIS — E83.51 HYPOCALCEMIA: ICD-10-CM

## 2019-11-08 LAB
ALBUMIN SERPL-MCNC: 3.5 G/DL (ref 3.4–5)
CALCIUM SERPL-MCNC: 7.7 MG/DL (ref 8.5–10.1)
CREAT SERPL-MCNC: 0.58 MG/DL (ref 0.52–1.04)
GFR SERPL CREATININE-BSD FRML MDRD: >90 ML/MIN/{1.73_M2}
IRON SATN MFR SERPL: 15 % (ref 15–46)
IRON SERPL-MCNC: 54 UG/DL (ref 35–180)
MAGNESIUM SERPL-MCNC: 2.4 MG/DL (ref 1.6–2.3)
PHOSPHATE SERPL-MCNC: 4.2 MG/DL (ref 2.5–4.5)
PTH-INTACT SERPL-MCNC: 20 PG/ML (ref 18–80)
T4 FREE SERPL-MCNC: 1.09 NG/DL (ref 0.76–1.46)
TIBC SERPL-MCNC: 363 UG/DL (ref 240–430)
TSH SERPL DL<=0.005 MIU/L-ACNC: 15.41 MU/L (ref 0.4–4)
VIT B12 SERPL-MCNC: 515 PG/ML (ref 193–986)

## 2019-11-08 PROCEDURE — 82306 VITAMIN D 25 HYDROXY: CPT | Performed by: INTERNAL MEDICINE

## 2019-11-08 PROCEDURE — 36415 COLL VENOUS BLD VENIPUNCTURE: CPT | Performed by: INTERNAL MEDICINE

## 2019-11-08 PROCEDURE — 83735 ASSAY OF MAGNESIUM: CPT | Performed by: INTERNAL MEDICINE

## 2019-11-08 PROCEDURE — 82040 ASSAY OF SERUM ALBUMIN: CPT | Performed by: INTERNAL MEDICINE

## 2019-11-08 PROCEDURE — 84443 ASSAY THYROID STIM HORMONE: CPT | Performed by: INTERNAL MEDICINE

## 2019-11-08 PROCEDURE — 84100 ASSAY OF PHOSPHORUS: CPT | Performed by: INTERNAL MEDICINE

## 2019-11-08 PROCEDURE — 83970 ASSAY OF PARATHORMONE: CPT | Performed by: INTERNAL MEDICINE

## 2019-11-08 PROCEDURE — 99214 OFFICE O/P EST MOD 30 MIN: CPT | Performed by: INTERNAL MEDICINE

## 2019-11-08 PROCEDURE — 83550 IRON BINDING TEST: CPT | Performed by: INTERNAL MEDICINE

## 2019-11-08 PROCEDURE — 84439 ASSAY OF FREE THYROXINE: CPT | Performed by: INTERNAL MEDICINE

## 2019-11-08 PROCEDURE — 82607 VITAMIN B-12: CPT | Performed by: INTERNAL MEDICINE

## 2019-11-08 PROCEDURE — 82310 ASSAY OF CALCIUM: CPT | Performed by: INTERNAL MEDICINE

## 2019-11-08 PROCEDURE — 83540 ASSAY OF IRON: CPT | Performed by: INTERNAL MEDICINE

## 2019-11-08 PROCEDURE — 82565 ASSAY OF CREATININE: CPT | Performed by: INTERNAL MEDICINE

## 2019-11-08 NOTE — PROGRESS NOTES
"S: Pt being seen in f/u for hypothyroidism.  Previously seen by Dr Zhou.   \"Previous thyroidectomy surgery 8/2017, then postop hypothyroidism and hypocalcemia     ~2015.  Noticed left thyroid lump, then medical evaluations  Recalls periodic thyroid u/s imaging x4, also thyroid FNA biopsies:  2/6/15 Thyroid u/s:              Right lobe 5.0x 2.3x 2.0 cm and left lobe 4.6x 3.2x 2.4 cm              Right lobe nodules:  0.7 cm RML, 0.9 cm RLL              Left lobe nodules:  0.9 cm BROOKE, 2.4 cm LML              Left side of isthmus:  0.9 cm nodule  3/3/15 labs included high TPOAb 620  3/2015. Left thyroid lobe nodule FNA:  Benign  3/2016 Thyroid nodule FNA:  Benign  No previous use of thyroid medication  Progressive growth, then problems with food sticking sensation during swallowing  1/11/17. Saw Dr. 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 Juares  8/22/17.  Total thyroidectomy surgery   Procedure at [facility] with Dr. RICHARD Juares  Path:                Benign MNG, no evidence for malignancy              Focal lymphocytic thyroiditis              2 fragments of benign parathyroid tissue, 3 benign LN's     Postop treatment with levothyroxine, recalls the 0.137 mg QD dose   Also took calcium 3-4x/day postop for few weeks, then discontinued  9/26/17 labs:  TSH 0.02, FT4 1.67, Ca 8.1, PTH 7, alb 3.5  1/8/18 labs:  TSH <0.01, FT4 1.66, Ca 8.5, PTH 14, alb 3.8  FamHx thyroid cancer:  Yes     2/21/18. Initial evaluation with me  Dose reduction of thyroid medication, started calcium supplement use  Calcium supplement dose:  Infrequent dosing as 500 mg 2-tabs po QPM  Subsequent dose increase with gummy bear calcum chews  Recent [facility] labs include:        Lab Results   Component Value Date     TSH 0.23 (L) 01/16/2019     T4 1.33 01/16/2019     FT3 2.6 04/29/2015      (H) 03/03/2015            Lab Results   Component Value Date      02/12/2019     POTASSIUM " "4.7 02/12/2019     CHLORIDE 104 02/12/2019     CO2 29 02/12/2019     ANIONGAP 7 02/12/2019      (H) 02/12/2019     BUN 14 02/12/2019     CR 0.52 02/12/2019     GFRESTIMATED >90 02/12/2019     GFRESTBLACK >90 02/12/2019     CAROLE 8.4 (L) 02/12/2019     TSH 0.23 (L) 01/16/2019     VITDT 30 02/12/2019     PTHI 30 01/16/2019      Changed from gummy bear calcium to calcium tablets  Taking calcium 3 (large) calcium tabs each evening  Current dose:  Levothyroxine 0.112 mg QAM     , lives in [place], [place]  Sees Dr. Vasquez Denson for general medicine evaluations\"    Her dose was decreased from 112 to 100 mcg daily after her last visit in 5/2019.   No change in mood. She does note she has been having a harder time getting going in the morning.   She has not been told that she snores.     She had a AROLDO in 2009. Occasional hot flashes.     She is taking 3 calcium gummies a day.   No muscle spasms or javan-oral numbness.     ROS: 10 point ROS neg other than the symptoms noted above in the HPI.    O:  Vital signs:      BP: 123/86 Pulse: 65     SpO2: 99 %       Weight: 84.9 kg (187 lb 3.2 oz)  Estimated body mass index is 34.24 kg/m  as calculated from the following:    Height as of 5/29/19: 1.575 m (5' 2\").    Weight as of this encounter: 84.9 kg (187 lb 3.2 oz).  Gen: In NAD.   HEENT: no proptosis or lid lag, EOMI, no palpable thyroid tissue.  Card: S1 S2 RRR no m/r/g.  Pulm: CTA b/l.   GI: NT ND +BS.   Ext: no LE edema.   MSK: no gross deformities.  Neuro: no tremor, +2 DTR's.     A/P:   Hypothyroidism - 2/2 surgery for multinodular goiter causing dysphagia. Extensive discussion of thyroid hormone and normal physiology. Included was discussion of thyroid in relation to weight and energy. Dose lowered after last visit 2/2 subclinical hyperthyroidism.   -Labs today.     Hypocalcemia - An issue since thyroid surgery in 8/2017.     Vitamin D 30 in 2/2019. Normal iPTH. Albumin was 3.8 in 2/2019.  No muscle spasms or " javan-oral numbness.   Partial hypoparathyroidism?  -Labs today. If not improved will add a low dose of calcitriol.     Fatigue - labs as above plus iron panel and B12.     I spent 25 minutes with the patient. Greater than 50% of the time spent in . Risks/benefits/alternatives reviewed.     Sharif Anne MD on 11/8/2019 at 11:51 AM

## 2019-11-08 NOTE — LETTER
"    11/8/2019         RE: Rhonda Cordero  [address]  [facility] [address]        Dear Colleague,    Thank you for referring your patient, Rhonda Cordero, to the [facility]. Please see a copy of my visit note below.    S: Pt being seen in f/u for hypothyroidism.  Previously seen by Dr Zhou.   \"Previous thyroidectomy surgery 8/2017, then postop hypothyroidism and hypocalcemia     ~2015.  Noticed left thyroid lump, then medical evaluations  Recalls periodic thyroid u/s imaging x4, also thyroid FNA biopsies:  2/6/15 Thyroid u/s:              Right lobe 5.0x 2.3x 2.0 cm and left lobe 4.6x 3.2x 2.4 cm              Right lobe nodules:  0.7 cm RML, 0.9 cm RLL              Left lobe nodules:  0.9 cm BROOKE, 2.4 cm LML              Left side of isthmus:  0.9 cm nodule  3/3/15 labs included high TPOAb 620  3/2015. Left thyroid lobe nodule FNA:  Benign  3/2016 Thyroid nodule FNA:  Benign  No previous use of thyroid medication  Progressive growth, then problems with food sticking sensation during swallowing  1/11/17. Saw Dr. 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 Juares  8/22/17.  Total thyroidectomy surgery   Procedure at [facility] with Dr. RICHARD Juares  Path:                Benign MNG, no evidence for malignancy              Focal lymphocytic thyroiditis              2 fragments of benign parathyroid tissue, 3 benign LN's     Postop treatment with levothyroxine, recalls the 0.137 mg QD dose   Also took calcium 3-4x/day postop for few weeks, then discontinued  9/26/17 labs:  TSH 0.02, FT4 1.67, Ca 8.1, PTH 7, alb 3.5  1/8/18 labs:  TSH <0.01, FT4 1.66, Ca 8.5, PTH 14, alb 3.8  FamHx thyroid cancer:  Yes     2/21/18. Initial evaluation with me  Dose reduction of thyroid medication, started calcium supplement use  Calcium supplement dose:  Infrequent dosing as 500 mg 2-tabs po QPM  Subsequent dose increase with gummy bear calcum " "chews  Recent [facility] labs include:        Lab Results   Component Value Date     TSH 0.23 (L) 01/16/2019     T4 1.33 01/16/2019     FT3 2.6 04/29/2015      (H) 03/03/2015            Lab Results   Component Value Date      02/12/2019     POTASSIUM 4.7 02/12/2019     CHLORIDE 104 02/12/2019     CO2 29 02/12/2019     ANIONGAP 7 02/12/2019      (H) 02/12/2019     BUN 14 02/12/2019     CR 0.52 02/12/2019     GFRESTIMATED >90 02/12/2019     GFRESTBLACK >90 02/12/2019     CAROLE 8.4 (L) 02/12/2019     TSH 0.23 (L) 01/16/2019     VITDT 30 02/12/2019     PTHI 30 01/16/2019      Changed from gummy bear calcium to calcium tablets  Taking calcium 3 (large) calcium tabs each evening  Current dose:  Levothyroxine 0.112 mg QAM     , lives in [place], [place]  Sees Dr. Vasquez Denson for general medicine evaluations\"    Her dose was decreased from 112 to 100 mcg daily after her last visit in 5/2019.   No change in mood. She does note she has been having a harder time getting going in the morning.   She has not been told that she snores.     She had a AROLDO in 2009. Occasional hot flashes.     She is taking 3 calcium gummies a day.   No muscle spasms or javan-oral numbness.     ROS: 10 point ROS neg other than the symptoms noted above in the HPI.    O:  Vital signs:      BP: 123/86 Pulse: 65     SpO2: 99 %       Weight: 84.9 kg (187 lb 3.2 oz)  Estimated body mass index is 34.24 kg/m  as calculated from the following:    Height as of 5/29/19: 1.575 m (5' 2\").    Weight as of this encounter: 84.9 kg (187 lb 3.2 oz).  Gen: In NAD.   HEENT: no proptosis or lid lag, EOMI, no palpable thyroid tissue.  Card: S1 S2 RRR no m/r/g.  Pulm: CTA b/l.   GI: NT ND +BS.   Ext: no LE edema.   MSK: no gross deformities.  Neuro: no tremor, +2 DTR's.     A/P:   Hypothyroidism - 2/2 surgery for multinodular goiter causing dysphagia. Extensive discussion of thyroid hormone and normal physiology. Included was discussion of thyroid in " relation to weight and energy. Dose lowered after last visit 2/2 subclinical hyperthyroidism.   -Labs today.     Hypocalcemia - An issue since thyroid surgery in 8/2017.     Vitamin D 30 in 2/2019. Normal iPTH. Albumin was 3.8 in 2/2019.  No muscle spasms or javan-oral numbness.   Partial hypoparathyroidism?  -Labs today. If not improved will add a low dose of calcitriol.     Fatigue - labs as above plus iron panel and B12.     I spent 25 minutes with the patient. Greater than 50% of the time spent in . Risks/benefits/alternatives reviewed.     Sharif Anne MD on 11/8/2019 at 11:51 AM          Again, thank you for allowing me to participate in the care of your patient.        Sincerely,        Sharif Anne MD

## 2019-11-12 ENCOUNTER — TELEPHONE (OUTPATIENT)
Dept: ENDOCRINOLOGY | Facility: CLINIC | Age: 49
End: 2019-11-12

## 2019-11-12 DIAGNOSIS — E83.51 HYPOCALCEMIA: ICD-10-CM

## 2019-11-12 DIAGNOSIS — E89.0 POSTOPERATIVE HYPOTHYROIDISM: Primary | ICD-10-CM

## 2019-11-12 LAB
DEPRECATED CALCIDIOL+CALCIFEROL SERPL-MC: <31 UG/L (ref 20–75)
VITAMIN D2 SERPL-MCNC: <5 UG/L
VITAMIN D3 SERPL-MCNC: 26 UG/L

## 2019-11-12 RX ORDER — LEVOTHYROXINE SODIUM 112 UG/1
112 TABLET ORAL DAILY
Qty: 30 TABLET | Refills: 11 | Status: SHIPPED | OUTPATIENT
Start: 2019-11-12 | End: 2020-12-02

## 2019-11-13 ENCOUNTER — TELEPHONE (OUTPATIENT)
Dept: RHEUMATOLOGY | Facility: CLINIC | Age: 49
End: 2019-11-13

## 2019-11-13 NOTE — TELEPHONE ENCOUNTER
Reason for Call:  Other call back    Detailed comments: patient's  returned the call.    I messaged via [employer] ([employer]), Ashley told me to take a message.    Phone Number Patient can be reached at: Cell number on file:    Telephone Information:   Mobile [phone]       Best Time: any    Can we leave a detailed message on this number? YES    Call taken on 11/13/2019 at 5:12 PM by Maya Valverde

## 2020-01-17 DIAGNOSIS — E83.51 HYPOCALCEMIA: ICD-10-CM

## 2020-01-17 DIAGNOSIS — E89.0 POSTOPERATIVE HYPOTHYROIDISM: ICD-10-CM

## 2020-01-17 LAB
CALCIUM SERPL-MCNC: 7.9 MG/DL (ref 8.5–10.1)
MAGNESIUM SERPL-MCNC: 2.2 MG/DL (ref 1.6–2.3)
TSH SERPL DL<=0.005 MIU/L-ACNC: 0.99 MU/L (ref 0.4–4)

## 2020-01-17 PROCEDURE — 36415 COLL VENOUS BLD VENIPUNCTURE: CPT | Performed by: INTERNAL MEDICINE

## 2020-01-17 PROCEDURE — 82310 ASSAY OF CALCIUM: CPT | Performed by: INTERNAL MEDICINE

## 2020-01-17 PROCEDURE — 84443 ASSAY THYROID STIM HORMONE: CPT | Performed by: INTERNAL MEDICINE

## 2020-01-17 PROCEDURE — 82306 VITAMIN D 25 HYDROXY: CPT | Performed by: INTERNAL MEDICINE

## 2020-01-17 PROCEDURE — 83735 ASSAY OF MAGNESIUM: CPT | Performed by: INTERNAL MEDICINE

## 2020-01-21 ENCOUNTER — TELEPHONE (OUTPATIENT)
Dept: ENDOCRINOLOGY | Facility: CLINIC | Age: 50
End: 2020-01-21

## 2020-01-21 DIAGNOSIS — E83.51 HYPOCALCEMIA: ICD-10-CM

## 2020-01-21 DIAGNOSIS — E89.0 POSTOPERATIVE HYPOTHYROIDISM: Primary | ICD-10-CM

## 2020-01-21 LAB
DEPRECATED CALCIDIOL+CALCIFEROL SERPL-MC: <32 UG/L (ref 20–75)
VITAMIN D2 SERPL-MCNC: <5 UG/L
VITAMIN D3 SERPL-MCNC: 27 UG/L

## 2020-01-21 RX ORDER — CALCITRIOL 0.25 UG/1
0.25 CAPSULE, LIQUID FILLED ORAL DAILY
Qty: 30 CAPSULE | Refills: 11 | Status: SHIPPED | OUTPATIENT
Start: 2020-01-21 | End: 2021-02-01

## 2020-01-21 NOTE — TELEPHONE ENCOUNTER
Called pt and left VM to return call in regards to labs.    Renetta Poole RN Specialty Triage 1/21/2020 3:57 PM

## 2020-01-21 NOTE — TELEPHONE ENCOUNTER
----- Message from Sharif Anne MD sent at 1/21/2020  3:21 PM CST -----  Thyroid labs are normal.   Calcium still low but improved.   Vitamin D is a little low, stable.   Recommend adding calcitriol to improve calcium level.   Rx sent. Labs in 2 weeks to follow.     Sharif Anne MD on 1/21/2020 at 3:20 PM

## 2020-01-22 NOTE — TELEPHONE ENCOUNTER
Called pt and spoke to her about message below. She verified understanding.    Renetta Poole RN Specialty Triage 1/22/2020 2:58 PM

## 2020-04-23 DIAGNOSIS — I10 HYPERTENSION GOAL BP (BLOOD PRESSURE) < 140/90: ICD-10-CM

## 2020-04-27 DIAGNOSIS — I10 HYPERTENSION GOAL BP (BLOOD PRESSURE) < 140/90: ICD-10-CM

## 2020-04-27 NOTE — TELEPHONE ENCOUNTER
"Requested Prescriptions   Pending Prescriptions Disp Refills     lisinopril (ZESTRIL) 10 MG tablet 90 tablet 3     Sig: Take 1 tablet (10 mg) by mouth daily   Last Written Prescription Date:  2/12/19  Last Fill Quantity: 90,  # refills: 3   Last office visit: 2/12/2019 with prescribing provider:     Future Office Visit:      ACE Inhibitors (Including Combos) Protocol Failed - 4/27/2020 10:00 AM        Failed - Recent (12 mo) or future (30 days) visit within the authorizing provider's specialty     Patient has had an office visit with the authorizing provider or a provider within the authorizing providers department within the previous 12 mos or has a future within next 30 days. See \"Patient Info\" tab in inbasket, or \"Choose Columns\" in Meds & Orders section of the refill encounter.              Failed - Normal serum potassium on file in past 12 months     Recent Labs   Lab Test 02/12/19  0806   POTASSIUM 4.7             Passed - Blood pressure under 140/90 in past 12 months     BP Readings from Last 3 Encounters:   11/08/19 123/86   05/29/19 134/85   05/01/19 135/85                 Passed - Medication is active on med list        Passed - Patient is age 18 or older        Passed - No active pregnancy on record        Passed - Normal serum creatinine on file in past 12 months     Recent Labs   Lab Test 11/08/19  1146   CR 0.58       Ok to refill medication if creatinine is low          Passed - No positive pregnancy test within past 12 months           "

## 2020-04-27 NOTE — TELEPHONE ENCOUNTER
Reason for Call:  Medication or medication refill:    Do you use a [facility]?  Name of the pharmacy and phone number for the current request:  [facility]     Name of the medication requested: Lisinopril 10mg    Other request: none    Can we leave a detailed message on this number? YES    Phone number patient can be reached at: Home number on file [phone] (home)    Best Time: anytime    Call taken on 4/27/2020 at 9:42 AM by David Langley

## 2020-04-28 RX ORDER — LISINOPRIL 10 MG/1
10 TABLET ORAL DAILY
Qty: 90 TABLET | Refills: 3 | Status: SHIPPED | OUTPATIENT
Start: 2020-04-28 | End: 2020-05-20

## 2020-04-28 RX ORDER — LISINOPRIL 10 MG/1
TABLET ORAL
Qty: 90 TABLET | Refills: 3 | Status: SHIPPED | OUTPATIENT
Start: 2020-04-28 | End: 2021-05-17

## 2020-05-20 ENCOUNTER — OFFICE VISIT (OUTPATIENT)
Dept: FAMILY MEDICINE | Facility: CLINIC | Age: 50
End: 2020-05-20
Payer: COMMERCIAL

## 2020-05-20 VITALS
TEMPERATURE: 98.3 F | WEIGHT: 188.38 LBS | HEART RATE: 74 BPM | BODY MASS INDEX: 34.45 KG/M2 | SYSTOLIC BLOOD PRESSURE: 138 MMHG | DIASTOLIC BLOOD PRESSURE: 84 MMHG

## 2020-05-20 DIAGNOSIS — R59.9 ENLARGED LYMPH NODE: Primary | ICD-10-CM

## 2020-05-20 DIAGNOSIS — I10 HYPERTENSION GOAL BP (BLOOD PRESSURE) < 140/90: ICD-10-CM

## 2020-05-20 PROCEDURE — 99213 OFFICE O/P EST LOW 20 MIN: CPT | Performed by: FAMILY MEDICINE

## 2020-05-20 RX ORDER — AZITHROMYCIN 250 MG/1
TABLET, FILM COATED ORAL
Qty: 6 TABLET | Refills: 1 | Status: SHIPPED | OUTPATIENT
Start: 2020-05-20 | End: 2020-09-23

## 2020-05-20 ASSESSMENT — PAIN SCALES - GENERAL: PAINLEVEL: NO PAIN (0)

## 2020-05-20 NOTE — PROGRESS NOTES
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  Physical Exam  Constitutional:       Appearance: She is well-developed.   HENT:      Head: Normocephalic and atraumatic.      Right Ear: Tympanic membrane and external ear normal.      Left Ear: Tympanic membrane, ear canal and external ear normal.      Nose: Nose normal.      Mouth/Throat:      Mouth: Mucous membranes are moist.      Pharynx: Oropharynx is clear.   Eyes:      Conjunctiva/sclera: Conjunctivae normal.   Neck:      Vascular: No carotid bruit.   Cardiovascular:      Rate and Rhythm: Normal rate and regular rhythm.      Heart sounds: Normal heart sounds.   Pulmonary:      Effort: Pulmonary effort is normal. No respiratory distress.      Breath sounds: Normal breath sounds.   Neurological:      Mental Status: She is alert and oriented to person, place, and time.      no  Sinus/ submandib tenderness    Patient has small palpble bump left  Posterior neck at hairline.  Likely lymph node. Some tendernes       Diagnostic Test Results:  Labs reviewed in Epic        ASSESSMENT / PLAN:  (R59.9) Enlarged lymph node  (primary encounter diagnosis)  Comment: therapeutic trial of antibiotic. If  Not  Resolving then see ENT  Plan: OTOLARYNGOLOGY REFERRAL, azithromycin         (ZITHROMAX) 250 MG tablet             (I10) Hypertension goal BP (blood pressure) < 140/90  Comment: at goal  Plan: no change in meds      I  reviewed the patient's medications, allergies, medical history, family history, and social history.    Vasquez Denson MD

## 2020-05-20 NOTE — PATIENT INSTRUCTIONS
Take the antibiotics  For 5 days. Keeps working for 5-7 days after you finish.   Then one  Refill available.    If not better after antibiotics, then see ENT doctor

## 2020-06-19 ENCOUNTER — TELEPHONE (OUTPATIENT)
Dept: OBGYN | Facility: CLINIC | Age: 50
End: 2020-06-19

## 2020-06-19 NOTE — TELEPHONE ENCOUNTER
I called the patient to see if she could come in earlier on 7/29 for her PE at [place] at 4:00 with Dr. Tobar and she said she was seeing him in [place] right after her mammo which is at 3:30 on 7/29.      I told her we would check with Dr. Tobar to see what he would like done.     Asha Guallpa

## 2020-06-23 NOTE — TELEPHONE ENCOUNTER
Dr. Tobar is not at  Clinic on 7/29.    RN called and got mammogram switched over to [place] location for same time. Pt should come at 3:15PM for mammogram at [facility], being sure not to use powders or lotions.    Pt has annual exam to follow in [place] with Dr. Tobar at 4:00PM.    Unable to reach patient via phone. RN left a message and instructed patient to call the clinic at [phone] and ask for Women's Health.    Nahomi De La Vega RN on 6/23/2020 at 12:40 PM

## 2020-06-24 NOTE — TELEPHONE ENCOUNTER
Pt aware of new location of mammogram and visitor restriction.    Patient verbalized understanding and agreed to plan.     Nahomi De La Vega RN on 6/24/2020 at 3:29 PM

## 2020-07-15 ENCOUNTER — OFFICE VISIT (OUTPATIENT)
Dept: OTOLARYNGOLOGY | Facility: CLINIC | Age: 50
End: 2020-07-15
Payer: COMMERCIAL

## 2020-07-15 VITALS — OXYGEN SATURATION: 97 % | DIASTOLIC BLOOD PRESSURE: 84 MMHG | HEART RATE: 70 BPM | SYSTOLIC BLOOD PRESSURE: 128 MMHG

## 2020-07-15 DIAGNOSIS — R59.9 ENLARGED LYMPH NODES: Primary | ICD-10-CM

## 2020-07-15 PROCEDURE — 99213 OFFICE O/P EST LOW 20 MIN: CPT | Performed by: OTOLARYNGOLOGY

## 2020-07-15 NOTE — PROGRESS NOTES
I am seeing this patient in consultation for enlarged lymph node at the request of the provider Dr. Vasquez Denson.    Chief Complaint - Neck mass    History of Present Illness - Rhonda Cordero is a 49 year old female with a left posterior neck mass. It has been present for 1 month. The patient notes it is tender, worse with sleeping. No changes in size. The patient denies any facial numbness, weakness, history of skin cancer or any cancer. No obvious signs of infection including fluctuating size, redness, or purulent drainage. Treatments have included antibiotics, and it didn't help (z-pac). The patient doesn't smoke, or has a smoking history.     Past Medical History -   Patient Active Problem List   Diagnosis     CARDIOVASCULAR SCREENING; LDL GOAL LESS THAN 130     Impingement syndrome, shoulder     Rotator cuff tendonitis     Hashimoto's thyroiditis     Nontoxic multinodular goiter     Shoulder joint pain     Gastroesophageal reflux disease, esophagitis presence not specified     Cervical adenopathy     Hypertension goal BP (blood pressure) < 140/90     Essential hypertension with goal blood pressure less than 140/90     Anup's deformity of left heel     History of thyroidectomy     Postoperative hypothyroidism     Hypocalcemia       Current Medications -   Current Outpatient Medications:      amLODIPine (NORVASC) 5 MG tablet, TAKE ONE TABLET BY MOUTH ONCE DAILY, Disp: 90 tablet, Rfl: 0     azithromycin (ZITHROMAX) 250 MG tablet, 2 po daily x one day then 1 po daily x 4 days, Disp: 6 tablet, Rfl: 1     calcitRIOL (ROCALTROL) 0.25 MCG capsule, Take 1 capsule (0.25 mcg) by mouth daily, Disp: 30 capsule, Rfl: 11     calcium carbonate antacid 1000 MG CHEW, Take 2 tablets by mouth 3 times daily, Disp: 60 tablet, Rfl: 3     clobetasol (TEMOVATE) 0.05 % cream, Apply sparingly to affected area twice daily for two weeks, then use twice a week, Disp: 60 g, Rfl: 2     ketoconazole (NIZORAL) 2 % external cream,  Apply topically daily, Disp: 60 g, Rfl: 1     levothyroxine (SYNTHROID/LEVOTHROID) 100 MCG tablet, Take 1 tablet (100 mcg) by mouth daily, Disp: 90 tablet, Rfl: 3     levothyroxine (SYNTHROID/LEVOTHROID) 112 MCG tablet, Take 1 tablet (112 mcg) by mouth daily, Disp: 30 tablet, Rfl: 11     lisinopril (ZESTRIL) 10 MG tablet, TAKE ONE TABLET BY MOUTH ONCE DAILY, Disp: 90 tablet, Rfl: 3     triamcinolone (KENALOG) 0.1 % cream, Apply sparingly to affected area three times daily as needed, Disp: 80 g, Rfl: 1    Allergies -   Allergies   Allergen Reactions     Advil [Ibuprofen] Swelling     Hands swelled     Cephalexin Itching       Social History -   Social History     Socioeconomic History     Marital status:      Spouse name: Not on file     Number of children: 2     Years of education: 12     Highest education level: Not on file   Occupational History     Employer: [employer]   Social Needs     Financial resource strain: Not on file     Food insecurity     Worry: Not on file     Inability: Not on file     Transportation needs     Medical: Not on file     Non-medical: Not on file   Tobacco Use     Smoking status: Never Smoker     Smokeless tobacco: Never Used   Substance and Sexual Activity     Alcohol use: Yes     Comment: occ     Drug use: No     Comment: occasional/ rare     Sexual activity: Not Currently     Partners: Male     Birth control/protection: Surgical   Lifestyle     Physical activity     Days per week: Not on file     Minutes per session: Not on file     Stress: Not on file   Relationships     Social connections     Talks on phone: Not on file     Gets together: Not on file     Attends Latter-day service: Not on file     Active member of club or organization: Not on file     Attends meetings of clubs or organizations: Not on file     Relationship status: Not on file     Intimate partner violence     Fear of current or ex partner: Not on file     Emotionally abused: Not on file     Physically  abused: Not on file     Forced sexual activity: Not on file   Other Topics Concern     Parent/sibling w/ CABG, MI or angioplasty before 65F 55M? No      Service No     Blood Transfusions Yes     Comment: had 3 units 10/23/2009     Caffeine Concern No     Occupational Exposure No     Hobby Hazards No     Sleep Concern Not Asked     Comment: sleeps about 5-6 hours a night     Stress Concern Not Asked     Weight Concern Not Asked     Special Diet No     Back Care Not Asked     Exercise Yes     Comment: walk     Bike Helmet Not Asked     Comment: doesn't ride a bike     Seat Belt Yes     Self-Exams No     Comment: info given on SBE   Social History Narrative     Not on file       Family History -   Family History   Problem Relation Age of Onset     Cerebrovascular Disease Mother 50     Cerebrovascular Disease Father 49         49     Hypertension Father      Cancer No family hx of      Diabetes No family hx of      Thyroid Disease No family hx of      Glaucoma No family hx of      Macular Degeneration No family hx of        Review of Systems - As per HPI and PMHx, otherwise 10+ comprehensive system review is negative.    Physical Exam  /84   Pulse 70   LMP 2009   SpO2 97%   General - The patient is in no distress.  Alert and oriented x3, answers questions and cooperates with examination appropriately.   Voice and Breathing - The patient was breathing comfortably without the use of accessory muscles. There was no wheezing, stridor, or stertor.  The patients voice was clear and strong.  Eyes - Extraocular movements intact. Sclera were not icteric or injected, conjunctiva were pink and moist.  Neurologic - Cranial nerves II-XII are grossly intact. Specifically, the facial nerve is intact, House-Brackmann grade 1 of 6.   Ears - The auricles appeared normal. The external auditory canals were nonedematous and nonerythematous. The tympanic membranes are normal in appearance, bony landmarks are  intact.  No retraction, perforation, or masses.  No fluid or purulence was seen in the external canal or the middle ear.   Nose - No significant external deformity.  Nasal mucosa is pink and moist with no abnormal mucus.  The septum was midline, turbinates are of normal size and position.  No polyps, masses, or purulence.  Mouth - Examination of the oral cavity showed pink, healthy oral mucosa. No lesions or ulcerations noted.  The tongue was mobile and protrudes midline, no lesions.  Oropharynx - The walls of the oropharynx were smooth, symmetric, and had no lesions or ulcerations.  The tonsils were without masses or ulcerations. The uvula was midline and the palate raised symmetrically.   Neck - Palpation of the left suboccipital neck reveals a subcentimeter lymph node. It is mobile, and oval. No tenderness. No overlying skin changes. No fluctuance. The other occipital, submental, submandibular, internal jugular chain, and supraclavicular chains did not demonstrate any abnormal lymph nodes or masses. No parotid masses. Palpation of the thyroid was soft and smooth, with no nodules or goiter appreciated.  The trachea was midline.  Skin - close examination of the scalp and skin on the side of the mass reveals no suspicious lesions or skin cancers. No induration on palpation.      A/P - Rhonda Cordero is a 49 year old female with a left suboccipital lymph node.  At this point this does not seem to be enlarged.  It is a subcentimeter lymph node.  I did not appreciate any other lymphadenopathy.  She has some tenderness on that side that extends from her suboccipital area to her lower neck and shoulder.  I think the tenderness she is experiencing is actually muscle skeletal she is having some noticed this lymph node.  She can keep an eye on it if it enlarges she can return.  I would then consider CT scan of the neck.  Otherwise she can try chiropractor, massage, hot packs for her neck.    Ernst Rosas,  MD  Otolaryngology  [facility]

## 2020-07-15 NOTE — LETTER
7/15/2020         RE: Rhonda Cordero  [address]  [facility] [address]        Dear Colleague,    [facility] you [facility] Please see a copy of my visit note below.    I am seeing this patient in consultation for enlarged lymph node at the request of the provider Dr. Vasquez Denson.    Chief Complaint - Neck mass    History of Present Illness - Rhonda Cordero is a 49 year old female with a left posterior neck mass. It has been present for 1 month. The patient notes it is tender, worse with sleeping. No changes in size. The patient denies any facial numbness, weakness, history of skin cancer or any cancer. No obvious signs of infection including fluctuating size, redness, or purulent drainage. Treatments have included antibiotics, and it didn't help (z-pac). The patient doesn't smoke, or has a smoking history.     Past Medical History -   Patient Active Problem List   Diagnosis     CARDIOVASCULAR SCREENING; LDL GOAL LESS THAN 130     Impingement syndrome, shoulder     Rotator cuff tendonitis     Hashimoto's thyroiditis     Nontoxic multinodular goiter     Shoulder joint pain     Gastroesophageal reflux disease, esophagitis presence not specified     Cervical adenopathy     Hypertension goal BP (blood pressure) < 140/90     Essential hypertension with goal blood pressure less than 140/90     Anup's deformity of left heel     History of thyroidectomy     Postoperative hypothyroidism     Hypocalcemia       Current Medications -   Current Outpatient Medications:      amLODIPine (NORVASC) 5 MG tablet, TAKE ONE TABLET BY MOUTH ONCE DAILY, Disp: 90 tablet, Rfl: 0     azithromycin (ZITHROMAX) 250 MG tablet, 2 po daily x one day then 1 po daily x 4 days, Disp: 6 tablet, Rfl: 1     calcitRIOL (ROCALTROL) 0.25 MCG capsule, Take 1 capsule (0.25 mcg) by mouth daily, Disp: 30 capsule, Rfl: 11     calcium carbonate antacid 1000 MG CHEW,  Take 2 tablets by mouth 3 times daily, Disp: 60 tablet, Rfl: 3     clobetasol (TEMOVATE) 0.05 % cream, Apply sparingly to affected area twice daily for two weeks, then use twice a week, Disp: 60 g, Rfl: 2     ketoconazole (NIZORAL) 2 % external cream, Apply topically daily, Disp: 60 g, Rfl: 1     levothyroxine (SYNTHROID/LEVOTHROID) 100 MCG tablet, Take 1 tablet (100 mcg) by mouth daily, Disp: 90 tablet, Rfl: 3     levothyroxine (SYNTHROID/LEVOTHROID) 112 MCG tablet, Take 1 tablet (112 mcg) by mouth daily, Disp: 30 tablet, Rfl: 11     lisinopril (ZESTRIL) 10 MG tablet, TAKE ONE TABLET BY MOUTH ONCE DAILY, Disp: 90 tablet, Rfl: 3     triamcinolone (KENALOG) 0.1 % cream, Apply sparingly to affected area three times daily as needed, Disp: 80 g, Rfl: 1    Allergies -   Allergies   Allergen Reactions     Advil [Ibuprofen] Swelling     Hands swelled     Cephalexin Itching       Social History -   Social History     Socioeconomic History     Marital status:      Spouse name: Not on file     Number of children: 2     Years of education: 12     Highest education level: Not on file   Occupational History     Employer: [employer]   Social Needs     Financial resource strain: Not on file     Food insecurity     Worry: Not on file     Inability: Not on file     Transportation needs     Medical: Not on file     Non-medical: Not on file   Tobacco Use     Smoking status: Never Smoker     Smokeless tobacco: Never Used   Substance and Sexual Activity     Alcohol use: Yes     Comment: occ     Drug use: No     Comment: occasional/ rare     Sexual activity: Not Currently     Partners: Male     Birth control/protection: Surgical   Lifestyle     Physical activity     Days per week: Not on file     Minutes per session: Not on file     Stress: Not on file   Relationships     Social connections     Talks on phone: Not on file     Gets together: Not on file     Attends Voodoo service: Not on file     Active member of club or  organization: Not on file     Attends meetings of clubs or organizations: Not on file     Relationship status: Not on file     Intimate partner violence     Fear of current or ex partner: Not on file     Emotionally abused: Not on file     Physically abused: Not on file     Forced sexual activity: Not on file   Other Topics Concern     Parent/sibling w/ CABG, MI or angioplasty before 65F 55M? No      Service No     Blood Transfusions Yes     Comment: had 3 units 10/23/2009     Caffeine Concern No     Occupational Exposure No     Hobby Hazards No     Sleep Concern Not Asked     Comment: sleeps about 5-6 hours a night     Stress Concern Not Asked     Weight Concern Not Asked     Special Diet No     Back Care Not Asked     Exercise Yes     Comment: walk     Bike Helmet Not Asked     Comment: doesn't ride a bike     Seat Belt Yes     Self-Exams No     Comment: info given on SBE   Social History Narrative     Not on file       Family History -   Family History   Problem Relation Age of Onset     Cerebrovascular Disease Mother 50     Cerebrovascular Disease Father 49         49     Hypertension Father      Cancer No family hx of      Diabetes No family hx of      Thyroid Disease No family hx of      Glaucoma No family hx of      Macular Degeneration No family hx of        Review of Systems - As per HPI and PMHx, otherwise 10+ comprehensive system review is negative.    Physical Exam  /84   Pulse 70   LMP 2009   SpO2 97%   General - The patient is in no distress.  Alert and oriented x3, answers questions and cooperates with examination appropriately.   Voice and Breathing - The patient was breathing comfortably without the use of accessory muscles. There was no wheezing, stridor, or stertor.  The patients voice was clear and strong.  Eyes - Extraocular movements intact. Sclera were not icteric or injected, conjunctiva were pink and moist.  Neurologic - Cranial nerves II-XII are grossly intact.  Specifically, the facial nerve is intact, House-Brackmann grade 1 of 6.   Ears - The auricles appeared normal. The external auditory canals were nonedematous and nonerythematous. The tympanic membranes are normal in appearance, bony landmarks are intact.  No retraction, perforation, or masses.  No fluid or purulence was seen in the external canal or the middle ear.   Nose - No significant external deformity.  Nasal mucosa is pink and moist with no abnormal mucus.  The septum was midline, turbinates are of normal size and position.  No polyps, masses, or purulence.  Mouth - Examination of the oral cavity showed pink, healthy oral mucosa. No lesions or ulcerations noted.  The tongue was mobile and protrudes midline, no lesions.  Oropharynx - The walls of the oropharynx were smooth, symmetric, and had no lesions or ulcerations.  The tonsils were without masses or ulcerations. The uvula was midline and the palate raised symmetrically.   Neck - Palpation of the left suboccipital neck reveals a subcentimeter lymph node. It is mobile, and oval. No tenderness. No overlying skin changes. No fluctuance. The other occipital, submental, submandibular, internal jugular chain, and supraclavicular chains did not demonstrate any abnormal lymph nodes or masses. No parotid masses. Palpation of the thyroid was soft and smooth, with no nodules or goiter appreciated.  The trachea was midline.  Skin - close examination of the scalp and skin on the side of the mass reveals no suspicious lesions or skin cancers. No induration on palpation.      A/P - Rhonda Cordero is a 49 year old female with a left suboccipital lymph node.  At this point this does not seem to be enlarged.  It is a subcentimeter lymph node.  I did not appreciate any other lymphadenopathy.  She has some tenderness on that side that extends from her suboccipital area to her lower neck and shoulder.  I think the tenderness she is experiencing is actually muscle  skeletal she is having some noticed this lymph node.  She can keep an eye on it if it enlarges she can return.  I would then consider CT scan of the neck.  Otherwise she can try chiropractor, massage, hot packs for her neck.    Ernst Rosas MD  Otolaryngology  [facility]      Again, thank you for allowing me to participate in the care of your patient.        Sincerely,        Ernst Rosas MD

## 2020-07-29 ENCOUNTER — OFFICE VISIT (OUTPATIENT)
Dept: OBGYN | Facility: CLINIC | Age: 50
End: 2020-07-29
Payer: COMMERCIAL

## 2020-07-29 ENCOUNTER — OFFICE VISIT (OUTPATIENT)
Dept: OPTOMETRY | Facility: CLINIC | Age: 50
End: 2020-07-29
Payer: COMMERCIAL

## 2020-07-29 ENCOUNTER — ANCILLARY PROCEDURE (OUTPATIENT)
Dept: MAMMOGRAPHY | Facility: CLINIC | Age: 50
End: 2020-07-29
Attending: FAMILY MEDICINE
Payer: COMMERCIAL

## 2020-07-29 VITALS
SYSTOLIC BLOOD PRESSURE: 144 MMHG | BODY MASS INDEX: 34.45 KG/M2 | DIASTOLIC BLOOD PRESSURE: 88 MMHG | WEIGHT: 187.2 LBS | HEIGHT: 62 IN | OXYGEN SATURATION: 99 % | HEART RATE: 74 BPM

## 2020-07-29 DIAGNOSIS — Z01.419 ENCOUNTER FOR GYNECOLOGICAL EXAMINATION WITHOUT ABNORMAL FINDING: Primary | ICD-10-CM

## 2020-07-29 DIAGNOSIS — H52.223 REGULAR ASTIGMATISM OF BOTH EYES: ICD-10-CM

## 2020-07-29 DIAGNOSIS — Z12.31 VISIT FOR SCREENING MAMMOGRAM: ICD-10-CM

## 2020-07-29 DIAGNOSIS — Z01.00 ENCOUNTER FOR EXAMINATION OF EYES AND VISION WITHOUT ABNORMAL FINDINGS: Primary | ICD-10-CM

## 2020-07-29 DIAGNOSIS — H52.13 MYOPIA OF BOTH EYES: ICD-10-CM

## 2020-07-29 DIAGNOSIS — R73.03 PRE-DIABETES: ICD-10-CM

## 2020-07-29 PROCEDURE — 92014 COMPRE OPH EXAM EST PT 1/>: CPT | Performed by: OPTOMETRIST

## 2020-07-29 PROCEDURE — 92015 DETERMINE REFRACTIVE STATE: CPT | Performed by: OPTOMETRIST

## 2020-07-29 PROCEDURE — 99396 PREV VISIT EST AGE 40-64: CPT | Performed by: OBSTETRICS & GYNECOLOGY

## 2020-07-29 PROCEDURE — 77067 SCR MAMMO BI INCL CAD: CPT | Mod: TC

## 2020-07-29 PROCEDURE — 77063 BREAST TOMOSYNTHESIS BI: CPT | Mod: TC

## 2020-07-29 ASSESSMENT — MIFFLIN-ST. JEOR: SCORE: 1428.97

## 2020-07-29 ASSESSMENT — REFRACTION_MANIFEST
OD_CYLINDER: +1.00
OD_ADD: +2.00
OS_SPHERE: -0.25
OD_SPHERE: -0.25
OS_AXIS: 180
OD_AXIS: 167
OS_CYLINDER: +0.50
OS_ADD: +2.00

## 2020-07-29 ASSESSMENT — REFRACTION_WEARINGRX
OS_AXIS: 180
OD_ADD: +2.00
OD_AXIS: 170
OD_SPHERE: -0.50
OS_CYLINDER: +0.50
OS_ADD: +2.00
OS_SPHERE: -0.75
OD_CYLINDER: +1.00

## 2020-07-29 ASSESSMENT — VISUAL ACUITY
OS_SC: 20/120
METHOD: SNELLEN - LINEAR
OS_SC: 20/20
OD_SC: 20/30
OD_SC: 20/120

## 2020-07-29 ASSESSMENT — EXTERNAL EXAM - LEFT EYE: OS_EXAM: NORMAL

## 2020-07-29 ASSESSMENT — SLIT LAMP EXAM - LIDS
COMMENTS: NORMAL
COMMENTS: NORMAL

## 2020-07-29 ASSESSMENT — TONOMETRY
OD_IOP_MMHG: 23
IOP_METHOD: APPLANATION
OS_IOP_MMHG: 21

## 2020-07-29 ASSESSMENT — CUP TO DISC RATIO
OD_RATIO: 0.35
OS_RATIO: 0.3

## 2020-07-29 ASSESSMENT — CONF VISUAL FIELD
OD_NORMAL: 1
OS_NORMAL: 1

## 2020-07-29 ASSESSMENT — EXTERNAL EXAM - RIGHT EYE: OD_EXAM: NORMAL

## 2020-07-29 NOTE — PROGRESS NOTES
Rhonda Cordero is a 49 year old female , who presents for annual exam.   No unusual bleeding, no incontinence, or unusual discharge.  She has not had hot flashes.     Last cholesterol:   Recent Labs   Lab Test 19  0806 13  0852   CHOL 212* 186   HDL 54 46*   * 123   TRIG 78 84   CHOLHDLRATIO  --  4.1     Past Medical History:   Diagnosis Date     Anemia      Hypertension goal BP (blood pressure) < 130/80 2011     Hypocalcemia      Multinodular goiter      Postsurgical hypothyroidism      Rotator cuff tendonitis 2/10/2015       Past Surgical History:   Procedure Laterality Date     C TOTAL ABDOM HYSTERECTOMY  09    [facility]     LAPAROSCOPY,TUBAL CAUTERY       SALPINGO OOPHORECTOMY,R/L/LUIS      left     THYROIDECTOMY N/A 2017    Procedure: THYROIDECTOMY;  Total Thyroidectomy;  Surgeon: Skylar Costa MD;  Location: [facility] OR       OB History    Para Term  AB Living   2 2 0 0 0 2   SAB TAB Ectopic Multiple Live Births   0 0 0 0 0      # Outcome Date GA Lbr Roc/2nd Weight Sex Delivery Anes PTL Lv   2 Para            1 Para                Gyn History:  Gynecological History         Patient's last menstrual period was 2009.     no STD /no PID/no IUD      history of abnormal pap smear:  no  Last pap:   Lab Results   Component Value Date    PAP NIL 2014           Current Outpatient Medications   Medication Sig Dispense Refill     amLODIPine (NORVASC) 5 MG tablet TAKE ONE TABLET BY MOUTH ONCE DAILY 90 tablet 0     calcitRIOL (ROCALTROL) 0.25 MCG capsule Take 1 capsule (0.25 mcg) by mouth daily 30 capsule 11     calcium carbonate antacid 1000 MG CHEW Take 2 tablets by mouth 3 times daily 60 tablet 3     clobetasol (TEMOVATE) 0.05 % cream Apply sparingly to affected area twice daily for two weeks, then use twice a week 60 g 2     levothyroxine (SYNTHROID/LEVOTHROID) 100 MCG tablet Take 1 tablet (100 mcg) by mouth daily 90 tablet 3      levothyroxine (SYNTHROID/LEVOTHROID) 112 MCG tablet Take 1 tablet (112 mcg) by mouth daily 30 tablet 11     lisinopril (ZESTRIL) 10 MG tablet TAKE ONE TABLET BY MOUTH ONCE DAILY 90 tablet 3     azithromycin (ZITHROMAX) 250 MG tablet 2 po daily x one day then 1 po daily x 4 days (Patient not taking: Reported on 7/29/2020) 6 tablet 1     ketoconazole (NIZORAL) 2 % external cream Apply topically daily (Patient not taking: Reported on 7/29/2020) 60 g 1     triamcinolone (KENALOG) 0.1 % cream Apply sparingly to affected area three times daily as needed (Patient not taking: Reported on 7/29/2020) 80 g 1       Allergies   Allergen Reactions     Advil [Ibuprofen] Swelling     Hands swelled     Cephalexin Itching       Social History     Socioeconomic History     Marital status:      Spouse name: Not on file     Number of children: 2     Years of education: 12     Highest education level: Not on file   Occupational History     Employer: [employer]   Social Needs     Financial resource strain: Not on file     Food insecurity     Worry: Not on file     Inability: Not on file     Transportation needs     Medical: Not on file     Non-medical: Not on file   Tobacco Use     Smoking status: Never Smoker     Smokeless tobacco: Never Used   Substance and Sexual Activity     Alcohol use: Yes     Comment: occ     Drug use: No     Comment: occasional/ rare     Sexual activity: Not Currently     Partners: Male     Birth control/protection: Surgical   Lifestyle     Physical activity     Days per week: Not on file     Minutes per session: Not on file     Stress: Not on file   Relationships     Social connections     Talks on phone: Not on file     Gets together: Not on file     Attends Protestant service: Not on file     Active member of club or organization: Not on file     Attends meetings of clubs or organizations: Not on file     Relationship status: Not on file     Intimate partner violence     Fear of current or ex  "partner: Not on file     Emotionally abused: Not on file     Physically abused: Not on file     Forced sexual activity: Not on file   Other Topics Concern     Parent/sibling w/ CABG, MI or angioplasty before 65F 55M? No      Service No     Blood Transfusions Yes     Comment: had 3 units 10/23/2009     Caffeine Concern No     Occupational Exposure No     Hobby Hazards No     Sleep Concern Not Asked     Comment: sleeps about 5-6 hours a night     Stress Concern Not Asked     Weight Concern Not Asked     Special Diet No     Back Care Not Asked     Exercise Yes     Comment: walk     Bike Helmet Not Asked     Comment: doesn't ride a bike     Seat Belt Yes     Self-Exams No     Comment: info given on SBE   Social History Narrative     Not on file       Family History   Problem Relation Age of Onset     Cerebrovascular Disease Mother 50     Cerebrovascular Disease Father 49         49     Hypertension Father      Cancer No family hx of      Diabetes No family hx of      Thyroid Disease No family hx of      Glaucoma No family hx of      Macular Degeneration No family hx of          ROS:  All negative except as above.      EXAM:  BP (!) 144/88 (BP Location: Right arm, Cuff Size: Adult Regular)   Pulse 74   Ht 1.577 m (5' 2.1\")   Wt 84.9 kg (187 lb 3.2 oz)   LMP 2009   SpO2 99%   BMI 34.13 kg/m    General:  WNWD female, NAD  Alert  Oriented x 3  Gait:  Normal  Skin:  Normal skin turgor  Neurologic:  CN grossly intact, good sensation.    HEENT:  NC/AT, EOMI  Neck:  No masses palpated, symmetrical, carotids +2/4, no bruits heard  Heart:  RRR  Lungs:  Clear   Breasts:  Symmetrical, no dimpling noted, no masses palpated, no discharge expressed  Abdomen:  Non-tender, non-distended.  Vulva: No external lesions, normal hair distribution, no adenopathy  BUS:  Normal, no masses noted  Urethra:  No hypermobility noted  Urethral meatus:  No masses noted  Vagina: Moist, pink, no abnormal discharge, well rugated, " no lesions  Cervix: Absent  Uterus: Absent   Ovaries/Bimanual: No masses palpated, No tenderness elicited  Perianal:  No masses noted.    Extremities:  No clubbing, cyanosis, or edema      ASSESSMENT/PLAN   Annual examination   Pre-diabetes.  She has a future lab appointment for endocrine, so the Hgb A1C is ordered future and she may have it performed at that time.   Low fat diet, weight bearing exercises and self breast exams on a monthly basis have been recommended.  I have discussed with patient the risks, benefits, medications, treatment options and modalities.   I have instructed the patient to call or schedule a follow-up appointment if any problems.

## 2020-07-29 NOTE — LETTER
7/29/2020         RE: Rhonda Cordero  [address] [facility] [address]        Dear Colleague,    Thank you for referring your patient, Rhonda Cordero, to the [facility]. Please see a copy of my visit note below.    Chief Complaint   Patient presents with     Annual Eye Exam      Accompanied by self  Last Eye Exam: 1 year ago  Dilated Previously: Yes    What are you currently using to see?  Readers +1.00       Distance Vision Acuity: Satisfied with vision    Near Vision Acuity: satisfied with OTC readers    Eye Comfort: good  Do you use eye drops? 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       OBJECTIVE: See Ophthalmology exam    ASSESSMENT:    ICD-10-CM    1. Encounter for examination of eyes and vision without abnormal findings  Z01.00    2. Myopia of both eyes  H52.13    3. Regular astigmatism of both eyes  H52.223       PLAN:     Patient Instructions   Glasses prescription provided today; optional to fill.   Ok to continue with over the counter reading glasses as needed.     The effects of the dilating drops last for 4- 6 hours.  You will be more sensitive to light and vision will be blurry up close.  Mydriatic sunglasses were given if needed.    Return in 1-2 years for comprehensive eye exam, or sooner if needed.     Major Beasley OD  [facility]  [address] [place] [address]. [place]  [address]    [phone]           Again, thank you for allowing me to participate in the care of your patient.        Sincerely,        Major Beasley OD

## 2020-07-29 NOTE — PROGRESS NOTES
Chief Complaint   Patient presents with     Annual Eye Exam      Accompanied by self  Last Eye Exam: 1 year ago  Dilated Previously: Yes    What are you currently using to see?  Readers +1.00       Distance Vision Acuity: Satisfied with vision    Near Vision Acuity: satisfied with OTC readers    Eye Comfort: good  Do you use eye drops? 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       OBJECTIVE: See Ophthalmology exam    ASSESSMENT:    ICD-10-CM    1. Encounter for examination of eyes and vision without abnormal findings  Z01.00    2. Myopia of both eyes  H52.13    3. Regular astigmatism of both eyes  H52.223       PLAN:     Patient Instructions   Glasses prescription provided today; optional to fill.   Ok to continue with over the counter reading glasses as needed.     The effects of the dilating drops last for 4- 6 hours.  You will be more sensitive to light and vision will be blurry up close.  Mydriatic sunglasses were given if needed.    Return in 1-2 years for comprehensive eye exam, or sooner if needed.     Major Beasley, OD  [address] [place]  [address]    [phone]

## 2020-07-29 NOTE — PATIENT INSTRUCTIONS
Glasses prescription provided today; optional to fill.   Ok to continue with over the counter reading glasses as needed.     The effects of the dilating drops last for 4- 6 hours.  You will be more sensitive to light and vision will be blurry up close.  Mydriatic sunglasses were given if needed.    Return in 1-2 years for comprehensive eye exam, or sooner if needed.     Major Beasley, OD  [facility] [place]  [address] [place] [address]. [place]  [place]  [address]    [phone]

## 2020-08-19 DIAGNOSIS — E83.51 HYPOCALCEMIA: ICD-10-CM

## 2020-08-19 DIAGNOSIS — R73.03 PRE-DIABETES: ICD-10-CM

## 2020-08-19 LAB
CALCIUM SERPL-MCNC: 7.9 MG/DL (ref 8.5–10.1)
CREAT SERPL-MCNC: 0.52 MG/DL (ref 0.52–1.04)
GFR SERPL CREATININE-BSD FRML MDRD: >90 ML/MIN/{1.73_M2}
HBA1C MFR BLD: 6.6 % (ref 0–5.6)
PHOSPHATE SERPL-MCNC: 4.1 MG/DL (ref 2.5–4.5)

## 2020-08-19 PROCEDURE — 36415 COLL VENOUS BLD VENIPUNCTURE: CPT | Performed by: INTERNAL MEDICINE

## 2020-08-19 PROCEDURE — 83970 ASSAY OF PARATHORMONE: CPT | Performed by: INTERNAL MEDICINE

## 2020-08-19 PROCEDURE — 82565 ASSAY OF CREATININE: CPT | Performed by: INTERNAL MEDICINE

## 2020-08-19 PROCEDURE — 82310 ASSAY OF CALCIUM: CPT | Performed by: INTERNAL MEDICINE

## 2020-08-19 PROCEDURE — 84100 ASSAY OF PHOSPHORUS: CPT | Performed by: INTERNAL MEDICINE

## 2020-08-19 PROCEDURE — 83036 HEMOGLOBIN GLYCOSYLATED A1C: CPT | Performed by: INTERNAL MEDICINE

## 2020-08-20 DIAGNOSIS — E83.51 HYPOCALCEMIA: ICD-10-CM

## 2020-08-20 DIAGNOSIS — E89.0 POSTOPERATIVE HYPOTHYROIDISM: Primary | ICD-10-CM

## 2020-08-20 LAB — PTH-INTACT SERPL-MCNC: 17 PG/ML (ref 18–80)

## 2020-09-01 ENCOUNTER — TELEPHONE (OUTPATIENT)
Dept: FAMILY MEDICINE | Facility: CLINIC | Age: 50
End: 2020-09-01

## 2020-09-01 NOTE — TELEPHONE ENCOUNTER
"Patient's spouse calling, they both had positive tests yesterday from [facility].    Having fever, back back, loss of taste of smell.    Wonders what medicine they can take.   I discussed information below and what to watch for for need for ER visit.     verbalized understanding and agreement.    Annika Nice RN  [facility]          Discharge Instructions for COVID-19 Patients  You have--or may have--COVID-19. Please follow the instructions listed below.   If you have a weakened immune system, discuss with your doctor any other actions you need to take.  How can I protect others?  If you have symptoms (fever, cough, body aches or trouble breathing):    Stay home and away from others (self-isolate) until:  ? At least 10 days have passed since your symptoms started. And   ? You've had no fever--and no medicine that reduces fever--for 1 full day (24 hours). And   ? Your other symptoms have resolved (gotten better).  If you don't show symptoms, but testing showed that you have COVID-19:    Stay home and away from others (self-isolate) until at least 10 days have passed since the date of your first positive COVID-19 test.  During this time    Stay in your own room, even for meals. Use your own bathroom if you can.    Stay away from others in your home. No hugging, kissing or shaking hands. No visitors.    Don't go to work, school or anywhere else.    Clean \"high touch\" surfaces often (doorknobs, counters, handles). Use household cleaning spray or wipes. You'll find a full list of  on the EPA website: www.epa.gov/pesticide-registration/list-n-disinfectants-use-against-sars-cov-2.    Cover your mouth and nose with a mask or other face covering to avoid spreading germs.    Wash your hands and face often. Use soap and water.    Caregivers in these groups are at risk for severe illness due to COVID-19:  ? People 65 years and older  ? People who live in a nursing home or " long-term care facility  ? People with chronic disease (lung, heart, cancer, diabetes, kidney, liver, immunologic)  ? People who have a weakened immune system, including those who:    Are in cancer treatment    Take medicine that weakens the immune system, such as corticosteroids    Had a bone marrow or organ transplant    Have an immune deficiency    Have poorly controlled HIV or AIDS    Are obese (body mass index of 40 or higher)    Smoke regularly    Caregivers should wear gloves while washing dishes, handling laundry and cleaning bedrooms and bathrooms.    Use caution when washing and drying laundry: Don't shake dirty laundry and use the warmest water setting that you can.    For more tips on managing your health at home, go to www.cdc.gov/coronavirus/2019-ncov/downloads/10Things.pdf.  How can I take care of myself at home?  1. Get lots of rest. Drink extra fluids (unless a doctor has told you not to).  2. Take Tylenol (acetaminophen) for fever or pain. If you have liver or kidney problems, ask your family doctor if it's okay to take Tylenol.     Adults can take either:  ? 650 mg (two 325 mg pills) every 4 to 6 hours, or   ? 1,000 mg (two 500 mg pills) every 8 hours as needed.  ? Note: Don't take more than 3,000 mg in one day. Acetaminophen is found in many medicines (both prescribed and over-the-counter medicines). Read all labels to be sure you don't take too much.   For children, check the Tylenol bottle for the right dose. The dose is based on the child's age or weight.  3. If you have other health problems (like cancer, heart failure, an organ transplant or severe kidney disease): Call your specialty clinic if you don't feel better in the next 2 days.  4. Know when to call 911. Emergency warning signs include:  ? Trouble breathing or shortness of breath  ? Pain or pressure in the chest that doesn't go away  ? Feeling confused like you haven't felt before, or not being able to wake up  ? Bluish-colored lips  or face  5. Your doctor may have prescribed a blood thinner medicine. Follow their instructions.  Where can I get more information?    [facility] - About COVID-19: [email or web address]    CDC - What to Do If You're Sick: www.cdc.gov/coronavirus/2019-ncov/about/steps-when-sick.html    CDC - Ending Home Isolation: www.cdc.gov/coronavirus/2019-ncov/hcp/disposition-in-home-patients.html    [facility] - Caring for Someone: www.cdc.gov/coronavirus/2019-ncov/if-you-are-sick/care-for-someone.html    [facility] - Interim Guidance for Hospital Discharge to Home: www.health.[facility].[place]/diseases/coronavirus/hcp/hospdischarge.pdf    [facility] clinical trials (COVID-19 research studies): clinicalaffairs.[facility]-clinical-trials    Below are the COVID-19 hotlines at the [place] Department of [facility] ([facility]). Interpreters are available.  ? For health questions: Call 647-574-2810 or 1-971.338.9016 (7 a.m. to 7 p.m.)  ? For questions about schools and childcare: Call 332-512-1966 or 1-644.805.3291 (7 a.m. to 7 p.m.)    For informational purposes only. Not to replace the advice of your health care provider. Clinically reviewed by the Infection Prevention Team. Copyright   2020 [employer] [facility]. All rights reserved. vivit [id number] - REV 08/04/20.

## 2020-09-02 ENCOUNTER — TELEPHONE (OUTPATIENT)
Dept: FAMILY MEDICINE | Facility: CLINIC | Age: 50
End: 2020-09-02

## 2020-09-02 NOTE — TELEPHONE ENCOUNTER
patient called in requesting script for antibiotics  Tested positive for Covid 8/26    Do you have:     Fever >100.0 - NO  New cough yes coughing up mucous - light green  Shortness of breath - NO    Chills - Yes night and day    New loss of taste or smell YES  Generalized body aches YES  New persistent headache first couple days had headache but not now  New sore throat YES,  New rash - NO    Nausea, vomiting or diarrhea - NO       Within the past 3 weeks, have you been exposed to someone with a known positive COVID 19 test? Self and  tested positive Covid 19      Latrice Dial RN  [facility]

## 2020-09-23 ENCOUNTER — OFFICE VISIT (OUTPATIENT)
Dept: FAMILY MEDICINE | Facility: CLINIC | Age: 50
End: 2020-09-23
Payer: COMMERCIAL

## 2020-09-23 VITALS
WEIGHT: 186 LBS | DIASTOLIC BLOOD PRESSURE: 83 MMHG | HEART RATE: 76 BPM | SYSTOLIC BLOOD PRESSURE: 130 MMHG | TEMPERATURE: 98.3 F | BODY MASS INDEX: 33.91 KG/M2 | OXYGEN SATURATION: 100 %

## 2020-09-23 DIAGNOSIS — R53.1 WEAKNESS: ICD-10-CM

## 2020-09-23 DIAGNOSIS — L30.9 DERMATITIS: ICD-10-CM

## 2020-09-23 DIAGNOSIS — Z86.16 HISTORY OF 2019 NOVEL CORONAVIRUS DISEASE (COVID-19): Primary | ICD-10-CM

## 2020-09-23 PROCEDURE — 99214 OFFICE O/P EST MOD 30 MIN: CPT | Performed by: FAMILY MEDICINE

## 2020-09-23 NOTE — LETTER
[address] [address] [phone]                    To whom it may concern:    Rhonda Cordero (  [birth year] ) was seen  Here in clinic today. She was recently hospitalized with Covid Sept 4-7.  She is still quite weak so needs another two weeks off work.  We will re-evaluate at that time.            Sincerely,                    Vasquez Denson MD

## 2020-09-23 NOTE — PATIENT INSTRUCTIONS
Stay off work     See us in two weeks    Re-evaluate at that time    Hydrocortisone cream to affected skin area, call if not improving in a week

## 2020-09-23 NOTE — PROGRESS NOTES
Subjective     Rhonda Cordero is a 49 year old female who presents to clinic today for the following health issues:    HPI         Hospital Follow-up Visit:    Hospital/Nursing Home/IP Rehab Facility: [facility]  Date of Admission: 09/04/2020  Date of Discharge: 09/07/2020  Reason(s) for Admission: pneumonia and covid-19      Was your hospitalization related to COVID-19? YES   How are you feeling today? Better  In the past 24 hours have you had shortness of breath when speaking, walking, or climbing stairs? I don't have breathing problems  Do you have a cough? I don't have a cough  When is the last time you had a fever greater than 100? The last day in the hospital  Are you having any other symptoms? Body aches   Do you have any other stressors you would like to discuss with your provider? Job Concerns       Was the patient in the ICU or did the patient experience delirium during hospitalization?  No     Problems taking medications regularly:  None  Medication changes since discharge: None  Problems adhering to non-medication therapy:  None    Summary of hospitalization:  CareEverywhere information obtained and reviewed  Diagnostic Tests/Treatments reviewed.  Follow up needed: none  Other Healthcare Providers Involved in Patient s Care:         None  Update since discharge: improved.  Post Discharge Medication Reconciliation: discharge medications reconciled, continue medications without change.  Plan of care communicated with patient                Review of Systems   Finished dexamethasone    Weak all  Over    Staying same    Not back to work  [  Some  Days a   Little better than others    Eating and drinking okay    Bowels and bladder  Okay          Was on oxygeen initially but not needing now  [    Objective    /83 (BP Location: Right arm, Patient Position: Chair, Cuff Size: Adult Regular)   Pulse 76   Temp 98.3  F (36.8  C) (Oral)   Wt 84.4 kg (186 lb)   LMP 11/23/2009   SpO2 100%    Breastfeeding No   BMI 33.91 kg/m    Body mass index is 33.91 kg/m .  Physical Exam  Constitutional:       Appearance: She is well-developed.   HENT:      Head: Normocephalic and atraumatic.   Eyes:      Conjunctiva/sclera: Conjunctivae normal.   Neck:      Vascular: No carotid bruit.   Cardiovascular:      Rate and Rhythm: Normal rate and regular rhythm.      Heart sounds: Normal heart sounds.   Pulmonary:      Effort: Pulmonary effort is normal. No respiratory distress.      Breath sounds: Normal breath sounds.   Abdominal:      Palpations: Abdomen is soft.      Tenderness: There is no abdominal tenderness.   Neurological:      Mental Status: She is alert and oriented to person, place, and time.         strength in hands and arms and legs are okay     nonspecific  Dermatitis on abdomen    ASSESSMENT / PLAN:  (Z86.19) History of 2019 novel coronavirus disease (COVID-19)  (primary encounter diagnosis)  Comment: patient slowly recovering.  Not ready to return to her  Active job yet.   Plan: see letter.  Return to clinic in 2 weeks.      (R53.1) Weakness  Comment:as above  Plan: if patient needs paperwork done for work she will drop that off.  Try to increase walking/ activity at home     (L30.9) Dermatitis  Comment: patient will try hydrocortisone cream that she has at home  Plan: follow up prn symptoms       I reviewed the patient's medications, allergies, medical history, family history, and social history.    Vasquez Denson MD

## 2020-10-07 ENCOUNTER — OFFICE VISIT (OUTPATIENT)
Dept: FAMILY MEDICINE | Facility: CLINIC | Age: 50
End: 2020-10-07
Payer: COMMERCIAL

## 2020-10-07 VITALS
BODY MASS INDEX: 33.91 KG/M2 | DIASTOLIC BLOOD PRESSURE: 91 MMHG | TEMPERATURE: 98.2 F | WEIGHT: 186 LBS | SYSTOLIC BLOOD PRESSURE: 136 MMHG | HEART RATE: 78 BPM

## 2020-10-07 DIAGNOSIS — R51.9 NONINTRACTABLE HEADACHE, UNSPECIFIED CHRONICITY PATTERN, UNSPECIFIED HEADACHE TYPE: ICD-10-CM

## 2020-10-07 DIAGNOSIS — Z86.16 HISTORY OF 2019 NOVEL CORONAVIRUS DISEASE (COVID-19): Primary | ICD-10-CM

## 2020-10-07 PROCEDURE — 99213 OFFICE O/P EST LOW 20 MIN: CPT | Performed by: FAMILY MEDICINE

## 2020-10-07 ASSESSMENT — PAIN SCALES - GENERAL: PAINLEVEL: NO PAIN (0)

## 2020-10-07 NOTE — PROGRESS NOTES
Subjective     Rhonda Cordero is a 49 year old female who presents to clinic today for the following health issues:    HPI         Follow up  Forms for work       Review of Systems    coconut  Water is main beverage    And  Regular water    No soda    Getting  Daily headache, mild  Tylenol as needed    Patient feels  Ready to go back  To work          Objective    BP (!) 136/91 (BP Location: Right arm, Patient Position: Chair, Cuff Size: Adult Regular)   Pulse 78   Temp 98.2  F (36.8  C) (Oral)   Wt 84.4 kg (186 lb)   LMP 11/23/2009   Breastfeeding No   BMI 33.91 kg/m    Body mass index is 33.91 kg/m .  Physical Exam  Constitutional:       Appearance: She is well-developed.   HENT:      Head: Normocephalic and atraumatic.   Eyes:      Conjunctiva/sclera: Conjunctivae normal.   Neck:      Vascular: No carotid bruit.   Cardiovascular:      Rate and Rhythm: Normal rate and regular rhythm.      Heart sounds: Normal heart sounds.   Pulmonary:      Effort: Pulmonary effort is normal. No respiratory distress.      Breath sounds: Normal breath sounds.   Neurological:      Mental Status: She is alert and oriented to person, place, and time.                         ASSESSMENT / PLAN:  (Z86.19) History of 2019 novel coronavirus disease (COVID-19)  (primary encounter diagnosis)  Comment: discussed in detail with patient. Feeling much better.  See forms. And letter.   Plan: let us know in a couple weeks if she wants new letter getting rid of the hours restriction.    (R51.9) Nonintractable headache, unspecified chronicity pattern, unspecified headache type  Comment: advised avoiding tylenol as may be rebound headache    Plan: as above.  Follow up prn.     Stay well hydrated      I reviewed the patient's medications, allergies, medical history, family history, and social history.    Vasquez Denson MD

## 2020-10-07 NOTE — LETTER
[address] [address] [phone]                2020    To whom it may concern:       Anttinefren Cordero (  [birth year] )was seen in clinic in follow up today.  She is recovered and feeling well. Okay to return to work,normal duties.   Advise 25  Hours per week initially.  Re-evaluate in two weeks.            Sincerely,                    Vasquez Denson MD

## 2020-11-29 DIAGNOSIS — E89.0 POSTOPERATIVE HYPOTHYROIDISM: ICD-10-CM

## 2020-12-01 NOTE — TELEPHONE ENCOUNTER
Last Written Prescription Date:  11/8/19  Last Fill Quantity: 30 tablets,   # refills: 11  Last Office Visit: 11/8/19  Future Office visit:    Next 5 appointments (look out 90 days)    Dec 11, 2020  2:40 PM  SHORT with Vasquez Denson MD  [facility] ([place] [id number] [place] [address]  [phone]           Routing refill request to provider for review/approval because:  No office visit on file in past 12 months or scheduled for next 30 days.    Je Armstrong RN....12/1/2020 12:26 PM

## 2020-12-02 RX ORDER — LEVOTHYROXINE SODIUM 112 UG/1
TABLET ORAL
Qty: 30 TABLET | Refills: 11 | Status: SHIPPED | OUTPATIENT
Start: 2020-12-02 | End: 2020-12-13

## 2020-12-11 ENCOUNTER — OFFICE VISIT (OUTPATIENT)
Dept: FAMILY MEDICINE | Facility: CLINIC | Age: 50
End: 2020-12-11
Payer: COMMERCIAL

## 2020-12-11 VITALS
WEIGHT: 188.31 LBS | TEMPERATURE: 98.2 F | BODY MASS INDEX: 34.33 KG/M2 | DIASTOLIC BLOOD PRESSURE: 84 MMHG | SYSTOLIC BLOOD PRESSURE: 132 MMHG | HEART RATE: 80 BPM

## 2020-12-11 DIAGNOSIS — R73.01 IMPAIRED FASTING GLUCOSE: ICD-10-CM

## 2020-12-11 DIAGNOSIS — E03.9 HYPOTHYROIDISM, UNSPECIFIED TYPE: ICD-10-CM

## 2020-12-11 DIAGNOSIS — L65.9 HAIR LOSS: Primary | ICD-10-CM

## 2020-12-11 DIAGNOSIS — Z12.11 SCREEN FOR COLON CANCER: ICD-10-CM

## 2020-12-11 DIAGNOSIS — R53.83 FATIGUE, UNSPECIFIED TYPE: ICD-10-CM

## 2020-12-11 DIAGNOSIS — Z86.16 HISTORY OF 2019 NOVEL CORONAVIRUS DISEASE (COVID-19): ICD-10-CM

## 2020-12-11 LAB
ALBUMIN SERPL-MCNC: 3.8 G/DL (ref 3.4–5)
ALP SERPL-CCNC: 102 U/L (ref 40–150)
ALT SERPL W P-5'-P-CCNC: 34 U/L (ref 0–50)
ANION GAP SERPL CALCULATED.3IONS-SCNC: 7 MMOL/L (ref 3–14)
AST SERPL W P-5'-P-CCNC: 17 U/L (ref 0–45)
BASOPHILS # BLD AUTO: 0 10E9/L (ref 0–0.2)
BASOPHILS NFR BLD AUTO: 0.2 %
BILIRUB SERPL-MCNC: 0.3 MG/DL (ref 0.2–1.3)
BUN SERPL-MCNC: 16 MG/DL (ref 7–30)
CALCIUM SERPL-MCNC: 7.9 MG/DL (ref 8.5–10.1)
CHLORIDE SERPL-SCNC: 102 MMOL/L (ref 94–109)
CO2 SERPL-SCNC: 31 MMOL/L (ref 20–32)
CREAT SERPL-MCNC: 0.5 MG/DL (ref 0.52–1.04)
DIFFERENTIAL METHOD BLD: ABNORMAL
EOSINOPHIL # BLD AUTO: 0.2 10E9/L (ref 0–0.7)
EOSINOPHIL NFR BLD AUTO: 1.7 %
ERYTHROCYTE [DISTWIDTH] IN BLOOD BY AUTOMATED COUNT: 13.3 % (ref 10–15)
GFR SERPL CREATININE-BSD FRML MDRD: >90 ML/MIN/{1.73_M2}
GLUCOSE SERPL-MCNC: 156 MG/DL (ref 70–99)
HBA1C MFR BLD: 6.8 % (ref 0–5.6)
HCT VFR BLD AUTO: 41.3 % (ref 35–47)
HGB BLD-MCNC: 13 G/DL (ref 11.7–15.7)
LYMPHOCYTES # BLD AUTO: 5.8 10E9/L (ref 0.8–5.3)
LYMPHOCYTES NFR BLD AUTO: 42 %
MCH RBC QN AUTO: 26.4 PG (ref 26.5–33)
MCHC RBC AUTO-ENTMCNC: 31.5 G/DL (ref 31.5–36.5)
MCV RBC AUTO: 84 FL (ref 78–100)
MONOCYTES # BLD AUTO: 0.7 10E9/L (ref 0–1.3)
MONOCYTES NFR BLD AUTO: 5.1 %
NEUTROPHILS # BLD AUTO: 7 10E9/L (ref 1.6–8.3)
NEUTROPHILS NFR BLD AUTO: 51 %
PLATELET # BLD AUTO: 320 10E9/L (ref 150–450)
POTASSIUM SERPL-SCNC: 4.5 MMOL/L (ref 3.4–5.3)
PROT SERPL-MCNC: 7.6 G/DL (ref 6.8–8.8)
RBC # BLD AUTO: 4.93 10E12/L (ref 3.8–5.2)
SODIUM SERPL-SCNC: 140 MMOL/L (ref 133–144)
T4 FREE SERPL-MCNC: 1.55 NG/DL (ref 0.76–1.46)
TSH SERPL DL<=0.005 MIU/L-ACNC: 0.02 MU/L (ref 0.4–4)
WBC # BLD AUTO: 13.8 10E9/L (ref 4–11)

## 2020-12-11 PROCEDURE — 80050 GENERAL HEALTH PANEL: CPT | Performed by: FAMILY MEDICINE

## 2020-12-11 PROCEDURE — 84439 ASSAY OF FREE THYROXINE: CPT | Performed by: FAMILY MEDICINE

## 2020-12-11 PROCEDURE — 36415 COLL VENOUS BLD VENIPUNCTURE: CPT | Performed by: FAMILY MEDICINE

## 2020-12-11 PROCEDURE — 99214 OFFICE O/P EST MOD 30 MIN: CPT | Performed by: FAMILY MEDICINE

## 2020-12-11 PROCEDURE — 83036 HEMOGLOBIN GLYCOSYLATED A1C: CPT | Performed by: FAMILY MEDICINE

## 2020-12-11 ASSESSMENT — PAIN SCALES - GENERAL: PAINLEVEL: NO PAIN (0)

## 2020-12-11 NOTE — LETTER
December 15, 2020      Chengefren Cunninghamine  [address] [facility] [address]        Dear ,    We are writing to inform you of your test results.    I tried to call with these results.     We need to decrease the the dose of thyroid medication.  Stop your current thyroid pill.     We will send in a new dose for you to take, then see us in about 2 months to recheck.     Also the diabetes test ( hemoglobin a1c ) is a bit higher.  Still no need for diabetes medication.     Just keep working on healthy diet/exercise and weight loss as you are able.     Other labs are okay.         Resulted Orders   TSH   Result Value Ref Range    TSH 0.02 (L) 0.40 - 4.00 mU/L   T4 free   Result Value Ref Range    T4 Free 1.55 (H) 0.76 - 1.46 ng/dL   Comprehensive metabolic panel   Result Value Ref Range    Sodium 140 133 - 144 mmol/L    Potassium 4.5 3.4 - 5.3 mmol/L    Chloride 102 94 - 109 mmol/L    Carbon Dioxide 31 20 - 32 mmol/L    Anion Gap 7 3 - 14 mmol/L    Glucose 156 (H) 70 - 99 mg/dL    Urea Nitrogen 16 7 - 30 mg/dL    Creatinine 0.50 (L) 0.52 - 1.04 mg/dL    GFR Estimate >90 >60 mL/min/[1.73_m2]      Comment:      Non  GFR Calc  Starting 12/18/2018, serum creatinine based estimated GFR (eGFR) will be   calculated using the Chronic Kidney Disease Epidemiology Collaboration   (CKD-EPI) equation.      GFR Estimate If Black >90 >60 mL/min/[1.73_m2]      Comment:       GFR Calc  Starting 12/18/2018, serum creatinine based estimated GFR (eGFR) will be   calculated using the Chronic Kidney Disease Epidemiology Collaboration   (CKD-EPI) equation.      Calcium 7.9 (L) 8.5 - 10.1 mg/dL    Bilirubin Total 0.3 0.2 - 1.3 mg/dL    Albumin 3.8 3.4 - 5.0 g/dL    Protein Total 7.6 6.8 - 8.8 g/dL    Alkaline Phosphatase 102 40 - 150 U/L    ALT 34 0 - 50 U/L    AST 17 0 - 45 U/L   CBC with platelets differential   Result Value Ref Range    WBC 13.8 (H) 4.0 - 11.0 10e9/L    RBC Count 4.93  3.8 - 5.2 10e12/L    Hemoglobin 13.0 11.7 - 15.7 g/dL    Hematocrit 41.3 35.0 - 47.0 %    MCV 84 78 - 100 fl    MCH 26.4 (L) 26.5 - 33.0 pg    MCHC 31.5 31.5 - 36.5 g/dL    RDW 13.3 10.0 - 15.0 %    Platelet Count 320 150 - 450 10e9/L    % Neutrophils 51.0 %    % Lymphocytes 42.0 %    % Monocytes 5.1 %    % Eosinophils 1.7 %    % Basophils 0.2 %    Absolute Neutrophil 7.0 1.6 - 8.3 10e9/L    Absolute Lymphocytes 5.8 (H) 0.8 - 5.3 10e9/L    Absolute Monocytes 0.7 0.0 - 1.3 10e9/L    Absolute Eosinophils 0.2 0.0 - 0.7 10e9/L    Absolute Basophils 0.0 0.0 - 0.2 10e9/L    Diff Method Automated Method    Hemoglobin A1c   Result Value Ref Range    Hemoglobin A1C 6.8 (H) 0 - 5.6 %      Comment:      Normal <5.7% Prediabetes 5.7-6.4%  Diabetes 6.5% or higher - adopted from ADA   consensus guidelines.         If you have any questions or concerns, please call the clinic at the number listed above.       Sincerely,      Vasquez Denson MD/alejandra

## 2020-12-11 NOTE — PATIENT INSTRUCTIONS
We will send you lab results    Will adjust thyroid dose if needed     Call with problems/ problems

## 2020-12-11 NOTE — PROGRESS NOTES
Subjective     Rhonda Cordero is a 50 year old female who presents to clinic today for the following health issues:    HPI         Hair loss       Review of Systems   No residual symptoms from covid    Walking fine    Patient with bad hair loss two weeks ago  Started        Never had this before    Energy fine    Wt stable    Eating normal diet  [  Bowels and bladder fine    Stopped soda    Patient would like to lose weight     Well balanced diet          Objective    /84 (BP Location: Right arm, Patient Position: Chair, Cuff Size: Adult Regular)   Pulse 80   Temp 98.2  F (36.8  C) (Oral)   Wt 85.4 kg (188 lb 5 oz)   LMP 11/23/2009   Breastfeeding No   BMI 34.33 kg/m    Body mass index is 34.33 kg/m .  Physical Exam  Constitutional:       Appearance: She is well-developed.   HENT:      Head: Normocephalic and atraumatic.   Eyes:      Conjunctiva/sclera: Conjunctivae normal.   Neck:      Vascular: No carotid bruit.   Cardiovascular:      Rate and Rhythm: Normal rate and regular rhythm.      Heart sounds: Normal heart sounds.   Pulmonary:      Effort: Pulmonary effort is normal. No respiratory distress.      Breath sounds: Normal breath sounds.   Neurological:      Mental Status: She is alert and oriented to person, place, and time.         patient does have generalized thinning of hair on scalp; no patches of edinson alopecia      Results for orders placed or performed in visit on 12/11/20 (from the past 24 hour(s))   CBC with platelets differential   Result Value Ref Range    WBC 13.8 (H) 4.0 - 11.0 10e9/L    RBC Count 4.93 3.8 - 5.2 10e12/L    Hemoglobin 13.0 11.7 - 15.7 g/dL    Hematocrit 41.3 35.0 - 47.0 %    MCV 84 78 - 100 fl    MCH 26.4 (L) 26.5 - 33.0 pg    MCHC 31.5 31.5 - 36.5 g/dL    RDW 13.3 10.0 - 15.0 %    Platelet Count 320 150 - 450 10e9/L    % Neutrophils 51.0 %    % Lymphocytes 42.0 %    % Monocytes 5.1 %    % Eosinophils 1.7 %    % Basophils 0.2 %    Absolute Neutrophil 7.0 1.6 -  8.3 10e9/L    Absolute Lymphocytes 5.8 (H) 0.8 - 5.3 10e9/L    Absolute Monocytes 0.7 0.0 - 1.3 10e9/L    Absolute Eosinophils 0.2 0.0 - 0.7 10e9/L    Absolute Basophils 0.0 0.0 - 0.2 10e9/L    Diff Method Automated Method    Hemoglobin A1c   Result Value Ref Range    Hemoglobin A1C 6.8 (H) 0 - 5.6 %               she did bring in a baggie with some of her hair in it that came out with brushing        ASSESSMENT / PLAN:  (L65.9) Hair loss  (primary encounter diagnosis)  Comment: quite abrupt significant hair loss; otherwise feels fine  Plan: check labs etc     (E03.9) Hypothyroidism, unspecified type  Comment: check and adjust dose if needed   Plan: TSH, T4 free             (R53.83) Fatigue, unspecified type  Comment: check    Plan: Comprehensive metabolic panel, CBC with         platelets differential             (Z12.11) Screen for colon cancer  Comment: fit test; patient declined colonoscopy for now  Plan: Fecal colorectal cancer screen (FIT)             (R73.01) Impaired fasting glucose  Comment: check   Plan: Hemoglobin A1c             (Z86.19) History of 2019 novel coronavirus disease (COVID-19)  Comment: no residual known symptoms   Plan: monitor        I reviewed the patient's medications, allergies, medical history, family history, and social history.    Vasquez Denson MD

## 2020-12-13 ENCOUNTER — TELEPHONE (OUTPATIENT)
Dept: FAMILY MEDICINE | Facility: CLINIC | Age: 50
End: 2020-12-13

## 2020-12-13 DIAGNOSIS — E03.9 HYPOTHYROIDISM, UNSPECIFIED TYPE: Primary | ICD-10-CM

## 2020-12-13 RX ORDER — LEVOTHYROXINE SODIUM 75 UG/1
75 TABLET ORAL DAILY
Qty: 30 TABLET | Refills: 1 | Status: SHIPPED | OUTPATIENT
Start: 2020-12-13 | End: 2021-02-01

## 2020-12-13 NOTE — RESULT ENCOUNTER NOTE
I tried to call with these results.    We need to decrease the the dose of thyroid medication.  Stop your current thyroid pill.    We will send in a new dose for you to take, then see us in about 2 months to recheck.    Also the diabetes test ( hemoglobin a1c ) is a bit higher.  Still no need for diabetes medication.    Just keep working on healthy diet/exercise and weight loss as you are able.    Other labs are okay.    Vasquez Denson MD

## 2020-12-15 NOTE — TELEPHONE ENCOUNTER
Patient called me back today  Discussed in detail  Patient will start the lower dose of thyroid then see us in a couple months    Call or be seen sooner if needed     Vasquez Denson MD

## 2021-01-29 DIAGNOSIS — E83.51 HYPOCALCEMIA: ICD-10-CM

## 2021-01-29 DIAGNOSIS — E03.9 HYPOTHYROIDISM, UNSPECIFIED TYPE: ICD-10-CM

## 2021-02-01 RX ORDER — CALCITRIOL 0.25 UG/1
CAPSULE, LIQUID FILLED ORAL
Qty: 30 CAPSULE | Refills: 1 | Status: SHIPPED | OUTPATIENT
Start: 2021-02-01 | End: 2021-03-29

## 2021-02-01 RX ORDER — LEVOTHYROXINE SODIUM 75 UG/1
75 TABLET ORAL DAILY
Qty: 30 TABLET | Refills: 0 | Status: SHIPPED | OUTPATIENT
Start: 2021-02-01 | End: 2021-07-30

## 2021-02-01 NOTE — TELEPHONE ENCOUNTER
Routing refill request to provider for review/approval because:  Drug not on the [facility] refill protocol     Endocrine MA, patient is overdue for yearly follow up. Please call to schedule patient an apt. Thanks!    Je Armstrong RN....2/1/2021 1:31 PM

## 2021-02-01 NOTE — TELEPHONE ENCOUNTER
Called patient and left VM, informed that she is overdue for her follow up with Dr. Anne. Requested a call back to the clinic to make an appointment. Clinic number provided.     Jerica YOUNGBLOOD MA

## 2021-02-18 ENCOUNTER — OFFICE VISIT (OUTPATIENT)
Dept: FAMILY MEDICINE | Facility: CLINIC | Age: 51
End: 2021-02-18
Payer: COMMERCIAL

## 2021-02-18 VITALS
SYSTOLIC BLOOD PRESSURE: 149 MMHG | TEMPERATURE: 97.6 F | DIASTOLIC BLOOD PRESSURE: 89 MMHG | BODY MASS INDEX: 35.03 KG/M2 | OXYGEN SATURATION: 100 % | WEIGHT: 190.38 LBS | HEIGHT: 62 IN | HEART RATE: 75 BPM

## 2021-02-18 DIAGNOSIS — E03.9 HYPOTHYROIDISM, UNSPECIFIED TYPE: Primary | ICD-10-CM

## 2021-02-18 DIAGNOSIS — M25.50 MULTIPLE JOINT PAIN: ICD-10-CM

## 2021-02-18 DIAGNOSIS — R73.01 IMPAIRED FASTING GLUCOSE: ICD-10-CM

## 2021-02-18 DIAGNOSIS — M54.9 UPPER BACK PAIN: ICD-10-CM

## 2021-02-18 DIAGNOSIS — N62 LARGE BREASTS: ICD-10-CM

## 2021-02-18 DIAGNOSIS — I10 HYPERTENSION GOAL BP (BLOOD PRESSURE) < 140/90: ICD-10-CM

## 2021-02-18 LAB
B BURGDOR IGG+IGM SER QL: 0.08 (ref 0–0.89)
BASOPHILS # BLD AUTO: 0 10E9/L (ref 0–0.2)
BASOPHILS NFR BLD AUTO: 0.3 %
CRP SERPL-MCNC: 23.4 MG/L (ref 0–8)
DIFFERENTIAL METHOD BLD: ABNORMAL
EOSINOPHIL # BLD AUTO: 0.2 10E9/L (ref 0–0.7)
EOSINOPHIL NFR BLD AUTO: 1.5 %
ERYTHROCYTE [DISTWIDTH] IN BLOOD BY AUTOMATED COUNT: 14.6 % (ref 10–15)
ERYTHROCYTE [SEDIMENTATION RATE] IN BLOOD BY WESTERGREN METHOD: 25 MM/H (ref 0–30)
HBA1C MFR BLD: 7 % (ref 0–5.6)
HCT VFR BLD AUTO: 40.9 % (ref 35–47)
HGB BLD-MCNC: 12.9 G/DL (ref 11.7–15.7)
LYMPHOCYTES # BLD AUTO: 4.5 10E9/L (ref 0.8–5.3)
LYMPHOCYTES NFR BLD AUTO: 39.2 %
MCH RBC QN AUTO: 25.5 PG (ref 26.5–33)
MCHC RBC AUTO-ENTMCNC: 31.5 G/DL (ref 31.5–36.5)
MCV RBC AUTO: 81 FL (ref 78–100)
MONOCYTES # BLD AUTO: 0.5 10E9/L (ref 0–1.3)
MONOCYTES NFR BLD AUTO: 4.1 %
NEUTROPHILS # BLD AUTO: 6.3 10E9/L (ref 1.6–8.3)
NEUTROPHILS NFR BLD AUTO: 54.9 %
PLATELET # BLD AUTO: 300 10E9/L (ref 150–450)
RBC # BLD AUTO: 5.06 10E12/L (ref 3.8–5.2)
T4 FREE SERPL-MCNC: 0.86 NG/DL (ref 0.76–1.46)
TSH SERPL DL<=0.005 MIU/L-ACNC: 15.76 MU/L (ref 0.4–4)
WBC # BLD AUTO: 11.4 10E9/L (ref 4–11)

## 2021-02-18 PROCEDURE — 85025 COMPLETE CBC W/AUTO DIFF WBC: CPT | Performed by: FAMILY MEDICINE

## 2021-02-18 PROCEDURE — 86618 LYME DISEASE ANTIBODY: CPT | Performed by: FAMILY MEDICINE

## 2021-02-18 PROCEDURE — 86038 ANTINUCLEAR ANTIBODIES: CPT | Performed by: FAMILY MEDICINE

## 2021-02-18 PROCEDURE — 36415 COLL VENOUS BLD VENIPUNCTURE: CPT | Performed by: FAMILY MEDICINE

## 2021-02-18 PROCEDURE — 85652 RBC SED RATE AUTOMATED: CPT | Performed by: FAMILY MEDICINE

## 2021-02-18 PROCEDURE — 86140 C-REACTIVE PROTEIN: CPT | Performed by: FAMILY MEDICINE

## 2021-02-18 PROCEDURE — 83036 HEMOGLOBIN GLYCOSYLATED A1C: CPT | Performed by: FAMILY MEDICINE

## 2021-02-18 PROCEDURE — 86431 RHEUMATOID FACTOR QUANT: CPT | Performed by: FAMILY MEDICINE

## 2021-02-18 PROCEDURE — 99214 OFFICE O/P EST MOD 30 MIN: CPT | Performed by: FAMILY MEDICINE

## 2021-02-18 PROCEDURE — 84439 ASSAY OF FREE THYROXINE: CPT | Performed by: FAMILY MEDICINE

## 2021-02-18 PROCEDURE — 84443 ASSAY THYROID STIM HORMONE: CPT | Performed by: FAMILY MEDICINE

## 2021-02-18 RX ORDER — HYDROCHLOROTHIAZIDE 12.5 MG/1
12.5 TABLET ORAL DAILY
Qty: 30 TABLET | Refills: 1 | Status: SHIPPED | OUTPATIENT
Start: 2021-02-18 | End: 2021-04-16

## 2021-02-18 ASSESSMENT — MIFFLIN-ST. JEOR: SCORE: 1438.38

## 2021-02-18 NOTE — PATIENT INSTRUCTIONS
Stop amlodine    Start hydrochlorothiazide 12.5 mg once daily     Could see plastic surgeon for consult regarding possible breast reduction    We will send you lab results

## 2021-02-18 NOTE — PROGRESS NOTES
"         Darline Rogers is a 50 year old who presents for the following health issues    HPI       Multiple areas on body swelling and causing pain for the past few weeks  Knees 1st , right than left     Then feet         Review of Systems   Elbows also sore    Sleep on right side, feels pain left chest about 2 weeks ago    No exertional chest pain at all     No hand swelling or pain    Breathing okay    Eating and drinking normally    Better when wake up in am    Patient wondering about breast reduction because she is getting shoulder and upper back pain    On thyroid for a couple months    Hair loss resolved    More energy          Objective    BP (!) 149/89 (BP Location: Right arm, Patient Position: Chair, Cuff Size: Adult Regular)   Pulse 75   Temp 97.6  F (36.4  C) (Oral)   Ht 1.577 m (5' 2.1\")   Wt 86.4 kg (190 lb 6 oz)   LMP 11/23/2009   SpO2 100%   Breastfeeding No   BMI 34.71 kg/m    Body mass index is 34.71 kg/m .  Physical Exam  Constitutional:       Appearance: She is well-developed.   HENT:      Head: Normocephalic and atraumatic.   Eyes:      Conjunctiva/sclera: Conjunctivae normal.   Neck:      Vascular: No carotid bruit.   Cardiovascular:      Rate and Rhythm: Normal rate and regular rhythm.      Heart sounds: Normal heart sounds.   Pulmonary:      Effort: Pulmonary effort is normal. No respiratory distress.      Breath sounds: Normal breath sounds.   Neurological:      Mental Status: She is alert and oriented to person, place, and time.         patient has some slight tenderness and swelling over both 5th toes and distal lateral portion of foot    No discoloration    No pitting edema on dorsum of foot or lower legs    No warmth    Knees nontender    Range of motion knees fair; patient thinks they feel a little stiff    No elbow tenderness, range of motion okay      ASSESSMENT / PLAN:  (E03.9) Hypothyroidism, unspecified type  (primary encounter diagnosis)  Comment: recheck labs.  Adjust " dose if needed  Plan: TSH, T4 free             (I10) Hypertension goal BP (blood pressure) < 140/90  Comment: with foot swelling/ pain, will discontinue amlodipine and start low dose hydrochlorothiazide instead  Plan: hydrochlorothiazide (HYDRODIURIL) 12.5 MG         tablet        See us in 1-2 months, sooner if needed     (M25.50) Multiple joint pain  Comment: check labs, rule out inflammatory condition  Plan: Lyme Disease Helen with reflex to WB Serum,         Erythrocyte sedimentation rate auto, Rheumatoid        factor, CBC with platelets differential, Anti         Nuclear Helen IgG by IFA with Reflex, CRP,         inflammation             (N62) Large breasts  Comment: patient inquired about this.  Prudent to see plastic surgeon.  She is having symptoms related to this condition.  Bra digs into her shoulders/ upper back causing pain   Plan: PLASTIC SURGERY REFERRAL             (M54.9) Upper back pain  Comment: as above   Plan: PLASTIC SURGERY REFERRAL         Also of note redid letter for her work    (R73.01) Impaired fasting glucose  Comment: recheck; has been in diabetes range but no meds needed   Plan: Hemoglobin A1c               I reviewed the patient's medications, allergies, medical history, family history, and social history.    Vasquez Denson MD

## 2021-02-18 NOTE — LETTER
February 23, 2021    Rhonda Cordero  [address] [facility] [address]          Dear ,    We are writing to inform you of your test results.  As we discussed by phone, I sent in a slightly higher dose of thyroid medication, 88 micrograms.  Stop the old one and start this once daily.  In about 2-3 months see us to recheck.     If the joint pains are not improving in the next 2-3 weeks let us know and we will have you see rheumatology.     Diabetes test ( hemoglobin a1c ) a bit higher.  Keep working on healthy diet/exercise and weight loss.       Resulted Orders   TSH   Result Value Ref Range    TSH 15.76 (H) 0.40 - 4.00 mU/L   T4 free   Result Value Ref Range    T4 Free 0.86 0.76 - 1.46 ng/dL   Hemoglobin A1c   Result Value Ref Range    Hemoglobin A1C 7.0 (H) 0 - 5.6 %      Comment:      Normal <5.7% Prediabetes 5.7-6.4%  Diabetes 6.5% or higher - adopted from ADA   consensus guidelines.     Lyme Disease Helen with reflex to WB Serum   Result Value Ref Range    Lyme Disease Antibodies Serum 0.08 0.00 - 0.89      Comment:      Negative, Absence of detectable Borrelia burdorferi antibodies. A negative   result does not exclude the possibility of Borrelia burgdorferi infection. If   early Lyme disease is suspected, a second sample should be collected and   tested 2 to 4 weeks later.     Erythrocyte sedimentation rate auto   Result Value Ref Range    Sed Rate 25 0 - 30 mm/h   Rheumatoid factor   Result Value Ref Range    Rheumatoid Factor <7 <12 IU/mL   CBC with platelets differential   Result Value Ref Range    WBC 11.4 (H) 4.0 - 11.0 10e9/L    RBC Count 5.06 3.8 - 5.2 10e12/L    Hemoglobin 12.9 11.7 - 15.7 g/dL    Hematocrit 40.9 35.0 - 47.0 %    MCV 81 78 - 100 fl    MCH 25.5 (L) 26.5 - 33.0 pg    MCHC 31.5 31.5 - 36.5 g/dL    RDW 14.6 10.0 - 15.0 %    Platelet Count 300 150 - 450 10e9/L    Diff Method Automated Method     % Neutrophils 54.9 %    % Lymphocytes 39.2 %    % Monocytes 4.1 %     % Eosinophils 1.5 %    % Basophils 0.3 %    Absolute Neutrophil 6.3 1.6 - 8.3 10e9/L    Absolute Lymphocytes 4.5 0.8 - 5.3 10e9/L    Absolute Monocytes 0.5 0.0 - 1.3 10e9/L    Absolute Eosinophils 0.2 0.0 - 0.7 10e9/L    Absolute Basophils 0.0 0.0 - 0.2 10e9/L   Anti Nuclear Helen IgG by IFA with Reflex   Result Value Ref Range    PETERSON interpretation Negative NEG^Negative      Comment:                                         Reference range:  <1:40  NEGATIVE  1:40 - 1:80  BORDERLINE POSITIVE  >1:80 POSITIVE     CRP, inflammation   Result Value Ref Range    CRP Inflammation 23.4 (H) 0.0 - 8.0 mg/L       If you have any questions or concerns, please call the clinic at the number listed above.       Sincerely,      Vasquez Denson MD

## 2021-02-18 NOTE — LETTER
[facility]  [address] [facility] [address]  [phone]           2021    To whom it may concern:    Rhonda Cordero dob [birth year] is a long term patient of mine.  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           Sincerely,                     Vasquez Denson MD

## 2021-02-18 NOTE — LETTER
February 23, 2021    Antrosa Cordero  [address] [facility] [address]          Dear ,    We are writing to inform you of your test results.  I tried to call with these results.  I will try to call again in the next couple days.     One thyroid test is a bit off.  You may be a little hypothyroid now.     One inflammation test ( C reactive protein ) is high, but the others are okay.     Diabetes test ( hemoglobin a1c ) is a bit higher.     Other labs are okay.     Talk to you soon.      Resulted Orders   TSH   Result Value Ref Range    TSH 15.76 (H) 0.40 - 4.00 mU/L   T4 free   Result Value Ref Range    T4 Free 0.86 0.76 - 1.46 ng/dL   Hemoglobin A1c   Result Value Ref Range    Hemoglobin A1C 7.0 (H) 0 - 5.6 %      Comment:      Normal <5.7% Prediabetes 5.7-6.4%  Diabetes 6.5% or higher - adopted from ADA   consensus guidelines.     Lyme Disease Helen with reflex to WB Serum   Result Value Ref Range    Lyme Disease Antibodies Serum 0.08 0.00 - 0.89      Comment:      Negative, Absence of detectable Borrelia burdorferi antibodies. A negative   result does not exclude the possibility of Borrelia burgdorferi infection. If   early Lyme disease is suspected, a second sample should be collected and   tested 2 to 4 weeks later.     Erythrocyte sedimentation rate auto   Result Value Ref Range    Sed Rate 25 0 - 30 mm/h   Rheumatoid factor   Result Value Ref Range    Rheumatoid Factor <7 <12 IU/mL   CBC with platelets differential   Result Value Ref Range    WBC 11.4 (H) 4.0 - 11.0 10e9/L    RBC Count 5.06 3.8 - 5.2 10e12/L    Hemoglobin 12.9 11.7 - 15.7 g/dL    Hematocrit 40.9 35.0 - 47.0 %    MCV 81 78 - 100 fl    MCH 25.5 (L) 26.5 - 33.0 pg    MCHC 31.5 31.5 - 36.5 g/dL    RDW 14.6 10.0 - 15.0 %    Platelet Count 300 150 - 450 10e9/L    Diff Method Automated Method     % Neutrophils 54.9 %    % Lymphocytes 39.2 %    % Monocytes 4.1 %    % Eosinophils 1.5 %    % Basophils 0.3 %    Absolute  Neutrophil 6.3 1.6 - 8.3 10e9/L    Absolute Lymphocytes 4.5 0.8 - 5.3 10e9/L    Absolute Monocytes 0.5 0.0 - 1.3 10e9/L    Absolute Eosinophils 0.2 0.0 - 0.7 10e9/L    Absolute Basophils 0.0 0.0 - 0.2 10e9/L   Anti Nuclear Helen IgG by IFA with Reflex   Result Value Ref Range    PETERSON interpretation Negative NEG^Negative      Comment:                                         Reference range:  <1:40  NEGATIVE  1:40 - 1:80  BORDERLINE POSITIVE  >1:80 POSITIVE     CRP, inflammation   Result Value Ref Range    CRP Inflammation 23.4 (H) 0.0 - 8.0 mg/L       If you have any questions or concerns, please call the clinic at the number listed above.       Sincerely,      Vasquez Denson MD

## 2021-02-19 LAB
ANA SER QL IF: NEGATIVE
RHEUMATOID FACT SER NEPH-ACNC: <7 IU/ML (ref 0–20)

## 2021-02-20 ENCOUNTER — TELEPHONE (OUTPATIENT)
Dept: FAMILY MEDICINE | Facility: CLINIC | Age: 51
End: 2021-02-20

## 2021-02-20 DIAGNOSIS — E03.9 HYPOTHYROIDISM, UNSPECIFIED TYPE: Primary | ICD-10-CM

## 2021-02-20 RX ORDER — LEVOTHYROXINE SODIUM 88 UG/1
88 TABLET ORAL DAILY
Qty: 90 TABLET | Refills: 0 | Status: SHIPPED | OUTPATIENT
Start: 2021-02-20 | End: 2021-05-18

## 2021-02-20 NOTE — RESULT ENCOUNTER NOTE
As we discussed by phone, I sent in a slightly higher dose of thyroid medication, 88 micrograms.  Stop the old one and start this once daily.  In about 2-3 months see us to recheck.    If the joint pains are not improving in the next 2-3 weeks let us know and we will have you see rheumatology.    Diabetes test ( hemoglobin a1c ) a bit higher.  Keep working on healthy diet/exercise and weight loss.    Vasquez Denson MD

## 2021-02-20 NOTE — TELEPHONE ENCOUNTER
Patient called back    Discussed in detail     Did slight increase in the thyroid dose, recheck in 2-3 months    She will monitor the joint pains; if not improving in next 2-3 weeks, let us know and we will have her see rheumatology.    Hemoglobin a1c a bit higher.  Keep working on healthy diet/exercise and wt loss.    Vasquez Denson MD

## 2021-02-20 NOTE — RESULT ENCOUNTER NOTE
I tried to call with these results.  I will try to call again in the next couple days.    One thyroid test is a bit off.  You may be a little hypothyroid now.    One inflammation test ( C reactive protein ) is high, but the others are okay.    Diabetes test ( hemoglobin a1c ) is a bit higher.    Other labs are okay.    Talk to you soon.    Vasquez Denson MD

## 2021-03-05 ENCOUNTER — TELEPHONE (OUTPATIENT)
Dept: FAMILY MEDICINE | Facility: CLINIC | Age: 51
End: 2021-03-05

## 2021-03-05 RX ORDER — AMLODIPINE BESYLATE 5 MG/1
TABLET ORAL
Qty: 90 TABLET | Refills: 0 | OUTPATIENT
Start: 2021-03-05

## 2021-03-05 NOTE — TELEPHONE ENCOUNTER
This was a refill for amlodipine but we recently stopped this med    Please inform patient and pharmacy    Vasquez Denson MD

## 2021-03-08 NOTE — TELEPHONE ENCOUNTER
Patient called back  She verified that she is no longer taking amlodipine and is on hydrochlorothiazide instead  She will contact the pharmacy to take the amlodipine off auto-refill    Ivory Hahn RN  [facility]

## 2021-03-08 NOTE — TELEPHONE ENCOUNTER
Left message on voicemail advising patient to return call at [phone].  Per 02/18/2021 OV notes, Amlodipine was stopped d/t foot swelling/pain. Patient was put on hydrochlorothiazide instead.    Veronica BROWNN, RN  [facility], [place]

## 2021-03-10 ENCOUNTER — IMMUNIZATION (OUTPATIENT)
Dept: NURSING | Facility: CLINIC | Age: 51
End: 2021-03-10
Payer: COMMERCIAL

## 2021-03-10 ENCOUNTER — OFFICE VISIT (OUTPATIENT)
Dept: FAMILY MEDICINE | Facility: CLINIC | Age: 51
End: 2021-03-10
Payer: COMMERCIAL

## 2021-03-10 VITALS
SYSTOLIC BLOOD PRESSURE: 137 MMHG | WEIGHT: 192 LBS | HEART RATE: 81 BPM | TEMPERATURE: 98.1 F | BODY MASS INDEX: 35 KG/M2 | OXYGEN SATURATION: 100 % | DIASTOLIC BLOOD PRESSURE: 88 MMHG

## 2021-03-10 DIAGNOSIS — M25.50 MULTIPLE JOINT PAIN: ICD-10-CM

## 2021-03-10 DIAGNOSIS — R79.82 ELEVATED C-REACTIVE PROTEIN (CRP): ICD-10-CM

## 2021-03-10 DIAGNOSIS — N64.4 BREAST PAIN, LEFT: Primary | ICD-10-CM

## 2021-03-10 PROCEDURE — 91300 PR COVID VAC PFIZER DIL RECON 30 MCG/0.3 ML IM: CPT

## 2021-03-10 PROCEDURE — 0001A PR COVID VAC PFIZER DIL RECON 30 MCG/0.3 ML IM: CPT

## 2021-03-10 PROCEDURE — 99213 OFFICE O/P EST LOW 20 MIN: CPT | Performed by: FAMILY MEDICINE

## 2021-03-10 ASSESSMENT — PAIN SCALES - GENERAL: PAINLEVEL: SEVERE PAIN (6)

## 2021-03-10 NOTE — PROGRESS NOTES
Darline Rogers is a 50 year old who presents for the following health issues    HPI       Breast Concern  Onset/Duration: 3 weeks  Description:   Location: Upper left breast  Pain or tenderness: YES  Redness:  no   Intensity: 6/10  Progression of Symptoms: same  Accompanying Signs & Symptoms:  Any lumps in axillary region:  no   Movable:  no   Nipple discharge: None  Changes in the skin or nipple: None  On Hormone therapy:  no   Does it change with menstrual cycle:  no   Previous history of similar problem: No  First degree relative with breast cancer: a negative family history for breast cancer.  Precipitating factors:           Worsened by: Bending down and laying on her side she can feel the pain  Alleviating factors:            Improved by: None  Therapies tried and outcome: Tried sleeping on her back  Patient's last menstrual period was 11/23/2009.     patient thinking about the breast reduction consult ( we had done referral  Previously )    Review of Systems   Both elbows,both hands on 4th and 5th digits hurt    Also both knees and 4th and 5th toes       Objective    LMP 11/23/2009   There is no height or weight on file to calculate BMI.  Physical Exam    Full physical not done     Mentation and affect are fine    No tremor of speech or extremity    Patient has left breast pain, no mass per patient  We kept patient covered during exam but through the fabric did not feel any focal mass though full breast exam not done here; main goal is to order imaging    For feet, mild swelling of the 4th and 5th digits of both feet    Reviewed recent labs          ASSESSMENT / PLAN:  (N64.4) Breast pain, left  (primary encounter diagnosis)  Comment: patient to call and schedule ultrasound and diag mammogram  Plan: MA Diagnostic Digital Bilateral, US Breast Left        Complete 4 Quadrants             (M25.50) Multiple joint pain  Comment: pain not improving so see rheum  Plan: Rheumatology Referral              (R78.82) Elevated C-reactive protein (CRP)  Comment: as above   Plan: Rheumatology Referral               I reviewed the patient's medications, allergies, medical history, family history, and social history.    Vasquez Denson MD

## 2021-03-10 NOTE — PATIENT INSTRUCTIONS
Schedule with rheumatology for joint pains    Call to schedule breast ultrasound and diagnostic mammogram    For mild diabetes, exercise and wt loss are key

## 2021-03-26 ENCOUNTER — ANCILLARY PROCEDURE (OUTPATIENT)
Dept: ULTRASOUND IMAGING | Facility: CLINIC | Age: 51
End: 2021-03-26
Attending: FAMILY MEDICINE
Payer: COMMERCIAL

## 2021-03-26 ENCOUNTER — ANCILLARY PROCEDURE (OUTPATIENT)
Dept: MAMMOGRAPHY | Facility: CLINIC | Age: 51
End: 2021-03-26
Attending: FAMILY MEDICINE
Payer: COMMERCIAL

## 2021-03-26 DIAGNOSIS — N64.4 BREAST PAIN, LEFT: ICD-10-CM

## 2021-03-26 PROCEDURE — G0279 TOMOSYNTHESIS, MAMMO: HCPCS

## 2021-03-26 PROCEDURE — 76642 ULTRASOUND BREAST LIMITED: CPT | Mod: LT

## 2021-03-26 PROCEDURE — 77065 DX MAMMO INCL CAD UNI: CPT

## 2021-03-28 DIAGNOSIS — E83.51 HYPOCALCEMIA: ICD-10-CM

## 2021-03-29 RX ORDER — CALCITRIOL 0.25 UG/1
CAPSULE, LIQUID FILLED ORAL
Qty: 30 CAPSULE | Refills: 1 | Status: SHIPPED | OUTPATIENT
Start: 2021-03-29 | End: 2021-05-24

## 2021-03-29 NOTE — TELEPHONE ENCOUNTER
Routing refill request to provider for review/approval because:  Drug not on the [facility] refill protocol     Patient is overdue for a follow up visit.  Last endocrine visit was over 1 year ago on 11/8/19.  Endocrine MA, please call to help patient schedule a follow up.    Je JEFFERSON RN....3/29/2021 9:37 AM

## 2021-03-31 ENCOUNTER — IMMUNIZATION (OUTPATIENT)
Dept: NURSING | Facility: CLINIC | Age: 51
End: 2021-03-31
Attending: FAMILY MEDICINE
Payer: COMMERCIAL

## 2021-03-31 PROCEDURE — 91300 PR COVID VAC PFIZER DIL RECON 30 MCG/0.3 ML IM: CPT

## 2021-03-31 PROCEDURE — 0002A PR COVID VAC PFIZER DIL RECON 30 MCG/0.3 ML IM: CPT

## 2021-04-14 DIAGNOSIS — I10 HYPERTENSION GOAL BP (BLOOD PRESSURE) < 140/90: ICD-10-CM

## 2021-04-16 RX ORDER — HYDROCHLOROTHIAZIDE 12.5 MG/1
12.5 TABLET ORAL DAILY
Qty: 30 TABLET | Refills: 1 | Status: SHIPPED | OUTPATIENT
Start: 2021-04-16 | End: 2021-06-12

## 2021-04-16 NOTE — TELEPHONE ENCOUNTER
Routing refill request to providers' pool (due to PCP being out of office) for review/approval because:  Labs not current:  Creatinine    Pending Prescriptions:                       Disp   Refills    hydrochlorothiazide (HYDRODIURIL) 12.5 MG *30 tab*1        Sig: Take 1 tablet (12.5 mg) by mouth daily      Moisés Mahoney RN

## 2021-05-17 DIAGNOSIS — E03.9 HYPOTHYROIDISM, UNSPECIFIED TYPE: ICD-10-CM

## 2021-05-17 DIAGNOSIS — I10 HYPERTENSION GOAL BP (BLOOD PRESSURE) < 140/90: ICD-10-CM

## 2021-05-18 RX ORDER — LISINOPRIL 10 MG/1
10 TABLET ORAL DAILY
Qty: 90 TABLET | Refills: 1 | Status: SHIPPED | OUTPATIENT
Start: 2021-05-18 | End: 2021-10-06

## 2021-05-18 RX ORDER — LEVOTHYROXINE SODIUM 88 UG/1
88 TABLET ORAL DAILY
Qty: 90 TABLET | Refills: 0 | Status: SHIPPED | OUTPATIENT
Start: 2021-05-18 | End: 2021-08-13

## 2021-05-18 NOTE — TELEPHONE ENCOUNTER
Routing refill request to provider for review/approval because:  Labs out of range:    TSH   Date Value Ref Range Status   02/18/2021 15.76 (H) 0.40 - 4.00 mU/L Final

## 2021-05-18 NOTE — TELEPHONE ENCOUNTER
Okay refill but advise clinic appointment in 2-3 months    Please inform patient    Vasquez Denson MD

## 2021-05-23 DIAGNOSIS — E83.51 HYPOCALCEMIA: ICD-10-CM

## 2021-05-24 RX ORDER — CALCITRIOL 0.25 UG/1
CAPSULE, LIQUID FILLED ORAL
Qty: 30 CAPSULE | Refills: 1 | Status: SHIPPED | OUTPATIENT
Start: 2021-05-24 | End: 2021-07-19

## 2021-05-24 NOTE — TELEPHONE ENCOUNTER
Patient's spouse called the clinic returning a call.  Unable to find documentation of the call out.    Patient received previous message and has scheduled an appointment  For 7/30/21.

## 2021-05-24 NOTE — TELEPHONE ENCOUNTER
Routing refill request to provider for review/approval because:  Drug not on the [facility] refill protocol     Last visit was on 11/8/19. Called patient to schedule f/u however there was no answer. Left a message to return call to [phone].    Je JEFFERSON RN....5/24/2021 1:10 PM

## 2021-06-11 DIAGNOSIS — I10 HYPERTENSION GOAL BP (BLOOD PRESSURE) < 140/90: ICD-10-CM

## 2021-06-12 RX ORDER — HYDROCHLOROTHIAZIDE 12.5 MG/1
12.5 TABLET ORAL DAILY
Qty: 30 TABLET | Refills: 0 | Status: SHIPPED | OUTPATIENT
Start: 2021-06-12 | End: 2021-07-13

## 2021-06-22 ENCOUNTER — TELEPHONE (OUTPATIENT)
Dept: FAMILY MEDICINE | Facility: CLINIC | Age: 51
End: 2021-06-22

## 2021-06-22 NOTE — TELEPHONE ENCOUNTER
Patient/ called me yesterday.  Need a letter done regarding work/ lifting etc.  Did letter, will send via email to   [email or web address]    I gave letter to SELENE Denson MD

## 2021-06-22 NOTE — LETTER
[facility]  [address]  [place] [address]  [phone]            2021    To whom it may concern:    Rhonda Cordero ese [birth year] is a long term patient of mine who has several medical issues. She needs to be on a 10 pound lifting restriction until further notice.            Sincerely,                    Vasquez Denson MD

## 2021-07-12 DIAGNOSIS — I10 HYPERTENSION GOAL BP (BLOOD PRESSURE) < 140/90: ICD-10-CM

## 2021-07-13 RX ORDER — HYDROCHLOROTHIAZIDE 12.5 MG/1
12.5 TABLET ORAL DAILY
Qty: 90 TABLET | Refills: 1 | Status: SHIPPED | OUTPATIENT
Start: 2021-07-13 | End: 2021-07-30

## 2021-07-13 NOTE — TELEPHONE ENCOUNTER
Routing refill request to provider for review/approval because:  Labs out of range:  creatinine    Creatinine   Date Value Ref Range Status   12/11/2020 0.50 (L) 0.52 - 1.04 mg/dL Final              Pending Prescriptions:                       Disp   Refills    hydrochlorothiazide (HYDRODIURIL) 12.5 MG *30 tab*0        Sig: Take 1 tablet (12.5 mg) by mouth daily        Moisés Mahoney RN

## 2021-07-18 DIAGNOSIS — E83.51 HYPOCALCEMIA: ICD-10-CM

## 2021-07-19 RX ORDER — CALCITRIOL 0.25 UG/1
CAPSULE, LIQUID FILLED ORAL
Qty: 30 CAPSULE | Refills: 1 | Status: SHIPPED | OUTPATIENT
Start: 2021-07-19 | End: 2021-09-14

## 2021-07-19 NOTE — TELEPHONE ENCOUNTER
Routing refill request to provider for review/approval because:  Drug not on the [facility] refill protocol     Je JEFFERSON RN....7/19/2021 4:00 PM

## 2021-07-30 ENCOUNTER — ANCILLARY PROCEDURE (OUTPATIENT)
Dept: MAMMOGRAPHY | Facility: CLINIC | Age: 51
End: 2021-07-30
Attending: OBSTETRICS & GYNECOLOGY
Payer: COMMERCIAL

## 2021-07-30 ENCOUNTER — OFFICE VISIT (OUTPATIENT)
Dept: OBGYN | Facility: CLINIC | Age: 51
End: 2021-07-30
Payer: COMMERCIAL

## 2021-07-30 ENCOUNTER — OFFICE VISIT (OUTPATIENT)
Dept: FAMILY MEDICINE | Facility: CLINIC | Age: 51
End: 2021-07-30
Payer: COMMERCIAL

## 2021-07-30 VITALS
TEMPERATURE: 97.7 F | BODY MASS INDEX: 34.69 KG/M2 | WEIGHT: 190.25 LBS | SYSTOLIC BLOOD PRESSURE: 162 MMHG | HEART RATE: 77 BPM | DIASTOLIC BLOOD PRESSURE: 92 MMHG

## 2021-07-30 VITALS
BODY MASS INDEX: 34.96 KG/M2 | HEIGHT: 62 IN | WEIGHT: 190 LBS | SYSTOLIC BLOOD PRESSURE: 170 MMHG | DIASTOLIC BLOOD PRESSURE: 105 MMHG

## 2021-07-30 DIAGNOSIS — Z00.01 ENCOUNTER FOR ROUTINE ADULT PHYSICAL EXAM WITH ABNORMAL FINDINGS: Primary | ICD-10-CM

## 2021-07-30 DIAGNOSIS — Z12.31 VISIT FOR SCREENING MAMMOGRAM: ICD-10-CM

## 2021-07-30 DIAGNOSIS — Z00.00 HEALTHCARE MAINTENANCE: ICD-10-CM

## 2021-07-30 DIAGNOSIS — I10 ESSENTIAL HYPERTENSION WITH GOAL BLOOD PRESSURE LESS THAN 140/90: Primary | ICD-10-CM

## 2021-07-30 DIAGNOSIS — E66.9 OBESITY, UNSPECIFIED CLASSIFICATION, UNSPECIFIED OBESITY TYPE, UNSPECIFIED WHETHER SERIOUS COMORBIDITY PRESENT: ICD-10-CM

## 2021-07-30 DIAGNOSIS — E78.5 HYPERLIPIDEMIA LDL GOAL <100: ICD-10-CM

## 2021-07-30 DIAGNOSIS — E03.9 HYPOTHYROIDISM, UNSPECIFIED TYPE: ICD-10-CM

## 2021-07-30 DIAGNOSIS — R73.01 IMPAIRED FASTING GLUCOSE: ICD-10-CM

## 2021-07-30 DIAGNOSIS — R53.83 FATIGUE, UNSPECIFIED TYPE: ICD-10-CM

## 2021-07-30 LAB
ALBUMIN SERPL-MCNC: 3.5 G/DL (ref 3.4–5)
ALP SERPL-CCNC: 98 U/L (ref 40–150)
ALT SERPL W P-5'-P-CCNC: 38 U/L (ref 0–50)
ANION GAP SERPL CALCULATED.3IONS-SCNC: 1 MMOL/L (ref 3–14)
AST SERPL W P-5'-P-CCNC: 20 U/L (ref 0–45)
BASOPHILS # BLD AUTO: 0 10E3/UL (ref 0–0.2)
BASOPHILS NFR BLD AUTO: 0 %
BILIRUB SERPL-MCNC: 0.5 MG/DL (ref 0.2–1.3)
BUN SERPL-MCNC: 14 MG/DL (ref 7–30)
CALCIUM SERPL-MCNC: 7.6 MG/DL (ref 8.5–10.1)
CHLORIDE BLD-SCNC: 103 MMOL/L (ref 94–109)
CHOLEST SERPL-MCNC: 210 MG/DL
CO2 SERPL-SCNC: 33 MMOL/L (ref 20–32)
CREAT SERPL-MCNC: 0.59 MG/DL (ref 0.52–1.04)
EOSINOPHIL # BLD AUTO: 0.3 10E3/UL (ref 0–0.7)
EOSINOPHIL NFR BLD AUTO: 2 %
ERYTHROCYTE [DISTWIDTH] IN BLOOD BY AUTOMATED COUNT: 14.8 % (ref 10–15)
FASTING STATUS PATIENT QL REPORTED: YES
GFR SERPL CREATININE-BSD FRML MDRD: >90 ML/MIN/1.73M2
GLUCOSE BLD-MCNC: 116 MG/DL (ref 70–99)
HBA1C MFR BLD: 6.7 % (ref 0–5.6)
HCT VFR BLD AUTO: 41.3 % (ref 35–47)
HDLC SERPL-MCNC: 55 MG/DL
HGB BLD-MCNC: 13.2 G/DL (ref 11.7–15.7)
LDLC SERPL CALC-MCNC: 136 MG/DL
LYMPHOCYTES # BLD AUTO: 4.9 10E3/UL (ref 0.8–5.3)
LYMPHOCYTES NFR BLD AUTO: 44 %
MCH RBC QN AUTO: 26.1 PG (ref 26.5–33)
MCHC RBC AUTO-ENTMCNC: 32 G/DL (ref 31.5–36.5)
MCV RBC AUTO: 82 FL (ref 78–100)
MONOCYTES # BLD AUTO: 0.6 10E3/UL (ref 0–1.3)
MONOCYTES NFR BLD AUTO: 5 %
NEUTROPHILS # BLD AUTO: 5.4 10E3/UL (ref 1.6–8.3)
NEUTROPHILS NFR BLD AUTO: 48 %
NONHDLC SERPL-MCNC: 155 MG/DL
PLATELET # BLD AUTO: 285 10E3/UL (ref 150–450)
POTASSIUM BLD-SCNC: 4.2 MMOL/L (ref 3.4–5.3)
PROT SERPL-MCNC: 8 G/DL (ref 6.8–8.8)
RBC # BLD AUTO: 5.05 10E6/UL (ref 3.8–5.2)
SODIUM SERPL-SCNC: 137 MMOL/L (ref 133–144)
T4 FREE SERPL-MCNC: 0.82 NG/DL (ref 0.76–1.46)
TRIGL SERPL-MCNC: 95 MG/DL
TSH SERPL DL<=0.005 MIU/L-ACNC: 21.3 MU/L (ref 0.4–4)
WBC # BLD AUTO: 11.1 10E3/UL (ref 4–11)

## 2021-07-30 PROCEDURE — 77067 SCR MAMMO BI INCL CAD: CPT | Mod: TC | Performed by: RADIOLOGY

## 2021-07-30 PROCEDURE — 80061 LIPID PANEL: CPT | Performed by: FAMILY MEDICINE

## 2021-07-30 PROCEDURE — 83036 HEMOGLOBIN GLYCOSYLATED A1C: CPT | Performed by: FAMILY MEDICINE

## 2021-07-30 PROCEDURE — 99396 PREV VISIT EST AGE 40-64: CPT | Performed by: OBSTETRICS & GYNECOLOGY

## 2021-07-30 PROCEDURE — 99214 OFFICE O/P EST MOD 30 MIN: CPT | Mod: 25 | Performed by: FAMILY MEDICINE

## 2021-07-30 PROCEDURE — 84439 ASSAY OF FREE THYROXINE: CPT | Performed by: FAMILY MEDICINE

## 2021-07-30 PROCEDURE — 77063 BREAST TOMOSYNTHESIS BI: CPT | Mod: TC | Performed by: RADIOLOGY

## 2021-07-30 PROCEDURE — 80050 GENERAL HEALTH PANEL: CPT | Performed by: FAMILY MEDICINE

## 2021-07-30 PROCEDURE — 36415 COLL VENOUS BLD VENIPUNCTURE: CPT | Performed by: FAMILY MEDICINE

## 2021-07-30 RX ORDER — HYDROCHLOROTHIAZIDE 25 MG/1
25 TABLET ORAL DAILY
Qty: 30 TABLET | Refills: 1 | Status: SHIPPED | OUTPATIENT
Start: 2021-07-30 | End: 2021-10-06

## 2021-07-30 ASSESSMENT — MIFFLIN-ST. JEOR: SCORE: 1436.67

## 2021-07-30 NOTE — LETTER
August 2, 2021      Antrosa Cordero  [address] [facility] [address]        Dear ,    We are writing to inform you of your test results.    One thyroid test ( TSH ) is off but the follow up test ( Free T4 ) is normal, so stay on same dose of thyroid medication.  Let's recheck in about 3 months.     Diabetes test ( hemoglobin a1c ) is a little better.  Keep working on healthy diet/exercise and weight loss.     Other labs are okay/ stable.         Resulted Orders   Hemoglobin A1c   Result Value Ref Range    Hemoglobin A1C 6.7 (H) 0.0 - 5.6 %      Comment:      Normal <5.7%   Prediabetes 5.7-6.4%    Diabetes 6.5% or higher     Note: Adopted from ADA consensus guidelines.   Comprehensive metabolic panel   Result Value Ref Range    Sodium 137 133 - 144 mmol/L    Potassium 4.2 3.4 - 5.3 mmol/L    Chloride 103 94 - 109 mmol/L    Carbon Dioxide (CO2) 33 (H) 20 - 32 mmol/L    Anion Gap 1 (L) 3 - 14 mmol/L    Urea Nitrogen 14 7 - 30 mg/dL    Creatinine 0.59 0.52 - 1.04 mg/dL    Calcium 7.6 (L) 8.5 - 10.1 mg/dL    Glucose 116 (H) 70 - 99 mg/dL    Alkaline Phosphatase 98 40 - 150 U/L    AST 20 0 - 45 U/L    ALT 38 0 - 50 U/L    Protein Total 8.0 6.8 - 8.8 g/dL    Albumin 3.5 3.4 - 5.0 g/dL    Bilirubin Total 0.5 0.2 - 1.3 mg/dL    GFR Estimate >90 >60 mL/min/1.73m2      Comment:      As of July 11, 2021, eGFR is calculated by the CKD-EPI creatinine equation, without race adjustment. eGFR can be influenced by muscle mass, exercise, and diet. The reported eGFR is an estimation only and is only applicable if the renal function is stable.   Lipid panel reflex to direct LDL Fasting   Result Value Ref Range    Cholesterol 210 (H) <200 mg/dL      Comment:      Age 0-19 years  Desirable: <170 mg/dL  Borderline high:  170-199 mg/dl  High:            >199 mg/dl    Age 20 years and older  Desirable: <200 mg/dL    Triglycerides 95 <150 mg/dL      Comment:      0-9 years:  Normal:    Less than 75  mg/dL  Borderline high:  75-99 mg/dL  High:             Greater than or equal to 100 mg/dL    0-19 years:  Normal:    Less than 90 mg/dL  Borderline high:   mg/dL  High:             Greater than or equal to 130 mg/dL    20 years and older:  Normal:    Less than 150 mg/dL  Borderline high:  150-199 mg/dL  High:             200-499 mg/dL  Very high:   Greater than or equal to 500 mg/dL    Direct Measure HDL 55 >=50 mg/dL      Comment:      0-19 years:       Greater than or equal to 45 mg/dL   Low: Less than 40 mg/dL   Borderline low: 40-44 mg/dL     20 years and older:   Female: Greater than or equal to 50 mg/dL   Male:   Greater than or equal to 40 mg/dL         LDL Cholesterol Calculated 136 (H) <=100 mg/dL      Comment:      Age 0-19 years:  Desirable: 0-110 mg/dL   Borderline high: 110-129 mg/dL   High: >= 130 mg/dL    Age 20 years and older:  Desirable: <100mg/dL  Above desirable: 100-129 mg/dL   Borderline high: 130-159 mg/dL   High: 160-189 mg/dL   Very high: >= 190 mg/dL    Non HDL Cholesterol 155 (H) <130 mg/dL      Comment:      0-19 years:  Desirable:          Less than 120 mg/dL  Borderline high:   120-144 mg/dL  High:                   Greater than or equal to 145 mg/dL    20 years and older:  Desirable:          130 mg/dL  Above Desirable: 130-159 mg/dL  Borderline high:   160-189 mg/dL  High:               190-219 mg/dL  Very high:     Greater than or equal to 220 mg/dL    Patient Fasting > 8hrs? Yes    T4 free   Result Value Ref Range    Free T4 0.82 0.76 - 1.46 ng/dL   TSH   Result Value Ref Range    TSH 21.30 (H) 0.40 - 4.00 mU/L   CBC with platelets and differential   Result Value Ref Range    WBC Count 11.1 (H) 4.0 - 11.0 10e3/uL    RBC Count 5.05 3.80 - 5.20 10e6/uL    Hemoglobin 13.2 11.7 - 15.7 g/dL    Hematocrit 41.3 35.0 - 47.0 %    MCV 82 78 - 100 fL    MCH 26.1 (L) 26.5 - 33.0 pg    MCHC 32.0 31.5 - 36.5 g/dL    RDW 14.8 10.0 - 15.0 %    Platelet Count 285 150 - 450 10e3/uL    %  Neutrophils 48 %    % Lymphocytes 44 %    % Monocytes 5 %    % Eosinophils 2 %    % Basophils 0 %    Absolute Neutrophils 5.4 1.6 - 8.3 10e3/uL    Absolute Lymphocytes 4.9 0.8 - 5.3 10e3/uL    Absolute Monocytes 0.6 0.0 - 1.3 10e3/uL    Absolute Eosinophils 0.3 0.0 - 0.7 10e3/uL    Absolute Basophils 0.0 0.0 - 0.2 10e3/uL       If you have any questions or concerns, please call the clinic at the number listed above.       Sincerely,      Vasquez Denson MD/[facility]berts

## 2021-07-30 NOTE — PROGRESS NOTES
Rhonda Cordero is a 50 year old female , who presents for annual exam.   No unusual bleeding, no incontinence, or unusual discharge.     Last cholesterol: Recent Labs   Lab Test 19  0806 13  0852   CHOL 212* 186   HDL 54 46*   * 123   TRIG 78 84   CHOLHDLRATIO  --  4.1     Past Medical History:   Diagnosis Date     Anemia      Hypertension goal BP (blood pressure) < 130/80 2011     Hypocalcemia      Multinodular goiter      Postsurgical hypothyroidism      Rotator cuff tendonitis 2/10/2015       Past Surgical History:   Procedure Laterality Date     C TOTAL ABDOM HYSTERECTOMY  09    [facility]     LAPAROSCOPY,TUBAL CAUTERY       SALPINGO OOPHORECTOMY,R/L/LUIS      left     THYROIDECTOMY N/A 2017    Procedure: THYROIDECTOMY;  Total Thyroidectomy;  Surgeon: Skylar Costa MD;  Location: [facility] OR       OB History    Para Term  AB Living   2 2 0 0 0 2   SAB TAB Ectopic Multiple Live Births   0 0 0 0 0      # Outcome Date GA Lbr Roc/2nd Weight Sex Delivery Anes PTL Lv   2 Para            1 Para                Gyn History:  Gynecological History         Patient's last menstrual period was 2009.     No STD/No PID/No IUD      history of abnormal pap smear:  No  Last pap:   Lab Results   Component Value Date    PAP NIL 2014           Current Outpatient Medications   Medication Sig Dispense Refill     calcitRIOL (ROCALTROL) 0.25 MCG capsule TAKE ONE CAPSULE BY MOUTH ONCE DAILY 30 capsule 1     calcium carbonate antacid 1000 MG CHEW Take 2 tablets by mouth 3 times daily 60 tablet 3     clobetasol (TEMOVATE) 0.05 % cream Apply sparingly to affected area twice daily for two weeks, then use twice a week 60 g 2     hydrochlorothiazide (HYDRODIURIL) 25 MG tablet Take 1 tablet (25 mg) by mouth daily 30 tablet 1     ketoconazole (NIZORAL) 2 % external cream Apply topically daily 60 g 1     levothyroxine (SYNTHROID/LEVOTHROID) 88 MCG tablet  Take 1 tablet (88 mcg) by mouth daily 90 tablet 0     lisinopril (ZESTRIL) 10 MG tablet Take 1 tablet (10 mg) by mouth daily 90 tablet 1     triamcinolone (KENALOG) 0.1 % cream Apply sparingly to affected area three times daily as needed 80 g 1       Allergies   Allergen Reactions     Advil [Ibuprofen] Swelling     Hands swelled     Cephalexin Itching       Social History     Socioeconomic History     Marital status:      Spouse name: Not on file     Number of children: 2     Years of education: 12     Highest education level: Not on file   Occupational History     Employer: [employer]   Tobacco Use     Smoking status: Never Smoker     Smokeless tobacco: Never Used   Vaping Use     Vaping Use: Never used   Substance and Sexual Activity     Alcohol use: Yes     Comment: occ     Drug use: No     Comment: occasional/ rare     Sexual activity: Not Currently     Partners: Male     Birth control/protection: Surgical   Other Topics Concern     Parent/sibling w/ CABG, MI or angioplasty before 65F 55M? No      Service No     Blood Transfusions Yes     Comment: had 3 units 10/23/2009     Caffeine Concern No     Occupational Exposure No     Hobby Hazards No     Sleep Concern Not Asked     Comment: sleeps about 5-6 hours a night     Stress Concern Not Asked     Weight Concern Not Asked     Special Diet No     Back Care Not Asked     Exercise Yes     Comment: walk     Bike Helmet Not Asked     Comment: doesn't ride a bike     Seat Belt Yes     Self-Exams No     Comment: info given on SBE   Social History Narrative     Not on file     Social Determinants of Health     Financial Resource Strain:      Difficulty of Paying Living Expenses:    Food Insecurity:      Worried About Running Out of Food in the Last Year:      Ran Out of Food in the Last Year:    Transportation Needs:      Lack of Transportation (Medical):      Lack of Transportation (Non-Medical):    Physical Activity:      Days of Exercise per Week:  "     Minutes of Exercise per Session:    Stress:      Feeling of Stress :    Social Connections:      Frequency of Communication with Friends and Family:      Frequency of Social Gatherings with Friends and Family:      Attends Christian Services:      Active Member of Clubs or Organizations:      Attends Club or Organization Meetings:      Marital Status:    Intimate Partner Violence:      Fear of Current or Ex-Partner:      Emotionally Abused:      Physically Abused:      Sexually Abused:        Family History   Problem Relation Age of Onset     Cerebrovascular Disease Mother 50     Cerebrovascular Disease Father 49         49     Hypertension Father      Cancer No family hx of      Diabetes No family hx of      Thyroid Disease No family hx of      Glaucoma No family hx of      Macular Degeneration No family hx of          ROS:  All negative except as above.      EXAM:  BP (!) 170/105 (BP Location: Right arm, Cuff Size: Adult Regular)   Ht 1.577 m (5' 2.1\")   Wt 86.2 kg (190 lb)   LMP 2009   BMI 34.64 kg/m    General:  WNWD female, NAD  Alert  Oriented x 3  Gait:  Normal  Skin:  Normal skin turgor  Neurologic:  CN grossly intact, good sensation.    HEENT:  NC/AT, EOMI  Neck:  No masses palpated, symmetrical, carotids +2/4, no bruits heard  Heart:  RRR  Lungs:  Clear   Breasts:  Symmetrical, no dimpling noted, no masses palpated, no discharge expressed  Abdomen:  Non-tender, non-distended.  Vulva: No external lesions, normal hair distribution, no adenopathy  BUS:  Normal, no masses noted  Urethra:  No hypermobility noted  Urethral meatus:  No masses noted  Vagina: Moist, pink, no abnormal discharge, well rugated, no lesions  Cervix: Absent   Uterus: Absent   Ovaries/Bimanual: No masses, non-tender  Perianal:  No masses noted.    Rectal exam: declined   Extremities:  No clubbing, cyanosis, or edema      ASSESSMENT/PLAN   Annual examination   Blood pressure elevation/Hypertension.  She is being followed " by .   Colonoscopy advised for patient.  Low fat diet, weight bearing exercises and self breast exams on a monthly basis have been recommended.  I have discussed with patient the risks, benefits, medications, treatment options and modalities.   I have instructed the patient to call or schedule a follow-up appointment if any problems.

## 2021-07-30 NOTE — PROGRESS NOTES
Darline Rogers is a 50 year old who presents for the following health issues     HPI     Hypertension Follow-up      Do you check your blood pressure regularly outside of the clinic? No     Are you following a low salt diet? No    Are your blood pressures ever more than 140 on the top number (systolic) OR more   than 90 on the bottom number (diastolic), for example 140/90?       How many servings of fruits and vegetables do you eat daily?  2-3    On average, how many sweetened beverages do you drink each day (Examples: soda, juice, sweet tea, etc.  Do NOT count diet or artificially sweetened beverages)?   0    How many days per week do you exercise enough to make your heart beat faster?     How many minutes a day do you exercise enough to make your heart beat faster?     How many days per week do you miss taking your medication? 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  Physical Exam  Constitutional:       Appearance: She is well-developed.   HENT:      Head: Normocephalic and atraumatic.   Eyes:      Conjunctiva/sclera: Conjunctivae normal.   Neck:      Vascular: No carotid bruit.   Cardiovascular:      Rate and Rhythm: Normal rate and regular rhythm.      Heart sounds: Normal heart sounds.   Pulmonary:      Effort: Pulmonary effort is normal. No respiratory distress.      Breath sounds: Normal breath sounds.   Abdominal:      Palpations: Abdomen is soft.       Tenderness: There is no abdominal tenderness.   Neurological:      Mental Status: She is alert and oriented to person, place, and time.             ASSESSMENT / PLAN:  (I10) Essential hypertension with goal blood pressure less than 140/90  (primary encounter diagnosis)  Comment: increase hydrochlorothiazide to 25 mg; keep acei as is  Plan: hydrochlorothiazide (HYDRODIURIL) 25 MG tablet        We can recheck blood pressure in 2-3 months    (E03.9) Hypothyroidism, unspecified type  Comment: recheck labs, adjust thyroid dose if needed   Plan: TSH, T4 free        Past history thyroidectomy    (E78.5) Hyperlipidemia LDL goal <100  Comment: check today fasting   Plan: Lipid panel reflex to direct LDL Fasting,         Comprehensive metabolic panel             (R73.01) Impaired fasting glucose  Comment: check   Plan: Hemoglobin A1c             (R53.83) Fatigue, unspecified type  Comment: check   Plan: CBC with Platelets & Differential             (Z00.00) Healthcare maintenance  Comment: patient has eye exam scheduled; mammogram today   Plan: Adult Eye Referral             (E66.9) Obesity, unspecified classification, unspecified obesity type, unspecified whether serious comorbidity present  Comment: wt basically unchanged  Plan: keep working on healthy diet/exercise and wt loss        I reviewed the patient's medications, allergies, medical history, family history, and social history.    Vasquez Denson MD

## 2021-07-30 NOTE — PATIENT INSTRUCTIONS
Increase hydrochlorothiazide to 25 mg daily ( okay to take two of the 12.5 mg pills at once )    Keep lisinopril as is    Low sodium diet    Continue exercise as able    We will send you lab results    Adjust thyroid if needed

## 2021-08-01 NOTE — RESULT ENCOUNTER NOTE
One thyroid test ( TSH ) is off but the follow up test ( Free T4 ) is normal, so stay on same dose of thyroid medication.  Let's recheck in about 3 months.    Diabetes test ( hemoglobin a1c ) is a little better.  Keep working on healthy diet/exercise and weight loss.    Other labs are okay/ stable.    Vasquez Denson MD

## 2021-08-08 ENCOUNTER — TELEPHONE (OUTPATIENT)
Dept: FAMILY MEDICINE | Facility: CLINIC | Age: 51
End: 2021-08-08

## 2021-08-09 NOTE — TELEPHONE ENCOUNTER
Reason for call:  Other   Patient called regarding (reason for call): call back  Additional comments: noted    Phone number to reach patient:  Home number on file [phone] (home)    Best Time:  any    Can we leave a detailed message on this number?  YES    Travel screening: Not Applicable

## 2021-08-11 DIAGNOSIS — E03.9 HYPOTHYROIDISM, UNSPECIFIED TYPE: ICD-10-CM

## 2021-08-13 RX ORDER — LEVOTHYROXINE SODIUM 88 UG/1
88 TABLET ORAL DAILY
Qty: 90 TABLET | Refills: 0 | Status: SHIPPED | OUTPATIENT
Start: 2021-08-13 | End: 2021-11-12

## 2021-08-13 NOTE — TELEPHONE ENCOUNTER
Routing refill request to provider for review/approval because:  Labs out of range:  TSH    TSH   Date Value Ref Range Status   07/30/2021 21.30 (H) 0.40 - 4.00 mU/L Final   02/18/2021 15.76 (H) 0.40 - 4.00 mU/L Final

## 2021-08-20 ENCOUNTER — OFFICE VISIT (OUTPATIENT)
Dept: OPTOMETRY | Facility: CLINIC | Age: 51
End: 2021-08-20
Payer: COMMERCIAL

## 2021-08-20 DIAGNOSIS — Z01.00 ENCOUNTER FOR EXAMINATION OF EYES AND VISION WITHOUT ABNORMAL FINDINGS: Primary | ICD-10-CM

## 2021-08-20 DIAGNOSIS — H52.4 PRESBYOPIA: ICD-10-CM

## 2021-08-20 DIAGNOSIS — H52.13 MYOPIA OF BOTH EYES: ICD-10-CM

## 2021-08-20 PROCEDURE — 92014 COMPRE OPH EXAM EST PT 1/>: CPT | Performed by: OPTOMETRIST

## 2021-08-20 PROCEDURE — 92015 DETERMINE REFRACTIVE STATE: CPT | Performed by: OPTOMETRIST

## 2021-08-20 ASSESSMENT — TONOMETRY
IOP_METHOD: APPLANATION
OS_IOP_MMHG: 17
OD_IOP_MMHG: 21

## 2021-08-20 ASSESSMENT — VISUAL ACUITY
OD_SC: 20/120
OD_SC: 20/20
METHOD: SNELLEN - LINEAR
OS_SC: 20/20
OS_CC: 20/40-1
OD_CC: 20/60
OD_SC+: -1
OS_SC: 20/80-1

## 2021-08-20 ASSESSMENT — REFRACTION_MANIFEST
OD_AXIS: 158
OS_SPHERE: -0.50
OD_ADD: +1.50
OD_SPHERE: -0.50
OS_ADD: +1.50
OD_CYLINDER: +0.50
OS_CYLINDER: SPHERE

## 2021-08-20 ASSESSMENT — REFRACTION_WEARINGRX
OD_SPHERE: +1.00
OS_CYLINDER: SPHERE
OD_CYLINDER: SPHERE
OS_SPHERE: +1.00
SPECS_TYPE: OTC CHEATERS

## 2021-08-20 ASSESSMENT — CONF VISUAL FIELD
OS_NORMAL: 1
OD_NORMAL: 1
METHOD: COUNTING FINGERS

## 2021-08-20 ASSESSMENT — EXTERNAL EXAM - LEFT EYE: OS_EXAM: NORMAL

## 2021-08-20 ASSESSMENT — SLIT LAMP EXAM - LIDS
COMMENTS: NORMAL
COMMENTS: NORMAL

## 2021-08-20 ASSESSMENT — CUP TO DISC RATIO
OD_RATIO: 0.35
OS_RATIO: 0.3

## 2021-08-20 ASSESSMENT — EXTERNAL EXAM - RIGHT EYE: OD_EXAM: NORMAL

## 2021-08-20 NOTE — PROGRESS NOTES
Chief Complaint   Patient presents with     Annual Eye Exam      Accompanied by   Last Eye Exam: 7/29/2020  Dilated Previously: Yes, side effects of dilation explained today    What are you currently using to see? +1.00 readers     Distance Vision Acuity: Satisfied with vision    Near Vision Acuity: Satisfied with vision while reading and using computer with readers    Eye Comfort: good  Do you use eye drops? : No  Occupation or Hobbies: [employer]       Medical, surgical and family histories reviewed and updated 8/20/2021.       OBJECTIVE: See Ophthalmology exam    ASSESSMENT:    ICD-10-CM    1. Encounter for examination of eyes and vision without abnormal findings  Z01.00 EYE EXAM (SIMPLE-NONBILLABLE)   2. Myopia of both eyes  H52.13 EYE EXAM (SIMPLE-NONBILLABLE)     REFRACTION   3. Presbyopia  H52.4 EYE EXAM (SIMPLE-NONBILLABLE)     REFRACTION      PLAN:     Patient Instructions   No glasses prescription necessary at this time.   Continue with OTC reading glasses as needed.     Return in 1-2 years for a comprehensive eye exam, or sooner if needed.     The effects of the dilating drops last for 4- 6 hours.  You will be more sensitive to light and vision will be blurry up close.  Mydriatic sunglasses were given if needed.    Major Beasley, OD  [address]  [place]  [address] [phone]

## 2021-08-20 NOTE — PATIENT INSTRUCTIONS
No glasses prescription necessary at this time.   Continue with OTC reading glasses as needed.     Return in 1-2 years for a comprehensive eye exam, or sooner if needed.     The effects of the dilating drops last for 4- 6 hours.  You will be more sensitive to light and vision will be blurry up close.  Mydriatic sunglasses were given if needed.    Major Beasley, OD  [facility] [place]  [address] [place] [address]. [place]  [place]  [id number]    [phone]

## 2021-08-20 NOTE — LETTER
8/20/2021         RE: Rhonda Cordero  [address] [facility] [address]        Dear Colleague,    Thank you for referring your patient, Rhonda Cordero, to the [facility]. Please see a copy of my visit note below.    Chief Complaint   Patient presents with     Annual Eye Exam      Accompanied by   Last Eye Exam: 7/29/2020  Dilated Previously: Yes, side effects of dilation explained today    What are you currently using to see? +1.00 readers     Distance Vision Acuity: Satisfied with vision    Near Vision Acuity: Satisfied with vision while reading and using computer with readers    Eye Comfort: good  Do you use eye drops? : No  Occupation or Hobbies: [employer]       Medical, surgical and family histories reviewed and updated 8/20/2021.       OBJECTIVE: See Ophthalmology exam    ASSESSMENT:    ICD-10-CM    1. Encounter for examination of eyes and vision without abnormal findings  Z01.00 EYE EXAM (SIMPLE-NONBILLABLE)   2. Myopia of both eyes  H52.13 EYE EXAM (SIMPLE-NONBILLABLE)     REFRACTION   3. Presbyopia  H52.4 EYE EXAM (SIMPLE-NONBILLABLE)     REFRACTION      PLAN:     Patient Instructions   No glasses prescription necessary at this time.   Continue with OTC reading glasses as needed.     Return in 1-2 years for a comprehensive eye exam, or sooner if needed.     The effects of the dilating drops last for 4- 6 hours.  You will be more sensitive to light and vision will be blurry up close.  Mydriatic sunglasses were given if needed.    Major Beasley, CHRISTIE  [facility]  [address] [facility]. [place]  [address] [phone]             Again, thank you for allowing me to participate in the care of your patient.        Sincerely,        Major Beasley, CHRISTIE

## 2021-09-12 DIAGNOSIS — E83.51 HYPOCALCEMIA: ICD-10-CM

## 2021-09-14 RX ORDER — CALCITRIOL 0.25 UG/1
CAPSULE, LIQUID FILLED ORAL
Qty: 30 CAPSULE | Refills: 0 | Status: SHIPPED | OUTPATIENT
Start: 2021-09-14 | End: 2022-01-26

## 2021-09-14 NOTE — TELEPHONE ENCOUNTER
Routing refill request to provider for review/approval because:  Drug not on the [facility] refill protocol     Last endocrine visit was on 11/8/19. Endocrine MA, please assist patient with scheduling f/u.    Je JEFFERSON RN....9/14/2021 1:24 PM

## 2021-09-16 NOTE — TELEPHONE ENCOUNTER
Called patient and left VM, informed that she is overdue for a follow up. Requested call back to make an appt. Scheduling number provided.     Jerica YOUNGBLOOD MA

## 2021-10-06 ENCOUNTER — OFFICE VISIT (OUTPATIENT)
Dept: FAMILY MEDICINE | Facility: CLINIC | Age: 51
End: 2021-10-06
Payer: COMMERCIAL

## 2021-10-06 VITALS
WEIGHT: 188.38 LBS | SYSTOLIC BLOOD PRESSURE: 125 MMHG | HEART RATE: 76 BPM | BODY MASS INDEX: 34.34 KG/M2 | TEMPERATURE: 97.9 F | DIASTOLIC BLOOD PRESSURE: 85 MMHG

## 2021-10-06 DIAGNOSIS — E03.9 HYPOTHYROIDISM, UNSPECIFIED TYPE: ICD-10-CM

## 2021-10-06 DIAGNOSIS — E66.9 OBESITY, UNSPECIFIED CLASSIFICATION, UNSPECIFIED OBESITY TYPE, UNSPECIFIED WHETHER SERIOUS COMORBIDITY PRESENT: ICD-10-CM

## 2021-10-06 DIAGNOSIS — L85.3 XEROSIS CUTIS: ICD-10-CM

## 2021-10-06 DIAGNOSIS — I10 HYPERTENSION GOAL BP (BLOOD PRESSURE) < 140/90: Primary | ICD-10-CM

## 2021-10-06 DIAGNOSIS — R73.01 IMPAIRED FASTING GLUCOSE: ICD-10-CM

## 2021-10-06 LAB
ALBUMIN SERPL-MCNC: 3.6 G/DL (ref 3.4–5)
ALP SERPL-CCNC: 92 U/L (ref 40–150)
ALT SERPL W P-5'-P-CCNC: 32 U/L (ref 0–50)
ANION GAP SERPL CALCULATED.3IONS-SCNC: 5 MMOL/L (ref 3–14)
AST SERPL W P-5'-P-CCNC: 16 U/L (ref 0–45)
BILIRUB SERPL-MCNC: 0.3 MG/DL (ref 0.2–1.3)
BUN SERPL-MCNC: 25 MG/DL (ref 7–30)
CALCIUM SERPL-MCNC: 8 MG/DL (ref 8.5–10.1)
CHLORIDE BLD-SCNC: 104 MMOL/L (ref 94–109)
CO2 SERPL-SCNC: 31 MMOL/L (ref 20–32)
CREAT SERPL-MCNC: 0.51 MG/DL (ref 0.52–1.04)
GFR SERPL CREATININE-BSD FRML MDRD: >90 ML/MIN/1.73M2
GLUCOSE BLD-MCNC: 119 MG/DL (ref 70–99)
HBA1C MFR BLD: 6.8 % (ref 0–5.6)
POTASSIUM BLD-SCNC: 4 MMOL/L (ref 3.4–5.3)
PROT SERPL-MCNC: 7.5 G/DL (ref 6.8–8.8)
SODIUM SERPL-SCNC: 140 MMOL/L (ref 133–144)
T4 FREE SERPL-MCNC: 1.1 NG/DL (ref 0.76–1.46)
TSH SERPL DL<=0.005 MIU/L-ACNC: 2.08 MU/L (ref 0.4–4)

## 2021-10-06 PROCEDURE — 80053 COMPREHEN METABOLIC PANEL: CPT | Performed by: FAMILY MEDICINE

## 2021-10-06 PROCEDURE — 84443 ASSAY THYROID STIM HORMONE: CPT | Performed by: FAMILY MEDICINE

## 2021-10-06 PROCEDURE — 99214 OFFICE O/P EST MOD 30 MIN: CPT | Performed by: FAMILY MEDICINE

## 2021-10-06 PROCEDURE — 36415 COLL VENOUS BLD VENIPUNCTURE: CPT | Performed by: FAMILY MEDICINE

## 2021-10-06 PROCEDURE — 83036 HEMOGLOBIN GLYCOSYLATED A1C: CPT | Performed by: FAMILY MEDICINE

## 2021-10-06 PROCEDURE — 84439 ASSAY OF FREE THYROXINE: CPT | Performed by: FAMILY MEDICINE

## 2021-10-06 RX ORDER — LISINOPRIL 10 MG/1
TABLET ORAL
Qty: 135 TABLET | Refills: 1 | Status: SHIPPED | OUTPATIENT
Start: 2021-10-06 | End: 2022-05-05

## 2021-10-06 RX ORDER — HYDROCHLOROTHIAZIDE 25 MG/1
25 TABLET ORAL DAILY
Qty: 90 TABLET | Refills: 1 | Status: SHIPPED | OUTPATIENT
Start: 2021-10-06 | End: 2022-06-09

## 2021-10-06 ASSESSMENT — PAIN SCALES - GENERAL: PAINLEVEL: NO PAIN (0)

## 2021-10-06 NOTE — PROGRESS NOTES
Darline Rogers is a 50 year old who presents for the following health issues     HPI     Hypertension Follow-up      Do you check your blood pressure regularly outside of the clinic? Yes     Are you following a low salt diet? Yes    Are your blood pressures ever more than 140 on the top number (systolic) OR more   than 90 on the bottom number (diastolic), for example 140/90? Yes      How many servings of fruits and vegetables do you eat daily?  2-3    On average, how many sweetened beverages do you drink each day (Examples: soda, juice, sweet tea, etc.  Do NOT count diet or artificially sweetened beverages)?   0    How many days per week do you exercise enough to make your heart beat faster?     How many minutes a day do you exercise enough to make your heart beat faster?     How many days per week do you miss taking your medication? 0         Review of Systems   Patient did multiiple reading of blood pressure from cub    Recent 3 all 120 over 70s and 80s    Patient taking 15 mg lisinopril  Hydrochlorothiazide 25      Feels fine    Patient felt shaky when on 20 mg lisinopril          Objective    BP (!) 161/91 (BP Location: Right arm, Patient Position: Chair, Cuff Size: Adult Regular)   Pulse 76   Temp 97.9  F (36.6  C) (Temporal)   Wt 85.4 kg (188 lb 6 oz)   LMP 11/23/2009   Breastfeeding No   BMI 34.34 kg/m    Body mass index is 34.34 kg/m .  Physical Exam  Constitutional:       Appearance: She is well-developed.   HENT:      Head: Normocephalic and atraumatic.   Eyes:      Conjunctiva/sclera: Conjunctivae normal.   Neck:      Vascular: No carotid bruit.   Cardiovascular:      Rate and Rhythm: Normal rate and regular rhythm.      Heart sounds: Normal heart sounds.   Pulmonary:      Effort: Pulmonary effort is normal. No respiratory distress.      Breath sounds: Normal breath sounds.   Neurological:      Mental Status: She is alert and oriented to person, place, and time.         dry skin  especially on legs    ASSESSMENT / PLAN:  (I10) Hypertension goal BP (blood pressure) < 140/90  (primary encounter diagnosis)  Comment: stay on 15 mg lisinopril and 25 mg hydrochlorothiazide; on recheck blood pressure fine  Plan: hydrochlorothiazide (HYDRODIURIL) 25 MG tablet,        lisinopril (ZESTRIL) 10 MG tablet,         Comprehensive metabolic panel        Can stop checking so often    (R73.01) Impaired fasting glucose  Comment: recheck   Plan: Hemoglobin A1c        No diab meds     (E03.9) Hypothyroidism, unspecified type  Comment: recheck labs but also advised she see endocrinology again; complicated thyroid history   Plan: T4 free, TSH             (L85.3) Xerosis cutis  Comment: increase lotion usage   Plan: as above     (E66.9) Obesity, unspecified classification, unspecified obesity type, unspecified whether serious comorbidity present  Comment: chronic   Plan: keep working on healthy diet/exercise and wt loss      I reviewed the patient's medications, allergies, medical history, family history, and social history.    Vasquez Denson MD

## 2021-10-06 NOTE — PATIENT INSTRUCTIONS
We will send you lab results    Stay on 15 mg lisinopril ( 1 1/2 of the 10 mg pills )    Stay on 25 mg hydrochlorothiazide    Keep working on healthy diet/exercise and wt loss    Don't have to check blood pressure as often

## 2021-10-06 NOTE — LETTER
"October 8, 2021      Rhonda Cordero  [address] [facility] [address]        Dear ,    We are writing to inform you of your test results.    The thyroid tests are normal     Hemoglobin a1c is still in the \"prediabetes\" range; keep working on healthy diet/exercise     Other labs are fine        Resulted Orders   Hemoglobin A1c   Result Value Ref Range    Hemoglobin A1C 6.8 (H) 0.0 - 5.6 %      Comment:      Normal <5.7%   Prediabetes 5.7-6.4%    Diabetes 6.5% or higher     Note: Adopted from ADA consensus guidelines.   Comprehensive metabolic panel   Result Value Ref Range    Sodium 140 133 - 144 mmol/L    Potassium 4.0 3.4 - 5.3 mmol/L    Chloride 104 94 - 109 mmol/L    Carbon Dioxide (CO2) 31 20 - 32 mmol/L    Anion Gap 5 3 - 14 mmol/L    Urea Nitrogen 25 7 - 30 mg/dL    Creatinine 0.51 (L) 0.52 - 1.04 mg/dL    Calcium 8.0 (L) 8.5 - 10.1 mg/dL    Glucose 119 (H) 70 - 99 mg/dL    Alkaline Phosphatase 92 40 - 150 U/L    AST 16 0 - 45 U/L    ALT 32 0 - 50 U/L    Protein Total 7.5 6.8 - 8.8 g/dL    Albumin 3.6 3.4 - 5.0 g/dL    Bilirubin Total 0.3 0.2 - 1.3 mg/dL    GFR Estimate >90 >60 mL/min/1.73m2      Comment:      As of July 11, 2021, eGFR is calculated by the CKD-EPI creatinine equation, without race adjustment. eGFR can be influenced by muscle mass, exercise, and diet. The reported eGFR is an estimation only and is only applicable if the renal function is stable.   TSH   Result Value Ref Range    TSH 2.08 0.40 - 4.00 mU/L   T4 free   Result Value Ref Range    Free T4 1.10 0.76 - 1.46 ng/dL       If you have any questions or concerns, please call the clinic at the number listed above.       Sincerely,      Vasquez Denson MD/roberts          "

## 2021-10-08 NOTE — RESULT ENCOUNTER NOTE
"The thyroid tests are normal    Hemoglobin a1c is still in the \"prediabetes\" range; keep working on healthy diet/exercise     Other labs are fine    Vasquez Denson MD  "

## 2021-12-01 ENCOUNTER — OFFICE VISIT (OUTPATIENT)
Dept: URGENT CARE | Facility: URGENT CARE | Age: 51
End: 2021-12-01
Payer: COMMERCIAL

## 2021-12-01 VITALS
HEART RATE: 84 BPM | DIASTOLIC BLOOD PRESSURE: 90 MMHG | WEIGHT: 185 LBS | BODY MASS INDEX: 33.73 KG/M2 | OXYGEN SATURATION: 99 % | SYSTOLIC BLOOD PRESSURE: 152 MMHG | TEMPERATURE: 97.9 F

## 2021-12-01 DIAGNOSIS — L02.91 ABSCESS: Primary | ICD-10-CM

## 2021-12-01 DIAGNOSIS — I10 HYPERTENSION GOAL BP (BLOOD PRESSURE) < 140/90: ICD-10-CM

## 2021-12-01 PROCEDURE — 99214 OFFICE O/P EST MOD 30 MIN: CPT | Performed by: NURSE PRACTITIONER

## 2021-12-01 PROCEDURE — 87077 CULTURE AEROBIC IDENTIFY: CPT | Performed by: NURSE PRACTITIONER

## 2021-12-01 PROCEDURE — 87070 CULTURE OTHR SPECIMN AEROBIC: CPT | Performed by: NURSE PRACTITIONER

## 2021-12-01 PROCEDURE — 87186 SC STD MICRODIL/AGAR DIL: CPT | Performed by: NURSE PRACTITIONER

## 2021-12-01 RX ORDER — SULFAMETHOXAZOLE/TRIMETHOPRIM 800-160 MG
1 TABLET ORAL 2 TIMES DAILY
Qty: 14 TABLET | Refills: 0 | Status: SHIPPED | OUTPATIENT
Start: 2021-12-01 | End: 2021-12-08

## 2021-12-01 NOTE — PROGRESS NOTES
Assessment & Plan     Abscess    - sulfamethoxazole-trimethoprim (BACTRIM DS) 800-160 MG tablet  Dispense: 14 tablet; Refill: 0  - Abscess Aerobic Bacterial Culture Routine    Hypertension goal BP (blood pressure) < 140/90       Incision and drainage not indicated since abscesses not fluctuant. Wound culture obtained from right buttocks abscess, will notify if she should change antibiotic. Prescription sent to pharmacy for Bactrim twice daily for 7 days. Keep skin clean and dry, may apply heating pad. Watch closely for worsening symptoms of infection including fever, chills, redness, swelling, pain, purulent discharge and follow-up right away if develops.     Patient is hypertensive today but asymptomatic.  No headache, blurring of vision, chest pain, shortness of breath, dizziness, numbness, or weakness. Second BP reading was also above goal.  Patient instructed to follow up with PCP within the next week for BP recheck.  Instructed to go to emergency department if develops chest pain, shortness of breath, dizziness, vision changes, numbness, weakness.     Follow-up with PCP if symptoms persist for 5 days, and sooner if symptoms worsen or new symptoms develop.     Discussed red flag symptoms which warrant immediate visit in emergency room    All questions were answered and patient verbalized understanding. AVS reviewed with patient.     Asha Donato, MINESH, APRN, CNP 12/1/2021 10:45 AM  [facility]            Darline Rogers is a 50 year old female who presents to clinic today for the following health issues:  Chief Complaint   Patient presents with     Derm Problem     PT HAS A BOIL ON BUTT SIZE OF AN APPLE, SHE SAID SHE DID A HOME REMEDEY AND IT GROW IN SIZE 1 WK     Patient presents for evaluation of a boil on her bilateral buttocks. The one on the right buttocks is the size of an apple and firm. She did a home remedy wicking fluid out and it grew in size over the past week  and has been draining bloody fluid. Denies fever. She has been taking tylenol every 6 hours which helps temporarily, last had this morning. Pain is moderate currently but severe with sitting. She also has a small boil on her left buttocks which hasn't been draining and has been staying small. No history of MRSA.       Problem list, Medication list, Allergies, and Medical history reviewed in EPIC.    ROS:  Review of systems negative except for noted above    Objective    BP (!) 152/90   Pulse 84   Temp 97.9  F (36.6  C) (Axillary)   Wt 83.9 kg (185 lb)   LMP 11/23/2009   SpO2 99%   BMI 33.73 kg/m    Physical Exam  Constitutional:       General: She is not in acute distress.     Appearance: She is not toxic-appearing or diaphoretic.   Cardiovascular:      Rate and Rhythm: Normal rate and regular rhythm.      Heart sounds: Normal heart sounds.   Pulmonary:      Effort: Pulmonary effort is normal. No respiratory distress.      Breath sounds: Normal breath sounds. No wheezing, rhonchi or rales.   Lymphadenopathy:      Cervical: No cervical adenopathy.   Skin:     General: Skin is warm.      Comments: 1 cm firm abscess left buttocks without drainage with 1 cm surrounding erythema without increased warmth. Right buttock has 3 cm x 5 cm firm abscess with bloody drainage with 3 cm surrounding erythema without increased warmth   Neurological:      Mental Status: She is alert.

## 2021-12-03 LAB — BACTERIA ABSC ANAEROBE+AEROBE CULT: ABNORMAL

## 2021-12-03 RX ORDER — CLINDAMYCIN HCL 300 MG
300 CAPSULE ORAL 3 TIMES DAILY
Qty: 30 CAPSULE | Refills: 0 | Status: SHIPPED | OUTPATIENT
Start: 2021-12-03 | End: 2021-12-09

## 2021-12-08 ENCOUNTER — OFFICE VISIT (OUTPATIENT)
Dept: FAMILY MEDICINE | Facility: CLINIC | Age: 51
End: 2021-12-08
Payer: COMMERCIAL

## 2021-12-08 VITALS
BODY MASS INDEX: 33.64 KG/M2 | HEART RATE: 85 BPM | WEIGHT: 184.5 LBS | OXYGEN SATURATION: 100 % | DIASTOLIC BLOOD PRESSURE: 89 MMHG | SYSTOLIC BLOOD PRESSURE: 126 MMHG | TEMPERATURE: 97.5 F

## 2021-12-08 DIAGNOSIS — I10 HYPERTENSION GOAL BP (BLOOD PRESSURE) < 140/90: ICD-10-CM

## 2021-12-08 DIAGNOSIS — A49.02 MRSA INFECTION: Primary | ICD-10-CM

## 2021-12-08 DIAGNOSIS — L02.31 ABSCESS OF BUTTOCK: ICD-10-CM

## 2021-12-08 PROCEDURE — 99213 OFFICE O/P EST LOW 20 MIN: CPT | Performed by: FAMILY MEDICINE

## 2021-12-08 NOTE — PATIENT INSTRUCTIONS
Finish out antibiotic    If this becomes recurrent, then we could do referral to specialist    covid booster next week      Stay on other meds as is

## 2021-12-08 NOTE — PROGRESS NOTES
Darline Rogers is a 51 year old who presents for the following health issues    History of Present Illness   She exercises with enough effort to increase her heart rate 9 or less minutes per day.  She exercises with enough effort to increase her heart rate 5 days per week.   She is taking medications regularly.       ED/UC Followup:    Facility:   [facility]  Date of visit: 12/01/2021  Reason for visit: abscess on buttock  Current Status: stable            Review of Systems    got big boil on buttock    Went to emergency room but wait too long    Then to urgent care [facility]    Got antibiotics    Started drainiing with help of heat    Yellowish drainage    Was put on bactrim but then culture came back and resistant to this  Mrsa, sensitive to clindamycin so placed on that    Now can sit without pain            Objective    /89 (BP Location: Right arm, Patient Position: Chair, Cuff Size: Adult Regular)   Pulse 85   Temp 97.5  F (36.4  C) (Temporal)   Wt 83.7 kg (184 lb 8 oz)   LMP 11/23/2009   SpO2 100%   Breastfeeding No   BMI 33.64 kg/m    Body mass index is 33.64 kg/m .  Physical Exam  Constitutional:       Appearance: She is well-developed.   HENT:      Head: Normocephalic and atraumatic.   Eyes:      Conjunctiva/sclera: Conjunctivae normal.   Neck:      Vascular: No carotid bruit.   Cardiovascular:      Rate and Rhythm: Normal rate and regular rhythm.      Heart sounds: Normal heart sounds.   Pulmonary:      Effort: Pulmonary effort is normal. No respiratory distress.      Breath sounds: Normal breath sounds.   Abdominal:      Palpations: Abdomen is soft.      Tenderness: There is no abdominal tenderness.   Neurological:      Mental Status: She is alert and oriented to person, place, and time.         a couple bandages on the buttock area but no significant swelling presen    Patient has some scattered areas of post inflammatory hyperpigmentation       ASSESSMENT /  PLAN:  (A49.02) MRSA infection  (primary encounter diagnosis)  Comment: patient just got the prescription for clindamycin.  Plan: finish prescription and follow up prn.  Already much better due to drainage.  If this becomes a recurrent issue then consider ID referrral.     (I10) Hypertension goal BP (blood pressure) < 140/90  Comment: at goal   Plan: no change     (L02.31) Abscess of buttock  Comment: patient states she has always got this from using a toilet not at home.    Plan: sounds like best solution is to alter work hours so she does not have to use the bathroom at work and her employer is quite flexible in this regard.       I reviewed the patient's medications, allergies, medical history, family history, and social history.    Vasquez Denson MD                  Answers for HPI/ROS submitted by the patient on 12/8/2021  How many minutes a day do you exercise enough to make your heart beat faster?: 9 or less  How many days a week do you exercise enough to make your heart beat faster?: 5  How many days per week do you miss taking your medication?: 0

## 2021-12-09 ENCOUNTER — TELEPHONE (OUTPATIENT)
Dept: FAMILY MEDICINE | Facility: CLINIC | Age: 51
End: 2021-12-09
Payer: COMMERCIAL

## 2021-12-09 DIAGNOSIS — L02.91 ABSCESS: ICD-10-CM

## 2021-12-09 RX ORDER — DOXYCYCLINE 100 MG/1
100 CAPSULE ORAL 2 TIMES DAILY
Qty: 20 CAPSULE | Refills: 0 | Status: SHIPPED | OUTPATIENT
Start: 2021-12-09 | End: 2021-12-19

## 2021-12-09 NOTE — TELEPHONE ENCOUNTER
I sent in alternative to our pharmacy.  Take this one with food.    Please inform patient/.    Vasquez Denson MD

## 2021-12-09 NOTE — TELEPHONE ENCOUNTER
Patient's  notified of provider message as written. Patient verbalized understanding.     Gladys Chase RN

## 2021-12-09 NOTE — TELEPHONE ENCOUNTER
" calling, patient was seen by Dr. Denson yesterday, she started the clindamycin today, sounds like she finished the first antibiotic before starting the clindamycin that was prescribed on 12/3.     says she developed an itchy rash \"all over\" after taking the first dose this AM, worsened after 2nd dose today.    Denies trouble talking or swallowing, no lip or tongue swelling.   Advised ER visit if either of those happen.    Advised she not take anymore clindamycin, can get some over the counter benadryl and take per package directions if wants relief.    Pharmacy selected, provider no longer in clinic but will be back in the AM.    Routed to Dr. Denson to advise on alternative to clindamycin.    Annika Nice RN  [facility]          "

## 2022-01-25 ENCOUNTER — OFFICE VISIT (OUTPATIENT)
Dept: ENDOCRINOLOGY | Facility: CLINIC | Age: 52
End: 2022-01-25
Payer: COMMERCIAL

## 2022-01-25 VITALS
OXYGEN SATURATION: 98 % | HEART RATE: 67 BPM | BODY MASS INDEX: 34.31 KG/M2 | DIASTOLIC BLOOD PRESSURE: 91 MMHG | WEIGHT: 188.2 LBS | SYSTOLIC BLOOD PRESSURE: 159 MMHG

## 2022-01-25 DIAGNOSIS — E89.0 POSTOPERATIVE HYPOTHYROIDISM: ICD-10-CM

## 2022-01-25 DIAGNOSIS — E83.51 HYPOCALCEMIA: Primary | ICD-10-CM

## 2022-01-25 LAB
ALBUMIN SERPL-MCNC: 3.5 G/DL (ref 3.4–5)
CALCIUM SERPL-MCNC: 8.1 MG/DL (ref 8.5–10.1)
CREAT SERPL-MCNC: 0.58 MG/DL (ref 0.52–1.04)
GFR SERPL CREATININE-BSD FRML MDRD: >90 ML/MIN/1.73M2
MAGNESIUM SERPL-MCNC: 2 MG/DL (ref 1.6–2.3)
PHOSPHATE SERPL-MCNC: 4.7 MG/DL (ref 2.5–4.5)
PTH-INTACT SERPL-MCNC: 14 PG/ML (ref 18–80)
T4 FREE SERPL-MCNC: 1.16 NG/DL (ref 0.76–1.46)
TSH SERPL DL<=0.005 MIU/L-ACNC: 7.97 MU/L (ref 0.4–4)

## 2022-01-25 PROCEDURE — 99214 OFFICE O/P EST MOD 30 MIN: CPT | Performed by: INTERNAL MEDICINE

## 2022-01-25 PROCEDURE — 83970 ASSAY OF PARATHORMONE: CPT | Performed by: INTERNAL MEDICINE

## 2022-01-25 PROCEDURE — 84443 ASSAY THYROID STIM HORMONE: CPT | Performed by: INTERNAL MEDICINE

## 2022-01-25 PROCEDURE — 82040 ASSAY OF SERUM ALBUMIN: CPT | Performed by: INTERNAL MEDICINE

## 2022-01-25 PROCEDURE — 82310 ASSAY OF CALCIUM: CPT | Performed by: INTERNAL MEDICINE

## 2022-01-25 PROCEDURE — 84100 ASSAY OF PHOSPHORUS: CPT | Performed by: INTERNAL MEDICINE

## 2022-01-25 PROCEDURE — 82565 ASSAY OF CREATININE: CPT | Performed by: INTERNAL MEDICINE

## 2022-01-25 PROCEDURE — 82306 VITAMIN D 25 HYDROXY: CPT | Performed by: INTERNAL MEDICINE

## 2022-01-25 PROCEDURE — 83735 ASSAY OF MAGNESIUM: CPT | Performed by: INTERNAL MEDICINE

## 2022-01-25 PROCEDURE — 84439 ASSAY OF FREE THYROXINE: CPT | Performed by: INTERNAL MEDICINE

## 2022-01-25 PROCEDURE — 36415 COLL VENOUS BLD VENIPUNCTURE: CPT | Performed by: INTERNAL MEDICINE

## 2022-01-25 NOTE — PROGRESS NOTES
"S: Pt being seen in f/u for hypothyroidism.  Previously seen by Dr Zhou.   \"Previous thyroidectomy surgery 8/2017, then postop hypothyroidism and hypocalcemia     ~2015.  Noticed left thyroid lump, then medical evaluations  Recalls periodic thyroid u/s imaging x4, also thyroid FNA biopsies:  2/6/15 Thyroid u/s:              Right lobe 5.0x 2.3x 2.0 cm and left lobe 4.6x 3.2x 2.4 cm              Right lobe nodules:  0.7 cm RML, 0.9 cm RLL              Left lobe nodules:  0.9 cm BROOKE, 2.4 cm LML              Left side of isthmus:  0.9 cm nodule  3/3/15 labs included high TPOAb 620  3/2015. Left thyroid lobe nodule FNA:  Benign  3/2016 Thyroid nodule FNA:  Benign  No previous use of thyroid medication  Progressive growth, then problems with food sticking sensation during swallowing  1/11/17. Saw Dr. 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 [place]  8/22/17.  Total thyroidectomy surgery   Procedure at [facility] with Dr. RICHARD [place]  Path:                Benign MNG, no evidence for malignancy              Focal lymphocytic thyroiditis              2 fragments of benign parathyroid tissue, 3 benign LN's     Postop treatment with levothyroxine, recalls the 0.137 mg QD dose   Also took calcium 3-4x/day postop for few weeks, then discontinued  9/26/17 labs:  TSH 0.02, FT4 1.67, Ca 8.1, PTH 7, alb 3.5  1/8/18 labs:  TSH <0.01, FT4 1.66, Ca 8.5, PTH 14, alb 3.8  FamHx thyroid cancer:  Yes     2/21/18. Initial evaluation with me  Dose reduction of thyroid medication, started calcium supplement use  Calcium supplement dose:  Infrequent dosing as 500 mg 2-tabs po QPM  Subsequent dose increase with gummy bear calcum chews  Recent [facility] labs include:        Lab Results   Component Value Date     TSH 0.23 (L) 01/16/2019     T4 1.33 01/16/2019     FT3 2.6 04/29/2015      (H) 03/03/2015            Lab Results   Component Value Date      02/12/2019     POTASSIUM " "4.7 02/12/2019     CHLORIDE 104 02/12/2019     CO2 29 02/12/2019     ANIONGAP 7 02/12/2019      (H) 02/12/2019     BUN 14 02/12/2019     CR 0.52 02/12/2019     GFRESTIMATED >90 02/12/2019     GFRESTBLACK >90 02/12/2019     CAROLE 8.4 (L) 02/12/2019     TSH 0.23 (L) 01/16/2019     VITDT 30 02/12/2019     PTHI 30 01/16/2019      Changed from gummy bear calcium to calcium tablets  Taking calcium 3 (large) calcium tabs each evening  Current dose:  Levothyroxine 0.112 mg QAM     , lives in [place], [place]  Sees Dr. Vasquez Denson for general medicine evaluations\"    Her dose was decreased from 112 to 100 mcg daily after her last visit in 5/2019.   No change in mood. She does note she has been having a harder time getting going in the morning.   She has not been told that she snores.     She had a AROLDO in 2009. Occasional hot flashes.     She is taking 3 calcium gummies a day.   No muscle spasms or javan-oral numbness.     In 1/2022, continues levothyroxine 88 mcg every day.   2 calcium gummies TID.   Calcitriol 0.25 mcg every day   Hydrochlorothiazide 25 mg every day     No myalgias, palpitations, SOB, n/v/d.     ROS: 10 point ROS neg other than the symptoms noted above in the HPI.    O:  Vital signs:      BP: (!) 159/91 Pulse: 67     SpO2: 98 %       Weight: 85.4 kg (188 lb 3.2 oz)  Estimated body mass index is 34.31 kg/m  as calculated from the following:    Height as of 7/30/21: 1.577 m (5' 2.1\").    Weight as of this encounter: 85.4 kg (188 lb 3.2 oz).  Gen: In NAD.   HEENT: no proptosis or lid lag, EOMI, MMM.   Card: S1 S2 RRR no m/r/g.  Pulm: CTA b/l.   GI: NT ND +BS.   Ext: no LE edema.   MSK: no gross deformities.  Neuro: no tremor, +2 DTR's. chovstek's negative.     A/P:   Hypothyroidism - 2/2 surgery for multinodular goiter causing dysphagia. Extensive discussion of thyroid hormone and normal physiology. Included was discussion of thyroid in relation to weight and energy. Dose lowered after last visit " 2/2 subclinical hyperthyroidism.   In 1/2022, on 88 mcg levothyroxine every day.   -No change to levothyroxine dose.   -Labs today.     Hypocalcemia - An issue since thyroid surgery in 8/2017.     Vitamin D 30 in 2/2019. Normal iPTH. Albumin was 3.8 in 2/2019.  No muscle spasms or javan-oral numbness.   Partial hypoparathyroidism?  Started on calcitriol in 1/2020.   She is also on hydrochlorothiazide for HTN.   -Labs today.       Sharif Anne MD on 1/25/2022 at 4:01 PM

## 2022-01-25 NOTE — LETTER
"    1/25/2022         RE: Rhonda Cordero  [address]  [facility] [address]        Dear Colleague,    Thank you for referring your patient, Rhonda Cordero, to the [facility]. Please see a copy of my visit note below.    S: Pt being seen in f/u for hypothyroidism.  Previously seen by Dr Zhou.   \"Previous thyroidectomy surgery 8/2017, then postop hypothyroidism and hypocalcemia     ~2015.  Noticed left thyroid lump, then medical evaluations  Recalls periodic thyroid u/s imaging x4, also thyroid FNA biopsies:  2/6/15 Thyroid u/s:              Right lobe 5.0x 2.3x 2.0 cm and left lobe 4.6x 3.2x 2.4 cm              Right lobe nodules:  0.7 cm RML, 0.9 cm RLL              Left lobe nodules:  0.9 cm BROOKE, 2.4 cm LML              Left side of isthmus:  0.9 cm nodule  3/3/15 labs included high TPOAb 620  3/2015. Left thyroid lobe nodule FNA:  Benign  3/2016 Thyroid nodule FNA:  Benign  No previous use of thyroid medication  Progressive growth, then problems with food sticking sensation during swallowing  1/11/17. Saw Dr. 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 Juares  8/22/17.  Total thyroidectomy surgery   Procedure at [facility] with Dr. RICHARD Juares  Path:                Benign MNG, no evidence for malignancy              Focal lymphocytic thyroiditis              2 fragments of benign parathyroid tissue, 3 benign LN's     Postop treatment with levothyroxine, recalls the 0.137 mg QD dose   Also took calcium 3-4x/day postop for few weeks, then discontinued  9/26/17 labs:  TSH 0.02, FT4 1.67, Ca 8.1, PTH 7, alb 3.5  1/8/18 labs:  TSH <0.01, FT4 1.66, Ca 8.5, PTH 14, alb 3.8  FamHx thyroid cancer:  Yes     2/21/18. Initial evaluation with me  Dose reduction of thyroid medication, started calcium supplement use  Calcium supplement dose:  Infrequent dosing as 500 mg 2-tabs po QPM  Subsequent dose increase with gummy bear " "calcum chews  Recent [facility] labs include:        Lab Results   Component Value Date     TSH 0.23 (L) 01/16/2019     T4 1.33 01/16/2019     FT3 2.6 04/29/2015      (H) 03/03/2015            Lab Results   Component Value Date      02/12/2019     POTASSIUM 4.7 02/12/2019     CHLORIDE 104 02/12/2019     CO2 29 02/12/2019     ANIONGAP 7 02/12/2019      (H) 02/12/2019     BUN 14 02/12/2019     CR 0.52 02/12/2019     GFRESTIMATED >90 02/12/2019     GFRESTBLACK >90 02/12/2019     CAROLE 8.4 (L) 02/12/2019     TSH 0.23 (L) 01/16/2019     VITDT 30 02/12/2019     PTHI 30 01/16/2019      Changed from gummy bear calcium to calcium tablets  Taking calcium 3 (large) calcium tabs each evening  Current dose:  Levothyroxine 0.112 mg QAM     , lives in [place], [place]  Sees Dr. Vasquez Denson for general medicine evaluations\"    Her dose was decreased from 112 to 100 mcg daily after her last visit in 5/2019.   No change in mood. She does note she has been having a harder time getting going in the morning.   She has not been told that she snores.     She had a AROLDO in 2009. Occasional hot flashes.     She is taking 3 calcium gummies a day.   No muscle spasms or javan-oral numbness.     In 1/2022, continues levothyroxine 88 mcg every day.   2 calcium gummies TID.   Calcitriol 0.25 mcg every day   Hydrochlorothiazide 25 mg every day     No myalgias, palpitations, SOB, n/v/d.     ROS: 10 point ROS neg other than the symptoms noted above in the HPI.    O:  Vital signs:      BP: (!) 159/91 Pulse: 67     SpO2: 98 %       Weight: 85.4 kg (188 lb 3.2 oz)  Estimated body mass index is 34.31 kg/m  as calculated from the following:    Height as of 7/30/21: 1.577 m (5' 2.1\").    Weight as of this encounter: 85.4 kg (188 lb 3.2 oz).  Gen: In NAD.   HEENT: no proptosis or lid lag, EOMI, MMM.   Card: S1 S2 RRR no m/r/g.  Pulm: CTA b/l.   GI: NT ND +BS.   Ext: no LE edema.   MSK: no gross deformities.  Neuro: no tremor, +2 DTR's. " chovstek's negative.     A/P:   Hypothyroidism - 2/2 surgery for multinodular goiter causing dysphagia. Extensive discussion of thyroid hormone and normal physiology. Included was discussion of thyroid in relation to weight and energy. Dose lowered after last visit 2/2 subclinical hyperthyroidism.   In 1/2022, on 88 mcg levothyroxine every day.   -No change to levothyroxine dose.   -Labs today.     Hypocalcemia - An issue since thyroid surgery in 8/2017.     Vitamin D 30 in 2/2019. Normal iPTH. Albumin was 3.8 in 2/2019.  No muscle spasms or javan-oral numbness.   Partial hypoparathyroidism?  Started on calcitriol in 1/2020.   She is also on hydrochlorothiazide for HTN.   -Labs today.       Sharif Anne MD on 1/25/2022 at 4:01 PM              Again, thank you for allowing me to participate in the care of your patient.        Sincerely,        Sharif Anne MD

## 2022-01-25 NOTE — LETTER
January 27, 2022      Sue Cordero  [address] [facility] [address]        Dear ,    We are writing to inform you of your test results.    You had a low vitamin D, low parathyroid hormon, low calcium, and high phosphorus level   Please increase calcitriol to 0.5 mcg every day and take a separate D3 supplement of 2000 international unit(s) every day. Additionally your TSH is elevated. Please make sure you take the levothyroxine 4 hours separate from any calcium supplements. Please get labs in 4-6 weeks.       Resulted Orders   Calcium   Result Value Ref Range    Calcium 8.1 (L) 8.5 - 10.1 mg/dL   Albumin level   Result Value Ref Range    Albumin 3.5 3.4 - 5.0 g/dL   Parathyroid Hormone Intact   Result Value Ref Range    Parathyroid Hormone Intact 14 (L) 18 - 80 pg/mL   Phosphorus   Result Value Ref Range    Phosphorus 4.7 (H) 2.5 - 4.5 mg/dL   Creatinine   Result Value Ref Range    Creatinine 0.58 0.52 - 1.04 mg/dL    GFR Estimate >90 >60 mL/min/1.73m2      Comment:      Effective December 21, 2021 eGFRcr in adults is calculated using the 2021 CKD-EPI creatinine equation which includes age and gender (Natacha et al., NEJM, DOI: 10.1056/UVNNoy2767786)   Magnesium   Result Value Ref Range    Magnesium 2.0 1.6 - 2.3 mg/dL   Vitamin D Deficiency   Result Value Ref Range    Vitamin D, Total (25-Hydroxy) 23 20 - 75 ug/L    Narrative    Season, race, dietary intake, and treatment affect the concentration of 25-hydroxy-Vitamin D. Values may decrease during winter months and increase during summer months. Values 20-29 ug/L may indicate Vitamin D insufficiency and values <20 ug/L may indicate Vitamin D deficiency.    Vitamin D determination is routinely performed by an immunoassay specific for 25 hydroxyvitamin D3.  If an individual is on vitamin D2(ergocalciferol) supplementation, please specify 25 OH vitamin D2 and D3 level determination by LCMSMS test VITD23.     TSH with free T4 reflex   Result  Value Ref Range    TSH 7.97 (H) 0.40 - 4.00 mU/L   T4 free   Result Value Ref Range    Free T4 1.16 0.76 - 1.46 ng/dL       If you have any questions or concerns, please call the clinic at the number listed above.       Sincerely,      Je JEFFERSON RN....1/27/2022 9:10 AM

## 2022-01-26 DIAGNOSIS — E83.51 HYPOCALCEMIA: ICD-10-CM

## 2022-01-26 DIAGNOSIS — E89.0 POSTOPERATIVE HYPOTHYROIDISM: Primary | ICD-10-CM

## 2022-01-26 LAB — DEPRECATED CALCIDIOL+CALCIFEROL SERPL-MC: 23 UG/L (ref 20–75)

## 2022-01-26 RX ORDER — CALCITRIOL 0.25 UG/1
0.5 CAPSULE, LIQUID FILLED ORAL DAILY
Qty: 60 CAPSULE | Refills: 11 | Status: SHIPPED | OUTPATIENT
Start: 2022-01-26 | End: 2023-03-01

## 2022-05-04 DIAGNOSIS — I10 HYPERTENSION GOAL BP (BLOOD PRESSURE) < 140/90: ICD-10-CM

## 2022-05-05 RX ORDER — LISINOPRIL 10 MG/1
TABLET ORAL
Qty: 135 TABLET | Refills: 0 | Status: SHIPPED | OUTPATIENT
Start: 2022-05-05 | End: 2022-07-28

## 2022-05-05 NOTE — TELEPHONE ENCOUNTER
"Routing refill request to provider for review/approval because:  Blood Pressure out of range    Requested Prescriptions   Pending Prescriptions Disp Refills    lisinopril (ZESTRIL) 10 MG tablet [Pharmacy Med Name: LISINOPRIL 10MG TABS] 135 tablet 1     Sig: TAKE ONE AND ONE-HALF TABLETS BY MOUTH EVERY DAY        ACE Inhibitors (Including Combos) Protocol Failed - 5/4/2022  5:01 AM        Failed - Blood pressure under 140/90 in past 12 months       BP Readings from Last 3 Encounters:   01/25/22 (!) 159/91   12/08/21 126/89   12/01/21 (!) 152/90                 Passed - Recent (12 mo) or future (30 days) visit within the authorizing provider's specialty     Patient has had an office visit with the authorizing provider or a provider within the authorizing providers department within the previous 12 mos or has a future within next 30 days. See \"Patient Info\" tab in inbasket, or \"Choose Columns\" in Meds & Orders section of the refill encounter.              Passed - Medication is active on med list        Passed - Patient is age 18 or older        Passed - No active pregnancy on record        Passed - Normal serum creatinine on file in past 12 months     Recent Labs   Lab Test 01/25/22  1552   CR 0.58       Ok to refill medication if creatinine is low          Passed - Normal serum potassium on file in past 12 months     Recent Labs   Lab Test 10/06/21  1409   POTASSIUM 4.0               Passed - No positive pregnancy test within past 12 months              Trish Paniagua RN   [facility]   "

## 2022-05-05 NOTE — TELEPHONE ENCOUNTER
Okay refill but then see us in the next 2-3 months in clinic    Please inform patient    Vasquez Denson MD

## 2022-05-06 ENCOUNTER — ALLIED HEALTH/NURSE VISIT (OUTPATIENT)
Dept: FAMILY MEDICINE | Facility: CLINIC | Age: 52
End: 2022-05-06
Payer: COMMERCIAL

## 2022-05-06 VITALS — SYSTOLIC BLOOD PRESSURE: 118 MMHG | DIASTOLIC BLOOD PRESSURE: 80 MMHG

## 2022-05-06 DIAGNOSIS — Z01.30 BP CHECK: Primary | ICD-10-CM

## 2022-05-06 PROCEDURE — 99207 PR NO CHARGE NURSE ONLY: CPT | Performed by: FAMILY MEDICINE

## 2022-05-06 NOTE — TELEPHONE ENCOUNTER
I called and notified patient of message below. She will have the pharmacy check her BP for us when she comes in to  the medication. She will also schedule her recheck with Dr. Denson when she gets time.  Bette Houston CMA

## 2022-05-06 NOTE — PROGRESS NOTES
Rhonda Cordero was evaluated at [facility] on May 6, 2022 at which time her blood pressure was:    BP Readings from Last 3 Encounters:   05/06/22 118/80   01/25/22 (!) 159/91   12/08/21 126/89     Pulse Readings from Last 3 Encounters:   01/25/22 67   12/08/21 85   12/01/21 84       Reviewed lifestyle modifications for blood pressure control and reduction: including making healthy food choices, managing weight, getting regular exercise, smoking cessation, reducing alcohol consumption, monitoring blood pressure regularly.     Symptoms: None    BP Goal:< 140/90 mmHg    BP Assessment:  BP at goal    Potential Reasons for BP too high: NA - Not applicable    BP Follow-Up Plan: Recheck BP in 6 months at pharmacy    Recommendation to Provider: Pt reports that she has lost a significant amount of weight as a result of lifestyle modification. She should have a BP taken during her appointment in clinic next month.     Note completed by: Cameron Markham, PharmD  Sonia Pharmacist   [facility]  [address] [place] [address]  P: [phone]

## 2022-07-08 ENCOUNTER — OFFICE VISIT (OUTPATIENT)
Dept: FAMILY MEDICINE | Facility: CLINIC | Age: 52
End: 2022-07-08
Payer: COMMERCIAL

## 2022-07-08 VITALS
HEIGHT: 63 IN | OXYGEN SATURATION: 98 % | TEMPERATURE: 97.3 F | BODY MASS INDEX: 32.89 KG/M2 | DIASTOLIC BLOOD PRESSURE: 84 MMHG | SYSTOLIC BLOOD PRESSURE: 132 MMHG | RESPIRATION RATE: 22 BRPM | WEIGHT: 185.6 LBS | HEART RATE: 64 BPM

## 2022-07-08 DIAGNOSIS — G56.01 CARPAL TUNNEL SYNDROME OF RIGHT WRIST: ICD-10-CM

## 2022-07-08 DIAGNOSIS — B35.4 TINEA CORPORIS: ICD-10-CM

## 2022-07-08 DIAGNOSIS — L30.9 DERMATITIS: ICD-10-CM

## 2022-07-08 DIAGNOSIS — M70.61 TROCHANTERIC BURSITIS OF BOTH HIPS: Primary | ICD-10-CM

## 2022-07-08 DIAGNOSIS — I10 ESSENTIAL HYPERTENSION WITH GOAL BLOOD PRESSURE LESS THAN 140/90: ICD-10-CM

## 2022-07-08 DIAGNOSIS — Z12.11 SCREEN FOR COLON CANCER: ICD-10-CM

## 2022-07-08 DIAGNOSIS — M70.62 TROCHANTERIC BURSITIS OF BOTH HIPS: Primary | ICD-10-CM

## 2022-07-08 PROCEDURE — 99214 OFFICE O/P EST MOD 30 MIN: CPT | Performed by: FAMILY MEDICINE

## 2022-07-08 RX ORDER — CLOTRIMAZOLE 1 %
CREAM (GRAM) TOPICAL 2 TIMES DAILY
Qty: 113 G | Refills: 1 | Status: SHIPPED | OUTPATIENT
Start: 2022-07-08 | End: 2023-05-05

## 2022-07-08 ASSESSMENT — ENCOUNTER SYMPTOMS: HIP PAIN: 1

## 2022-07-08 NOTE — PATIENT INSTRUCTIONS
Return the stool FIT test    Sports medicine for hip trochanteric bursitis    Use wrist brace some; if hand getting worse see orthopedic surgeon    For rash, use both hydrocortisone cream and antifungal cream clotrimazole; if not resolving see dermatology    No scratching

## 2022-07-08 NOTE — PROGRESS NOTES
"       Darline Rogers is a 51 year old presenting for the following health issues:  Hip Pain    Bilateral hip pain x2 months, sometimes radiates down legs. Worse with walking and laying on side.    Also noticed some intermittent numbness/tingling in fingertips x2 weeks.    Hip Pain    History of Present Illness       Reason for visit:  Hip hurt  Symptom onset:  More than a month  Symptom intensity:  Severe  Symptom progression:  Staying the same  Had these symptoms before:  Yes  Has tried/received treatment for these symptoms:  No    She eats 0-1 servings of fruits and vegetables daily.She consumes 0 sweetened beverage(s) daily.She exercises with enough effort to increase her heart rate 60 or more minutes per day.  She exercises with enough effort to increase her heart rate 5 days per week. She is missing 1 dose(s) of medications per week.        Review of Systems   Pain with walking up step    Hard to sleep on side        Objective    Resp 22   Ht 1.588 m (5' 2.5\")   Wt 84.2 kg (185 lb 9.6 oz)   LMP 11/23/2009   BMI 33.41 kg/m    Body mass index is 33.41 kg/m .  Physical Exam   Full physical not done     Mentation and affect are fine    No tremor of speech or extremity    Patient has large fairly symmetric area of dermatitis on upper and mid gluteal fold.  It is intertriginous but surface is quite rough.  She does admit to scratching/ scrubbing at it.  Much smaller area nickel sized on right hip/ upper thigh.    She is tender over both trochanters.      Hip range of motion is okay bilat and symmetric    tinels and pressure tests positive on right hand/ wrist. phalens neg.    All neg on left.    Good sensation and strength throughout.      Radial pulses okay.             ASSESSMENT / PLAN:  (M70.61,  M70.62) Trochanteric bursitis of both hips  (primary encounter diagnosis)  Comment: did referral for sports med for this and discussed exercises she could try.  In past she has injection which helped   Plan: " Orthopedic  Referral             (Z12.11) Screen for colon cancer  Comment: fit test   Plan: Fecal colorectal cancer screen (FIT)           Of note patient has mammogram scheduled     (G56.01) Carpal tunnel syndrome of right wrist  Comment: discussed in detail.  Patient could try wrist splint occasionally but if not improving then see orthopedics.  Stressed she should not wait until hand / fingers get weak.   Plan: Orthopedic  Referral             (L30.9) Dermatitis  Comment: likely combination of fungal and inflammation contributors.  Plan: Adult Dermatology Referral        Use hydrocortisone and antifungal creams both bid.  No scratching/ scrubbing at area  Follow up with dermatology if not better in a few weeks     (B35.4) Tinea corporis  Comment: as above  Plan: clotrimazole (LOTRIMIN) 1 % external cream             (I10) Essential hypertension with goal blood pressure less than 140/90  Comment: blood pressure okay   Plan: no change in meds       I reviewed the patient's medications, allergies, medical history, family history, and social history.    Vasquez Denson MD                .  ..

## 2022-07-20 ENCOUNTER — OFFICE VISIT (OUTPATIENT)
Dept: ORTHOPEDICS | Facility: CLINIC | Age: 52
End: 2022-07-20
Attending: FAMILY MEDICINE
Payer: COMMERCIAL

## 2022-07-20 VITALS — DIASTOLIC BLOOD PRESSURE: 74 MMHG | SYSTOLIC BLOOD PRESSURE: 128 MMHG | HEART RATE: 78 BPM | OXYGEN SATURATION: 99 %

## 2022-07-20 DIAGNOSIS — G56.01 CARPAL TUNNEL SYNDROME OF RIGHT WRIST: Primary | ICD-10-CM

## 2022-07-20 DIAGNOSIS — R20.0 NUMBNESS OF FINGERS: ICD-10-CM

## 2022-07-20 PROCEDURE — 99243 OFF/OP CNSLTJ NEW/EST LOW 30: CPT | Performed by: ORTHOPAEDIC SURGERY

## 2022-07-20 ASSESSMENT — PAIN SCALES - GENERAL: PAINLEVEL: MODERATE PAIN (4)

## 2022-07-20 NOTE — LETTER
7/20/2022         RE: Rhonda Cordero  [address] [facility] [address]        Dear Colleague,    Thank you for referring your patient, Rhonda Cordero, to the [facility]. Please see a copy of my visit note below.    SUBJECTIVE:  Rhonda Cordero is a 51 year old female who is seen in consultation at the request of Dr. Denson, for Right hand problems x ? 1 year.   Worse the past 2 months  Symptoms:   Has numbness and tingling in #2-4 digits.  No pain or weakness reported.  Worse in the morning.     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,  at [employer]    Past Medical History:   Diagnosis Date     Anemia      Hypertension goal BP (blood pressure) < 130/80 5/6/2011     Hypocalcemia      Multinodular goiter      Postsurgical hypothyroidism      Rotator cuff tendonitis 2/10/2015      Past Surgical History:   Procedure Laterality Date     LAPAROSCOPY,TUBAL CAUTERY  2005     SALPINGO OOPHORECTOMY,R/L/LUIS  2005    left     THYROIDECTOMY N/A 8/22/2017    Procedure: THYROIDECTOMY;  Total Thyroidectomy;  Surgeon: Skylar Costa MD;  Location:  OR     [facility] TOTAL ABDOM HYSTERECTOMY  12/9/09    [facility]        REVIEW OF SYSTEMS:  CONSTITUTIONAL:  NEGATIVE for fever, chills, change in weight  INTEGUMENTARY/SKIN:  NEGATIVE for worrisome rashes, moles or lesions  EYES:  NEGATIVE for vision changes or irritation  ENT/MOUTH:  NEGATIVE for ear, mouth and throat problems  RESP:  NEGATIVE for significant cough or SOB  BREAST:  NEGATIVE for masses, tenderness or discharge  CV:  NEGATIVE for chest pain, palpitations or peripheral edema  GI:  NEGATIVE for nausea, abdominal pain, heartburn, or change in bowel habits  :  Negative   MUSCULOSKELETAL:  See HPI above  NEURO:  See HPI above  ENDOCRINE:  NEGATIVE for temperature intolerance, skin/hair changes  HEME/ALLERGY/IMMUNE:  NEGATIVE for bleeding problems  PSYCHIATRIC:  NEGATIVE for  changes in mood or affect    EXAM:  GENERAL APPEARANCE: healthy, alert and no distress   GAIT: NORMAL  PSYCH:  mentation appears normal and affect normal/bright  SKIN: no suspicious lesions or rashes  NEURO: reflexes normal in upper extremities.   Vascular: Good capp refill and pulses  RESP: No increased work of breathing  LYMPH:  No lymphedema    MSK/Neuro:   strength: decreased,   negative thenar fasciculations.  Thenar atrophy:none.   Sensation diminished in  #2-4 digits,     Range of Motion wrist, digits: All Normal  Special tests: Tinel's positive, Phalen's positive 10 seconds    EMG: none.    ASSESSMENT/PLAN  Mild-moderate right Carpal tunnel syndrome     We talked about the options, which included  EMG, activity modification, night splinting, therapy, corticosteroid injection, medrol dose pack, and carpal tunnel release, home exercise program      We decided to proceed with image guided corticosteroid injection, night bracing, nsaids, home exercise program, which was discussed.        Return to clinic as needed     FRED Carlson MD  Dept. Orthopedic Surgery  [facility]      Again, thank you for allowing me to participate in the care of your patient.        Sincerely,        Eriberto Carlson MD

## 2022-07-20 NOTE — PROGRESS NOTES
SUBJECTIVE:  Rhonda Cordero is a 51 year old female who is seen in consultation at the request of Dr. Denson, for Right hand problems x ? 1 year.   Worse the past 2 months  Symptoms:   Has numbness and tingling in #2-4 digits.  No pain or weakness reported.  Worse in the morning.     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,  at [employer]    Past Medical History:   Diagnosis Date     Anemia      Hypertension goal BP (blood pressure) < 130/80 5/6/2011     Hypocalcemia      Multinodular goiter      Postsurgical hypothyroidism      Rotator cuff tendonitis 2/10/2015      Past Surgical History:   Procedure Laterality Date     LAPAROSCOPY,TUBAL CAUTERY  2005     SALPINGO OOPHORECTOMY,R/L/LUIS  2005    left     THYROIDECTOMY N/A 8/22/2017    Procedure: THYROIDECTOMY;  Total Thyroidectomy;  Surgeon: Skylar Costa MD;  Location:  OR     [facility] TOTAL ABDOM HYSTERECTOMY  12/9/09    [facility]        REVIEW OF SYSTEMS:  CONSTITUTIONAL:  NEGATIVE for fever, chills, change in weight  INTEGUMENTARY/SKIN:  NEGATIVE for worrisome rashes, moles or lesions  EYES:  NEGATIVE for vision changes or irritation  ENT/MOUTH:  NEGATIVE for ear, mouth and throat problems  RESP:  NEGATIVE for significant cough or SOB  BREAST:  NEGATIVE for masses, tenderness or discharge  CV:  NEGATIVE for chest pain, palpitations or peripheral edema  GI:  NEGATIVE for nausea, abdominal pain, heartburn, or change in bowel habits  :  Negative   MUSCULOSKELETAL:  See HPI above  NEURO:  See HPI above  ENDOCRINE:  NEGATIVE for temperature intolerance, skin/hair changes  HEME/ALLERGY/IMMUNE:  NEGATIVE for bleeding problems  PSYCHIATRIC:  NEGATIVE for changes in mood or affect    EXAM:  GENERAL APPEARANCE: healthy, alert and no distress   GAIT: NORMAL  PSYCH:  mentation appears normal and affect normal/bright  SKIN: no suspicious lesions or rashes  NEURO: reflexes normal in upper extremities.   Vascular: Good capp  refill and pulses  RESP: No increased work of breathing  LYMPH:  No lymphedema    MSK/Neuro:   strength: decreased,   negative thenar fasciculations.  Thenar atrophy:none.   Sensation diminished in  #2-4 digits,     Range of Motion wrist, digits: All Normal  Special tests: Tinel's positive, Phalen's positive 10 seconds    EMG: none.    ASSESSMENT/PLAN  Mild-moderate right Carpal tunnel syndrome     We talked about the options, which included  EMG, activity modification, night splinting, therapy, corticosteroid injection, medrol dose pack, and carpal tunnel release, home exercise program      We decided to proceed with image guided corticosteroid injection, night bracing, nsaids, home exercise program, which was discussed.        Return to clinic as needed     FRED Carlson MD  Dept. Orthopedic Surgery  [facility]

## 2022-07-28 ENCOUNTER — ANCILLARY PROCEDURE (OUTPATIENT)
Dept: GENERAL RADIOLOGY | Facility: CLINIC | Age: 52
End: 2022-07-28
Attending: PEDIATRICS
Payer: COMMERCIAL

## 2022-07-28 ENCOUNTER — OFFICE VISIT (OUTPATIENT)
Dept: ORTHOPEDICS | Facility: CLINIC | Age: 52
End: 2022-07-28
Attending: FAMILY MEDICINE

## 2022-07-28 VITALS
DIASTOLIC BLOOD PRESSURE: 80 MMHG | HEART RATE: 82 BPM | SYSTOLIC BLOOD PRESSURE: 132 MMHG | WEIGHT: 185 LBS | BODY MASS INDEX: 33.3 KG/M2

## 2022-07-28 DIAGNOSIS — M70.62 TROCHANTERIC BURSITIS OF BOTH HIPS: ICD-10-CM

## 2022-07-28 DIAGNOSIS — M70.61 TROCHANTERIC BURSITIS OF BOTH HIPS: ICD-10-CM

## 2022-07-28 PROCEDURE — 73522 X-RAY EXAM HIPS BI 3-4 VIEWS: CPT | Mod: TC | Performed by: RADIOLOGY

## 2022-07-28 PROCEDURE — 20610 DRAIN/INJ JOINT/BURSA W/O US: CPT | Mod: 50 | Performed by: PEDIATRICS

## 2022-07-28 PROCEDURE — 99203 OFFICE O/P NEW LOW 30 MIN: CPT | Mod: 25 | Performed by: PEDIATRICS

## 2022-07-28 RX ORDER — LIDOCAINE HYDROCHLORIDE 10 MG/ML
2 INJECTION, SOLUTION INFILTRATION; PERINEURAL
Status: SHIPPED | OUTPATIENT
Start: 2022-07-28

## 2022-07-28 RX ORDER — TRIAMCINOLONE ACETONIDE 40 MG/ML
40 INJECTION, SUSPENSION INTRA-ARTICULAR; INTRAMUSCULAR
Status: SHIPPED | OUTPATIENT
Start: 2022-07-28

## 2022-07-28 RX ADMIN — TRIAMCINOLONE ACETONIDE 40 MG: 40 INJECTION, SUSPENSION INTRA-ARTICULAR; INTRAMUSCULAR at 16:18

## 2022-07-28 RX ADMIN — LIDOCAINE HYDROCHLORIDE 2 ML: 10 INJECTION, SOLUTION INFILTRATION; PERINEURAL at 16:18

## 2022-07-28 NOTE — PATIENT INSTRUCTIONS
Discussed the diagnosis of greater trochanteric pain syndrome and contributing factors to lateral hip pain including gluteal tendinopathy, IT Band Syndrome and weak hip abductor muscles.  Discussed that these factors can cause inflammation in the greater trochanteric bursa.  I recommend physical therapy for overall hip strengthening.  Discussed that injections are sometimes helpful for point tenderness over the bursa, however, will not improve overall hip strength.  Would consider further work up and treatment pending clinical course.    Plan:  - Today's Plan of Care:  Steroid injection of the bilateral hip: trochanteric bursae was performed today in clinic  Icing for the next 1-2 days may be helpful for pain. Injection may take 10-14 days to see the full effect.    Discussed activity considerations and other supportive care including Ice/Heat, OTC and other topical medications as needed.    Home Exercise Program    -We also discussed other future treatment options:  Referral to Physical Therapy  MRI if severe pain    Follow Up: as needed    If you have any further questions for your physician or physician s care team you can call [phone] and use option 3 to leave a voice message.    After the Injection     After the injection, strenuous and repetitive activity should be minimized for approximately 48 hours.   Ice should be applied to the injected area at least for the next 48 hours.   Apply ice to the injected area at least 3 - 4 times a day for 20 minutes each time for the next 48 hours. This can reduce the painful  flare  reaction that can follow an injection the next day. This reaction can cause the area that was injected to hurt more the next day just from the injection. This will resolve within a day if it does occur.     Use over-the-counter pain medications such as Tylenol to help with the pain if necessary.     After 48 hours, icing the area may be continued if you find it beneficial.     The lidocaine  or marcaine (commonly called Novocain) is an anesthetic agent that is injected with the steroid will typically relieve your pain for a few hours following the injection. If the  Novocain  and steroid are injected near a nerve, you may experience local numbness or weakness from the nerve block until it wears off. After this wears off your pain may return until the steroid takes effect.   The steroid may be effective immediately after the injection. Do not be concerned if the injection is not effective in relieving your symptoms immediately. In some cases, it may take up to two weeks for the steroid to work.   If you are diabetic, the corticosteroid may cause your blood sugar to become elevated for several days following the injection. This response usually lasts about 2-4 days before it returns to your normal level.   You should report any adverse reaction to you doctor. Call if there are any questions.

## 2022-07-28 NOTE — PROGRESS NOTES
ASSESSMENT & PLAN    Rhonda was seen today for pain and pain.    Diagnoses and all orders for this visit:    Trochanteric bursitis of both hips  -     Orthopedic  Referral  -     XR Pelvis and Hip Bilateral 2 Views; Future    Other orders  -     Large Joint Injection/Arthocentesis: bilateral greater trochanteric bursa      This issue is acute on chronic and Unchanged.    Discussed the diagnosis of greater trochanteric pain syndrome and contributing factors to lateral hip pain including gluteal tendinopathy, IT Band Syndrome and weak hip abductor muscles.  Discussed that these factors can cause inflammation in the greater trochanteric bursa.  I recommend physical therapy for overall hip strengthening.  Discussed that injections are sometimes helpful for point tenderness over the bursa, however, will not improve overall hip strength.  Would consider further work up and treatment pending clinical course.    Plan:  - Today's Plan of Care:  Steroid injection of the bilateral hip: trochanteric bursae was performed today in clinic  Icing for the next 1-2 days may be helpful for pain. Injection may take 10-14 days to see the full effect.    Discussed activity considerations and other supportive care including Ice/Heat, OTC and other topical medications as needed.    Home Exercise Program    -We also discussed other future treatment options:  Referral to Physical Therapy  MRI if severe pain    Follow Up: as needed    Concerning signs and symptoms were reviewed.  The patient expressed understanding of this management plan and all questions were answered at this time.    Thanks for the opportunity to participate in the care of this patient, I will keep you updated on their progress.    CC: Vasquez Leon MD [facility]  Sports Medicine Physician  [facility]    -----  Chief Complaint   Patient presents with     Right Hip - Pain     Left Hip - Pain       SUBJECTIVE  Rhonda  Gil is a/an 51 year old female who is seen in consultation at the request of  Vasquez Denson M.D. for evaluation of bilateral hip pain.     The patient is seen by themselves.    Onset: 10-15 years(s) ago, hurts again on both sides in the last few months. Reports insidious onset without acute precipitating event.  Location of Pain: bilateral hips, left worse than R ; lateral thigh pain, greater trochanter   Worsened by: laying down, right/left side laying down  Better with: changing positions while sleeping  Treatments tried: rest/activity avoidance, Tylenol and other medications: biofreeze  - Prior right sided bursa injection 10/2013 with Dr. Luu  Associated symptoms: pain at night    Orthopedic/Surgical history: YES - Date: chronic bilateral pain  Social History/Occupation: works at [employer]    No family history pertinent to patient's problem today.    REVIEW OF SYSTEMS:  Review of Systems  Skin: no bruising, no swelling  Musculoskeletal: as above  Neurologic: no numbness, paresthesias  Remainder of review of systems is negative including constitutional, CV, pulmonary, GI, except as noted in HPI or medical history.    OBJECTIVE:  /80   Pulse 82   Wt 83.9 kg (185 lb)   LMP 11/23/2009   BMI 33.30 kg/m     General: healthy, alert and in no distress  HEENT: no scleral icterus or conjunctival erythema  Skin: no suspicious lesions or rash. No jaundice.  CV: distal perfusion intact  Resp: normal respiratory effort without conversational dyspnea   Psych: normal mood and affect  Gait: normal steady gait with appropriate coordination and balance  Neuro: Normal light sensory exam of lower extremity    Bilateral hip exam    Inspection:      no edema or ecchymosis in hip area    Tender:      greater trochanter bilateral    Non Tender:      remainder of the hip area bilateral    ROM:     Full active and passive ROM  bilateral    Strength:      flexion 5-/5 bilateral       abduction 5-/5 bilateral        adduction 5/5 bilateral    Sensation:      grossly intact in hip and thigh    Special Tests:      neg (-) DESIREE bilateral       neg (-) FADIR bilateral    RADIOLOGY:  I independently ordered, visualized and reviewed these images with the patient  AP Pelvis and lateral XR views of hips reviewed: no acute bony abnormality, no significant degenerative change  - will follow official read      Review of the result(s) of each unique test - XR       Large Joint Injection/Arthocentesis: bilateral greater trochanteric bursa    Date/Time: 7/28/2022 4:18 PM  Performed by: Yanique Leon MD  Authorized by: Yanique Leon MD     Indications:  Pain  Needle Size:  25 G  Guidance: landmark guided    Approach:  Lateral  Location:  Hip  Laterality:  Bilateral      Site:  Bilateral greater trochanteric bursa  Medications (Right):  40 mg triamcinolone 40 MG/ML; 2 mL lidocaine 1 %  Medications (Left):  40 mg triamcinolone 40 MG/ML; 2 mL lidocaine 1 %  Outcome:  Tolerated well, no immediate complications  Procedure discussed: discussed risks, benefits, and alternatives    Consent Given by:  Patient  Timeout: timeout called immediately prior to procedure    Prep: patient was prepped and draped in usual sterile fashion     The risks, benefits and complications of steroid injection were discussed with the patient (including but not limited to: bleeding, infection, pain, scar, damage to adjacent structures, atrophy or necrosis of soft tissue, skin blanching, failure to relieve symptoms, worsening of symptoms, allergic reaction). After this discussion all questions were addressed and answered and the patient elected to proceed. Rhonda  tolerated the procedure well without complications.  Also discussed that if diabetic, recommend close monitoring of blood sugars over the next week as cortisone injections can temporarily elevate blood sugars.

## 2022-07-28 NOTE — LETTER
7/28/2022         RE: Rhonda Cordero  [address] [facility] [address]        Dear Colleague,    Thank you for referring your patient, Rhonda Cordero, to the [facility]. Please see a copy of my visit note below.    ASSESSMENT & PLAN    Rhonda was seen today for pain and pain.    Diagnoses and all orders for this visit:    Trochanteric bursitis of both hips  -     Orthopedic  Referral  -     XR Pelvis and Hip Bilateral 2 Views; Future    Other orders  -     Large Joint Injection/Arthocentesis: bilateral greater trochanteric bursa      This issue is acute on chronic and Unchanged.    Discussed the diagnosis of greater trochanteric pain syndrome and contributing factors to lateral hip pain including gluteal tendinopathy, IT Band Syndrome and weak hip abductor muscles.  Discussed that these factors can cause inflammation in the greater trochanteric bursa.  I recommend physical therapy for overall hip strengthening.  Discussed that injections are sometimes helpful for point tenderness over the bursa, however, will not improve overall hip strength.  Would consider further work up and treatment pending clinical course.    Plan:  - Today's Plan of Care:  Steroid injection of the bilateral hip: trochanteric bursae was performed today in clinic  Icing for the next 1-2 days may be helpful for pain. Injection may take 10-14 days to see the full effect.    Discussed activity considerations and other supportive care including Ice/Heat, OTC and other topical medications as needed.    Home Exercise Program    -We also discussed other future treatment options:  Referral to Physical Therapy  MRI if severe pain    Follow Up: as needed    Concerning signs and symptoms were reviewed.  The patient expressed understanding of this management plan and all questions were answered at this time.    Thanks for the opportunity to participate in the care of this  patient, I will keep you updated on their progress.    CC: Vasquez Leon MD [facility]  Sports Medicine Physician  [facility]    -----  Chief Complaint   Patient presents with     Right Hip - Pain     Left Hip - Pain       SUBJECTIVE  Rhonda Cordero is a/an 51 year old female who is seen in consultation at the request of  Vasquez Denson M.D. for evaluation of bilateral hip pain.     The patient is seen by themselves.    Onset: 10-15 years(s) ago, hurts again on both sides in the last few months. Reports insidious onset without acute precipitating event.  Location of Pain: bilateral hips, left worse than R ; lateral thigh pain, greater trochanter   Worsened by: laying down, right/left side laying down  Better with: changing positions while sleeping  Treatments tried: rest/activity avoidance, Tylenol and other medications: biofreeze  - Prior right sided bursa injection 10/2013 with Dr. Luu  Associated symptoms: pain at night    Orthopedic/Surgical history: YES - Date: chronic bilateral pain  Social History/Occupation: works at [employer]    No family history pertinent to patient's problem today.    REVIEW OF SYSTEMS:  Review of Systems  Skin: no bruising, no swelling  Musculoskeletal: as above  Neurologic: no numbness, paresthesias  Remainder of review of systems is negative including constitutional, CV, pulmonary, GI, except as noted in HPI or medical history.    OBJECTIVE:  /80   Pulse 82   Wt 83.9 kg (185 lb)   LMP 11/23/2009   BMI 33.30 kg/m     General: healthy, alert and in no distress  HEENT: no scleral icterus or conjunctival erythema  Skin: no suspicious lesions or rash. No jaundice.  CV: distal perfusion intact  Resp: normal respiratory effort without conversational dyspnea   Psych: normal mood and affect  Gait: normal steady gait with appropriate coordination and balance  Neuro: Normal light sensory exam of lower extremity    Bilateral hip  exam    Inspection:      no edema or ecchymosis in hip area    Tender:      greater trochanter bilateral    Non Tender:      remainder of the hip area bilateral    ROM:     Full active and passive ROM  bilateral    Strength:      flexion 5-/5 bilateral       abduction 5-/5 bilateral       adduction 5/5 bilateral    Sensation:      grossly intact in hip and thigh    Special Tests:      neg (-) DESIREE bilateral       neg (-) FADIR bilateral    RADIOLOGY:  I independently ordered, visualized and reviewed these images with the patient  AP Pelvis and lateral XR views of hips reviewed: no acute bony abnormality, no significant degenerative change  - will follow official read      Review of the result(s) of each unique test - XR       Large Joint Injection/Arthocentesis: bilateral greater trochanteric bursa    Date/Time: 7/28/2022 4:18 PM  Performed by: Yanique Leon MD  Authorized by: Yanique Leon MD     Indications:  Pain  Needle Size:  25 G  Guidance: landmark guided    Approach:  Lateral  Location:  Hip  Laterality:  Bilateral      Site:  Bilateral greater trochanteric bursa  Medications (Right):  40 mg triamcinolone 40 MG/ML; 2 mL lidocaine 1 %  Medications (Left):  40 mg triamcinolone 40 MG/ML; 2 mL lidocaine 1 %  Outcome:  Tolerated well, no immediate complications  Procedure discussed: discussed risks, benefits, and alternatives    Consent Given by:  Patient  Timeout: timeout called immediately prior to procedure    Prep: patient was prepped and draped in usual sterile fashion     The risks, benefits and complications of steroid injection were discussed with the patient (including but not limited to: bleeding, infection, pain, scar, damage to adjacent structures, atrophy or necrosis of soft tissue, skin blanching, failure to relieve symptoms, worsening of symptoms, allergic reaction). After this discussion all questions were addressed and answered and the patient elected to proceed. Rhonda  tolerated the  procedure well without complications.  Also discussed that if diabetic, recommend close monitoring of blood sugars over the next week as cortisone injections can temporarily elevate blood sugars.               Again, thank you for allowing me to participate in the care of your patient.        Sincerely,        Yanique Leon MD

## 2022-08-12 ENCOUNTER — OFFICE VISIT (OUTPATIENT)
Dept: OBGYN | Facility: CLINIC | Age: 52
End: 2022-08-12
Payer: COMMERCIAL

## 2022-08-12 ENCOUNTER — ANCILLARY PROCEDURE (OUTPATIENT)
Dept: MAMMOGRAPHY | Facility: CLINIC | Age: 52
End: 2022-08-12
Payer: COMMERCIAL

## 2022-08-12 VITALS
WEIGHT: 184.4 LBS | BODY MASS INDEX: 32.67 KG/M2 | OXYGEN SATURATION: 100 % | HEIGHT: 63 IN | SYSTOLIC BLOOD PRESSURE: 166 MMHG | HEART RATE: 73 BPM | DIASTOLIC BLOOD PRESSURE: 94 MMHG

## 2022-08-12 DIAGNOSIS — Z12.31 VISIT FOR SCREENING MAMMOGRAM: ICD-10-CM

## 2022-08-12 DIAGNOSIS — N64.53 RETRACTION OF RIGHT NIPPLE: ICD-10-CM

## 2022-08-12 DIAGNOSIS — Z01.411 ENCOUNTER FOR GYNECOLOGICAL EXAMINATION WITH ABNORMAL FINDING: Primary | ICD-10-CM

## 2022-08-12 PROCEDURE — 77067 SCR MAMMO BI INCL CAD: CPT | Mod: TC | Performed by: RADIOLOGY

## 2022-08-12 PROCEDURE — 99396 PREV VISIT EST AGE 40-64: CPT | Performed by: OBSTETRICS & GYNECOLOGY

## 2022-08-12 PROCEDURE — 77063 BREAST TOMOSYNTHESIS BI: CPT | Mod: TC | Performed by: RADIOLOGY

## 2022-08-12 NOTE — PROGRESS NOTES
Rhonda Cordero is a 51 year old female , who presents for annual exam.   No unusual bleeding, no incontinence, or unusual discharge.   She has had mammogram today, results pending.     Last cholesterol: Recent Labs   Lab Test 21  1146 19  0806   CHOL 210* 212*   HDL 55 54   * 142*   TRIG 95 78     Past Medical History:   Diagnosis Date     Anemia      Hypertension goal BP (blood pressure) < 130/80 2011     Hypocalcemia      Multinodular goiter      Postsurgical hypothyroidism      Rotator cuff tendonitis 02/10/2015     Vaginal Papanicolaou smear not required due to history of hysterectomy        Past Surgical History:   Procedure Laterality Date     LAPAROSCOPY,TUBAL CAUTERY       SALPINGO OOPHORECTOMY,R/L/LUIS      left     THYROIDECTOMY N/A 2017    Procedure: THYROIDECTOMY;  Total Thyroidectomy;  Surgeon: Skylar Costa MD;  Location:  OR     [facility] TOTAL ABDOM HYSTERECTOMY  09    [facility]       OB History    Para Term  AB Living   2 2 0 0 0 2   SAB IAB Ectopic Multiple Live Births   0 0 0 0 0      # Outcome Date GA Lbr Roc/2nd Weight Sex Delivery Anes PTL Lv   2 Para            1 Para                Gyn History:  Gynecological History         Patient's last menstrual period was 2009.    Pap smear no longer indicated.       history of abnormal pap smear:   Last pap:   Lab Results   Component Value Date    PAP NIL 2014           Current Outpatient Medications   Medication Sig Dispense Refill     calcitRIOL (ROCALTROL) 0.25 MCG capsule Take 2 capsules (0.5 mcg) by mouth daily 60 capsule 11     clotrimazole (LOTRIMIN) 1 % external cream Apply topically 2 times daily 113 g 1     hydrochlorothiazide (HYDRODIURIL) 25 MG tablet TAKE 1 TABLET (25 MG) BY MOUTH DAILY 90 tablet 1     levothyroxine (SYNTHROID/LEVOTHROID) 88 MCG tablet TAKE 1 TABLET (88 MCG) BY MOUTH DAILY 90 tablet 1     lisinopril (ZESTRIL) 10 MG tablet TAKE  ONE AND ONE-HALF TABLETS (15MG) BY MOUTH EVERY  tablet 0     triamcinolone (KENALOG) 0.1 % cream Apply sparingly to affected area three times daily as needed 80 g 1     calcium carbonate antacid 1000 MG CHEW Take 2 tablets by mouth 3 times daily (Patient not taking: Reported on 8/12/2022) 60 tablet 3       Allergies   Allergen Reactions     Advil [Ibuprofen] Swelling     Hands swelled     Cephalexin Itching       Social History     Socioeconomic History     Marital status:      Spouse name: Not on file     Number of children: 2     Years of education: 12     Highest education level: Not on file   Occupational History     Employer: [employer]   Tobacco Use     Smoking status: Never Smoker     Smokeless tobacco: Never Used   Vaping Use     Vaping Use: Never used   Substance and Sexual Activity     Alcohol use: Yes     Comment: occ     Drug use: No     Comment: occasional/ rare     Sexual activity: Not Currently     Partners: Male     Birth control/protection: Surgical   Other Topics Concern     Parent/sibling w/ CABG, MI or angioplasty before 65F 55M? No      Service No     Blood Transfusions Yes     Comment: had 3 units 10/23/2009     Caffeine Concern No     Occupational Exposure No     Hobby Hazards No     Sleep Concern Not Asked     Comment: sleeps about 5-6 hours a night     Stress Concern Not Asked     Weight Concern Not Asked     Special Diet No     Back Care Not Asked     Exercise Yes     Comment: walk     Bike Helmet Not Asked     Comment: doesn't ride a bike     Seat Belt Yes     Self-Exams No     Comment: info given on SBE   Social History Narrative     Not on file     Social Determinants of Health     Financial Resource Strain: Not on file   Food Insecurity: Not on file   Transportation Needs: Not on file   Physical Activity: Not on file   Stress: Not on file   Social Connections: Not on file   Intimate Partner Violence: Not on file   Housing Stability: Not on file       Family  "History   Problem Relation Age of Onset     Cerebrovascular Disease Mother 50     Cerebrovascular Disease Father 49         49     Hypertension Father      Cancer No family hx of      Diabetes No family hx of      Thyroid Disease No family hx of      Glaucoma No family hx of      Macular Degeneration No family hx of          ROS:  All negative except as above.      EXAM:  BP (!) 161/97 (BP Location: Right arm, Cuff Size: Adult Regular)   Pulse 73   Ht 1.588 m (5' 2.5\")   Wt 83.6 kg (184 lb 6.4 oz)   LMP 2009   SpO2 100%   BMI 33.19 kg/m    General:  WNWD female, NAD  Alert  Oriented x 3  Gait:  Normal  Skin:  Normal skin turgor  Neurologic:  CN grossly intact, good sensation.    HEENT:  NC/AT, EOMI  Neck:  No masses palpated, symmetrical, carotids +2/4, no bruits heard  Heart:  RRR  Lungs:  Clear   Breasts:  Right nipple retraction noted, no dimpling noted, no masses palpated, no discharge expressed  Abdomen:  Non-tender, non-distended.  Vulva: No external lesions, normal hair distribution, no adenopathy  BUS:  Normal, no masses noted  Urethra:  No hypermobility noted  Urethral meatus:  No masses noted  Vagina: Moist, pink, no abnormal discharge, well rugated, no lesions  Ovaries/Bimanual exam: No mass, non-tender, mobile  Perianal:  No masses noted.   Rectal exam: Declined   Extremities:  No clubbing, cyanosis, or edema      ASSESSMENT/PLAN   Annual examination   Right breast nipple retraction.  She reports she has noted this for about the past 3 weeks.  She had the mammogram today prior to today's visit.  Radiology is aware of the physical findings and the eport is pending.   Low fat diet, weight bearing exercises and self breast exams on a monthly basis have been recommended.  I have discussed with patient the risks, benefits, medications, treatment options and modalities.   I have instructed the patient to call or schedule a follow-up appointment if any problems.    "

## 2022-09-02 ENCOUNTER — OFFICE VISIT (OUTPATIENT)
Dept: OPTOMETRY | Facility: CLINIC | Age: 52
End: 2022-09-02
Payer: COMMERCIAL

## 2022-09-02 DIAGNOSIS — H52.4 PRESBYOPIA: ICD-10-CM

## 2022-09-02 DIAGNOSIS — H52.223 REGULAR ASTIGMATISM OF BOTH EYES: ICD-10-CM

## 2022-09-02 DIAGNOSIS — Z01.00 ENCOUNTER FOR EXAMINATION OF EYES AND VISION WITHOUT ABNORMAL FINDINGS: Primary | ICD-10-CM

## 2022-09-02 DIAGNOSIS — H52.13 MYOPIA OF BOTH EYES: ICD-10-CM

## 2022-09-02 PROCEDURE — 92015 DETERMINE REFRACTIVE STATE: CPT | Performed by: OPTOMETRIST

## 2022-09-02 PROCEDURE — 92014 COMPRE OPH EXAM EST PT 1/>: CPT | Performed by: OPTOMETRIST

## 2022-09-02 ASSESSMENT — EXTERNAL EXAM - RIGHT EYE: OD_EXAM: NORMAL

## 2022-09-02 ASSESSMENT — REFRACTION_MANIFEST
OD_AXIS: 167
OS_AXIS: 165
OD_CYLINDER: +0.50
OS_CYLINDER: +0.50
OD_SPHERE: -0.25
OS_SPHERE: -0.25
OD_ADD: +1.50
OS_ADD: +1.50

## 2022-09-02 ASSESSMENT — TONOMETRY
IOP_METHOD: APPLANATION
OS_IOP_MMHG: 19
OD_IOP_MMHG: 18

## 2022-09-02 ASSESSMENT — SLIT LAMP EXAM - LIDS
COMMENTS: NORMAL
COMMENTS: NORMAL

## 2022-09-02 ASSESSMENT — VISUAL ACUITY
METHOD: SNELLEN - LINEAR
OD_CC: 20/80-1
OD_SC: 20/20
CORRECTION_TYPE: GLASSES
OS_CC: 20/40
OS_SC: 20/80
OD_SC: 20/80
OS_SC: 20/20

## 2022-09-02 ASSESSMENT — REFRACTION_WEARINGRX
SPECS_TYPE: OTC READERS
OS_SPHERE: +1.25
OD_CYLINDER: SPHERE
OS_CYLINDER: SPHERE
OD_SPHERE: +1.25

## 2022-09-02 ASSESSMENT — CONF VISUAL FIELD
OD_NORMAL: 1
OS_NORMAL: 1
METHOD: COUNTING FINGERS

## 2022-09-02 ASSESSMENT — CUP TO DISC RATIO
OS_RATIO: 0.3
OD_RATIO: 0.35

## 2022-09-02 ASSESSMENT — EXTERNAL EXAM - LEFT EYE: OS_EXAM: NORMAL

## 2022-09-02 NOTE — PATIENT INSTRUCTIONS
No glasses prescription necessary.  Continue use of OTC reading glasses, as needed.     Return in 1-2 years for a comprehensive eye exam, or sooner if needed.      The effects of the dilating drops last for 4- 6 hours.  You will be more sensitive to light and vision will be blurry up close.  Mydriatic sunglasses were given if needed.     Major Beasley, OD  [facility]  [address] [place] [address]. [place]  [place]  [address]    [phone]

## 2022-09-02 NOTE — LETTER
9/2/2022         RE: Rhonda Cordero  [address] [facility] [address]        Dear Colleague,    Thank you for referring your patient, Rhonda Cordero, to the [facility]. Please see a copy of my visit note below.    Chief Complaint   Patient presents with     Annual Eye Exam      Accompanied by spouse, Dano    Last Eye Exam: 8/20/2021  Dilated Previously: Yes, side effects of dilation explained today    What are you currently using to see? +1.25 Readers        Distance Vision Acuity: Satisfied with vision    Near Vision Acuity: Satisfied with vision while reading and using computer with readers - feels she may need to increase reader strength    Eye Comfort: good  Do you use eye drops? : No  Occupation or Hobbies: [employer]    [employer]       Medical, surgical and family histories reviewed and updated 9/2/2022.       OBJECTIVE: See Ophthalmology exam    ASSESSMENT:    ICD-10-CM    1. Encounter for examination of eyes and vision without abnormal findings  Z01.00    2. Myopia of both eyes  H52.13    3. Regular astigmatism of both eyes  H52.223    4. Presbyopia  H52.4        PLAN:     Patient Instructions   No glasses prescription necessary.  Continue use of OTC reading glasses, as needed.     Return in 1-2 years for a comprehensive eye exam, or sooner if needed.      The effects of the dilating drops last for 4- 6 hours.  You will be more sensitive to light and vision will be blurry up close.  Mydriatic sunglasses were given if needed.     Major Beasley, CHRISTIE  [facility]  [address] [place]  [address] [phone]            Again, thank you for allowing me to participate in the care of your patient.        Sincerely,        Major Beasley, OD

## 2022-09-02 NOTE — PROGRESS NOTES
Chief Complaint   Patient presents with     Annual Eye Exam      Accompanied by spouse, Dano    Last Eye Exam: 8/20/2021  Dilated Previously: Yes, side effects of dilation explained today    What are you currently using to see? +1.25 Readers        Distance Vision Acuity: Satisfied with vision    Near Vision Acuity: Satisfied with vision while reading and using computer with readers - feels she may need to increase reader strength    Eye Comfort: good  Do you use eye drops? : No  Occupation or Hobbies: [employer]       Medical, surgical and family histories reviewed and updated 9/2/2022.       OBJECTIVE: See Ophthalmology exam    ASSESSMENT:    ICD-10-CM    1. Encounter for examination of eyes and vision without abnormal findings  Z01.00    2. Myopia of both eyes  H52.13    3. Regular astigmatism of both eyes  H52.223    4. Presbyopia  H52.4        PLAN:     Patient Instructions   No glasses prescription necessary.  Continue use of OTC reading glasses, as needed.     Return in 1-2 years for a comprehensive eye exam, or sooner if needed.      The effects of the dilating drops last for 4- 6 hours.  You will be more sensitive to light and vision will be blurry up close.  Mydriatic sunglasses were given if needed.     Major Beasley, OD  [facility]  [address] [facility]. [place]  [address] [phone]

## 2022-09-05 DIAGNOSIS — I10 HYPERTENSION GOAL BP (BLOOD PRESSURE) < 140/90: ICD-10-CM

## 2022-09-07 RX ORDER — LISINOPRIL 10 MG/1
TABLET ORAL
Qty: 135 TABLET | Refills: 0 | OUTPATIENT
Start: 2022-09-07

## 2022-12-21 ENCOUNTER — OFFICE VISIT (OUTPATIENT)
Dept: DERMATOLOGY | Facility: CLINIC | Age: 52
End: 2022-12-21
Attending: FAMILY MEDICINE
Payer: COMMERCIAL

## 2022-12-21 DIAGNOSIS — L40.9 PSORIASIS: Primary | ICD-10-CM

## 2022-12-21 PROCEDURE — 99203 OFFICE O/P NEW LOW 30 MIN: CPT | Performed by: PHYSICIAN ASSISTANT

## 2022-12-21 RX ORDER — CLOBETASOL PROPIONATE 0.5 MG/G
OINTMENT TOPICAL
Qty: 30 G | Refills: 4 | Status: SHIPPED | OUTPATIENT
Start: 2022-12-21 | End: 2022-12-21

## 2022-12-21 RX ORDER — CLOBETASOL PROPIONATE 0.5 MG/G
OINTMENT TOPICAL
Qty: 60 G | Refills: 4 | Status: SHIPPED | OUTPATIENT
Start: 2022-12-21

## 2022-12-21 NOTE — PROGRESS NOTES
Rhonda Cordero is an extremely pleasant 52 year old year old female patient here today for rash on buttocks. Present for one year. She notes it is itchy. She has tried triamcinolone and clotrimazole with little results. She notes vasline does help some. Patient has no other skin complaints today.  Remainder of the HPI, Meds, PMH, Allergies, FH, and SH was reviewed in chart.    Past Medical History:   Diagnosis Date     Anemia      Hypertension goal BP (blood pressure) < 130/80 2011     Hypocalcemia      Multinodular goiter      Postsurgical hypothyroidism      Rotator cuff tendonitis 02/10/2015     Vaginal Papanicolaou smear not required due to history of hysterectomy        Past Surgical History:   Procedure Laterality Date     LAPAROSCOPY,TUBAL CAUTERY       SALPINGO OOPHORECTOMY,R/L/LUIS      left     THYROIDECTOMY N/A 2017    Procedure: THYROIDECTOMY;  Total Thyroidectomy;  Surgeon: Skylar Costa MD;  Location:  OR     [facility] TOTAL ABDOM HYSTERECTOMY  09    [facility]        Family History   Problem Relation Age of Onset     Cerebrovascular Disease Mother 50     Cerebrovascular Disease Father 49         49     Hypertension Father      Cancer No family hx of      Diabetes No family hx of      Thyroid Disease No family hx of      Glaucoma No family hx of      Macular Degeneration No family hx of        Social History     Socioeconomic History     Marital status:      Spouse name: Not on file     Number of children: 2     Years of education: 12     Highest education level: Not on file   Occupational History     Employer: [employer]   Tobacco Use     Smoking status: Never     Smokeless tobacco: Never   Vaping Use     Vaping Use: Never used   Substance and Sexual Activity     Alcohol use: Yes     Comment: occ     Drug use: No     Comment: occasional/ rare     Sexual activity: Not Currently     Partners: Male     Birth control/protection: Surgical   Other  Topics Concern     Parent/sibling w/ CABG, MI or angioplasty before 65F 55M? No      Service No     Blood Transfusions Yes     Comment: had 3 units 10/23/2009     Caffeine Concern No     Occupational Exposure No     Hobby Hazards No     Sleep Concern Not Asked     Comment: sleeps about 5-6 hours a night     Stress Concern Not Asked     Weight Concern Not Asked     Special Diet No     Back Care Not Asked     Exercise Yes     Comment: walk     Bike Helmet Not Asked     Comment: doesn't ride a bike     Seat Belt Yes     Self-Exams No     Comment: info given on SBE   Social History Narrative     Not on file     Social Determinants of Health     Financial Resource Strain: Not on file   Food Insecurity: Not on file   Transportation Needs: Not on file   Physical Activity: Not on file   Stress: Not on file   Social Connections: Not on file   Intimate Partner Violence: Not on file   Housing Stability: Not on file       Outpatient Encounter Medications as of 12/21/2022   Medication Sig Dispense Refill     clobetasol (TEMOVATE) 0.05 % external ointment Apply sparingly twice to three times daily for 3-4 weeks to affected areas on buttock. Please dispense 60 gram quantity. 60 g 4     clotrimazole (LOTRIMIN) 1 % external cream Apply topically 2 times daily 113 g 1     triamcinolone (KENALOG) 0.1 % cream Apply sparingly to affected area three times daily as needed 80 g 1     calcitRIOL (ROCALTROL) 0.25 MCG capsule Take 2 capsules (0.5 mcg) by mouth daily 60 capsule 11     calcium carbonate antacid 1000 MG CHEW Take 2 tablets by mouth 3 times daily (Patient not taking: Reported on 8/12/2022) 60 tablet 3     hydrochlorothiazide (HYDRODIURIL) 25 MG tablet TAKE ONE TABLET BY MOUTH ONCE DAILY 90 tablet 0     levothyroxine (SYNTHROID/LEVOTHROID) 88 MCG tablet TAKE 1 TABLET (88 MCG) BY MOUTH DAILY 90 tablet 1     lisinopril (ZESTRIL) 10 MG tablet TAKE ONE AND ONE-HALF TABLETS (15MG) BY MOUTH EVERY  tablet 0      [DISCONTINUED] clobetasol (TEMOVATE) 0.05 % external ointment Apply sparingly twice to three times daily for 3-4 weeks to affected areas on buttock. 30 g 4     Facility-Administered Encounter Medications as of 12/21/2022   Medication Dose Route Frequency Provider Last Rate Last Admin     lidocaine 1 % injection 2 mL  2 mL   Yanique Leon MD   2 mL at 07/28/22 1618     lidocaine 1 % injection 2 mL  2 mL   Yanique Leon MD   2 mL at 07/28/22 1618     triamcinolone (KENALOG-40) injection 40 mg  40 mg   Yanique Leon MD   40 mg at 07/28/22 1618     triamcinolone (KENALOG-40) injection 40 mg  40 mg   Yanique Leon MD   40 mg at 07/28/22 1618             O:   NAD, WDWN, Alert & Oriented, Mood & Affect wnl, Vitals stable   Here today alone   LMP 11/23/2009    General appearance normal   Vitals stable   Alert, oriented and in no acute distress     Psoriasiform plaques on buttocks       Eyes: Conjunctivae/lids:Normal     ENT: Lips: normal    MSK:Normal    Pulm: Breathing Normal    Neuro/Psych: Orientation:Alert and Orientedx3 ; Mood/Affect:normal   A/P:  1. Psoriasis on buttock   Apply clobetasol cream twice daily as needed.   Skin care regimen reviewed with patient: Eliminate harsh soaps, i.e. Dial, zest, irsih spring; Mild soaps such as Cetaphil or Dove sensitive skin, avoid hot or cold showers, aggressive use of emollients including vanicream, cetaphil or cerave discussed with patient.    Return to clinic as needed.

## 2022-12-21 NOTE — LETTER
2022         RE: Rhonda Cordero   [address]  [place] [address]        Dear Colleague,    Thank you for referring your patient, Rhonda Cordero, to the [facility]. Please see a copy of my visit note below.    Rhonda Cordero is an extremely pleasant 52 year old year old female patient here today for rash on buttocks. Present for one year. She notes it is itchy. She has tried triamcinolone and clotrimazole with little results. She notes vasline does help some. Patient has no other skin complaints today.  Remainder of the HPI, Meds, PMH, Allergies, FH, and SH was reviewed in chart.    Past Medical History:   Diagnosis Date     Anemia      Hypertension goal BP (blood pressure) < 130/80 2011     Hypocalcemia      Multinodular goiter      Postsurgical hypothyroidism      Rotator cuff tendonitis 02/10/2015     Vaginal Papanicolaou smear not required due to history of hysterectomy        Past Surgical History:   Procedure Laterality Date     LAPAROSCOPY,TUBAL CAUTERY       SALPINGO OOPHORECTOMY,R/L/LUIS      left     THYROIDECTOMY N/A 2017    Procedure: THYROIDECTOMY;  Total Thyroidectomy;  Surgeon: Skylar Costa MD;  Location:  OR     [facility] TOTAL ABDOM HYSTERECTOMY  09    [facility]        Family History   Problem Relation Age of Onset     Cerebrovascular Disease Mother 50     Cerebrovascular Disease Father 49         49     Hypertension Father      Cancer No family hx of      Diabetes No family hx of      Thyroid Disease No family hx of      Glaucoma No family hx of      Macular Degeneration No family hx of        Social History     Socioeconomic History     Marital status:      Spouse name: Not on file     Number of children: 2     Years of education: 12     Highest education level: Not on file   Occupational History     Employer: [employer]   Tobacco Use     Smoking status: Never     Smokeless tobacco:  Hpi Title: Evaluation of Skin Lesions How Severe Are Your Spot(S)?: moderate Never   Vaping Use     Vaping Use: Never used   Substance and Sexual Activity     Alcohol use: Yes     Comment: occ     Drug use: No     Comment: occasional/ rare     Sexual activity: Not Currently     Partners: Male     Birth control/protection: Surgical   Other Topics Concern     Parent/sibling w/ CABG, MI or angioplasty before 65F 55M? No      Service No     Blood Transfusions Yes     Comment: had 3 units 10/23/2009     Caffeine Concern No     Occupational Exposure No     Hobby Hazards No     Sleep Concern Not Asked     Comment: sleeps about 5-6 hours a night     Stress Concern Not Asked     Weight Concern Not Asked     Special Diet No     Back Care Not Asked     Exercise Yes     Comment: walk     Bike Helmet Not Asked     Comment: doesn't ride a bike     Seat Belt Yes     Self-Exams No     Comment: info given on SBE   Social History Narrative     Not on file     Social Determinants of Health     Financial Resource Strain: Not on file   Food Insecurity: Not on file   Transportation Needs: Not on file   Physical Activity: Not on file   Stress: Not on file   Social Connections: Not on file   Intimate Partner Violence: Not on file   Housing Stability: Not on file       Outpatient Encounter Medications as of 12/21/2022   Medication Sig Dispense Refill     clobetasol (TEMOVATE) 0.05 % external ointment Apply sparingly twice to three times daily for 3-4 weeks to affected areas on buttock. Please dispense 60 gram quantity. 60 g 4     clotrimazole (LOTRIMIN) 1 % external cream Apply topically 2 times daily 113 g 1     triamcinolone (KENALOG) 0.1 % cream Apply sparingly to affected area three times daily as needed 80 g 1     calcitRIOL (ROCALTROL) 0.25 MCG capsule Take 2 capsules (0.5 mcg) by mouth daily 60 capsule 11     calcium carbonate antacid 1000 MG CHEW Take 2 tablets by mouth 3 times daily (Patient not taking: Reported on 8/12/2022) 60 tablet 3     hydrochlorothiazide (HYDRODIURIL) 25 MG tablet TAKE ONE  Have Your Spot(S) Been Treated In The Past?: has not been treated TABLET BY MOUTH ONCE DAILY 90 tablet 0     levothyroxine (SYNTHROID/LEVOTHROID) 88 MCG tablet TAKE 1 TABLET (88 MCG) BY MOUTH DAILY 90 tablet 1     lisinopril (ZESTRIL) 10 MG tablet TAKE ONE AND ONE-HALF TABLETS (15MG) BY MOUTH EVERY  tablet 0     [DISCONTINUED] clobetasol (TEMOVATE) 0.05 % external ointment Apply sparingly twice to three times daily for 3-4 weeks to affected areas on buttock. 30 g 4     Facility-Administered Encounter Medications as of 12/21/2022   Medication Dose Route Frequency Provider Last Rate Last Admin     lidocaine 1 % injection 2 mL  2 mL   Yanique Leon MD   2 mL at 07/28/22 1618     lidocaine 1 % injection 2 mL  2 mL   Yanique Leon MD   2 mL at 07/28/22 1618     triamcinolone (KENALOG-40) injection 40 mg  40 mg   Yanique Leon MD   40 mg at 07/28/22 1618     triamcinolone (KENALOG-40) injection 40 mg  40 mg   Yanique Leon MD   40 mg at 07/28/22 1618             O:   NAD, WDWN, Alert & Oriented, Mood & Affect wnl, Vitals stable   Here today alone   LMP 11/23/2009    General appearance normal   Vitals stable   Alert, oriented and in no acute distress     Psoriasiform plaques on buttocks       Eyes: Conjunctivae/lids:Normal     ENT: Lips: normal    MSK:Normal    Pulm: Breathing Normal    Neuro/Psych: Orientation:Alert and Orientedx3 ; Mood/Affect:normal   A/P:  1. Psoriasis on buttock   Apply clobetasol cream twice daily as needed.   Skin care regimen reviewed with patient: Eliminate harsh soaps, i.e. Dial, zest, irsih spring; Mild soaps such as Cetaphil or Dove sensitive skin, avoid hot or cold showers, aggressive use of emollients including vanicream, cetaphil or cerave discussed with patient.    Return to clinic as needed.         Again, thank you for allowing me to participate in the care of your patient.        Sincerely,        Sarita Mcknight PA-C

## 2023-01-01 NOTE — TELEPHONE ENCOUNTER
infant delivered today, infant with 2 episodes of apnea and bradycardia requiring stimulation for recovery.     PLAN:  Load with caffeine today and begin maintenance for tomorrow  Follow clinically   "Please call pt regarding results below  [facility]  Team 3 Coordinator     Your cholesterol is moderately elevated.    The  Hemoglobin a1c is in the \"prediabetes\" range.    Keep working on healthy diet/exercise and weight loss as you are able.    Other labs are fine.    Vasquez Denson MD  "

## 2023-01-09 ENCOUNTER — OFFICE VISIT (OUTPATIENT)
Dept: ORTHOPEDICS | Facility: CLINIC | Age: 53
End: 2023-01-09
Payer: COMMERCIAL

## 2023-01-09 VITALS — WEIGHT: 184 LBS | BODY MASS INDEX: 33.12 KG/M2

## 2023-01-09 DIAGNOSIS — M25.559 GREATER TROCHANTERIC PAIN SYNDROME: Primary | ICD-10-CM

## 2023-01-09 PROCEDURE — 99213 OFFICE O/P EST LOW 20 MIN: CPT | Performed by: PEDIATRICS

## 2023-01-09 NOTE — PATIENT INSTRUCTIONS
We discussed these other possible diagnosis: Discussed the diagnosis of greater trochanteric pain syndrome and contributing factors to lateral hip pain including gluteal tendinopathy, IT Band Syndrome and weak hip abductor muscles.  Discussed that these factors can cause inflammation in the greater trochanteric bursa.  I recommend physical therapy for overall hip strengthening.  Discussed that injections are sometimes helpful for point tenderness over the bursa, however, will not improve overall hip strength.  Would consider further work up and treatment pending clinical course.    Plan:  - Today's Plan of Care:  Rehab: [facility] [phone]  Discussed activity considerations and other supportive care including Ice/Heat, OTC and other topical medications as needed.    -We also discussed other future treatment options:  MRI if more severe  Could consider repeat injection    Follow Up: as needed    If you have any further questions for your physician or physician s care team you can call [phone] and use option 3 to leave a voice message.

## 2023-01-09 NOTE — PROGRESS NOTES
ASSESSMENT & PLAN    Rhonda was seen today for recheck and recheck.    Diagnoses and all orders for this visit:    Greater trochanteric pain syndrome  -     Physical Therapy Referral; Future      This issue is acute on chronic and Unchanged.      We discussed these other possible diagnosis: Discussed the diagnosis of greater trochanteric pain syndrome and contributing factors to lateral hip pain including gluteal tendinopathy, IT Band Syndrome and weak hip abductor muscles.  Discussed that these factors can cause inflammation in the greater trochanteric bursa.  I recommend physical therapy for overall hip strengthening.  Discussed that injections are sometimes helpful for point tenderness over the bursa, however, will not improve overall hip strength.  Would consider further work up and treatment pending clinical course.    Plan:  - Today's Plan of Care:  Rehab: Physical Therapy: [facility] - [phone]  Discussed activity considerations and other supportive care including Ice/Heat, OTC and other topical medications as needed.    -We also discussed other future treatment options:  MRI if more severe  Could consider repeat injection    Follow Up: as needed    Concerning signs and symptoms were reviewed.  The patient expressed understanding of this management plan and all questions were answered at this time.    Yanique Leon MD [facility]  Sports Medicine Physician  [facility]      SUBJECTIVE- Interim History January 9, 2023    Chief Complaint   Patient presents with     Right Hip - RECHECK     Left Hip - RECHECK       Rhonda Cordero is a 52 year old female who is seen in f/u up for bilateral hip pain. Since last visit on 7/28/2022, patient received cortisone injection on 7/28/2022, felt relief up until 1 week ago. Right hip has been bothering her for 1 week, radiates down to the right knee at times. She is using lidocaine patches to help. Comes and goes. No KOMAL, though has been  taking care of grand baby.  - Now ~ Chronic, from initial onset     Orthopedic/Surgical history: YES - Date: chronic bilateral hip pain  Social History/Occupation: works at [employer]     No family history pertinent to patient's problem today.    REVIEW OF SYSTEMS:  Review of Systems  Skin: no bruising, no swelling  Musculoskeletal: as above  Neurologic: no numbness, paresthesias  Remainder of review of systems is negative including constitutional, CV, pulmonary, GI, except as noted in HPI or medical history.    OBJECTIVE:  Wt 83.5 kg (184 lb)   LMP 11/23/2009   BMI 33.12 kg/m       GENERAL APPEARANCE: healthy, alert and no distress   GAIT: NORMAL  SKIN: no suspicious lesions or rashes  HEENT: Sclera clear, anicteric  CV: no lower extremity edema, good peripheral pulses  RESP: Breathing not labored  NEURO: Normal strength and tone, mentation intact and speech normal  PSYCH:  mentation appears normal and affect normal/bright    Bilateral hip exam  Inspection:      no edema or ecchymosis in hip area     Tender:      greater trochanter right     Non Tender:      remainder of the hip area bilateral     ROM:     Full active and passive ROM  bilateral     Strength:      flexion 5/5 bilateral       abduction 5-/5 right       adduction 5/5 bilateral     Sensation:      grossly intact in hip and thigh     Special Tests:      neg (-) DESIERE bilateral       neg (-) FADIR bilateral    RADIOLOGY:  Final results and radiologist's interpretation, available in the [facility] health record.  Images were reviewed with the patient in the office today.  My personal interpretation of the performed imaging:   AP Pelvis and lateral XR views of hips reviewed 7/28/2022: no acute bony abnormality, no significant degenerative change      Review of the result(s) of each unique test - XR

## 2023-01-09 NOTE — LETTER
1/9/2023         RE: Rhonda Cordero  [address] [place] [facility] [address]        Dear Colleague,    Thank you for referring your patient, Rhonda Cordero, to the [facility]. Please see a copy of my visit note below.    ASSESSMENT & PLAN    Rhonda was seen today for recheck and recheck.    Diagnoses and all orders for this visit:    Greater trochanteric pain syndrome  -     Physical Therapy Referral; Future      This issue is acute on chronic and Unchanged.      We discussed these other possible diagnosis: Discussed the diagnosis of greater trochanteric pain syndrome and contributing factors to lateral hip pain including gluteal tendinopathy, IT Band Syndrome and weak hip abductor muscles.  Discussed that these factors can cause inflammation in the greater trochanteric bursa.  I recommend physical therapy for overall hip strengthening.  Discussed that injections are sometimes helpful for point tenderness over the bursa, however, will not improve overall hip strength.  Would consider further work up and treatment pending clinical course.    Plan:  - Today's Plan of Care:  Rehab: Physical Therapy: [facility] - [phone]  Discussed activity considerations and other supportive care including Ice/Heat, OTC and other topical medications as needed.    -We also discussed other future treatment options:  MRI if more severe  Could consider repeat injection    Follow Up: as needed    Concerning signs and symptoms were reviewed.  The patient expressed understanding of this management plan and all questions were answered at this time.    Yanique Leon MD [facility]  Sports Medicine Physician  [facility]      SUBJECTIVE- Interim History January 9, 2023    Chief Complaint   Patient presents with     Right Hip - RECHECK     Left Hip - RECHECK       Rhonda Cordero is a 52 year old female who is seen in f/u up for bilateral hip pain.  Since last visit on 7/28/2022, patient received cortisone injection on 7/28/2022, felt relief up until 1 week ago. Right hip has been bothering her for 1 week, radiates down to the right knee at times. She is using lidocaine patches to help. Comes and goes. No KOMAL, though has been taking care of grand baby.  - Now ~ Chronic, from initial onset     Orthopedic/Surgical history: YES - Date: chronic bilateral hip pain  Social History/Occupation: works at [employer]     No family history pertinent to patient's problem today.    REVIEW OF SYSTEMS:  Review of Systems  Skin: no bruising, no swelling  Musculoskeletal: as above  Neurologic: no numbness, paresthesias  Remainder of review of systems is negative including constitutional, CV, pulmonary, GI, except as noted in HPI or medical history.    OBJECTIVE:  Wt 83.5 kg (184 lb)   LMP 11/23/2009   BMI 33.12 kg/m       GENERAL APPEARANCE: healthy, alert and no distress   GAIT: NORMAL  SKIN: no suspicious lesions or rashes  HEENT: Sclera clear, anicteric  CV: no lower extremity edema, good peripheral pulses  RESP: Breathing not labored  NEURO: Normal strength and tone, mentation intact and speech normal  PSYCH:  mentation appears normal and affect normal/bright    Bilateral hip exam  Inspection:      no edema or ecchymosis in hip area     Tender:      greater trochanter right     Non Tender:      remainder of the hip area bilateral     ROM:     Full active and passive ROM  bilateral     Strength:      flexion 5/5 bilateral       abduction 5-/5 right       adduction 5/5 bilateral     Sensation:      grossly intact in hip and thigh     Special Tests:      neg (-) DESIREE bilateral       neg (-) FADIR bilateral    RADIOLOGY:  Final results and radiologist's interpretation, available in the [facility] health record.  Images were reviewed with the patient in the office today.  My personal interpretation of the performed imaging:   AP Pelvis and lateral XR views of hips reviewed  7/28/2022: no acute bony abnormality, no significant degenerative change      Review of the result(s) of each unique test - XR             Again, thank you for allowing me to participate in the care of your patient.        Sincerely,        Yanique Leon MD

## 2023-01-25 ENCOUNTER — LAB (OUTPATIENT)
Dept: LAB | Facility: CLINIC | Age: 53
End: 2023-01-25
Payer: COMMERCIAL

## 2023-01-25 ENCOUNTER — OFFICE VISIT (OUTPATIENT)
Dept: DERMATOLOGY | Facility: CLINIC | Age: 53
End: 2023-01-25
Payer: COMMERCIAL

## 2023-01-25 DIAGNOSIS — L40.9 PSORIASIS: Primary | ICD-10-CM

## 2023-01-25 DIAGNOSIS — Z12.11 SCREEN FOR COLON CANCER: Primary | ICD-10-CM

## 2023-01-25 DIAGNOSIS — Z12.11 SPECIAL SCREENING FOR MALIGNANT NEOPLASMS, COLON: Primary | ICD-10-CM

## 2023-01-25 DIAGNOSIS — Z12.11 SCREEN FOR COLON CANCER: ICD-10-CM

## 2023-01-25 DIAGNOSIS — Z12.11 SPECIAL SCREENING FOR MALIGNANT NEOPLASMS, COLON: ICD-10-CM

## 2023-01-25 PROCEDURE — 99213 OFFICE O/P EST LOW 20 MIN: CPT | Performed by: PHYSICIAN ASSISTANT

## 2023-01-25 PROCEDURE — 82274 ASSAY TEST FOR BLOOD FECAL: CPT

## 2023-01-25 NOTE — LETTER
2023         RE: Rhonda Cordero   [address]  [place], [place]. \Hospital [facility] and Outlook.\"" [id number]        Dear Colleague,    Thank you for referring your patient, Rhonda Cordero, to the [facility]. Please see a copy of my visit note below.    Rhonda Cordero is an extremely pleasant 52 year old year old female patient here today for recheck rash on buttocks. She notes clear. Happy with results. She notes rash is now present in groin, she just started clobetasol which is helping. .  Patient has no other skin complaints today.  Remainder of the HPI, Meds, PMH, Allergies, FH, and SH was reviewed in chart.    Past Medical History:   Diagnosis Date     Anemia      Hypertension goal BP (blood pressure) < 130/80 2011     Hypocalcemia      Multinodular goiter      Postsurgical hypothyroidism      Rotator cuff tendonitis 02/10/2015     Vaginal Papanicolaou smear not required due to history of hysterectomy        Past Surgical History:   Procedure Laterality Date     LAPAROSCOPY,TUBAL CAUTERY       SALPINGO OOPHORECTOMY,R/L/LUIS      left     THYROIDECTOMY N/A 2017    Procedure: THYROIDECTOMY;  Total Thyroidectomy;  Surgeon: Skylar Costa MD;  Location:  OR     [facility] TOTAL ABDOM HYSTERECTOMY  09    [facility]        Family History   Problem Relation Age of Onset     Cerebrovascular Disease Mother 50     Cerebrovascular Disease Father 49         49     Hypertension Father      Cancer No family hx of      Diabetes No family hx of      Thyroid Disease No family hx of      Glaucoma No family hx of      Macular Degeneration No family hx of        Social History     Socioeconomic History     Marital status:      Spouse name: Not on file     Number of children: 2     Years of education: 12     Highest education level: Not on file   Occupational History     Employer: [employer]   Tobacco Use     Smoking status: Never     Smokeless tobacco: Never    Vaping Use     Vaping Use: Never used   Substance and Sexual Activity     Alcohol use: Yes     Comment: occ     Drug use: No     Comment: occasional/ rare     Sexual activity: Not Currently     Partners: Male     Birth control/protection: Surgical   Other Topics Concern     Parent/sibling w/ CABG, MI or angioplasty before 65F 55M? No      Service No     Blood Transfusions Yes     Comment: had 3 units 10/23/2009     Caffeine Concern No     Occupational Exposure No     Hobby Hazards No     Sleep Concern Not Asked     Comment: sleeps about 5-6 hours a night     Stress Concern Not Asked     Weight Concern Not Asked     Special Diet No     Back Care Not Asked     Exercise Yes     Comment: walk     Bike Helmet Not Asked     Comment: doesn't ride a bike     Seat Belt Yes     Self-Exams No     Comment: info given on SBE   Social History Narrative     Not on file     Social Determinants of Health     Financial Resource Strain: Not on file   Food Insecurity: Not on file   Transportation Needs: Not on file   Physical Activity: Not on file   Stress: Not on file   Social Connections: Not on file   Intimate Partner Violence: Not on file   Housing Stability: Not on file       Outpatient Encounter Medications as of 1/25/2023   Medication Sig Dispense Refill     clobetasol (TEMOVATE) 0.05 % external ointment Apply sparingly twice to three times daily for 3-4 weeks to affected areas on buttock. Please dispense 60 gram quantity. 60 g 4     calcitRIOL (ROCALTROL) 0.25 MCG capsule Take 2 capsules (0.5 mcg) by mouth daily 60 capsule 11     calcium carbonate antacid 1000 MG CHEW Take 2 tablets by mouth 3 times daily (Patient not taking: Reported on 8/12/2022) 60 tablet 3     clotrimazole (LOTRIMIN) 1 % external cream Apply topically 2 times daily (Patient not taking: Reported on 1/25/2023) 113 g 1     hydrochlorothiazide (HYDRODIURIL) 25 MG tablet TAKE ONE TABLET BY MOUTH ONCE DAILY 90 tablet 0     levothyroxine  (SYNTHROID/LEVOTHROID) 88 MCG tablet TAKE 1 TABLET (88 MCG) BY MOUTH DAILY 90 tablet 1     lisinopril (ZESTRIL) 10 MG tablet TAKE ONE AND ONE-HALF TABLETS (15MG) BY MOUTH EVERY  tablet 0     triamcinolone (KENALOG) 0.1 % cream Apply sparingly to affected area three times daily as needed (Patient not taking: Reported on 1/25/2023) 80 g 1     Facility-Administered Encounter Medications as of 1/25/2023   Medication Dose Route Frequency Provider Last Rate Last Admin     lidocaine 1 % injection 2 mL  2 mL   Yanique Leon MD   2 mL at 07/28/22 1618     lidocaine 1 % injection 2 mL  2 mL   Yanique Leon MD   2 mL at 07/28/22 1618     triamcinolone (KENALOG-40) injection 40 mg  40 mg   Yanique Leon MD   40 mg at 07/28/22 1618     triamcinolone (KENALOG-40) injection 40 mg  40 mg   Yanique Leon MD   40 mg at 07/28/22 1618             O:   NAD, WDWN, Alert & Oriented, Mood & Affect wnl, Vitals stable   Here today alone   LMP 11/23/2009    General appearance normal   Vitals stable   Alert, oriented and in no acute distress   PIH on buttock   Mild scaly plaques on vulva/mons pubis      Eyes: Conjunctivae/lids:Normal     ENT: Lipsnormal    MSK:Normal    Pulm: Breathing Normal  \    Neuro/Psych: Orientation:Alert and Orientedx3 ; Mood/Affect:normal     A/P:  1. Favor psoriasis  Reduce clobetasol to once twice weekly.   Use gentle cleanser like vanicream or cerave.  Use cerave healing ointment on days that you are not using clobetasol.   Recheck as needed.       Again, thank you for allowing me to participate in the care of your patient.        Sincerely,        Sarita Mcknight PA-C

## 2023-01-25 NOTE — LETTER
January 30, 2023    Sue Cordero  [address] [facility] [address]          Dear ,    We are writing to inform you of your test results.  You did show blood in the stool on the FIT test     You need to do a colonoscopy     I will put in an order for this     Call [phone] to schedule       Resulted Orders   Fecal colorectal cancer screen FIT   Result Value Ref Range    Occult Blood Screen FIT Positive (A) Negative       If you have any questions or concerns, please call the clinic at the number listed above.       Sincerely,      Vasquez Denson MD

## 2023-01-26 ENCOUNTER — TELEPHONE (OUTPATIENT)
Dept: FAMILY MEDICINE | Facility: CLINIC | Age: 53
End: 2023-01-26
Payer: COMMERCIAL

## 2023-01-26 DIAGNOSIS — Z12.11 SCREEN FOR COLON CANCER: Primary | ICD-10-CM

## 2023-01-26 LAB — HEMOCCULT STL QL IA: POSITIVE

## 2023-01-26 NOTE — PROGRESS NOTES
Rhonda Cordero is an extremely pleasant 52 year old year old female patient here today for recheck rash on buttocks. She notes clear. Happy with results. She notes rash is now present in groin, she just started clobetasol which is helping. .  Patient has no other skin complaints today.  Remainder of the HPI, Meds, PMH, Allergies, FH, and SH was reviewed in chart.    Past Medical History:   Diagnosis Date     Anemia      Hypertension goal BP (blood pressure) < 130/80 2011     Hypocalcemia      Multinodular goiter      Postsurgical hypothyroidism      Rotator cuff tendonitis 02/10/2015     Vaginal Papanicolaou smear not required due to history of hysterectomy        Past Surgical History:   Procedure Laterality Date     LAPAROSCOPY,TUBAL CAUTERY       SALPINGO OOPHORECTOMY,R/L/LUIS      left     THYROIDECTOMY N/A 2017    Procedure: THYROIDECTOMY;  Total Thyroidectomy;  Surgeon: Skylar Costa MD;  Location:  OR     [facility] TOTAL ABDOM HYSTERECTOMY  09    [facility]        Family History   Problem Relation Age of Onset     Cerebrovascular Disease Mother 50     Cerebrovascular Disease Father 49         49     Hypertension Father      Cancer No family hx of      Diabetes No family hx of      Thyroid Disease No family hx of      Glaucoma No family hx of      Macular Degeneration No family hx of        Social History     Socioeconomic History     Marital status:      Spouse name: Not on file     Number of children: 2     Years of education: 12     Highest education level: Not on file   Occupational History     Employer: [employer]   Tobacco Use     Smoking status: Never     Smokeless tobacco: Never   Vaping Use     Vaping Use: Never used   Substance and Sexual Activity     Alcohol use: Yes     Comment: occ     Drug use: No     Comment: occasional/ rare     Sexual activity: Not Currently     Partners: Male     Birth control/protection: Surgical   Other Topics Concern      Parent/sibling w/ CABG, MI or angioplasty before 65F 55M? No      Service No     Blood Transfusions Yes     Comment: had 3 units 10/23/2009     Caffeine Concern No     Occupational Exposure No     Hobby Hazards No     Sleep Concern Not Asked     Comment: sleeps about 5-6 hours a night     Stress Concern Not Asked     Weight Concern Not Asked     Special Diet No     Back Care Not Asked     Exercise Yes     Comment: walk     Bike Helmet Not Asked     Comment: doesn't ride a bike     Seat Belt Yes     Self-Exams No     Comment: info given on SBE   Social History Narrative     Not on file     Social Determinants of Health     Financial Resource Strain: Not on file   Food Insecurity: Not on file   Transportation Needs: Not on file   Physical Activity: Not on file   Stress: Not on file   Social Connections: Not on file   Intimate Partner Violence: Not on file   Housing Stability: Not on file       Outpatient Encounter Medications as of 1/25/2023   Medication Sig Dispense Refill     clobetasol (TEMOVATE) 0.05 % external ointment Apply sparingly twice to three times daily for 3-4 weeks to affected areas on buttock. Please dispense 60 gram quantity. 60 g 4     calcitRIOL (ROCALTROL) 0.25 MCG capsule Take 2 capsules (0.5 mcg) by mouth daily 60 capsule 11     calcium carbonate antacid 1000 MG CHEW Take 2 tablets by mouth 3 times daily (Patient not taking: Reported on 8/12/2022) 60 tablet 3     clotrimazole (LOTRIMIN) 1 % external cream Apply topically 2 times daily (Patient not taking: Reported on 1/25/2023) 113 g 1     hydrochlorothiazide (HYDRODIURIL) 25 MG tablet TAKE ONE TABLET BY MOUTH ONCE DAILY 90 tablet 0     levothyroxine (SYNTHROID/LEVOTHROID) 88 MCG tablet TAKE 1 TABLET (88 MCG) BY MOUTH DAILY 90 tablet 1     lisinopril (ZESTRIL) 10 MG tablet TAKE ONE AND ONE-HALF TABLETS (15MG) BY MOUTH EVERY  tablet 0     triamcinolone (KENALOG) 0.1 % cream Apply sparingly to affected area three times daily as  needed (Patient not taking: Reported on 1/25/2023) 80 g 1     Facility-Administered Encounter Medications as of 1/25/2023   Medication Dose Route Frequency Provider Last Rate Last Admin     lidocaine 1 % injection 2 mL  2 mL   Yanique Leon MD   2 mL at 07/28/22 1618     lidocaine 1 % injection 2 mL  2 mL   Yanique Leon MD   2 mL at 07/28/22 1618     triamcinolone (KENALOG-40) injection 40 mg  40 mg   Yanique Leon MD   40 mg at 07/28/22 1618     triamcinolone (KENALOG-40) injection 40 mg  40 mg   Yanique Leon MD   40 mg at 07/28/22 1618             O:   NAD, WDWN, Alert & Oriented, Mood & Affect wnl, Vitals stable   Here today alone   LMP 11/23/2009    General appearance normal   Vitals stable   Alert, oriented and in no acute distress   PIH on buttock   Mild scaly plaques on vulva/mons pubis      Eyes: Conjunctivae/lids:Normal     ENT: Lipsnormal    MSK:Normal    Pulm: Breathing Normal  \    Neuro/Psych: Orientation:Alert and Orientedx3 ; Mood/Affect:normal     A/P:  1. Favor psoriasis  Reduce clobetasol to once twice weekly.   Use gentle cleanser like vanicream or cerave.  Use cerave healing ointment on days that you are not using clobetasol.   Recheck as needed.

## 2023-01-27 NOTE — RESULT ENCOUNTER NOTE
You did show blood in the stool on the FIT test     You need to do a colonoscopy    I will put in an order for this    Call [phone] to schedule    Vasquez Denson MD

## 2023-01-27 NOTE — TELEPHONE ENCOUNTER
Please call patient to inform her the FIT test did show blood.  She should do a colonoscopy.    I put in order.    She can call [phone] to schedule.    Thanks.    Vasquez Denson MD

## 2023-01-27 NOTE — TELEPHONE ENCOUNTER
"Called patient, left message to call back at [phone]. Called to relay message below:    \"Please call patient to inform her the FIT test did show blood.  She should do a colonoscopy.  I put in order.     She can call [phone] to schedule.     Thanks.   Vasquez Denson MD\"    RISHI Ramirez RN  [facility]  "

## 2023-01-30 ENCOUNTER — TELEPHONE (OUTPATIENT)
Dept: GASTROENTEROLOGY | Facility: CLINIC | Age: 53
End: 2023-01-30
Payer: COMMERCIAL

## 2023-01-30 NOTE — TELEPHONE ENCOUNTER
"Screening Questions  BLUE  KIND OF PREP RED  LOCATION [review exclusion criteria] GREEN  SEDATION TYPE        n Are you active on mychart?       deal Ordering/Referring Provider?        bcbs What type of coverage do you have?      N Have you had a positive covid test in the last 14 days?     33.7 1. BMI  [BMI 40+ - review exclusion criteria]    Y  2. Are you able to give consent for your medical care? [IF NO,RN REVIEW]          N  3. Are you taking any prescription pain medications on a routine schedule   (ex narcotics: tramadol, oxycodone, roxicodone, oxycontin,  and percocet)?          3a. EXTENDED PREP What kind of prescription?     N 4. Do you have any chemical dependencies such as alcohol, street drugs, or methadone?        **If yes 3- 5 , please schedule with MAC sedation.**          IF YES TO ANY 6 - 10 - HOSPITAL SETTING ONLY.     N 6.   Do you need assistance transferring?     N 7.   Have you had a heart or lung transplant?    N 8.   Are you currently on dialysis?   N 9.   Do you use daily home oxygen?   N 10. Do you take nitroglycerin?   10a.  If yes, how often?     11. [FEMALES]   Are you currently pregnant?    11a.  If yes, how many weeks? [ Greater than 12 weeks, OR NEEDED]    N 12. Do you have Pulmonary Hypertension? *NEED PAC APPT AT [facility]*     N 13. [review exclusion criteria]  Do you have any implantable devices in your body (pacemaker, defib, LVAD)?    N 14. In the past 6 months, have you had any heart related issues including cardiomyopathy or heart attack?     14a.  If yes, did it require cardiac stenting if so when?     N 15. Have you had a stroke or Transient ischemic attack (TIA - aka  mini stroke ) within 6 months?      N 16. Do you have mod to severe Obstructive Sleep Apnea?  [Hospital only]    N 17. Do you have SEVERE AND UNCONTROLLED asthma? *NEED PAC APPT AT [facility]*     1 DAILY ASPIRIN 18. Are you currently taking any blood thinners?     18a. If yes, inform patient to \"follow up w/ ordering " "provider for bridging instructions.\"    N 19. Do you take the medication Phentermine?    19a. If yes, \"Hold for 7 days before procedure.  Please consult your prescribing provider if you have questions about holding this medication.\"     N  20. Do you have chronic kidney disease?      N  21. Do you have a diagnosis of diabetes?     N  22. On a regular basis do you go 3-5 days between bowel movements?      23. Preferred LOCAL Pharmacy for Pre Prescription    [ LIST ONLY ONE PHARMACY]     [facility] [place], [place] - [address]    - CLOSING REMINDERS -    Informed patient they will need an adult    Cannot take any type of public or medical transportation alone    Conscious Sedation- Needs  for 6 hours after the procedure       MAC/General-Needs  for 24 hours after procedure    Pre-Procedure Covid test to be completed [[facility] PCR Testing Required]    Confirmed Nurse will call to complete assessment       - SCHEDULING DETAILS -  NO Hospital Setting Required? If yes, what is the exclusion?:    MARE  Surgeon    3/24  Date of Procedure  Lower Endoscopy [Colonoscopy]  Type of Procedure Scheduled  [facility]-If you answer yes to questions #8, #20, #21Which Colonoscopy Prep was Sent?     CS Sedation Type     N PAC / Pre-op Required                 "

## 2023-01-30 NOTE — TELEPHONE ENCOUNTER
Received return call from patient. She verbalized understanding of this message, no further action needed.    STEPHANIE VallesN RN  [facility]

## 2023-01-30 NOTE — TELEPHONE ENCOUNTER
Attempted to call 2nd time. Left vm stating to call back regarding lab and to call GI to schedule colonoscopy if she doesn't hear from them in next two business days. Closing encounter until we hear from pt.     Thanks,  ROBERT San  [facility]

## 2023-02-27 DIAGNOSIS — E83.51 HYPOCALCEMIA: ICD-10-CM

## 2023-02-27 RX ORDER — CALCITRIOL 0.25 UG/1
0.5 CAPSULE, LIQUID FILLED ORAL DAILY
Qty: 60 CAPSULE | Refills: 11 | Status: CANCELLED | OUTPATIENT
Start: 2023-02-27

## 2023-03-01 DIAGNOSIS — E03.9 HYPOTHYROIDISM, UNSPECIFIED TYPE: ICD-10-CM

## 2023-03-01 DIAGNOSIS — E83.51 HYPOCALCEMIA: ICD-10-CM

## 2023-03-01 RX ORDER — CALCITRIOL 0.25 UG/1
0.5 CAPSULE, LIQUID FILLED ORAL DAILY
Qty: 60 CAPSULE | Refills: 1 | Status: SHIPPED | OUTPATIENT
Start: 2023-03-01 | End: 2023-05-10

## 2023-03-01 RX ORDER — LEVOTHYROXINE SODIUM 88 UG/1
88 TABLET ORAL DAILY
Qty: 90 TABLET | Refills: 0 | Status: SHIPPED | OUTPATIENT
Start: 2023-03-01 | End: 2023-05-08

## 2023-03-01 NOTE — TELEPHONE ENCOUNTER
Reason for call:  Medication   If this is a refill request, has the caller requested the refill from the pharmacy already? Yes  Will the patient be using a [facility]? Yes  Name of the pharmacy and phone number for the current request:    [facility] [place], [place] - [address]      Name of the medication requested: calcitRIOL (ROCALTROL) 0.25 MCG capsule & levothyroxine (SYNTHROID/LEVOTHROID) 88 MCG tablet    Other request: patient is out of medications and Dr. Anne is no longer with  [facility]. They asked for him to contact patient's primary.     Phone number to reach LAINE MUÑIZ [phone]  Best Time:      Can we leave a detailed message on this number?  YES    Travel screening: Not Applicable

## 2023-03-01 NOTE — TELEPHONE ENCOUNTER
Okay refills but advise clinic appointment in the next couple months     Please inform patient    Vasquez Denson MD

## 2023-03-01 NOTE — TELEPHONE ENCOUNTER
Patient advise that Dr Anne was no longer at the clinic but is wondering about the status of this medication request, Will call primary clinic as well for advise thank you

## 2023-03-02 NOTE — TELEPHONE ENCOUNTER
levothyroxine (SYNTHROID/LEVOTHROID) 88 MCG tablet 90 tablet 0 3/1/2023  No   Sig - Route: Take 1 tablet (88 mcg) by mouth daily - Oral   Sent to pharmacy as: Levothyroxine Sodium 88 MCG Oral Tablet (SYNTHROID/LEVOTHROID)   Class: E-Prescribe   Order: [id number]   E-Prescribing Status: Receipt confirmed by pharmacy (3/1/2023  5:13 PM CST)     Printout Tracking    External Result Report     Pharmacy    [facility] [address] [facility] [address]     calcitRIOL (ROCALTROL) 0.25 MCG capsule 60 capsule 1 3/1/2023  No   Sig - Route: Take 2 capsules (0.5 mcg) by mouth daily - Oral   Sent to pharmacy as: Calcitriol 0.25 MCG Oral Capsule (ROCALTROL)   Class: E-Prescribe   Order: [id number]   E-Prescribing Status: Receipt confirmed by pharmacy (3/1/2023  5:13 PM CST)     Printout Tracking    External Result Report     Pharmacy    [facility] [address] [facility]

## 2023-03-09 ENCOUNTER — TELEPHONE (OUTPATIENT)
Dept: GASTROENTEROLOGY | Facility: CLINIC | Age: 53
End: 2023-03-09
Payer: COMMERCIAL

## 2023-03-09 DIAGNOSIS — Z12.11 ENCOUNTER FOR SCREENING COLONOSCOPY: Primary | ICD-10-CM

## 2023-03-09 NOTE — TELEPHONE ENCOUNTER
Attempted to contact patient regarding upcoming Colonoscopy  procedure on 3.24.2023 for pre assessment questions. No answer.     Left message to return call to [phone] #4    Discuss Covid policy and designated  policy.    Pre op exam? N/A    Arrival time: 0730. Procedure time: 0815    Facility location: [facility] [place]; [address], [place] [address]    Sedation type: Conscious sedation     Anticoagulants: No    Electronic implanted devices? No    Diabetic? No    Indication for procedure: + FIT    Bowel prep recommendation: Standard Golytely      Prep instructions sent via letter at time of scheduling. Bowel prep script sent to [facility] [place] - [address]    Tesha Asencio RN  Endoscopy Procedure Pre Assessment RN

## 2023-03-09 NOTE — LETTER
March 9, 2023      Rhonda Cordero  [address]  [facility] [id number]              Dear Rhonda,          Colonoscopy      Procedure date: 3.24.2023    Anticipated arrival time: 7:30 AM   (Procedure Times are Subject to Change)    Facility location: [facility]; [address] Floor, [place] [address] - Check in location: 2nd Floor at Surgery desk    Prep Instructions: Instructions on how to prepare for your upcoming procedure are found below. Deviation from instructions may result in less than desired outcomes and procedure may need to be rescheduled.      Policy: Please arrive with a responsible adult who can drive you home after the exam and stay with you for the next 24 hours, unless otherwise specified. You will not be able to drive if you are having any type of sedation. You cannot take a taxi, Uber, train or bus by yourself. Procedure will be cancelled due to not having a .     If you are taking blood thinning medication: Medications such as Coumadin, Plavix, Rivaroxaban (Xarelto)..etc, may need to be temporarily stopped for multiple days before your scheduled procedure. Talk to your doctor. Your prescribing doctor will give you directions to see if these medications should be changed or stopped prior to your exam. Procedure may be canceled if medications are not accurately held.     If you have Diabetes: Ask to have your exam early in the morning if possible. Call your doctor if you have questions regarding your diet modifications and/or diabetic medications during prep.       If you take Phentermine (Adipex-P, Lomaira): This MUST be held 7 days prior to your scheduled procedure. Your procedure will be canceled if this medication has not been held.     To reschedule or cancel your procedure: Please call our scheduling team at:   [facility]: [phone], option 2  Monday through Friday, 8 a.m. to 4:30  p.m.    ---------------------------------------------------------------------------------------------------------------------    Prep prescription sent to    [facility] [place]  [place] - [address]      STANDARD Golytely (Colyte, Nulytely)  Prep Instructions for your Colonoscopy      Please read these instructions carefully at least 7 days prior to your colonoscopy procedure. Be sure to follow all directions completely. The inside of your colon must be clean to allow for a complete examination for the presence of any growths, polyps, and/or abnormalities, as well as their biopsy or removal. A number of tips are included in order to make this part of the procedure as comfortable as possible.    Getting ready:     A nurse will call you within a week of your exam to prescribe your bowel prep, review the prep instructions, and answer any other questions you have.     You must arrange for an adult to drive you home after your exam. Your colonoscopy cannot be done unless you have a ride. If you need to use public transportation, someone must ride with you and stay with you for a minimum of 6-24 hours.    Check with your insurance company to be sure they will cover this exam.    7 days before the exam:    Talk to your doctor:  If you take blood-thinners (such as Coumadin, Plavix, Xarelto), your prescription or schedule may need to change before the test.    Stop taking fiber supplements, multi-vitamins with iron, and medicines that contain iron.    Continue taking prescribed aspirin; talk to your prescribing doctor with any concerns.    Stop eating corn, nuts and foods with seeds.  These can stay in the colon for days.    If you have diabetes:  Ask to have your exam early in the morning.  Also, ask your doctor if you should change your diet or medicines.    3 days before the exam:    Begin a low-fiber diet: No raw fruits or vegetables, whole wheat, seeds, nuts, popcorn or other high-fiber foods  (see list on page). No binding agents: (bran, Metamucil, Fibercon) and no Olestra (a fat substitute).    One day before the exam:    You can have a light, low-fiber breakfast. But drink only clear liquids after 9 a.m. (see list below). Drink at least 8 to 10 full glasses of clear liquids during the day.     Fill the jug that contains the Golytely powder with warm water. Cover and shake until well mixed. Use a full gallon of water. Chill for 3 hours, but do not add ice.    You will start drinking half of the Golytely solution at 6 p.m. The timing of drinking the 2nd half of the Golytely solution depends on your exam time. See Step 2 below.    After you start drinking the solution, stay near a toilet. You may have watery stools (diarrhea), mild cramping, bloating , and nausea.     Step One:  o At 3 p.m., take 2 tablets of Dulcolax (bisacodyl).  o At 6 p.m. start drinking the Golytely solution. Drink an 8-ounce glass every 15 minutes until the jug is half empty. Drink each glass quickly.     Step Two:   If you arrive before 11 AM:  At 11 p.m. on the day before your exam:    Take 2 Dulcolax (Bisacodyl) tablets.     Start drinking the other half of the Golytely jug. Drink one 8-ounce glass every 15 minutes until the jug is empty. Drink each glass quickly.  If you arrive after 11 AM:  At 6 AM on the day of the exam:    Take 2 tablets of Dulcolax (Bisacodyl).     Drink the other half of the Golytely jug.     Drink one 8-ounce glass every 15 minutes until the jug is empty.     Drink each glass quickly.     You should finish the prep 4 hours before the exam.      Day of exam:      You may take your necessary morning medications with sips of water    Do not take diabetes medicine by mouth until after your exam.    You may drink clear liquids only up until 2 hours before your arrival time.    Do not smoke, chew tobacco, or swallow anything, including water or gum for at least 2 hours before your arrival time. This is a safety  issue. Your procedure could be cancelled if you do not follow directions.    Please arrive with a responsible adult who can take you home after the test and stay with you for a minimum of 24 hours: The medicine used will make you sleepy and forgetful. If you do not have someone to take you home, we will cancel your procedure. If using public transportation you must have someone to ride with you.    Please perform your nebulizer treatments and airway clearance therapy in the morning prior to the procedure (if applicable).    If you have asthma, bring your inhalers.      CLEAR LIQUIDS   You may have:    Water, tea, coffee (no milk or cream)    Soda pop, Gatorade (not red or purple)    Jell-O, Popsicles (no milk or fruit pieces - not red or purple)    Fat-free soup broth or bouillon    Plain hard candy, such as clear life savers (not red or purple)    Clear juices and fruit-flavored drinks, such as apple juice, white grape juice, Hi-C, and Teodoro-Aid (not red or purple)   Do not have:    Milk or milk products such as ice cream, malts or shakes, or coffee creamer    Red or purple drinks of any kind such as cranberry juice or grape juice. Avoid red or purple Jell-O, Popsicles, Teodoro-Aid, sorbet, sherbet and candy    Juices with pulp such as orange, grapefruit, pineapple or tomato juice    Cream soups of any kind    Alcohol and beer    Protein drinks or protein powder     LOW FIBER DIET   You may have:      Starches: White bread, rolls, biscuits, croissants, [place] toast, white flour tortillas, waffles, pancakes, [place]an toast; white rice, noodles, pasta, macaroni; cooked and peeled potatoes; plain crackers, saltines; cooked farina or cream of rice; puffed rice, corn flakes, Rice Krispies, Special K     Vegetables: tender cooked and canned, vegetable broths    Fruits and fruit juices: Strained fruit juice, canned fruit without seeds or skin (not pineapple), applesauce, pear sauce, ripe bananas, melons (not watermelon)     Milk  products: Milk (plain or flavored), cheese, cottage cheese, yogurt (no berries), custard, ice cream      Proteins: Tender, well-cooked ground beef, lamb, veal, ham, pork, chicken, turkey, fish or organ meats; eggs; creamy peanut butter     Fats and condiments:  Margarine, butter, oils, mayonnaise, sour cream, salad dressing, plain gravy; spices, cooked herbs; sugar, clear jelly, honey, syrup     Snacks, sweets and drinks: Pretzels, hard candy; plain cakes and cookies (no nuts or seeds); gelatin, plain pudding, sherbet, Popsicles; coffee, tea, carbonated ( fizzy ) drinks Do not have:      Starches: Breads or rolls that contain nuts, seeds or fruit; whole wheat or whole grain breads that contain more than 1 gram of fiber per slice; cornbread; corn or whole wheat tortillas; potatoes with skin; brown rice, wild rice, kasha (buckwheat), and oatmeal     Vegetables: Any raw or steamed vegetables; vegetables with seeds; corn in any form     Fruits and fruit juices: Prunes, prune juice, raisins and other dried fruits, berries and other fruits with seeds, canned pineapple juices with pulp such as orange, grapefruit, pineapple or tomato juice    Milk products: Any yogurt with nuts, seeds or berries     Proteins: Tough, fibrous meats with gristle; cooked dried beans, peas or lentils; crunchy peanut butter    Fats and condiments: Pickles, olives, relish, horseradish; jam, marmalade, preserves     Snacks, sweets and drinks: Popcorn, nuts, seeds, granola, coconut, candies made with nuts or seeds; all desserts that contain nuts, seeds, raisins and other dried fruits, coconut, whole grains or bran.        FAQ:      How do you know if your colon is cleaned out?   o After completing the bowel prep, your bowel movements should be all liquid and yellow. Your bowel movements will look similar to urine in the toilet. If there are pieces of stool (poop) in the toilet, or if you can't see to the bottom of the toilet, please call our office  for advice. Call [phone] and ask to speak with a nurse.     Why do you need a responsible  to take you home and stay with you?  o We require a responsible adult to take you home for your safety. The sedation medicines used to relax you during the procedure can impair your judgement and reaction time, make you forgetful and possible a little unsteady. Do not drive, make any important decisions, or sign any legal documents for 24 hours after your procedure.     It is normal to feel bloated and gassy after your procedure. Walking will help move the air through your colon. You can take non-aspirin pain relievers that contain acetaminophen (Tylenol).     When can you eat after your procedure?  o You may resume your normal diet when you feel ready, unless advised otherwise by the doctor performing your procedure. Do not drink alcohol for 24 hours after your procedure.     You many resume normal activities (work, exercise, etc.) after 24 hours.     When will you get test results?  o You should have your procedure results and any lab results (if applicable) by letter, [employer] message, or phone call within 2 weeks. If you have any questions, please call the doctor that referred you for the procedure.       Thank you for choosing [facility], for your procedure. If you are sent a survey regarding your care, please take the time to complete the questionnaire. We value your feedback!             Updated: 6/22/2022

## 2023-03-10 NOTE — TELEPHONE ENCOUNTER
Patient returned call.     Pre assessment questions completed for upcoming Colonoscopy  procedure scheduled on 3/24/23    COVID policy reviewed.     Pre-op exam? N/A    Reviewed procedural arrival time 0730, procedure time 0815 and facility location [facility]; [address] Floor, [place] [address]    Designated  policy reviewed. Instructed to have someone stay 6 hours post procedure.     Reviewed procedure prep instructions. Patient states they did receive letter.     Patient verbalized understanding and had no questions or concerns at this time.    Sherine Clark RN  Endoscopy Procedure Pre Assessment RN

## 2023-03-20 RX ORDER — BISACODYL 5 MG/1
TABLET, DELAYED RELEASE ORAL
Qty: 4 TABLET | Refills: 0 | Status: SHIPPED | OUTPATIENT
Start: 2023-03-20 | End: 2023-05-05

## 2023-03-20 NOTE — TELEPHONE ENCOUNTER
Patient calling, states no prescription was sent for her to the pharmacy, prescription was ordered and sent.     Nellie Arroyo, RN  Endoscopy Procedure Pre-assessment RN

## 2023-03-24 ENCOUNTER — HOSPITAL ENCOUNTER (OUTPATIENT)
Facility: AMBULATORY SURGERY CENTER | Age: 53
Discharge: HOME | End: 2023-03-24
Attending: SURGERY | Admitting: SURGERY
Payer: COMMERCIAL

## 2023-03-24 VITALS
OXYGEN SATURATION: 98 % | SYSTOLIC BLOOD PRESSURE: 141 MMHG | HEART RATE: 66 BPM | DIASTOLIC BLOOD PRESSURE: 72 MMHG | RESPIRATION RATE: 16 BRPM | TEMPERATURE: 96 F

## 2023-03-24 LAB — COLONOSCOPY: NORMAL

## 2023-03-24 PROCEDURE — G8918 PT W/O PREOP ORDER IV AB PRO: HCPCS

## 2023-03-24 PROCEDURE — 45385 COLONOSCOPY W/LESION REMOVAL: CPT

## 2023-03-24 PROCEDURE — 99152 MOD SED SAME PHYS/QHP 5/>YRS: CPT | Mod: 59 | Performed by: SURGERY

## 2023-03-24 PROCEDURE — 88305 TISSUE EXAM BY PATHOLOGIST: CPT | Performed by: PATHOLOGY

## 2023-03-24 PROCEDURE — 45385 COLONOSCOPY W/LESION REMOVAL: CPT | Mod: PT | Performed by: SURGERY

## 2023-03-24 PROCEDURE — G8907 PT DOC NO EVENTS ON DISCHARG: HCPCS

## 2023-03-24 RX ORDER — ONDANSETRON 2 MG/ML
4 INJECTION INTRAMUSCULAR; INTRAVENOUS
Status: DISCONTINUED | OUTPATIENT
Start: 2023-03-24 | End: 2023-03-25 | Stop reason: HOSPADM

## 2023-03-24 RX ORDER — ONDANSETRON 2 MG/ML
4 INJECTION INTRAMUSCULAR; INTRAVENOUS EVERY 6 HOURS PRN
Status: DISCONTINUED | OUTPATIENT
Start: 2023-03-24 | End: 2023-03-25 | Stop reason: HOSPADM

## 2023-03-24 RX ORDER — LIDOCAINE 40 MG/G
CREAM TOPICAL
Status: DISCONTINUED | OUTPATIENT
Start: 2023-03-24 | End: 2023-03-25 | Stop reason: HOSPADM

## 2023-03-24 RX ORDER — FLUMAZENIL 0.1 MG/ML
0.2 INJECTION, SOLUTION INTRAVENOUS
Status: ACTIVE | OUTPATIENT
Start: 2023-03-24 | End: 2023-03-24

## 2023-03-24 RX ORDER — NALOXONE HYDROCHLORIDE 0.4 MG/ML
0.4 INJECTION, SOLUTION INTRAMUSCULAR; INTRAVENOUS; SUBCUTANEOUS
Status: DISCONTINUED | OUTPATIENT
Start: 2023-03-24 | End: 2023-03-25 | Stop reason: HOSPADM

## 2023-03-24 RX ORDER — NALOXONE HYDROCHLORIDE 0.4 MG/ML
0.2 INJECTION, SOLUTION INTRAMUSCULAR; INTRAVENOUS; SUBCUTANEOUS
Status: DISCONTINUED | OUTPATIENT
Start: 2023-03-24 | End: 2023-03-25 | Stop reason: HOSPADM

## 2023-03-24 RX ORDER — FENTANYL CITRATE 50 UG/ML
INJECTION, SOLUTION INTRAMUSCULAR; INTRAVENOUS PRN
Status: DISCONTINUED | OUTPATIENT
Start: 2023-03-24 | End: 2023-03-24 | Stop reason: HOSPADM

## 2023-03-24 RX ORDER — PROCHLORPERAZINE MALEATE 10 MG
10 TABLET ORAL EVERY 6 HOURS PRN
Status: DISCONTINUED | OUTPATIENT
Start: 2023-03-24 | End: 2023-03-25 | Stop reason: HOSPADM

## 2023-03-24 RX ORDER — ONDANSETRON 4 MG/1
4 TABLET, ORALLY DISINTEGRATING ORAL EVERY 6 HOURS PRN
Status: DISCONTINUED | OUTPATIENT
Start: 2023-03-24 | End: 2023-03-25 | Stop reason: HOSPADM

## 2023-03-24 NOTE — H&P
Pre-Endoscopy History and Physical     Rhonda Cordero MRN# [id number]   YOB: 1970 Age: 52 year old     Date of Procedure: 3/24/2023  Primary care provider: Vasquez Denson  Type of Endoscopy: colonoscopy  Reason for Procedure: positive FIT  Type of Anesthesia Anticipated: Moderate Sedation    HPI:    Rhonda is a 52 year old female who will be undergoing the above procedure.      A history and physical has been performed. The patient's medications and allergies have been reviewed. The risks and benefits of the procedure and the sedation options and risks were discussed with the patient.  All questions were answered and informed consent was obtained.      She denies a personal or family history of anesthesia complications or bleeding disorders.     Allergies   Allergen Reactions     Advil [Ibuprofen] Swelling     Hands swelled     Cephalexin Itching        Cannot display prior to admission medications because the patient has not been admitted in this contact.     Current Outpatient Medications   Medication     bisacodyl (DULCOLAX) 5 MG EC tablet     calcitRIOL (ROCALTROL) 0.25 MCG capsule     calcium carbonate antacid 1000 MG CHEW     hydrochlorothiazide (HYDRODIURIL) 25 MG tablet     levothyroxine (SYNTHROID/LEVOTHROID) 88 MCG tablet     lisinopril (ZESTRIL) 10 MG tablet     polyethylene glycol (GOLYTELY) 236 g suspension     clobetasol (TEMOVATE) 0.05 % external ointment     clotrimazole (LOTRIMIN) 1 % external cream     triamcinolone (KENALOG) 0.1 % cream     Current Facility-Administered Medications   Medication     lidocaine (LMX4) kit     lidocaine 1 % 0.1-1 mL     lidocaine 1 % injection 2 mL     lidocaine 1 % injection 2 mL     ondansetron (ZOFRAN) injection 4 mg     sodium chloride (PF) 0.9% PF flush 3 mL     sodium chloride (PF) 0.9% PF flush 3 mL     triamcinolone (KENALOG-40) injection 40 mg     triamcinolone (KENALOG-40) injection 40 mg         Patient Active Problem List    Diagnosis     CARDIOVASCULAR SCREENING; LDL GOAL LESS THAN 130     Impingement syndrome, shoulder     Rotator cuff tendonitis     Hashimoto's thyroiditis     Nontoxic multinodular goiter     Shoulder joint pain     Gastroesophageal reflux disease, esophagitis presence not specified     Cervical adenopathy     Hypertension goal BP (blood pressure) < 140/90     Essential hypertension with goal blood pressure less than 140/90     Anup's deformity of left heel     History of thyroidectomy     Postoperative hypothyroidism     Hypocalcemia        Past Medical History:   Diagnosis Date     Anemia      Hypertension goal BP (blood pressure) < 130/80 2011     Hypocalcemia      Multinodular goiter      Postsurgical hypothyroidism      Rotator cuff tendonitis 02/10/2015     Vaginal Papanicolaou smear not required due to history of hysterectomy         Past Surgical History:   Procedure Laterality Date     LAPAROSCOPY,TUBAL CAUTERY       SALPINGO OOPHORECTOMY,R/L/LUIS      left     THYROIDECTOMY N/A 2017    Procedure: THYROIDECTOMY;  Total Thyroidectomy;  Surgeon: Skylar Costa MD;  Location:  OR     [facility] TOTAL ABDOM HYSTERECTOMY  09    [facility]       Social History     Tobacco Use     Smoking status: Never     Smokeless tobacco: Never   Substance Use Topics     Alcohol use: Yes     Comment: occ       Family History   Problem Relation Age of Onset     Cerebrovascular Disease Mother 50     Cerebrovascular Disease Father 49         49     Hypertension Father      Cancer No family hx of      Diabetes No family hx of      Thyroid Disease No family hx of      Glaucoma No family hx of      Macular Degeneration No family hx of        REVIEW OF SYSTEMS:     5 point ROS negative except as noted above in HPI, including Gen., Resp., CV, GI &  system review.      PHYSICAL EXAM:   BP (!) 168/92   Temp (!) 96  F (35.6  C) (Temporal)   Resp 16   LMP 2009   SpO2 100%  Estimated body  "mass index is 33.12 kg/m  as calculated from the following:    Height as of 8/12/22: 1.588 m (5' 2.5\").    Weight as of 1/9/23: 83.5 kg (184 lb).   GENERAL APPEARANCE: healthy, alert and no distress  MENTAL STATUS: alert  AIRWAY EXAM: Mallampatti Class II (visualization of the soft palate, fauces, and uvula)  RESP: lungs clear to auscultation - no rales, rhonchi or wheezes  CV: regular rates and rhythm      DIAGNOSTICS:    Not indicated      IMPRESSION   ASA Class 2 - Mild systemic disease        PLAN:       Plan for colonoscopy. We discussed the risks, benefits and alternatives and the patient wished to proceed.    The above has been forwarded to the consulting provider.      Signed Electronically by: Chet Escobar MD  March 24, 2023    "

## 2023-03-28 LAB
PATH REPORT.COMMENTS IMP SPEC: NORMAL
PATH REPORT.COMMENTS IMP SPEC: NORMAL
PATH REPORT.FINAL DX SPEC: NORMAL
PATH REPORT.GROSS SPEC: NORMAL
PATH REPORT.MICROSCOPIC SPEC OTHER STN: NORMAL
PATH REPORT.RELEVANT HX SPEC: NORMAL
PHOTO IMAGE: NORMAL

## 2023-03-29 ENCOUNTER — TELEPHONE (OUTPATIENT)
Dept: SURGERY | Facility: CLINIC | Age: 53
End: 2023-03-29
Payer: COMMERCIAL

## 2023-03-29 NOTE — TELEPHONE ENCOUNTER
----- Message from Chet Escobar MD sent at 3/28/2023  7:39 AM CDT -----  Pathology from the colon polyp(s) showed 1 adenoma type, which is the kind that has cancer risk. This is benign. Next colonoscopy should be in 7 years.

## 2023-05-05 ENCOUNTER — OFFICE VISIT (OUTPATIENT)
Dept: FAMILY MEDICINE | Facility: CLINIC | Age: 53
End: 2023-05-05
Payer: COMMERCIAL

## 2023-05-05 VITALS
SYSTOLIC BLOOD PRESSURE: 155 MMHG | DIASTOLIC BLOOD PRESSURE: 98 MMHG | HEIGHT: 63 IN | RESPIRATION RATE: 18 BRPM | HEART RATE: 69 BPM | TEMPERATURE: 97 F | OXYGEN SATURATION: 100 % | BODY MASS INDEX: 33.73 KG/M2 | WEIGHT: 190.38 LBS

## 2023-05-05 DIAGNOSIS — E66.9 OBESITY, UNSPECIFIED CLASSIFICATION, UNSPECIFIED OBESITY TYPE, UNSPECIFIED WHETHER SERIOUS COMORBIDITY PRESENT: ICD-10-CM

## 2023-05-05 DIAGNOSIS — E89.0 POSTOPERATIVE HYPOTHYROIDISM: ICD-10-CM

## 2023-05-05 DIAGNOSIS — E03.9 HYPOTHYROIDISM, UNSPECIFIED TYPE: ICD-10-CM

## 2023-05-05 DIAGNOSIS — R73.01 IMPAIRED FASTING GLUCOSE: ICD-10-CM

## 2023-05-05 DIAGNOSIS — L85.3 XEROSIS CUTIS: ICD-10-CM

## 2023-05-05 DIAGNOSIS — I10 HYPERTENSION GOAL BP (BLOOD PRESSURE) < 140/90: ICD-10-CM

## 2023-05-05 DIAGNOSIS — R53.83 FATIGUE, UNSPECIFIED TYPE: ICD-10-CM

## 2023-05-05 DIAGNOSIS — I10 ESSENTIAL HYPERTENSION WITH GOAL BLOOD PRESSURE LESS THAN 140/90: Primary | ICD-10-CM

## 2023-05-05 DIAGNOSIS — E78.5 HYPERLIPIDEMIA LDL GOAL <100: ICD-10-CM

## 2023-05-05 LAB
ALBUMIN SERPL BCG-MCNC: 4 G/DL (ref 3.5–5.2)
ALP SERPL-CCNC: 93 U/L (ref 35–104)
ALT SERPL W P-5'-P-CCNC: 23 U/L (ref 10–35)
ANION GAP SERPL CALCULATED.3IONS-SCNC: 11 MMOL/L (ref 7–15)
AST SERPL W P-5'-P-CCNC: 25 U/L (ref 10–35)
BASOPHILS # BLD AUTO: 0 10E3/UL (ref 0–0.2)
BASOPHILS NFR BLD AUTO: 0 %
BILIRUB SERPL-MCNC: 0.3 MG/DL
BUN SERPL-MCNC: 19.1 MG/DL (ref 6–20)
CALCIUM SERPL-MCNC: 8.1 MG/DL (ref 8.6–10)
CHLORIDE SERPL-SCNC: 101 MMOL/L (ref 98–107)
CHOLEST SERPL-MCNC: 197 MG/DL
CREAT SERPL-MCNC: 0.6 MG/DL (ref 0.51–0.95)
DEPRECATED HCO3 PLAS-SCNC: 29 MMOL/L (ref 22–29)
EOSINOPHIL # BLD AUTO: 0.2 10E3/UL (ref 0–0.7)
EOSINOPHIL NFR BLD AUTO: 2 %
ERYTHROCYTE [DISTWIDTH] IN BLOOD BY AUTOMATED COUNT: 14.4 % (ref 10–15)
GFR SERPL CREATININE-BSD FRML MDRD: >90 ML/MIN/1.73M2
GLUCOSE SERPL-MCNC: 130 MG/DL (ref 70–99)
HBA1C MFR BLD: 7.7 % (ref 0–5.6)
HCT VFR BLD AUTO: 40.3 % (ref 35–47)
HDLC SERPL-MCNC: 53 MG/DL
HGB BLD-MCNC: 12.8 G/DL (ref 11.7–15.7)
LDLC SERPL CALC-MCNC: 127 MG/DL
LYMPHOCYTES # BLD AUTO: 4.4 10E3/UL (ref 0.8–5.3)
LYMPHOCYTES NFR BLD AUTO: 44 %
MCH RBC QN AUTO: 26 PG (ref 26.5–33)
MCHC RBC AUTO-ENTMCNC: 31.8 G/DL (ref 31.5–36.5)
MCV RBC AUTO: 82 FL (ref 78–100)
MONOCYTES # BLD AUTO: 0.6 10E3/UL (ref 0–1.3)
MONOCYTES NFR BLD AUTO: 6 %
NEUTROPHILS # BLD AUTO: 4.8 10E3/UL (ref 1.6–8.3)
NEUTROPHILS NFR BLD AUTO: 48 %
NONHDLC SERPL-MCNC: 144 MG/DL
PLATELET # BLD AUTO: 297 10E3/UL (ref 150–450)
POTASSIUM SERPL-SCNC: 4.3 MMOL/L (ref 3.4–5.3)
PROT SERPL-MCNC: 7.3 G/DL (ref 6.4–8.3)
RBC # BLD AUTO: 4.93 10E6/UL (ref 3.8–5.2)
SODIUM SERPL-SCNC: 141 MMOL/L (ref 136–145)
T4 FREE SERPL-MCNC: 1.08 NG/DL (ref 0.9–1.7)
TRIGL SERPL-MCNC: 85 MG/DL
TSH SERPL DL<=0.005 MIU/L-ACNC: 28.4 UIU/ML (ref 0.3–4.2)
WBC # BLD AUTO: 10 10E3/UL (ref 4–11)

## 2023-05-05 PROCEDURE — 80061 LIPID PANEL: CPT | Performed by: FAMILY MEDICINE

## 2023-05-05 PROCEDURE — 80050 GENERAL HEALTH PANEL: CPT | Performed by: FAMILY MEDICINE

## 2023-05-05 PROCEDURE — 36415 COLL VENOUS BLD VENIPUNCTURE: CPT | Performed by: FAMILY MEDICINE

## 2023-05-05 PROCEDURE — 84439 ASSAY OF FREE THYROXINE: CPT | Performed by: FAMILY MEDICINE

## 2023-05-05 PROCEDURE — 99214 OFFICE O/P EST MOD 30 MIN: CPT | Performed by: FAMILY MEDICINE

## 2023-05-05 PROCEDURE — 83036 HEMOGLOBIN GLYCOSYLATED A1C: CPT | Performed by: FAMILY MEDICINE

## 2023-05-05 RX ORDER — LISINOPRIL 20 MG/1
20 TABLET ORAL DAILY
Qty: 90 TABLET | Refills: 0 | Status: SHIPPED | OUTPATIENT
Start: 2023-05-05 | End: 2023-07-26

## 2023-05-05 RX ORDER — HYDROCHLOROTHIAZIDE 25 MG/1
25 TABLET ORAL DAILY
Qty: 90 TABLET | Refills: 3 | Status: SHIPPED | OUTPATIENT
Start: 2023-05-05 | End: 2024-05-08

## 2023-05-05 ASSESSMENT — PAIN SCALES - GENERAL: PAINLEVEL: NO PAIN (0)

## 2023-05-05 NOTE — PATIENT INSTRUCTIONS
Increase lisinopril to 20 mg daily    Keep hydrochlorothiazide as is    We will send you lab results    We will send in appropriate dose thyroid     Keep working on healthy diet/exercise and wt loss     Increase lotion usage on dry skin     Return to clinic in a couple months

## 2023-05-05 NOTE — PROGRESS NOTES
"       Darline Rogers is a 52 year old, presenting for the following health issues:  RECHECK        5/5/2023     7:19 AM   Additional Questions   Roomed by Bette Houston     History of Present Illness       Hypothyroidism:     Since last visit, patient describes the following symptoms::  None    Reason for visit:  Blood pressure    She eats 2-3 servings of fruits and vegetables daily.She consumes 0 sweetened beverage(s) daily.She exercises with enough effort to increase her heart rate 9 or less minutes per day.  She exercises with enough effort to increase her heart rate 3 or less days per week.   She is taking medications regularly.      walk a lot at work    To gym daily    No sugar drinks    Some snacks at work    Recent blood pressure at [facility] 143 systolic      Review of Systems    energy level okay    If uses message, blood pressure raises up           Objective    BP (!) 166/111 (BP Location: Left arm, Patient Position: Chair, Cuff Size: Adult Regular)   Pulse 69   Temp 97  F (36.1  C) (Temporal)   Resp 18   Ht 1.588 m (5' 2.5\")   Wt 86.4 kg (190 lb 6 oz)   LMP 11/23/2009   SpO2 100%   BMI 34.27 kg/m    Body mass index is 34.27 kg/m .  Physical Exam  Constitutional:       Appearance: She is well-developed.   HENT:      Head: Normocephalic and atraumatic.   Eyes:      Conjunctiva/sclera: Conjunctivae normal.   Neck:      Vascular: No carotid bruit.   Cardiovascular:      Rate and Rhythm: Normal rate and regular rhythm.      Heart sounds: Normal heart sounds.   Pulmonary:      Effort: Pulmonary effort is normal. No respiratory distress.      Breath sounds: Normal breath sounds.   Abdominal:      Palpations: Abdomen is soft.      Tenderness: There is no abdominal tenderness.   Neurological:      Mental Status: She is alert and oriented to person, place, and time.      no edema    Radials symmetric     Skin dry        ASSESSMENT / PLAN:  (I10) Essential hypertension with goal blood pressure less than " 140/90  (primary encounter diagnosis)  Comment: blood pressure high even on recheck   Plan: advised 3rd blood pressure med but patient wanted to hold off.  Increase lisinopril to 20 mg daily then see us in a couple months.     (E78.5) Hyperlipidemia LDL goal <100  Comment: check labs   Plan: Lipid panel reflex to direct LDL Fasting,         Comprehensive metabolic panel             (E89.0) Postoperative hypothyroidism  Comment: check labs today   Plan: send in appropriate dose    (I10) Hypertension goal BP (blood pressure) < 140/90  Comment: as above   Plan: lisinopril (ZESTRIL) 20 MG tablet,         hydrochlorothiazide (HYDRODIURIL) 25 MG tablet             (E03.9) Hypothyroidism, unspecified type  Comment: send in appropriate dose   Plan: T4 free, TSH             (R73.01) Impaired fasting glucose  Comment: check   Plan: Hemoglobin A1c             (R53.83) Fatigue, unspecified type  Comment: check   Plan: CBC with Platelets & Differential             (E66.9) Obesity, unspecified classification, unspecified obesity type, unspecified whether serious comorbidity present  Comment: chronic   Plan: keep working on healthy diet/exercise and wt loss    (L85.3) Xerosis cutis  Comment: increase lotion usage  Plan: as above      I reviewed the patient's medications, allergies, medical history, family history, and social history.    Vasquez Denson MD

## 2023-05-05 NOTE — LETTER
"May 8, 2023    Nelsonjasmyne Gil  [address]  [facility] [address]          Dear ,    We are writing to inform you of your test results.  One thyroid test ( free T4 ) is okay but the TSH is more abnormal than last time.  I may try to call you in a few days to discuss in detail.  Not urgent.     The diabetes test ( hemoglobin a1c ) is higher also.  We can discuss that as well when I call.     LDL \"bad\" cholesterol is high but better than last time.  Keep working on healthy diet/exercise.     Other labs okay/ stable.       Resulted Orders   Lipid panel reflex to direct LDL Fasting   Result Value Ref Range    Cholesterol 197 <200 mg/dL    Triglycerides 85 <150 mg/dL    Direct Measure HDL 53 >=50 mg/dL    LDL Cholesterol Calculated 127 (H) <=100 mg/dL    Non HDL Cholesterol 144 (H) <130 mg/dL    Narrative    Cholesterol  Desirable:  <200 mg/dL    Triglycerides  Normal:  Less than 150 mg/dL  Borderline High:  150-199 mg/dL  High:  200-499 mg/dL  Very High:  Greater than or equal to 500 mg/dL    Direct Measure HDL  Female:  Greater than or equal to 50 mg/dL   Male:  Greater than or equal to 40 mg/dL    LDL Cholesterol  Desirable:  <100mg/dL  Above Desirable:  100-129 mg/dL   Borderline High:  130-159 mg/dL   High:  160-189 mg/dL   Very High:  >= 190 mg/dL    Non HDL Cholesterol  Desirable:  130 mg/dL  Above Desirable:  130-159 mg/dL  Borderline High:  160-189 mg/dL  High:  190-219 mg/dL  Very High:  Greater than or equal to 220 mg/dL   T4 free   Result Value Ref Range    Free T4 1.08 0.90 - 1.70 ng/dL   TSH   Result Value Ref Range    TSH 28.40 (H) 0.30 - 4.20 uIU/mL   Hemoglobin A1c   Result Value Ref Range    Hemoglobin A1C 7.7 (H) 0.0 - 5.6 %      Comment:      Normal <5.7%   Prediabetes 5.7-6.4%    Diabetes 6.5% or higher     Note: Adopted from ADA consensus guidelines.   Comprehensive metabolic panel   Result Value Ref Range    Sodium 141 136 - 145 mmol/L    Potassium 4.3 3.4 - 5.3 mmol/L "    Chloride 101 98 - 107 mmol/L    Carbon Dioxide (CO2) 29 22 - 29 mmol/L    Anion Gap 11 7 - 15 mmol/L    Urea Nitrogen 19.1 6.0 - 20.0 mg/dL    Creatinine 0.60 0.51 - 0.95 mg/dL    Calcium 8.1 (L) 8.6 - 10.0 mg/dL    Glucose 130 (H) 70 - 99 mg/dL    Alkaline Phosphatase 93 35 - 104 U/L    AST 25 10 - 35 U/L    ALT 23 10 - 35 U/L    Protein Total 7.3 6.4 - 8.3 g/dL    Albumin 4.0 3.5 - 5.2 g/dL    Bilirubin Total 0.3 <=1.2 mg/dL    GFR Estimate >90 >60 mL/min/1.73m2      Comment:      eGFR calculated using 2021 CKD-EPI equation.   CBC with platelets and differential   Result Value Ref Range    WBC Count 10.0 4.0 - 11.0 10e3/uL    RBC Count 4.93 3.80 - 5.20 10e6/uL    Hemoglobin 12.8 11.7 - 15.7 g/dL    Hematocrit 40.3 35.0 - 47.0 %    MCV 82 78 - 100 fL    MCH 26.0 (L) 26.5 - 33.0 pg    MCHC 31.8 31.5 - 36.5 g/dL    RDW 14.4 10.0 - 15.0 %    Platelet Count 297 150 - 450 10e3/uL    % Neutrophils 48 %    % Lymphocytes 44 %    % Monocytes 6 %    % Eosinophils 2 %    % Basophils 0 %    Absolute Neutrophils 4.8 1.6 - 8.3 10e3/uL    Absolute Lymphocytes 4.4 0.8 - 5.3 10e3/uL    Absolute Monocytes 0.6 0.0 - 1.3 10e3/uL    Absolute Eosinophils 0.2 0.0 - 0.7 10e3/uL    Absolute Basophils 0.0 0.0 - 0.2 10e3/uL       If you have any questions or concerns, please call the clinic at the number listed above.       Sincerely,      Vasquez Denson MD

## 2023-05-08 ENCOUNTER — TELEPHONE (OUTPATIENT)
Dept: FAMILY MEDICINE | Facility: CLINIC | Age: 53
End: 2023-05-08
Payer: COMMERCIAL

## 2023-05-08 DIAGNOSIS — E03.9 HYPOTHYROIDISM, UNSPECIFIED TYPE: Primary | ICD-10-CM

## 2023-05-08 RX ORDER — LEVOTHYROXINE SODIUM 100 UG/1
100 TABLET ORAL DAILY
Qty: 90 TABLET | Refills: 0 | Status: SHIPPED | OUTPATIENT
Start: 2023-05-08 | End: 2023-08-02

## 2023-05-08 NOTE — RESULT ENCOUNTER NOTE
"One thyroid test ( free T4 ) is okay but the TSH is more abnormal than last time.  I may try to call you in a few days to discuss in detail.  Not urgent.    The diabetes test ( hemoglobin a1c ) is higher also.  We can discuss that as well when I call.    LDL \"bad\" cholesterol is high but better than last time.  Keep working on healthy diet/exercise.    Other labs okay/ stable.    Vasquez Denson MD  "

## 2023-06-16 ENCOUNTER — OFFICE VISIT (OUTPATIENT)
Dept: FAMILY MEDICINE | Facility: CLINIC | Age: 53
End: 2023-06-16
Payer: COMMERCIAL

## 2023-06-16 VITALS
TEMPERATURE: 97.1 F | SYSTOLIC BLOOD PRESSURE: 114 MMHG | HEIGHT: 63 IN | DIASTOLIC BLOOD PRESSURE: 84 MMHG | OXYGEN SATURATION: 97 % | HEART RATE: 68 BPM | RESPIRATION RATE: 16 BRPM | BODY MASS INDEX: 33.56 KG/M2 | WEIGHT: 189.4 LBS

## 2023-06-16 DIAGNOSIS — R13.10 PROBLEMS WITH SWALLOWING: ICD-10-CM

## 2023-06-16 DIAGNOSIS — K21.9 GASTROESOPHAGEAL REFLUX DISEASE, UNSPECIFIED WHETHER ESOPHAGITIS PRESENT: Primary | ICD-10-CM

## 2023-06-16 DIAGNOSIS — R94.31 ABNORMAL ELECTROCARDIOGRAM: ICD-10-CM

## 2023-06-16 DIAGNOSIS — I49.8 BRUGADA PATTERN ON ELECTROCARDIOGRAM: ICD-10-CM

## 2023-06-16 DIAGNOSIS — I10 HYPERTENSION GOAL BP (BLOOD PRESSURE) < 140/90: ICD-10-CM

## 2023-06-16 PROCEDURE — 99214 OFFICE O/P EST MOD 30 MIN: CPT | Performed by: FAMILY MEDICINE

## 2023-06-16 NOTE — PATIENT INSTRUCTIONS
Schedule upper endoscopy EGD    Schedule echocardiogram ( heart  ultrasound )     Schedule cardiology consult  to see them after echo is done

## 2023-06-16 NOTE — PROGRESS NOTES
"       Darline Rogers is a 52 year old, presenting for the following health issues:  Derm Problem        6/16/2023    11:54 AM   Additional Questions   Roomed by Christine WEBBER     Psoriasis   Onset/Duration: Ongoing issue   Description  Location: right buttocks   Character: red bumps and red rings   Itching: very itchy   Progression of Symptoms:  waxing and waning  Accompanying signs and symptoms:   Fever: No  Body aches or joint pain: No  Sore throat symptoms: No  Recent cold symptoms: No  History:           Previous episodes of similar rash: Yes-   New exposures:  None  Recent travel: No  Exposure to similar rash: No  Therapies tried and outcome: [employer] soap and Clobetasol             Review of Systems   Change in ekg when seen at emergency room at [facility] with eating      Objective    /84   Pulse 68   Temp 97.1  F (36.2  C) (Temporal)   Resp 16   Ht 1.588 m (5' 2.5\")   Wt 85.9 kg (189 lb 6.4 oz)   LMP 11/23/2009   SpO2 97%   BMI 34.09 kg/m    Body mass index is 34.09 kg/m .  Physical Exam  Constitutional:       Appearance: She is well-developed.   HENT:      Head: Normocephalic and atraumatic.   Eyes:      Conjunctiva/sclera: Conjunctivae normal.   Neck:      Vascular: No carotid bruit.   Cardiovascular:      Rate and Rhythm: Normal rate and regular rhythm.      Heart sounds: Normal heart sounds.   Pulmonary:      Effort: Pulmonary effort is normal. No respiratory distress.      Breath sounds: Normal breath sounds.   Neurological:      Mental Status: She is alert and oriented to person, place, and time.      no edema    Radials symmetric     No chest wall tenderness            ASSESSMENT / PLAN:  (K21.9) Gastroesophageal reflux disease, unspecified whether esophagitis present  (primary encounter diagnosis)  Comment: prudent to do egd. Patient to schedule.   Plan: Adult GI  Referral - Procedure Only             (R13.10) Problems with swallowing  Comment: as above.  Of " note, patient recently had colonoscopy.   Plan: Adult GI  Referral - Procedure Only             (R94.31) Abnormal electrocardiogram  Comment: prudent to do echo and then have patient see cardiology.   Plan: Echocardiogram Complete, Adult Cardiology Eval          Referral             (I49.8) Brugada pattern on electrocardiogram  Comment: as above   Plan: Echocardiogram Complete, Adult Cardiology Eval          Referral             (I10) Hypertension goal BP (blood pressure) < 140/90  Comment: at goal   Plan: no change     Colon polyp: reviewed colonoscopy report and pathology.  Advise repeat in 5 years.       I reviewed the patient's medications, allergies, medical history, family history, and social history.    Vasquez Denson MD

## 2023-06-27 ENCOUNTER — ANCILLARY PROCEDURE (OUTPATIENT)
Dept: CARDIOLOGY | Facility: CLINIC | Age: 53
End: 2023-06-27
Attending: FAMILY MEDICINE
Payer: COMMERCIAL

## 2023-06-27 DIAGNOSIS — I49.8 BRUGADA PATTERN ON ELECTROCARDIOGRAM: ICD-10-CM

## 2023-06-27 DIAGNOSIS — R94.31 ABNORMAL ELECTROCARDIOGRAM: ICD-10-CM

## 2023-06-27 LAB — LVEF ECHO: NORMAL

## 2023-06-27 PROCEDURE — 93306 TTE W/DOPPLER COMPLETE: CPT | Performed by: STUDENT IN AN ORGANIZED HEALTH CARE EDUCATION/TRAINING PROGRAM

## 2023-06-27 NOTE — RESULT ENCOUNTER NOTE
Nothing worrisome on the heart ultrasound, but okay to see cardiology as we previously advised given your recent unusual ekg ( electrocardiogram ).    Vasquez Denson MD

## 2023-06-27 NOTE — LETTER
2023    Sue Muñiz   [address] [facility] [address]          Dear ,    We are writing to inform you of your test results.  Nothing worrisome on the heart ultrasound, but okay to see cardiology as we previously advised given your recent unusual ekg ( electrocardiogram ).        Resulted Orders   Echocardiogram Complete   Result Value Ref Range    LVEF  55-60%     Narrative    [id number]  [id number]  [id number]  [id number]^DEMAR^AILIN^BUCKY     [facility], Echocardiography Laboratory  [address] [place] [id number]     Name: DINESH MUÑIZ  MRN: [id number]  : 1970  Study Date: 2023 02:56 PM  Age: 52 yrs  Gender: Female  Patient Location: [facility]  Reason For Study: Abnormal electrocardiogram, Brugada pattern on  electrocardiogram  Ordering Physician: AILIN BENZ  Referring Physician: AILIN BENZ  Performed By: Guerita Evans RDCS     BSA: 1.9 m2  Height: 62 in  Weight: 189 lb  BP: 114/84 mmHg  ______________________________________________________________________________  Procedure  Echocardiogram with two-dimensional, color and spectral Doppler performed.  ______________________________________________________________________________  Interpretation Summary     Global and regional left ventricular function is normal with an EF of 55-60%.  The right ventricle is normal size. Global right ventricular function is  normal.  Trace aortic insufficiency is present.  The inferior vena cava cannot be assessed.  No pericardial effusion is present.  ______________________________________________________________________________  Left Ventricle  Global and regional left ventricular function is normal with an EF of 55-60%.  Left ventricular size is normal. Left ventricular diastolic function is  normal.     Right Ventricle  The right ventricle is normal size. Global right ventricular function is  normal.     Atria  Both atria appear normal.      Mitral Valve  The mitral valve is normal. Trace mitral insufficiency is present.     Aortic Valve  Aortic valve sclerosis is present. Trace aortic insufficiency is present.     Tricuspid Valve  The tricuspid valve is normal. Trace tricuspid insufficiency is present.  Pulmonary artery systolic pressure cannot be assessed.     Pulmonic Valve  The pulmonic valve is normal.     Vessels  The inferior vena cava cannot be assessed. The aorta root is normal. The  thoracic aorta is normal.     Pericardium  No pericardial effusion is present.     ______________________________________________________________________________  MMode/2D Measurements & Calculations  IVSd: 1.3 cm  LVIDd: 3.8 cm  LVIDs: 2.5 cm  LVPWd: 0.98 cm  FS: 35.5 %     LV mass(C)d: 140.9 grams  LV mass(C)dI: 75.5 grams/m2  Ao root diam: 2.9 cm  asc Aorta Diam: 3.0 cm  LVOT diam: 1.9 cm  LVOT area: 2.7 cm2  LA Volume (BP): 21.1 ml  LA Volume Index (BP): 11.3 ml/m2  RWT: 0.51  TAPSE: 2.2 cm     Doppler Measurements & Calculations  MV E max joe: 94.6 cm/sec  MV A max joe: 89.2 cm/sec  MV E/A: 1.1  MV dec slope: 451.3 cm/sec2  E/E' av.3  Lateral E/e': 9.8  Medial E/e': 22.9     ______________________________________________________________________________  Report approved by: MD Deshawn Bello 2023 03:39 PM             If you have any questions or concerns, please call the clinic at the number listed above.       Sincerely,      Vasquez Denson MD

## 2023-06-30 ENCOUNTER — TELEPHONE (OUTPATIENT)
Dept: GASTROENTEROLOGY | Facility: CLINIC | Age: 53
End: 2023-06-30
Payer: COMMERCIAL

## 2023-06-30 ENCOUNTER — HOSPITAL ENCOUNTER (OUTPATIENT)
Facility: AMBULATORY SURGERY CENTER | Age: 53
End: 2023-06-30
Attending: SURGERY | Admitting: SURGERY
Payer: COMMERCIAL

## 2023-06-30 NOTE — TELEPHONE ENCOUNTER
"Endoscopy Scheduling Screen    Have you had a positive Covid test in the last 14 days?  No    Are you active on MyChart?   No    What insurance is in the chart?  BC/BS: Schedule in ASC unless patient meets exclusion criteria.     Ordering/Referring Provider: Vasquez Denson MD      (If ordering provider performs procedure, schedule with ordering provider unless otherwise instructed. )    BMI: Estimated body mass index is 34.09 kg/m  as calculated from the following:    Height as of 6/16/23: 1.588 m (5' 2.5\").    Weight as of 6/16/23: 85.9 kg (189 lb 6.4 oz).     Sedation Ordered  moderate sedation.   If patient BMI > 50 do not schedule in ASC.    Are you taking any prescription medications for pain?   No    Are you taking methadone or Suboxone?  No    Do you have a history of malignant hyperthermia or adverse reaction to anesthesia?  No    (Females) Are you currently pregnant?   No     Have you been diagnosed or told you have pulmonary hypertension?   No    Do you have an LVAD?  No    Have you been told you have moderate to severe sleep apnea?  No    Have you been told you have COPD, asthma, or any other lung disease?  No    Do you have any heart conditions?  No     Have you ever had or are you awaiting a heart or lung transplant?   No    Have you had a stroke or transient ischemic attack (TIA aka \"mini stroke\" in the last 6 months?   No    Have you been diagnosed with or been told you have cirrhosis of the liver?   No    Are you currently on dialysis?   No    Do you need assistance transferring?   No    BMI: Estimated body mass index is 34.09 kg/m  as calculated from the following:    Height as of 6/16/23: 1.588 m (5' 2.5\").    Weight as of 6/16/23: 85.9 kg (189 lb 6.4 oz).     Is patients BMI > 40 and scheduling location [facility]?  No    Do you take the medication Phentermine, Ozempic or Wegovy?  No    Do you take the medication Naltrexone?  No    Do you take blood thinners?  No      Prep   Are you currently on " dialysis or do you have chronic kidney disease?  No    Do you have a diagnosis of diabetes?  No    Do you have a diagnosis of cystic fibrosis (CF)?  No    On a regular basis do you go 3 -5 days between bowel movements?  No    BMI > 40?  No    Preferred Pharmacy:      [facility] [place], [place] - [address]  [address]  [address]  [place] [address]  Phone: [phone] Fax: [phone]      Final Scheduling Details   Colonoscopy prep sent?  N/A    Procedure scheduled  Upper endoscopy (EGD)    Surgeon:  MARE     Date of procedure:  9/1     Schedule PAC:   No    Location  [facility]    Sedation   Moderate Sedation    Patient Reminders:    You will receive a call from a Nurse to review instructions and health history.  This assessment must be completed prior to your procedure.  Failure to complete the Nurse assessment may result in the procedure being cancelled.       On the day of your procedure, please designate an adult(s) who can drive you home stay with you for the next 24 hours. The medicines used in the exam will make you sleepy. You will not be able to drive.       You cannot take public transportation, ride share services, or non-medical taxi service without a responsible caregiver.  Medical transport services are allowed with the requirement that a responsible caregiver will receive you at your destination.  We require that drivers and caregivers are confirmed prior to your procedure.

## 2023-07-13 ENCOUNTER — PATIENT OUTREACH (OUTPATIENT)
Dept: CARE COORDINATION | Facility: CLINIC | Age: 53
End: 2023-07-13
Payer: COMMERCIAL

## 2023-07-17 ENCOUNTER — TELEPHONE (OUTPATIENT)
Dept: GASTROENTEROLOGY | Facility: CLINIC | Age: 53
End: 2023-07-17
Payer: COMMERCIAL

## 2023-07-17 NOTE — TELEPHONE ENCOUNTER
Caller: Dano Cordero ()  Reason for Reschedule/Cancellation (please be detailed, any staff messages or encounters to note?): scheduling error, should have been scheduled on 8/1      Prior to reschedule please review:    Ordering Provider: DEMAR    Sedation per order: CS    Does patient have any ASC Exclusions, please identify?: NO      Notes on Cancelled Procedure:    Procedure: Upper Endoscopy [EGD]     Date: 9/1    Location: [facility]; [address], [place] [address]    Surgeon: MARE      Rescheduled: Yes    Procedure: Upper Endoscopy [EGD]     Date: 7/27    Location: [facility]; [address], [place] [address]    Surgeon: MISAEL    Sedation Level Scheduled  CS,  Reason for Sedation Level PER ORDER    Prep/Instructions updated and sent: YES     Send In - basket message to Panc - Taiwo Pool if EUS  procedure is canceled or rescheduled: [ N/A, YES or NO] N/A

## 2023-07-17 NOTE — TELEPHONE ENCOUNTER
Message sent to anesthesia for approval d/t recent abnormal EKG    Pre visit planning completed.      Procedure details:    Patient scheduled for Upper endoscopy (EGD) on 07.27.2023.     Arrival time: 0835. Procedure time 0920    Pre op exam needed? N/A    Facility location: [facility]; [address], [place] [address]    Sedation type: Conscious sedation     Indication for procedure:   Gastroesophageal reflux disease, unspecified whether esophagitis present   Problems with swallowing       Chart review:     Electronic implanted devices? No    Diabetic? No    Diabetic medication HOLDING recommendations: (if applicable)  Oral diabetic medications: N/A  Diabetic injectables: N/A  Insulin: N/A      Medication review:    Anticoagulants? No    NSAIDS? No NSAID medications per patient's medication list.  RN will verify with pre-assessment call.    Other medication HOLDING recommendations:  N/A      Prep for procedure:     Prep instructions sent via [facility]  Endoscopy Procedure Pre Assessment RN  [phone] option 4

## 2023-07-18 ENCOUNTER — TELEPHONE (OUTPATIENT)
Dept: GASTROENTEROLOGY | Facility: CLINIC | Age: 53
End: 2023-07-18
Payer: COMMERCIAL

## 2023-07-18 NOTE — TELEPHONE ENCOUNTER
Writer sent FYI to Dr. Eddie Emerson cardiologist.     John Thomas MD Soldo, Jennifer, RN  Approved as cardiology will be before. Do you know who she will be seeing from cardiology? I wouldn't anticipate them recommending anything that would delay surgery, but would like them to be aware of her planned procedure the next day.      Pre assessment completed for upcoming procedure.      Procedure details:    Patient scheduled for Upper endoscopy (EGD) on 07.28.2023. Patient rescheduled this morning for the next day    Arrival time: 0800. Procedure time 0845    Pre op exam needed? N/A    Facility location: [facility]; [address], [place] [address]    Sedation type: Conscious sedation     Indication for procedure:   Gastroesophageal reflux disease, unspecified whether esophagitis present   Problems with swallowing         COVID policy reviewed.    Designated  policy reviewed. Instructed to have someone stay 6 hours post procedure.       Chart review:     Electronic implanted devices? No    Diabetic? No    Diabetic medication HOLDING recommendations: (if applicable)  Oral diabetic medications: N/A  Diabetic injectables: N/A  Insulin: N/A      Medication review:    Anticoagulants? No    NSAIDS? No    Other medication HOLDING recommendations:  N/A      Prep for procedure:     Prep instructions sent via [employer]     Reviewed procedure prep instructions.     Patient verbalized understanding and had no questions or concerns at this time.      Sherine Benjamin RN  Endoscopy Procedure Pre Assessment RN  [phone] option 4

## 2023-07-18 NOTE — TELEPHONE ENCOUNTER
Caller: LAINE MUÑIZ (SPOUSE)  Reason for Reschedule/Cancellation (please be detailed, any staff messages or encounters to note?): PER PT SPOUSE PT IS UNAVAILABLE      Prior to reschedule please review:    Ordering Provider: DEMAR    Sedation per order: MODERATE    Does patient have any ASC Exclusions, please identify?: NO      Notes on Cancelled Procedure:    Procedure: Upper Endoscopy [EGD]     Date: 7/27/23    Location: [facility]; [address], [place] [address]    Surgeon: MISAEL      Rescheduled: Yes    Procedure: Upper Endoscopy [EGD]     Date: 7/28/23    Location: [facility]; [address], [place] [address]    Surgeon: MISAEL    Sedation Level Scheduled  MODERATE,  Reason for Sedation Level PER ORDER     Prep/Instructions updated and sent: YES/JEIMYT     Send In - basket message to Panc - Taiwo Pool if EUS  procedure is canceled or rescheduled: [ N/A, YES or NO] N/A

## 2023-07-26 ENCOUNTER — OFFICE VISIT (OUTPATIENT)
Dept: CARDIOLOGY | Facility: CLINIC | Age: 53
End: 2023-07-26
Attending: FAMILY MEDICINE
Payer: COMMERCIAL

## 2023-07-26 VITALS
SYSTOLIC BLOOD PRESSURE: 138 MMHG | BODY MASS INDEX: 34.38 KG/M2 | DIASTOLIC BLOOD PRESSURE: 83 MMHG | OXYGEN SATURATION: 98 % | WEIGHT: 191 LBS | HEART RATE: 76 BPM

## 2023-07-26 DIAGNOSIS — R94.31 ABNORMAL ELECTROCARDIOGRAM: ICD-10-CM

## 2023-07-26 DIAGNOSIS — I10 HYPERTENSION GOAL BP (BLOOD PRESSURE) < 140/90: ICD-10-CM

## 2023-07-26 DIAGNOSIS — I49.8 BRUGADA PATTERN ON ELECTROCARDIOGRAM: Primary | ICD-10-CM

## 2023-07-26 DIAGNOSIS — E11.9 TYPE 2 DIABETES, HBA1C GOAL < 7% (H): ICD-10-CM

## 2023-07-26 PROCEDURE — 99205 OFFICE O/P NEW HI 60 MIN: CPT | Performed by: INTERNAL MEDICINE

## 2023-07-26 PROCEDURE — 93000 ELECTROCARDIOGRAM COMPLETE: CPT | Performed by: INTERNAL MEDICINE

## 2023-07-26 RX ORDER — LISINOPRIL 30 MG/1
30 TABLET ORAL DAILY
Qty: 90 TABLET | Refills: 3 | Status: SHIPPED | OUTPATIENT
Start: 2023-07-26 | End: 2024-08-29

## 2023-07-26 NOTE — LETTER
7/26/2023      RE: Rhonda Cordero  [address] [facility] [address]       Dear Colleague,    Thank you for the opportunity to participate in the care of your patient, Rhonda Cordero, at the [facility]ART [facility]. Please see a copy of my visit note below.                                                                                                                                                                                       General Cardiology Clinic-[place]      Referring provider:Vasquez Denson MD     HPI: Ms. Rhonda Cordero is a 52 year old  female with PMH significant for    -Hypothyroidism on levothyroxine  -Hypertension  -Obesity  -Diabetes type 2 not on treatment    Patient is being seen today for pre-procedure clearance prior to EGD.  She tells me that she is having difficulty swallowing solid food like bread.  She is referred to EGD however she was found to have EKG pattern suggestive of Brugada pattern therefore she is referred to cardiology.    Patient works at [employer].  She tells me that she is pretty active throughout the whole day.  Denies exertional symptoms.  No chest pain, shortness of breath, dizziness, syncope, palpitations or lower extremity edema.      I reviewed patient's EKG which shows pseudo right bundle branch block with coved pattern of ST segment elevation in V1, saddleback ST elevation in V2.  These findings are suggestive of Brugada EKG pattern    She has no prior history of cardiac disease.  Appears patient's hemoglobin A1c is elevated since 2021.  Most recent hemoglobin A1c 7.7.  She is also carrying a diagnosis of Hashimoto thyroiditis and started on levothyroxine.    Current medications include hydrochlorothiazide 25 mg, levothyroxine and lisinopril 20 mg.    Medications, personal, family, and social history reviewed with patient and revised.    PAST MEDICAL  HISTORY:  Past Medical History:   Diagnosis Date     Anemia      Hypertension goal BP (blood pressure) < 130/80 05/06/2011     Hypocalcemia      Multinodular goiter      Postsurgical hypothyroidism      Rotator cuff tendonitis 02/10/2015     Vaginal Papanicolaou smear not required due to history of hysterectomy        CURRENT MEDICATIONS:  Current Outpatient Medications   Medication Sig Dispense Refill     calcitRIOL (ROCALTROL) 0.25 MCG capsule TAKE 2 CAPSULES (0.5 MCG) BY MOUTH DAILY 180 capsule 1     calcium carbonate antacid 1000 MG CHEW Take 2 tablets by mouth 3 times daily 60 tablet 3     clobetasol (TEMOVATE) 0.05 % external ointment Apply sparingly twice to three times daily for 3-4 weeks to affected areas on buttock. Please dispense 60 gram quantity. 60 g 4     hydrochlorothiazide (HYDRODIURIL) 25 MG tablet Take 1 tablet (25 mg) by mouth daily 90 tablet 3     levothyroxine (SYNTHROID/LEVOTHROID) 100 MCG tablet Take 1 tablet (100 mcg) by mouth daily 90 tablet 0     lisinopril (ZESTRIL) 20 MG tablet Take 1 tablet (20 mg) by mouth daily 90 tablet 0       PAST SURGICAL HISTORY:  Past Surgical History:   Procedure Laterality Date     COLONOSCOPY N/A 3/24/2023    Procedure: COLONOSCOPY, FLEXIBLE, WITH LESION REMOVAL USING SNARE;  Surgeon: Chet Escobar MD;  Location:  OR     COLONOSCOPY WITH CO2 INSUFFLATION N/A 3/24/2023    Procedure: COLONOSCOPY, WITH CO2 INSUFFLATION;  Surgeon: Chet Escobar MD;  Location:  OR     LAPAROSCOPY,TUBAL CAUTERY  2005     SALPINGO OOPHORECTOMY,R/L/LUIS  2005    left     THYROIDECTOMY N/A 8/22/2017    Procedure: THYROIDECTOMY;  Total Thyroidectomy;  Surgeon: Skylar Costa MD;  Location:  OR     [facility] TOTAL ABDOM HYSTERECTOMY  12/9/09    [facility]       ALLERGIES:     Allergies   Allergen Reactions     Advil [Ibuprofen] Swelling     Hands swelled     Cephalexin Itching       FAMILY HISTORY:  Family History   Problem Relation Age of Onset      Cerebrovascular Disease Mother 50     Cerebrovascular Disease Father 49         49     Hypertension Father      Cancer No family hx of      Diabetes No family hx of      Thyroid Disease No family hx of      Glaucoma No family hx of      Macular Degeneration No family hx of          SOCIAL HISTORY:  Social History     Tobacco Use     Smoking status: Never     Smokeless tobacco: Never   Vaping Use     Vaping Use: Never used   Substance Use Topics     Alcohol use: Yes     Comment: occ     Drug use: No     Comment: occasional/ rare       ROS:   Constitutional: No fever, chills, or sweats. Weight stable.   Cardiovascular: As per HPI.       Exam:  /83 (BP Location: Right arm, Patient Position: Chair, Cuff Size: Adult Large)   Pulse 76   Wt 86.6 kg (191 lb)   LMP 2009   SpO2 98%   BMI 34.38 kg/m    GENERAL APPEARANCE: alert and no distress  HEENT: no icterus, no central cyanosis  LYMPH/NECK: no adenopathy, no asymmetry, JVP not elevated, no carotid bruits.  RESPIRATORY: lungs clear to auscultation - no rales, rhonchi or wheezes, no use of accessory muscles, no retractions, respirations are unlabored, normal respiratory rate  CARDIOVASCULAR: regular rhythm, normal S1, S2, no S3 or S4 and no murmur, click or rub, precordium quiet with normal PMI.  GI: soft, non tender  EXTREMITIES: no edema  NEURO: alert, normal speech,and affect  SKIN: no ecchymoses, no rashes     I have reviewed the labs and personally reviewed the imaging below and made my comment in the assessment and plan.    Labs:  CBC RESULTS:   Lab Results   Component Value Date    WBC 10.0 2023    WBC 11.4 (H) 2021    RBC 4.93 2023    RBC 5.06 2021    HGB 12.8 2023    HGB 12.9 2021    HCT 40.3 2023    HCT 40.9 2021    MCV 82 2023    MCV 81 2021    MCH 26.0 (L) 2023    MCH 25.5 (L) 2021    MCHC 31.8 2023    MCHC 31.5 2021    RDW 14.4 2023    RDW 14.6  02/18/2021     05/05/2023     02/18/2021       BMP RESULTS:  Lab Results   Component Value Date     05/05/2023     12/11/2020    POTASSIUM 4.3 05/05/2023    POTASSIUM 4.0 10/06/2021    POTASSIUM 4.5 12/11/2020    CHLORIDE 101 05/05/2023    CHLORIDE 104 10/06/2021    CHLORIDE 102 12/11/2020    CO2 29 05/05/2023    CO2 31 10/06/2021    CO2 31 12/11/2020    ANIONGAP 11 05/05/2023    ANIONGAP 5 10/06/2021    ANIONGAP 7 12/11/2020     (H) 05/05/2023     (H) 10/06/2021     (H) 12/11/2020    BUN 19.1 05/05/2023    BUN 25 10/06/2021    BUN 16 12/11/2020    CR 0.60 05/05/2023    CR 0.50 (L) 12/11/2020    GFRESTIMATED >90 05/05/2023    GFRESTIMATED >90 12/11/2020    GFRESTBLACK >90 12/11/2020    CAROLE 8.1 (L) 05/05/2023    CAROLE 7.9 (L) 12/11/2020            Echocardiogram  No results found for this or any previous visit (from the past 8760 hour(s)).      EKG personally reviewed in clinic today is consistent with Brugada pattern.  Sinus rhythm    Assessment and Plan:   Patient is incidentally found to have Brugada pattern on EKG.  She is asymptomatic from cardiac standpoint.  Blood pressure is not well controlled.  She has no family history of Brugada syndrome or sudden cardiac death.    # Asymptomatic patients with only the Brugada ECG pattern (she has no high risk features) do not require any specific therapy other than the general measures described below    -General measures aimed at preventing potential arrhythmia include   -Antipyretics if they develop fever   -Avoiding specific medications that are known to increase risk of ventricular arrhythmia (drug list can be found@www.brugadadrugs.org.  Selective drugs that should be avoided in patients with Brugada EKG pattern include class I antiarrhythmic drugs excluding quinidine, some psychotropic drugs like tricyclic antidepressants, lithium and oxcarbazepine and some anesthesia medications including procaine, bupivacaine and  prolonged propofol infusion.  -Avoid excessive alcohol    -Patient can proceed to planned EGD from cardiology standpoint    #Hypertension  -Blood pressure is not well controlled despite being on hydrochlorothiazide 25 and lisinopril 20 mg.  -Increase lisinopril dose from 20 to 30 mg daily  -Emphasized importance of lifestyle changes including losing weight, exercise and cutting down on salt      #Diabetes type 2  -Patient's hemoglobin A1c is high since 2021.  Not on any treatment.  -Referral placed to endocrinology    Total time spent today for this visit is 63 minutes including precharting, face-to-face clinic visit, review of labs/imaging and medical documentation.    Please donot hesitate to contact me if you have any questions or concerns. Again, thank you for allowing me to participate in the care of your patient.    Eddie WEINER MD  [facility] Division of Cardiology  Pager [phone]       Please do not hesitate to contact me if you have any questions/concerns.     Sincerely,     Eddie Weiner MD

## 2023-07-26 NOTE — PROGRESS NOTES
[facility]      Referring provider:Vasquez Denson MD     HPI: Ms. Rhonda Cordero is a 52 year old  female with PMH significant for    -Hypothyroidism on levothyroxine  -Hypertension  -Obesity  -Diabetes type 2 not on treatment    Patient is being seen today for pre-procedure clearance prior to EGD.  She tells me that she is having difficulty swallowing solid food like bread.  She is referred to EGD however she was found to have EKG pattern suggestive of Brugada pattern therefore she is referred to cardiology.    Patient works at [employer].  She tells me that she is pretty active throughout the whole day.  Denies exertional symptoms.  No chest pain, shortness of breath, dizziness, syncope, palpitations or lower extremity edema.      I reviewed patient's EKG which shows pseudo right bundle branch block with coved pattern of ST segment elevation in V1, saddleback ST elevation in V2.  These findings are suggestive of Brugada EKG pattern    She has no prior history of cardiac disease.  Appears patient's hemoglobin A1c is elevated since 2021.  Most recent hemoglobin A1c 7.7.  She is also carrying a diagnosis of Hashimoto thyroiditis and started on levothyroxine.    Current medications include hydrochlorothiazide 25 mg, levothyroxine and lisinopril 20 mg.    Medications, personal, family, and social history reviewed with patient and revised.    PAST MEDICAL HISTORY:  Past Medical History:   Diagnosis Date    Anemia     Hypertension goal BP (blood pressure) < 130/80 05/06/2011    Hypocalcemia     Multinodular goiter     Postsurgical hypothyroidism     Rotator cuff tendonitis 02/10/2015    Vaginal Papanicolaou smear not required due to history of hysterectomy        CURRENT MEDICATIONS:  Current Outpatient  Medications   Medication Sig Dispense Refill    calcitRIOL (ROCALTROL) 0.25 MCG capsule TAKE 2 CAPSULES (0.5 MCG) BY MOUTH DAILY 180 capsule 1    calcium carbonate antacid 1000 MG CHEW Take 2 tablets by mouth 3 times daily 60 tablet 3    clobetasol (TEMOVATE) 0.05 % external ointment Apply sparingly twice to three times daily for 3-4 weeks to affected areas on buttock. Please dispense 60 gram quantity. 60 g 4    hydrochlorothiazide (HYDRODIURIL) 25 MG tablet Take 1 tablet (25 mg) by mouth daily 90 tablet 3    levothyroxine (SYNTHROID/LEVOTHROID) 100 MCG tablet Take 1 tablet (100 mcg) by mouth daily 90 tablet 0    lisinopril (ZESTRIL) 20 MG tablet Take 1 tablet (20 mg) by mouth daily 90 tablet 0       PAST SURGICAL HISTORY:  Past Surgical History:   Procedure Laterality Date    COLONOSCOPY N/A 3/24/2023    Procedure: COLONOSCOPY, FLEXIBLE, WITH LESION REMOVAL USING SNARE;  Surgeon: Chet Escobar MD;  Location: [facility] OR    COLONOSCOPY WITH CO2 INSUFFLATION N/A 3/24/2023    Procedure: COLONOSCOPY, WITH CO2 INSUFFLATION;  Surgeon: Chet Escobar MD;  Location: [facility] OR    LAPAROSCOPY,TUBAL CAUTERY      SALPINGO OOPHORECTOMY,R/L/LUIS      left    THYROIDECTOMY N/A 2017    Procedure: THYROIDECTOMY;  Total Thyroidectomy;  Surgeon: Skylar Costa MD;  Location:  OR    [facility] TOTAL ABDOM [facility]STERECTOMY  09    [facility]       ALLERGIES:     Allergies   Allergen Reactions    Advil [Ibuprofen] Swelling     Hands swelled    Cephalexin Itching       FAMILY HISTORY:  Family History   Problem Relation Age of Onset    Cerebrovascular Disease Mother 50    Cerebrovascular Disease Father 49         49    Hypertension Father     Cancer No family hx of     Diabetes No family hx of     Thyroid Disease No family hx of     Glaucoma No family hx of     Macular Degeneration No family hx of          SOCIAL HISTORY:  Social History     Tobacco Use    Smoking status: Never    Smokeless tobacco: Never    Vaping Use    Vaping Use: Never used   Substance Use Topics    Alcohol use: Yes     Comment: occ    Drug use: No     Comment: occasional/ rare       ROS:   Constitutional: No fever, chills, or sweats. Weight stable.   Cardiovascular: As per HPI.       Exam:  /83 (BP Location: Right arm, Patient Position: Chair, Cuff Size: Adult Large)   Pulse 76   Wt 86.6 kg (191 lb)   LMP 11/23/2009   SpO2 98%   BMI 34.38 kg/m    GENERAL APPEARANCE: alert and no distress  HEENT: no icterus, no central cyanosis  LYMPH/NECK: no adenopathy, no asymmetry, JVP not elevated, no carotid bruits.  RESPIRATORY: lungs clear to auscultation - no rales, rhonchi or wheezes, no use of accessory muscles, no retractions, respirations are unlabored, normal respiratory rate  CARDIOVASCULAR: regular rhythm, normal S1, S2, no S3 or S4 and no murmur, click or rub, precordium quiet with normal PMI.  GI: soft, non tender  EXTREMITIES: no edema  NEURO: alert, normal speech,and affect  SKIN: no ecchymoses, no rashes     I have reviewed the labs and personally reviewed the imaging below and made my comment in the assessment and plan.    Labs:  CBC RESULTS:   Lab Results   Component Value Date    WBC 10.0 05/05/2023    WBC 11.4 (H) 02/18/2021    RBC 4.93 05/05/2023    RBC 5.06 02/18/2021    HGB 12.8 05/05/2023    HGB 12.9 02/18/2021    HCT 40.3 05/05/2023    HCT 40.9 02/18/2021    MCV 82 05/05/2023    MCV 81 02/18/2021    MCH 26.0 (L) 05/05/2023    MCH 25.5 (L) 02/18/2021    MCHC 31.8 05/05/2023    MCHC 31.5 02/18/2021    RDW 14.4 05/05/2023    RDW 14.6 02/18/2021     05/05/2023     02/18/2021       BMP RESULTS:  Lab Results   Component Value Date     05/05/2023     12/11/2020    POTASSIUM 4.3 05/05/2023    POTASSIUM 4.0 10/06/2021    POTASSIUM 4.5 12/11/2020    CHLORIDE 101 05/05/2023    CHLORIDE 104 10/06/2021    CHLORIDE 102 12/11/2020    CO2 29 05/05/2023    CO2 31 10/06/2021    CO2 31 12/11/2020    ANIONGAP 11  05/05/2023    ANIONGAP 5 10/06/2021    ANIONGAP 7 12/11/2020     (H) 05/05/2023     (H) 10/06/2021     (H) 12/11/2020    BUN 19.1 05/05/2023    BUN 25 10/06/2021    BUN 16 12/11/2020    CR 0.60 05/05/2023    CR 0.50 (L) 12/11/2020    GFRESTIMATED >90 05/05/2023    GFRESTIMATED >90 12/11/2020    GFRESTBLACK >90 12/11/2020    CAROLE 8.1 (L) 05/05/2023    CAROLE 7.9 (L) 12/11/2020            Echocardiogram  No results found for this or any previous visit (from the past 8760 hour(s)).      EKG personally reviewed in clinic today is consistent with Brugada pattern.  Sinus rhythm    Assessment and Plan:   Patient is incidentally found to have Brugada pattern on EKG.  She is asymptomatic from cardiac standpoint.  Blood pressure is not well controlled.  She has no family history of Brugada syndrome or sudden cardiac death.    # Asymptomatic patients with only the Brugada ECG pattern (she has no high risk features) do not require any specific therapy other than the general measures described below    -General measures aimed at preventing potential arrhythmia include   -Antipyretics if they develop fever   -Avoiding specific medications that are known to increase risk of ventricular arrhythmia (drug list can be found@www.brugadadrugs.org.  Selective drugs that should be avoided in patients with Brugada EKG pattern include class I antiarrhythmic drugs excluding quinidine, some psychotropic drugs like tricyclic antidepressants, lithium and oxcarbazepine and some anesthesia medications including procaine, bupivacaine and prolonged propofol infusion.  -Avoid excessive alcohol    -Patient can proceed to planned EGD from cardiology standpoint    #Hypertension  -Blood pressure is not well controlled despite being on hydrochlorothiazide 25 and lisinopril 20 mg.  -Increase lisinopril dose from 20 to 30 mg daily  -Emphasized importance of lifestyle changes including losing weight, exercise and cutting down on salt       #Diabetes type 2  -Patient's hemoglobin A1c is high since 2021.  Not on any treatment.  -Referral placed to endocrinology    Total time spent today for this visit is 63 minutes including precharting, face-to-face clinic visit, review of labs/imaging and medical documentation.    Please donot hesitate to contact me if you have any questions or concerns. Again, thank you for allowing me to participate in the care of your patient.    Eddie WEINER MD  [facility] Division of Cardiology  Pager [phone]

## 2023-07-26 NOTE — PATIENT INSTRUCTIONS
Thank you for coming to the [facility] @ [facility]; please note the following instructions:    1. Cardiology cleared for EGD    2. INCREASE: Lisinopril to 30mg daily. A new prescription has been sent to your preferred pharmacy. Follow up with primary care provider for blood pressure follow ups.    3. Cardiology follow up as needed        If you have any questions regarding your visit please contact your care team:     Cardiology  Telephone Number   Stella JEFFERSON., RN  Annmarie MOSER, RN   Leslye RIOS, RONY BENTLEY, RONY PRICE, Visit Facilitator  Fe [facility] [phone] (option 1)   For scheduling appts:     [phone] (select option 1)       For the Device Clinic (Pacemakers and ICD's)  RN's :  Tiffany Valentine   During business hours: [phone]    *After business hours:  [phone] (select option 4)      Normal test result notifications will be released via [employer] or mailed within 7 business days.  All other test results, will be communicated via telephone once reviewed by your cardiologist.    If you need a medication refill please contact your pharmacy.  Please allow 3 business days for your refill to be completed.    As always, thank you for trusting us with your health care needs!

## 2023-07-26 NOTE — NURSING NOTE
"Chief Complaint   Patient presents with    Abnormal Ekg     New General Cardiology for Abnormal EKG; Brugada pattern on electrocardiogram.        Initial /83 (BP Location: Right arm, Patient Position: Chair, Cuff Size: Adult Large)   Pulse 76   Wt 86.6 kg (191 lb)   LMP 11/23/2009   SpO2 98%   BMI 34.38 kg/m   Estimated body mass index is 34.38 kg/m  as calculated from the following:    Height as of 6/16/23: 1.588 m (5' 2.5\").    Weight as of this encounter: 86.6 kg (191 lb)..  BP completed using cuff size: RONY Camarena    "

## 2023-07-27 ENCOUNTER — PATIENT OUTREACH (OUTPATIENT)
Dept: CARE COORDINATION | Facility: CLINIC | Age: 53
End: 2023-07-27
Payer: COMMERCIAL

## 2023-07-27 NOTE — TELEPHONE ENCOUNTER
Attempted to call patient to relay information regarding endocrinology referral. Left message for patient.    Rosalinda Ventura, RN, BSN  Cardiology RN Care Coordinator   [place]   Phone: [phone]  Fax: [phone] ([place]) [phone] ([place])

## 2023-07-27 NOTE — TELEPHONE ENCOUNTER
----- Message from Eddie Weiner MD sent at 7/27/2023  2:40 PM CDT -----  She is known to have diabetes since 2021 and she is not on any treatment.  I referred her to endocrinology.  Please inform patient.  She should call and schedule an appointment.    DR WEINER

## 2023-07-28 ENCOUNTER — HOSPITAL ENCOUNTER (OUTPATIENT)
Facility: AMBULATORY SURGERY CENTER | Age: 53
Discharge: HOME | End: 2023-07-28
Attending: INTERNAL MEDICINE | Admitting: INTERNAL MEDICINE
Payer: COMMERCIAL

## 2023-07-28 VITALS
DIASTOLIC BLOOD PRESSURE: 84 MMHG | HEART RATE: 62 BPM | RESPIRATION RATE: 16 BRPM | SYSTOLIC BLOOD PRESSURE: 122 MMHG | OXYGEN SATURATION: 97 % | TEMPERATURE: 95.6 F

## 2023-07-28 DIAGNOSIS — K21.9 GASTROESOPHAGEAL REFLUX DISEASE WITHOUT ESOPHAGITIS: Primary | ICD-10-CM

## 2023-07-28 LAB — UPPER GI ENDOSCOPY: NORMAL

## 2023-07-28 PROCEDURE — G8918 PT W/O PREOP ORDER IV AB PRO: HCPCS

## 2023-07-28 PROCEDURE — 88342 IMHCHEM/IMCYTCHM 1ST ANTB: CPT | Performed by: PATHOLOGY

## 2023-07-28 PROCEDURE — 88305 TISSUE EXAM BY PATHOLOGIST: CPT | Performed by: PATHOLOGY

## 2023-07-28 PROCEDURE — 43239 EGD BIOPSY SINGLE/MULTIPLE: CPT

## 2023-07-28 PROCEDURE — 88341 IMHCHEM/IMCYTCHM EA ADD ANTB: CPT | Performed by: PATHOLOGY

## 2023-07-28 PROCEDURE — G8907 PT DOC NO EVENTS ON DISCHARG: HCPCS

## 2023-07-28 RX ORDER — FENTANYL CITRATE 50 UG/ML
INJECTION, SOLUTION INTRAMUSCULAR; INTRAVENOUS PRN
Status: DISCONTINUED | OUTPATIENT
Start: 2023-07-28 | End: 2023-07-28 | Stop reason: HOSPADM

## 2023-07-28 RX ORDER — OMEPRAZOLE 40 MG/1
40 CAPSULE, DELAYED RELEASE ORAL DAILY
Qty: 30 CAPSULE | Refills: 3 | Status: SHIPPED | OUTPATIENT
Start: 2023-07-28

## 2023-07-28 NOTE — H&P
[facility] Anesthesia Pre-op History and Physical    Rhonda Cordero MRN# [id number]   Age: 52 year old YOB: 1970     Date of Exam 7/28/2023       Primary care provider: Vasquez Denson         Chief Complaint and/or Reason for Procedure:     Dysphagia to solids         Active problem list:     Patient Active Problem List    Diagnosis Date Noted    Postoperative hypothyroidism 09/19/2018     Priority: Medium    Hypocalcemia 09/19/2018     Priority: Medium    History of thyroidectomy 09/26/2017     Priority: Medium    Anup's deformity of left heel 02/24/2017     Priority: Medium    Essential hypertension with goal blood pressure less than 140/90 11/09/2016     Priority: Medium    Hypertension goal BP (blood pressure) < 140/90 01/21/2016     Priority: Medium    Gastroesophageal reflux disease, esophagitis presence not specified 01/15/2016     Priority: Medium     IMO Regulatory Load OCT 2020      Cervical adenopathy 01/15/2016     Priority: Medium    Shoulder joint pain 03/15/2015     Priority: Medium    Hashimoto's thyroiditis 03/04/2015     Priority: Medium    Nontoxic multinodular goiter 03/04/2015     Priority: Medium    Impingement syndrome, shoulder 02/10/2015     Priority: Medium    Rotator cuff tendonitis 02/10/2015     Priority: Medium    CARDIOVASCULAR SCREENING; LDL GOAL LESS THAN 130 09/13/2013     Priority: Medium            Medications (include herbals and vitamins):   Any Plavix use in the last 7 days? No     Current Outpatient Medications   Medication Sig    hydrochlorothiazide (HYDRODIURIL) 25 MG tablet Take 1 tablet (25 mg) by mouth daily    levothyroxine (SYNTHROID/LEVOTHROID) 100 MCG tablet Take 1 tablet (100 mcg) by mouth daily    lisinopril (ZESTRIL) 30 MG tablet Take 1 tablet (30 mg) by mouth daily    calcitRIOL (ROCALTROL) 0.25 MCG capsule TAKE 2 CAPSULES (0.5 MCG) BY MOUTH DAILY    calcium carbonate antacid 1000 MG CHEW Take 2 tablets by mouth 3 times daily     clobetasol (TEMOVATE) 0.05 % external ointment Apply sparingly twice to three times daily for 3-4 weeks to affected areas on buttock. Please dispense 60 gram quantity.     Current Facility-Administered Medications   Medication    lidocaine 1 % injection 2 mL    lidocaine 1 % injection 2 mL    triamcinolone (KENALOG-40) injection 40 mg    triamcinolone (KENALOG-40) injection 40 mg             Allergies:      Allergies   Allergen Reactions    Advil [Ibuprofen] Swelling     Hands swelled    Cephalexin Itching     Allergy to Latex? No  Allergy to tape?   No  Intolerances:             Physical Exam:   All vitals have been reviewed  Patient Vitals for the past 8 hrs:   BP Temp Temp src Pulse Resp SpO2   07/28/23 0802 (!) 164/86 (!) 95.6  F (35.3  C) Temporal 60 16 100 %     No intake/output data recorded.  Lungs:   No increased work of breathing, good air exchange, clear to auscultation bilaterally, no crackles or wheezing     Cardiovascular:   normal S1 and S2             Lab / Radiology Results:            Anesthetic risk and/or ASA classification:   2    Sarita Montes, DO

## 2023-08-02 LAB
PATH REPORT.COMMENTS IMP SPEC: NORMAL
PATH REPORT.FINAL DX SPEC: NORMAL
PATH REPORT.GROSS SPEC: NORMAL
PATH REPORT.MICROSCOPIC SPEC OTHER STN: NORMAL
PATH REPORT.RELEVANT HX SPEC: NORMAL
PHOTO IMAGE: NORMAL

## 2023-08-04 NOTE — TELEPHONE ENCOUNTER
Called and relayed information to patient. No questions at this time.    Rosalinda Ventura, RN, BSN  Cardiology RN Care Coordinator   [place]   Phone: [phone]  Fax: [phone] ([place]) [phone] ([place])

## 2023-08-07 ENCOUNTER — ANCILLARY ORDERS (OUTPATIENT)
Dept: FAMILY MEDICINE | Facility: CLINIC | Age: 53
End: 2023-08-07

## 2023-08-07 DIAGNOSIS — Z12.31 VISIT FOR SCREENING MAMMOGRAM: Primary | ICD-10-CM

## 2023-08-09 DIAGNOSIS — K29.40 ATROPHIC GASTRITIS WITHOUT HEMORRHAGE: Primary | ICD-10-CM

## 2023-08-10 ENCOUNTER — TELEPHONE (OUTPATIENT)
Dept: GASTROENTEROLOGY | Facility: CLINIC | Age: 53
End: 2023-08-10
Payer: COMMERCIAL

## 2023-08-10 NOTE — TELEPHONE ENCOUNTER
Writer received a message from ROBERT JEFFERSON to help get Pt scheduled for an appointment in 4-6 weeks with Dr. Montes or Arnaud Fulton. Writer called and left message for the Pt, along with call back number.

## 2023-08-10 NOTE — TELEPHONE ENCOUNTER
[facility] Call Center    Phone Message    May a detailed message be left on voicemail: yes     Reason for Call: Other: Dano,  returned call.  Scheduled first available with Dr Montes on 11/22/23  This is outside of the 4-6 week time period that was recommended.   Please reach out to Sue and Dano if she should be seen sooner.         Action Taken: Message routed to:  Adult Clinics: Gastroenterology (GI) p [id number]    Travel Screening: Not Applicable

## 2023-08-11 NOTE — TELEPHONE ENCOUNTER
[facility] Call Center    Phone Message    May a detailed message be left on voicemail: yes     Reason for Call: Other: Dano is returning a call from [facility], please reach back out. Thank you!     Action Taken: Message routed to:  [facility] GI    Travel Screening: Not Applicable

## 2023-08-15 ENCOUNTER — OFFICE VISIT (OUTPATIENT)
Dept: OBGYN | Facility: CLINIC | Age: 53
End: 2023-08-15
Payer: COMMERCIAL

## 2023-08-15 ENCOUNTER — ANCILLARY ORDERS (OUTPATIENT)
Dept: FAMILY MEDICINE | Facility: CLINIC | Age: 53
End: 2023-08-15

## 2023-08-15 ENCOUNTER — ANCILLARY PROCEDURE (OUTPATIENT)
Dept: MAMMOGRAPHY | Facility: CLINIC | Age: 53
End: 2023-08-15
Payer: COMMERCIAL

## 2023-08-15 VITALS
OXYGEN SATURATION: 99 % | SYSTOLIC BLOOD PRESSURE: 145 MMHG | WEIGHT: 188 LBS | HEART RATE: 84 BPM | BODY MASS INDEX: 34.6 KG/M2 | DIASTOLIC BLOOD PRESSURE: 92 MMHG | HEIGHT: 62 IN

## 2023-08-15 DIAGNOSIS — Z12.31 VISIT FOR SCREENING MAMMOGRAM: ICD-10-CM

## 2023-08-15 DIAGNOSIS — Z01.419 ENCOUNTER FOR GYNECOLOGICAL EXAMINATION WITHOUT ABNORMAL FINDING: Primary | ICD-10-CM

## 2023-08-15 DIAGNOSIS — Z12.31 VISIT FOR SCREENING MAMMOGRAM: Primary | ICD-10-CM

## 2023-08-15 PROCEDURE — 77067 SCR MAMMO BI INCL CAD: CPT | Mod: TC | Performed by: RADIOLOGY

## 2023-08-15 PROCEDURE — 77063 BREAST TOMOSYNTHESIS BI: CPT | Mod: TC | Performed by: RADIOLOGY

## 2023-08-15 PROCEDURE — 99396 PREV VISIT EST AGE 40-64: CPT | Performed by: OBSTETRICS & GYNECOLOGY

## 2023-08-15 NOTE — PROGRESS NOTES
Rhonda Cordero is a 52 year old female , who presents for annual exam.   No unusual bleeding, no incontinence, or unusual discharge.   Last cholesterol:   Recent Labs   Lab Test 23  0757 21  1146   CHOL 197 210*   HDL 53 55   * 136*   TRIG 85 95     Past Medical History:   Diagnosis Date    Anemia     Hypertension goal BP (blood pressure) < 130/80 2011    Hypocalcemia     Multinodular goiter     Postsurgical hypothyroidism     Rotator cuff tendonitis 02/10/2015    Vaginal Papanicolaou smear not required due to history of hysterectomy        Past Surgical History:   Procedure Laterality Date    COLONOSCOPY N/A 3/24/2023    Procedure: COLONOSCOPY, FLEXIBLE, WITH LESION REMOVAL USING SNARE;  Surgeon: Chet Escobar MD;  Location: [facility] OR    COLONOSCOPY WITH CO2 INSUFFLATION N/A 3/24/2023    Procedure: COLONOSCOPY, WITH CO2 INSUFFLATION;  Surgeon: Chet Escobar MD;  Location: [facility] OR    COMBINED ESOPHAGOSCOPY, GASTROSCOPY, DUODENOSCOPY (EGD) WITH CO2 INSUFFLATION N/A 2023    Procedure: Combined Esophagoscopy, Gastroscopy, Duodenoscopy (Egd) With Co2 Insufflation;  Surgeon: Sarita Montes DO;  Location: [facility] OR    ESOPHAGOSCOPY, GASTROSCOPY, DUODENOSCOPY (EGD), COMBINED N/A 2023    Procedure: ESOPHAGOGASTRODUODENOSCOPY, WITH BIOPSY;  Surgeon: Sarita Montes DO;  Location:  OR    LAPAROSCOPY,TUBAL CAUTERY      SALPINGO OOPHORECTOMY,R/L/LUIS      left    THYROIDECTOMY N/A 2017    Procedure: THYROIDECTOMY;  Total Thyroidectomy;  Surgeon: Skylar Costa MD;  Location:  OR    [facility] TOTAL ABDOM HYSTERECTOMY  09    [facility]       OB History    Para Term  AB Living   2 2 0 0 0 2   SAB IAB Ectopic Multiple Live Births   0 0 0 0 0      # Outcome Date GA Lbr Roc/2nd Weight Sex Delivery Anes PTL Lv   2 Para            1 Para                Gyn History:  Gynecological History            Patient's last menstrual period  was 11/23/2009.         history of abnormal pap smear:    Last pap:   Lab Results   Component Value Date    PAP NIL 02/21/2014           Current Outpatient Medications   Medication Sig Dispense Refill    calcitRIOL (ROCALTROL) 0.25 MCG capsule TAKE 2 CAPSULES (0.5 MCG) BY MOUTH DAILY 180 capsule 1    calcium carbonate antacid 1000 MG CHEW Take 2 tablets by mouth 3 times daily 60 tablet 3    clobetasol (TEMOVATE) 0.05 % external ointment Apply sparingly twice to three times daily for 3-4 weeks to affected areas on buttock. Please dispense 60 gram quantity. 60 g 4    hydrochlorothiazide (HYDRODIURIL) 25 MG tablet Take 1 tablet (25 mg) by mouth daily 90 tablet 3    levothyroxine (SYNTHROID/LEVOTHROID) 100 MCG tablet TAKE ONE TABLET BY MOUTH ONCE DAILY 90 tablet 0    lisinopril (ZESTRIL) 30 MG tablet Take 1 tablet (30 mg) by mouth daily 90 tablet 3    omeprazole (PRILOSEC) 40 MG DR capsule Take 1 capsule (40 mg) by mouth daily 30 capsule 3       Allergies   Allergen Reactions    Advil [Ibuprofen] Swelling     Hands swelled    Cephalexin Itching       Social History     Socioeconomic History    Marital status:      Spouse name: Not on file    Number of children: 2    Years of education: 12    Highest education level: Not on file   Occupational History     Employer: [employer]   Tobacco Use    Smoking status: Never    Smokeless tobacco: Never   Vaping Use    Vaping Use: Never used   Substance and Sexual Activity    Alcohol use: Yes     Comment: occ    Drug use: No     Comment: occasional/ rare    Sexual activity: Not Currently     Partners: Male     Birth control/protection: Surgical   Other Topics Concern    Parent/sibling w/ CABG, MI or angioplasty before 65F 55M? No     Service No    Blood Transfusions Yes     Comment: had 3 units 10/23/2009    Caffeine Concern No    Occupational Exposure No    Hobby Hazards No    Sleep Concern Not Asked     Comment: sleeps about 5-6 hours a night    Stress  "Concern Not Asked    Weight Concern Not Asked    Special Diet No    Back Care Not Asked    Exercise Yes     Comment: walk    Bike Helmet Not Asked     Comment: doesn't ride a bike    Seat Belt Yes    Self-Exams No     Comment: info given on SBE   Social History Narrative    Not on file     Social Determinants of Health     Financial Resource Strain: Not on file   Food Insecurity: Not on file   Transportation Needs: Not on file   Physical Activity: Not on file   Stress: Not on file   Social Connections: Not on file   Intimate Partner Violence: Not on file   Housing Stability: Not on file       Family History   Problem Relation Age of Onset    Cerebrovascular Disease Mother 50    Cerebrovascular Disease Father 49         49    Hypertension Father     Cancer No family hx of     Diabetes No family hx of     Thyroid Disease No family hx of     Glaucoma No family hx of     Macular Degeneration No family hx of          ROS:  All negative except as above.      EXAM:  BP (!) 145/92 (BP Location: Right arm, Cuff Size: Adult Regular)   Pulse 84   Ht 1.579 m (5' 2.15\")   Wt 85.3 kg (188 lb)   LMP 2009   SpO2 99%   BMI 34.22 kg/m    General:  WNWD female, NAD  Alert  Oriented x 3  Gait:  Normal  Skin:  Normal skin turgor  Neurologic:  CN grossly intact, good sensation.    HEENT:  NC/AT, EOMI  Neck:  No masses palpated, symmetrical, carotids +2/4, no bruits heard  Heart:  RRR  Lungs:  Clear   Breasts:  Symmetrical, no dimpling noted, no masses palpated, no discharge expressed  Abdomen:  Non-tender, non-distended.  Vulva: No external lesions, normal hair distribution, no adenopathy  BUS:  Normal, no masses noted  Urethra:  No hypermobility noted  Urethral meatus:  No masses noted  Vagina: Moist, pink, no abnormal discharge, well rugated, no lesions  Cervix: Absent  Uterus: Absent   Ovaries/Bimanual: No masses palpated, non-tender, mobile  Perianal:  No masses noted.    Extremities:  No clubbing, cyanosis, or " edema      ASSESSMENT/PLAN   Annual examination  Low fat diet, weight bearing exercises and self breast exams on a monthly basis have been recommended.  I have discussed with patient the risks, benefits, medications, treatment options and modalities.   I have instructed the patient to call or schedule a follow-up appointment if any problems.

## 2023-08-16 ENCOUNTER — OFFICE VISIT (OUTPATIENT)
Dept: FAMILY MEDICINE | Facility: CLINIC | Age: 53
End: 2023-08-16
Payer: COMMERCIAL

## 2023-08-16 VITALS
HEART RATE: 80 BPM | SYSTOLIC BLOOD PRESSURE: 128 MMHG | WEIGHT: 188 LBS | RESPIRATION RATE: 14 BRPM | TEMPERATURE: 98.1 F | OXYGEN SATURATION: 100 % | BODY MASS INDEX: 34.22 KG/M2 | DIASTOLIC BLOOD PRESSURE: 87 MMHG

## 2023-08-16 DIAGNOSIS — I49.8 BRUGADA PATTERN ON ELECTROCARDIOGRAM: ICD-10-CM

## 2023-08-16 DIAGNOSIS — E66.9 OBESITY, UNSPECIFIED CLASSIFICATION, UNSPECIFIED OBESITY TYPE, UNSPECIFIED WHETHER SERIOUS COMORBIDITY PRESENT: ICD-10-CM

## 2023-08-16 DIAGNOSIS — E78.5 HYPERLIPIDEMIA LDL GOAL <70: ICD-10-CM

## 2023-08-16 DIAGNOSIS — E11.9 TYPE 2 DIABETES MELLITUS WITHOUT COMPLICATION, WITHOUT LONG-TERM CURRENT USE OF INSULIN (H): Primary | ICD-10-CM

## 2023-08-16 DIAGNOSIS — E03.9 HYPOTHYROIDISM, UNSPECIFIED TYPE: ICD-10-CM

## 2023-08-16 PROCEDURE — 99214 OFFICE O/P EST MOD 30 MIN: CPT | Performed by: FAMILY MEDICINE

## 2023-08-16 RX ORDER — ASPIRIN 81 MG/1
81 TABLET ORAL DAILY
COMMUNITY
Start: 2023-08-16

## 2023-08-16 RX ORDER — ATORVASTATIN CALCIUM 10 MG/1
10 TABLET, FILM COATED ORAL DAILY
Qty: 30 TABLET | Refills: 2 | Status: SHIPPED | OUTPATIENT
Start: 2023-08-16 | End: 2023-12-07

## 2023-08-16 NOTE — PROGRESS NOTES
Subjective   Sue is a 52 year old, presenting for the following health issues:  regarding diabetes      8/16/2023     4:42 PM   Additional Questions   Roomed by jillian   Accompanied by        HPI     Chief Complaint   Patient presents with    regarding diabetes             Review of Systems         Objective    BP (!) 135/91   Pulse 80   Temp 98.1  F (36.7  C)   Resp 14   Wt 85.3 kg (188 lb)   LMP 11/23/2009   SpO2 100%   BMI 34.22 kg/m    Body mass index is 34.22 kg/m .  Physical Exam    Full physical not done     Mentation and affect are fine    No tremor of speech or extremity    Went over labs in great detail from the last few years          ASSESSMENT / PLAN:  (E11.9) Type 2 diabetes mellitus without complication, without long-term current use of insulin (H)  (primary encounter diagnosis)  Comment: discussed in detail.  In past she has had prediabetes and very mild diabetes and has not required medications. She has also been  reluctant to go on meds.  Now we agreed good to start metformin.  See diab ed.  She has access to a meter at home with / son.   Plan: metFORMIN (GLUCOPHAGE) 500 MG tablet, AMB Adult        Diabetes Educator Referral, Hemoglobin A1c             (E78.5) Hyperlipidemia LDL goal <70  Comment: also advised statin now that she officially has diabetes.   Plan: atorvastatin (LIPITOR) 10 MG tablet, Lipid         panel reflex to direct LDL Fasting,         Comprehensive metabolic panel             (E03.9) Hypothyroidism, unspecified type  Comment: check labs in 1-2 months.  We had adjusted dose a few months ago   Plan: TSH, T4 free        As above     (E66.9) Obesity, unspecified classification, unspecified obesity type, unspecified whether serious comorbidity present  Comment: chronic   Plan: keep working on healthy diet/exercise and wt loss.  Metformin may help.     (I49.8) Brugada pattern on electrocardiogram  Comment: discussed in detail.  She has pattern, not  syndrome   Plan: discussed in detail.  No treatment needed.  Follow up prn symptoms.       I reviewed the patient's medications, allergies, medical history, family history, and social history.    Vasquez Denson MD

## 2023-08-16 NOTE — PATIENT INSTRUCTIONS
Start metformin     If tolerating, then start cholesterol pill atorvastatin 1-2 weeks later    Keep working on healthy diet/exercise and weight loss    See diabetes educator    In about 1-2 months do fasting lab appointment

## 2023-08-18 ENCOUNTER — TELEPHONE (OUTPATIENT)
Dept: GASTROENTEROLOGY | Facility: CLINIC | Age: 53
End: 2023-08-18
Payer: COMMERCIAL

## 2023-08-18 NOTE — TELEPHONE ENCOUNTER
Spouse called to inform GI team that patient will be home after 3pm, if team need to speak with patient.     RONY Dumas   Email: [email or web address]  [facility] - Rheumatology  Phone: [phone]  Fax: [phone]

## 2023-08-21 ENCOUNTER — NURSE TRIAGE (OUTPATIENT)
Dept: FAMILY MEDICINE | Facility: CLINIC | Age: 53
End: 2023-08-21
Payer: COMMERCIAL

## 2023-08-21 DIAGNOSIS — E11.9 TYPE 2 DIABETES MELLITUS WITHOUT COMPLICATION, WITHOUT LONG-TERM CURRENT USE OF INSULIN (H): Primary | ICD-10-CM

## 2023-08-21 RX ORDER — METFORMIN HCL 500 MG
TABLET, EXTENDED RELEASE 24 HR ORAL
Qty: 60 TABLET | Refills: 1 | Status: SHIPPED | OUTPATIENT
Start: 2023-08-21 | End: 2023-10-20

## 2023-08-21 NOTE — TELEPHONE ENCOUNTER
Received call from patient. She is calling to report weakness. This comes in waves, specifically after starting Metformin 8/16/23. After taking a dose, she gets the chills and feels weak, like she has to go sit down. During these episodes she does not report low blood glucose. Most recent episode BG was 154. She does state after drinking some sugary fluids, she feels better. She does not currently have these symptoms and otherwise feels well. She has tried taking half her current dose, and symptoms do not improve. No other symptoms at all. Please advise on next steps.    Reason for Disposition   Taking a medicine that could cause weakness (e.g., blood pressure medications, diuretics)    Additional Information   Negative: SEVERE difficulty breathing (e.g., struggling for each breath, speaks in single words)   Negative: Shock suspected (e.g., cold/pale/clammy skin, too weak to stand, low BP, rapid pulse)   Negative: Difficult to awaken or acting confused (e.g., disoriented, slurred speech)   Negative: Fainted > 15 minutes ago and still feels too weak or dizzy to stand   Negative: SEVERE weakness (i.e., unable to walk or barely able to walk, requires support) and new-onset or worsening   Negative: Sounds like a life-threatening emergency to the triager   Negative: Weakness of the face, arm or leg on one side of the body   Negative: Has diabetes mellitus and weakness from low blood sugar (i.e., < 60 mg/dL or 3.5 mmol/L)   Negative: Recent heat exposure, suspected cause of weakness   Negative: Vomiting is main symptom   Negative: Diarrhea is main symptom   Negative: Difficulty breathing   Negative: Heart beating < 50 beats per minute OR > 140 beats per minute   Negative: Extra heartbeats OR irregular heart beating (i.e., 'palpitations')   Negative: Follows bleeding (e.g., from vomiting, rectum, vagina)  (Exception: Small transient weakness from sight of a small amount blood.)   Negative: Bloody, black, or tarry bowel  movements  (Exception: Chronic-unchanged black-grey bowel movements and is taking iron pills or Pepto-Bismol.)   Negative: MODERATE weakness from poor fluid intake with no improvement after 2 hours of rest and fluids   Negative: Drinking very little and dehydration suspected (e.g., no urine > 12 hours, very dry mouth, very lightheaded)   Negative: Patient sounds very sick or weak to the triager   Negative: MODERATE weakness (i.e., interferes with work, school, normal activities) and cause unknown  (Exceptions: Weakness with acute minor illness, or weakness from poor fluid intake.)   Negative: Fever > 103 F  (39.4 C) and not able to get the Fever down using CARE ADVICE   Negative: Fever > 100.0 F (37.8 C) and bedridden (e.g., nursing home patient, stroke, chronic illness, recovering from surgery)   Negative: Fever > 101 F (38.3 C) and over 60 years of age   Negative: Fever > 100.0 F (37.8 C) and diabetes mellitus or weak immune system (e.g., HIV positive, cancer chemo, splenectomy, organ transplant, chronic steroids)   Negative: Pale skin (pallor)   Negative: MODERATE weakness (i.e., interferes with work, school, normal activities) and persists > 3 days    Protocols used: Weakness (Generalized) and Fatigue-A-OH    RISHI Valles RN  [facility]

## 2023-08-21 NOTE — TELEPHONE ENCOUNTER
LPN spoke with patient and her spouse, Dano and notified them of pathology results. Both verbalized understanding and were informed that patient can have the blood work drawn at any [facility] clinic. Both denied having any questions at this time.  expressed frustration about the repeat calls. Writer apologized for this.     Leslye Martinez LPN

## 2023-08-21 NOTE — TELEPHONE ENCOUNTER
REFERRAL INFORMATION:  Referring Provider:  Vasquez Denson MD   Referring Clinic:  [facility]   Reason for Visit/Diagnosis:   Gastroesophageal reflux disease, unspecified whether esophagitis present   Problems with swallowing        FUTURE VISIT INFORMATION:  Appointment Date: 11/22/2023  Appointment Time:      NOTES STATUS DETAILS   OFFICE NOTE from Referring Provider N/A    OFFICE NOTE from Other Specialist Internal 6/16/2023 OV with Deal, L   HOSPITAL DISCHARGE SUMMARY/  ED VISITS N/A    OPERATIVE REPORT N/A    MEDICATION LIST Internal         ENDOSCOPY  Internal 7/28/2023   COLONOSCOPY Internal 3/24/2023   ERCP N/A    EUS N/A    STOOL TESTING N/A    PERTINENT LABS N/A    PATHOLOGY REPORTS (RELATED) N/A    IMAGING (CT, MRI, EGD, MRCP, Small Bowel Follow Through/SBT, MR/CT Enterography) N/A

## 2023-08-21 NOTE — TELEPHONE ENCOUNTER
Symptoms    Describe your symptoms: Patient was prescribed metformin on 8/16/2023 and it makes her weak.     Any pain: No    How long have you been having symptoms: 4  days    Have you been seen for this:  No    Appointment offered?: No    Triage offered?: No    Home remedies tried: No    Preferred Pharmacy:   [facility] [place], [place] - [address]  [address]  [place] [address]  Phone: [phone] Fax: [phone]    WRITTEN PRESCRIPTION REQUESTED  No address on file      [facility] [place] - [address]  [address]  [address]  [facility] [address]  Phone: [phone] Fax: [phone]      Okay to leave a detailed message?: Yes at Cell number on file:    Telephone Information:   Mobile [phone]

## 2023-08-22 ENCOUNTER — LAB (OUTPATIENT)
Dept: LAB | Facility: CLINIC | Age: 53
End: 2023-08-22
Payer: COMMERCIAL

## 2023-08-22 DIAGNOSIS — E11.9 TYPE 2 DIABETES MELLITUS WITHOUT COMPLICATION, WITHOUT LONG-TERM CURRENT USE OF INSULIN (H): ICD-10-CM

## 2023-08-22 DIAGNOSIS — E78.5 HYPERLIPIDEMIA LDL GOAL <70: ICD-10-CM

## 2023-08-22 DIAGNOSIS — E03.9 HYPOTHYROIDISM, UNSPECIFIED TYPE: ICD-10-CM

## 2023-08-22 DIAGNOSIS — K29.40 ATROPHIC GASTRITIS WITHOUT HEMORRHAGE: ICD-10-CM

## 2023-08-22 LAB — HBA1C MFR BLD: 7.7 % (ref 0–5.6)

## 2023-08-22 PROCEDURE — 86340 INTRINSIC FACTOR ANTIBODY: CPT | Mod: 90

## 2023-08-22 PROCEDURE — 84443 ASSAY THYROID STIM HORMONE: CPT

## 2023-08-22 PROCEDURE — 80053 COMPREHEN METABOLIC PANEL: CPT

## 2023-08-22 PROCEDURE — 83516 IMMUNOASSAY NONANTIBODY: CPT | Mod: 90

## 2023-08-22 PROCEDURE — 83540 ASSAY OF IRON: CPT

## 2023-08-22 PROCEDURE — 83550 IRON BINDING TEST: CPT

## 2023-08-22 PROCEDURE — 99000 SPECIMEN HANDLING OFFICE-LAB: CPT

## 2023-08-22 PROCEDURE — 80061 LIPID PANEL: CPT

## 2023-08-22 PROCEDURE — 83036 HEMOGLOBIN GLYCOSYLATED A1C: CPT

## 2023-08-22 PROCEDURE — 84439 ASSAY OF FREE THYROXINE: CPT

## 2023-08-22 PROCEDURE — 82728 ASSAY OF FERRITIN: CPT

## 2023-08-22 PROCEDURE — 82607 VITAMIN B-12: CPT

## 2023-08-22 PROCEDURE — 36415 COLL VENOUS BLD VENIPUNCTURE: CPT

## 2023-08-22 NOTE — LETTER
August 25, 2023    Sue Cordero  [address] [facility] [address]          Dear ,    We are writing to inform you of your test results.  Here are the labs I called you about     Thyroid tests better     We need to give it and the diabetes med some time to work     See us in about 1 1/2 to 2 months, sooner if needed     Take care       Resulted Orders   Hemoglobin A1c   Result Value Ref Range    Hemoglobin A1C 7.7 (H) 0.0 - 5.6 %      Comment:      Normal <5.7%   Prediabetes 5.7-6.4%    Diabetes 6.5% or higher     Note: Adopted from ADA consensus guidelines.   Lipid panel reflex to direct LDL Fasting   Result Value Ref Range    Cholesterol 183 <200 mg/dL    Triglycerides 141 <150 mg/dL    Direct Measure HDL 47 (L) >=50 mg/dL    LDL Cholesterol Calculated 108 (H) <=100 mg/dL    Non HDL Cholesterol 136 (H) <130 mg/dL    Narrative    Cholesterol  Desirable:  <200 mg/dL    Triglycerides  Normal:  Less than 150 mg/dL  Borderline High:  150-199 mg/dL  High:  200-499 mg/dL  Very High:  Greater than or equal to 500 mg/dL    Direct Measure HDL  Female:  Greater than or equal to 50 mg/dL   Male:  Greater than or equal to 40 mg/dL    LDL Cholesterol  Desirable:  <100mg/dL  Above Desirable:  100-129 mg/dL   Borderline High:  130-159 mg/dL   High:  160-189 mg/dL   Very High:  >= 190 mg/dL    Non HDL Cholesterol  Desirable:  130 mg/dL  Above Desirable:  130-159 mg/dL  Borderline High:  160-189 mg/dL  High:  190-219 mg/dL  Very High:  Greater than or equal to 220 mg/dL   TSH   Result Value Ref Range    TSH 10.40 (H) 0.30 - 4.20 uIU/mL   T4 free   Result Value Ref Range    Free T4 1.35 0.90 - 1.70 ng/dL   Comprehensive metabolic panel   Result Value Ref Range    Sodium 140 136 - 145 mmol/L    Potassium 4.4 3.4 - 5.3 mmol/L    Chloride 101 98 - 107 mmol/L    Carbon Dioxide (CO2) 25 22 - 29 mmol/L    Anion Gap 14 7 - 15 mmol/L    Urea Nitrogen 17.8 6.0 - 20.0 mg/dL    Creatinine 0.71 0.51 - 0.95 mg/dL     Calcium 8.4 (L) 8.6 - 10.0 mg/dL    Glucose 163 (H) 70 - 99 mg/dL    Alkaline Phosphatase 95 35 - 104 U/L    AST 37 0 - 45 U/L      Comment:      Reference intervals for this test were updated on 6/12/2023 to more accurately reflect our healthy population. There may be differences in the flagging of prior results with similar values performed with this method. Interpretation of those prior results can be made in the context of the updated reference intervals.    ALT 46 0 - 50 U/L      Comment:      Reference intervals for this test were updated on 6/12/2023 to more accurately reflect our healthy population. There may be differences in the flagging of prior results with similar values performed with this method. Interpretation of those prior results can be made in the context of the updated reference intervals.      Protein Total 7.7 6.4 - 8.3 g/dL    Albumin 4.3 3.5 - 5.2 g/dL    Bilirubin Total 0.2 <=1.2 mg/dL    GFR Estimate >90 >60 mL/min/1.73m2       If you have any questions or concerns, please call the clinic at the number listed above.       Sincerely,      Vasquez Denson MD

## 2023-08-23 LAB
ALBUMIN SERPL BCG-MCNC: 4.3 G/DL (ref 3.5–5.2)
ALP SERPL-CCNC: 95 U/L (ref 35–104)
ALT SERPL W P-5'-P-CCNC: 46 U/L (ref 0–50)
ANION GAP SERPL CALCULATED.3IONS-SCNC: 14 MMOL/L (ref 7–15)
AST SERPL W P-5'-P-CCNC: 37 U/L (ref 0–45)
BILIRUB SERPL-MCNC: 0.2 MG/DL
BUN SERPL-MCNC: 17.8 MG/DL (ref 6–20)
CALCIUM SERPL-MCNC: 8.4 MG/DL (ref 8.6–10)
CHLORIDE SERPL-SCNC: 101 MMOL/L (ref 98–107)
CHOLEST SERPL-MCNC: 183 MG/DL
CREAT SERPL-MCNC: 0.71 MG/DL (ref 0.51–0.95)
DEPRECATED HCO3 PLAS-SCNC: 25 MMOL/L (ref 22–29)
FERRITIN SERPL-MCNC: 35 NG/ML (ref 11–328)
GFR SERPL CREATININE-BSD FRML MDRD: >90 ML/MIN/1.73M2
GLUCOSE SERPL-MCNC: 163 MG/DL (ref 70–99)
HDLC SERPL-MCNC: 47 MG/DL
IRON BINDING CAPACITY (ROCHE): 346 UG/DL (ref 240–430)
IRON SATN MFR SERPL: 8 % (ref 15–46)
IRON SERPL-MCNC: 28 UG/DL (ref 37–145)
LDLC SERPL CALC-MCNC: 108 MG/DL
NONHDLC SERPL-MCNC: 136 MG/DL
POTASSIUM SERPL-SCNC: 4.4 MMOL/L (ref 3.4–5.3)
PROT SERPL-MCNC: 7.7 G/DL (ref 6.4–8.3)
SODIUM SERPL-SCNC: 140 MMOL/L (ref 136–145)
T4 FREE SERPL-MCNC: 1.35 NG/DL (ref 0.9–1.7)
TRIGL SERPL-MCNC: 141 MG/DL
TSH SERPL DL<=0.005 MIU/L-ACNC: 10.4 UIU/ML (ref 0.3–4.2)
VIT B12 SERPL-MCNC: 673 PG/ML (ref 232–1245)

## 2023-08-23 NOTE — TELEPHONE ENCOUNTER
Writer called and talked with Pt's , Dano. Pt is scheduled for a sooner appt with [facility] on 9/6/2023 at 9am for an in-person clinic visit.

## 2023-08-24 ENCOUNTER — TELEPHONE (OUTPATIENT)
Dept: FAMILY MEDICINE | Facility: CLINIC | Age: 53
End: 2023-08-24
Payer: COMMERCIAL

## 2023-08-24 NOTE — TELEPHONE ENCOUNTER
I called patient  Tolerating the metformin XR better so for compared to IR    Thyroid tests better    Hemoglobin a1c unchanged but this is expected as she just started the diab med  Need to give this some time to work    Plan to see us in 1 1/2  to 2 months    Vasquez Denson MD

## 2023-08-24 NOTE — RESULT ENCOUNTER NOTE
Here are the labs I called you about    Thyroid tests better    We need to give it and the diabetes med some time to work    See us in about 1 1/2 to 2 months, sooner if needed    Take care    Vasquez Denson MD

## 2023-08-25 LAB
IF BLOCK AB SER QL RIA: NEGATIVE
PCA IGG SER-ACNC: 109.4 UNITS

## 2023-09-05 NOTE — PROGRESS NOTES
GI CLINIC VISIT    CC/REFERRING MD:  No ref. provider found  REASON FOR CONSULTATION: Atrophic gastritis    ASSESSMENT/PLAN:    #Autoimmune atrophic gastritis   #gastric polyps  #dysphagia  Patient had a recent EGD which was concerning for atrophic gastritis - labs were done which were consistent with autoimmune atrophic gastritis. We discussed the clinical features of atrophic gastritis as well as the need for monitoring given increased risk for the development of gastric neuroendocrine tumors and adenocarcinomas. Patients are also at risk of iron deficiency anemia and pernicious anemia. Most recent CBC done in June shows no evidence of anemia, but recent iron labs show evidence of iron deficiency. B12 was normal. Will plan to obtain folate as well. She will need a repeat EGD in the near future for removal of gastric polyps, so an order was placed for this. She reports dysphagia only to bread, and no other solids - denies dysphagia to liquids. We discussed further evaluation including a video swallow study and xr esophagram, but a motility disorder seems less likely given her description of symptoms. She would not like to proceed with any further testing at this time, which is reasonable.   --obtain folate level.   --schedule EGD for gastric polyp removal.   --consider repleting iron.        Colorectal cancer screening: due in 2030    [facility] 3 months    Thank you for this consultation.  It was a pleasure to participate in the care of this patient; please contact us with any further questions.       40 minutes spent on the date of the encounter doing chart review, review of test results, patient visit, and documentation    This note was created with voice recognition software, and while reviewed for accuracy, typos may remain.    Tree Duque PA-C  Division of Gastroenterology, Hepatology and Nutrition  [facility]  53 y/o F presents for follow up after recent EGD  was concerning for autoimmune gastritis.    Patient had an EGD done on 7/28/23 - for evaluation of dysphagia, esophagus was normal, likely hiatal hernia present, a few gastric polyps were seen, biopsies showed reactive gastropathy and some eroded hyperplastic polyps, as well as concerns for atrophic gastritis. Labs were subsequently done which showed elevated parietal cell antibody IgG -- raising concern for autoimmune gastritis.    She reports dysphagia that has been ongoing for many years, particularly breads seemed to be getting stuck in the subxiphoid area. She would have to swallow multiple times to get it to go down. Denies any difficulties swallowing liquids. Denies any odynophagia. She did start taking omeprazole 40mg daily, but has not noticed any difference in her symptoms. She denies any classic symptoms of reflux - denies burning in her epigastric pain or chest. Denies nausea or vomiting. Denies abdominal pain.     In regards to her bowel movement, she has a bowel movement daily - described as as a bristol type IV. Denies BRBPR. Denies pushing/straining.     Denies NSAIDS. She takes tylenol 1-3x/week.  Denies use of OTC herbal supplements/weight loss products.      Occasional alcohol use. Denies tobacco products. No recreational drug use.     No family history or GI related malignancy (esophageal, gastric, pancreatic, liver or colon) or family history of IBD/celiac disease.     ROS:    No fevers or chills  No weight loss  No shortness of breath or wheezing  No chest pain or pressure  No odynophagia or dysphagia  No BRBPR, hematochezia, melena  No anxiety or depression    PROBLEM LIST  Patient Active Problem List    Diagnosis Date Noted    Postoperative hypothyroidism 09/19/2018     Priority: Medium    Hypocalcemia 09/19/2018     Priority: Medium    History of thyroidectomy 09/26/2017     Priority: Medium    Anup's deformity of left heel 02/24/2017     Priority: Medium    Essential hypertension with  goal blood pressure less than 140/90 11/09/2016     Priority: Medium    Hypertension goal BP (blood pressure) < 140/90 01/21/2016     Priority: Medium    Gastroesophageal reflux disease, esophagitis presence not specified 01/15/2016     Priority: Medium     IMO Regulatory Load OCT 2020      Cervical adenopathy 01/15/2016     Priority: Medium    Shoulder joint pain 03/15/2015     Priority: Medium    Hashimoto's thyroiditis 03/04/2015     Priority: Medium    Nontoxic multinodular goiter 03/04/2015     Priority: Medium    Impingement syndrome, shoulder 02/10/2015     Priority: Medium    Rotator cuff tendonitis 02/10/2015     Priority: Medium    CARDIOVASCULAR SCREENING; LDL GOAL LESS THAN 130 09/13/2013     Priority: Medium       PERTINENT PAST MEDICAL HISTORY:    Past Medical History:   Diagnosis Date    Anemia     Hypertension goal BP (blood pressure) < 130/80 05/06/2011    Hypocalcemia     Multinodular goiter     Postsurgical hypothyroidism     Rotator cuff tendonitis 02/10/2015    Vaginal Papanicolaou smear not required due to history of hysterectomy        PREVIOUS SURGERIES:    Past Surgical History:   Procedure Laterality Date    COLONOSCOPY N/A 3/24/2023    Procedure: COLONOSCOPY, FLEXIBLE, WITH LESION REMOVAL USING SNARE;  Surgeon: Chet Escobar MD;  Location:  OR    COLONOSCOPY WITH CO2 INSUFFLATION N/A 3/24/2023    Procedure: COLONOSCOPY, WITH CO2 INSUFFLATION;  Surgeon: Chet Escobar MD;  Location:  OR    COMBINED ESOPHAGOSCOPY, GASTROSCOPY, DUODENOSCOPY (EGD) WITH CO2 INSUFFLATION N/A 7/28/2023    Procedure: Combined Esophagoscopy, Gastroscopy, Duodenoscopy (Egd) With Co2 Insufflation;  Surgeon: Sarita Montes DO;  Location:  OR    ESOPHAGOSCOPY, GASTROSCOPY, DUODENOSCOPY (EGD), COMBINED N/A 7/28/2023    Procedure: ESOPHAGOGASTRODUODENOSCOPY, WITH BIOPSY;  Surgeon: Sarita Montes DO;  Location:  OR    LAPAROSCOPY,TUBAL CAUTERY  2005    SALPINGO OOPHORECTOMY,R/L/LUIS  2005     left    THYROIDECTOMY N/A 8/22/2017    Procedure: THYROIDECTOMY;  Total Thyroidectomy;  Surgeon: Skylar Costa MD;  Location:  OR    [facility] TOTAL ABDOM HYSTERECTOMY  12/9/09    [facility]       PREVIOUS ENDOSCOPY:    EGD (7/8/23):  - Normal esophagus.                             - Gastroesophageal flap valve classified as Hill                             Grade III (minimal fold, loose to endoscope, hiatal                             hernia likely).                             - A single gastric polyp. Biopsied.                             - A single gastric polyp. Biopsied.                             - Two gastric polyps. Biopsied.                             - Erythematous mucosa in the antrum. Biopsied.                             - Normal examined duodenum.     A.  GASTRIC, ANTRUM, POLYPS:  - Reactive gastropathy with mild chronic inactive gastritis; see comment  - No H. pylori-like organisms identified on routine staining  - No intestinal metaplasia identified     B.  GASTRIC, ANTRUM, BIOPSY:  - Reactive gastropathy with mild chronic inactive gastritis  - No H. pylori-like organisms identified on routine staining  - No intestinal metaplasia identified     C.  GASTRIC, BODY, BIOPSY:  - Chronic atrophic gastritis; see comment  - No H. pylori-like organisms identified on routine staining  - No intestinal metaplasia identified     D.  GASTRIC, BODY POLYP, BIOPSY:  - Eroded gastric hyperplastic polyp  - No H. pylori-like organisms identified on routine staining  - No intestinal metaplasia identified     E.  GASTRIC, LARGER BODY POLYP, BIOPSY:  - Eroded gastric hyperplastic polyp  - No H. pylori-like organisms identified on routine staining  - No intestinal metaplasia identified      Colonoscopy (3/4/23):  - Non-thrombosed external hemorrhoids found on                             digital rectal exam.                             - One 2 mm polyp in the ascending colon, removed                              with a cold snare. Resected and retrieved.                             - One 2 mm polyp in the proximal transverse colon,                             removed with a cold snare. Resected and retrieved.                             - The examined portion of the ileum was normal.                             - The examination was otherwise normal on direct                             and retroflexion views.     A.  COLON, ASCENDING, POLYP:  - Colonic mucosa with superficial hyperplastic changes and a benign lymphoid aggregate    B.  COLON, TRANSVERSE, POLYP:  - Tubular adenoma  - No high-grade dysplasia or malignancy identified      ALLERGIES:     Allergies   Allergen Reactions    Advil [Ibuprofen] Swelling     Hands swelled    Cephalexin Itching       PERTINENT MEDICATIONS:    Current Outpatient Medications:     aspirin 81 MG EC tablet, Take 1 tablet (81 mg) by mouth daily, Disp: , Rfl:     atorvastatin (LIPITOR) 10 MG tablet, Take 1 tablet (10 mg) by mouth daily, Disp: 30 tablet, Rfl: 2    calcitRIOL (ROCALTROL) 0.25 MCG capsule, TAKE 2 CAPSULES (0.5 MCG) BY MOUTH DAILY, Disp: 180 capsule, Rfl: 1    calcium carbonate antacid 1000 MG CHEW, Take 2 tablets by mouth 3 times daily, Disp: 60 tablet, Rfl: 3    clobetasol (TEMOVATE) 0.05 % external ointment, Apply sparingly twice to three times daily for 3-4 weeks to affected areas on buttock. Please dispense 60 gram quantity., Disp: 60 g, Rfl: 4    hydrochlorothiazide (HYDRODIURIL) 25 MG tablet, Take 1 tablet (25 mg) by mouth daily, Disp: 90 tablet, Rfl: 3    levothyroxine (SYNTHROID/LEVOTHROID) 100 MCG tablet, TAKE ONE TABLET BY MOUTH ONCE DAILY, Disp: 90 tablet, Rfl: 0    lisinopril (ZESTRIL) 30 MG tablet, Take 1 tablet (30 mg) by mouth daily, Disp: 90 tablet, Rfl: 3    metFORMIN (GLUCOPHAGE XR) 500 MG 24 hr tablet, 1 po daily for one week.  Then could increase to 2 po daily if tolerated., Disp: 60 tablet, Rfl: 1    omeprazole (PRILOSEC) 40 MG DR capsule, Take 1 capsule (40  mg) by mouth daily, Disp: 30 capsule, Rfl: 3    Current Facility-Administered Medications:     lidocaine 1 % injection 2 mL, 2 mL, , , Yanique Leon MD, 2 mL at 07/28/22 1618    lidocaine 1 % injection 2 mL, 2 mL, , , Yanique Leon MD, 2 mL at 07/28/22 1618    triamcinolone (KENALOG-40) injection 40 mg, 40 mg, , , Yanique Leon MD, 40 mg at 07/28/22 1618    triamcinolone (KENALOG-40) injection 40 mg, 40 mg, , , Yanique Leon MD, 40 mg at 07/28/22 1618    SOCIAL HISTORY:    Social History     Socioeconomic History    Marital status:      Spouse name: Not on file    Number of children: 2    Years of education: 12    Highest education level: Not on file   Occupational History     Employer: [employer]   Tobacco Use    Smoking status: Never    Smokeless tobacco: Never   Vaping Use    Vaping Use: Never used   Substance and Sexual Activity    Alcohol use: Yes     Comment: occ    Drug use: No     Comment: occasional/ rare    Sexual activity: Not Currently     Partners: Male     Birth control/protection: Surgical   Other Topics Concern    Parent/sibling w/ CABG, MI or angioplasty before 65F 55M? No     Service No    Blood Transfusions Yes     Comment: had 3 units 10/23/2009    Caffeine Concern No    Occupational Exposure No    Hobby Hazards No    Sleep Concern Not Asked     Comment: sleeps about 5-6 hours a night    Stress Concern Not Asked    Weight Concern Not Asked    Special Diet No    Back Care Not Asked    Exercise Yes     Comment: walk    Bike Helmet Not Asked     Comment: doesn't ride a bike    Seat Belt Yes    Self-Exams No     Comment: info given on SBE   Social History Narrative    Not on file     Social Determinants of Health     Financial Resource Strain: Not on file   Food Insecurity: Not on file   Transportation Needs: Not on file   Physical Activity: Not on file   Stress: Not on file   Social Connections: Not on file   Intimate Partner Violence: Not on file   Housing  Stability: Not on file       FAMILY HISTORY:  FH of CRC: None.  FH of IBD: none.  Family History   Problem Relation Age of Onset    Cerebrovascular Disease Mother 50    Cerebrovascular Disease Father 49         49    Hypertension Father     Cancer No family hx of     Diabetes No family hx of     Thyroid Disease No family hx of     Glaucoma No family hx of     Macular Degeneration No family hx of        Past/family/social history reviewed and no changes    PHYSICAL EXAMINATION:  Constitutional: aaox3, cooperative, pleasant, not dyspneic/diaphoretic, no acute distress  Vitals reviewed: LMP 2009   Wt:   Wt Readings from Last 2 Encounters:   23 85.3 kg (188 lb)   08/15/23 85.3 kg (188 lb)      Eyes: Sclera anicteric/injected  Respiratory: Unlabored breathing  Lymph: No axillary, submandibular, supraclavicular or inguinal lymphadenopathy  Skin: warm, perfused, no jaundice  Psych: Normal affect  MSK: Normal gait      PERTINENT STUDIES:    Lab on 2023   Component Date Value Ref Range Status    Vitamin B12 2023 673  232 - 1,245 pg/mL Final    Parietal Cell Antibody IgG 2023 109.4 (H)  0.0 - 24.9 Units Final    Intrinsic Factor Blocking Antibody 2023 Negative  Negative Final    Ferritin 2023 35  11 - 328 ng/mL Final    Iron 2023 28 (L)  37 - 145 ug/dL Final    Iron Binding Capacity 2023 346  240 - 430 ug/dL Final    Iron Sat Index 2023 8 (L)  15 - 46 % Final    Hemoglobin A1C 2023 7.7 (H)  0.0 - 5.6 % Final    Cholesterol 2023 183  <200 mg/dL Final    Triglycerides 2023 141  <150 mg/dL Final    Direct Measure HDL 2023 47 (L)  >=50 mg/dL Final    LDL Cholesterol Calculated 2023 108 (H)  <=100 mg/dL Final    Non HDL Cholesterol 2023 136 (H)  <130 mg/dL Final    TSH 2023 10.40 (H)  0.30 - 4.20 uIU/mL Final    Free T4 2023 1.35  0.90 - 1.70 ng/dL Final    Sodium 2023 140  136 - 145 mmol/L Final    Potassium  08/22/2023 4.4  3.4 - 5.3 mmol/L Final    Chloride 08/22/2023 101  98 - 107 mmol/L Final    Carbon Dioxide (CO2) 08/22/2023 25  22 - 29 mmol/L Final    Anion Gap 08/22/2023 14  7 - 15 mmol/L Final    Urea Nitrogen 08/22/2023 17.8  6.0 - 20.0 mg/dL Final    Creatinine 08/22/2023 0.71  0.51 - 0.95 mg/dL Final    Calcium 08/22/2023 8.4 (L)  8.6 - 10.0 mg/dL Final    Glucose 08/22/2023 163 (H)  70 - 99 mg/dL Final    Alkaline Phosphatase 08/22/2023 95  35 - 104 U/L Final    AST 08/22/2023 37  0 - 45 U/L Final    ALT 08/22/2023 46  0 - 50 U/L Final    Protein Total 08/22/2023 7.7  6.4 - 8.3 g/dL Final    Albumin 08/22/2023 4.3  3.5 - 5.2 g/dL Final    Bilirubin Total 08/22/2023 0.2  <=1.2 mg/dL Final    GFR Estimate 08/22/2023 >90  >60 mL/min/1.73m2 Final        PROVIDER:[TOKEN:[[id number]]],PROVIDER:[TOKEN:[[id number]]]

## 2023-09-06 ENCOUNTER — OFFICE VISIT (OUTPATIENT)
Dept: GASTROENTEROLOGY | Facility: CLINIC | Age: 53
End: 2023-09-06
Payer: COMMERCIAL

## 2023-09-06 VITALS
OXYGEN SATURATION: 100 % | SYSTOLIC BLOOD PRESSURE: 129 MMHG | HEIGHT: 62 IN | HEART RATE: 78 BPM | BODY MASS INDEX: 34.37 KG/M2 | WEIGHT: 186.8 LBS | DIASTOLIC BLOOD PRESSURE: 81 MMHG

## 2023-09-06 DIAGNOSIS — K29.40 ATROPHIC GASTRITIS, PRESENCE OF BLEEDING UNSPECIFIED: Primary | ICD-10-CM

## 2023-09-06 DIAGNOSIS — R13.10 DYSPHAGIA, UNSPECIFIED TYPE: ICD-10-CM

## 2023-09-06 DIAGNOSIS — K31.7 GASTRIC POLYPS: ICD-10-CM

## 2023-09-06 LAB — FOLATE SERPL-MCNC: 17.2 NG/ML (ref 4.6–34.8)

## 2023-09-06 PROCEDURE — 36415 COLL VENOUS BLD VENIPUNCTURE: CPT | Performed by: PHYSICIAN ASSISTANT

## 2023-09-06 PROCEDURE — 82746 ASSAY OF FOLIC ACID SERUM: CPT | Performed by: PHYSICIAN ASSISTANT

## 2023-09-06 PROCEDURE — 99203 OFFICE O/P NEW LOW 30 MIN: CPT | Performed by: PHYSICIAN ASSISTANT

## 2023-09-06 ASSESSMENT — PAIN SCALES - GENERAL: PAINLEVEL: NO PAIN (0)

## 2023-09-06 NOTE — PATIENT INSTRUCTIONS
It was a pleasure taking care of you today.  I've included a brief summary of our discussion and care plan from today's visit below.  Please review this information with your primary care provider.  ______________________________________________________________________    My recommendations are summarized as follows:  --schedule upper endoscopy for polyp removal -- someone will reach out to you to schedule.      -- please see scheduling information provided below     Return to GI Clinic in 3 months to review your progress.    ______________________________________________________________________    How do I schedule labs, imaging studies, or procedures that were ordered in clinic today?     Labs: To schedule lab appointment at the [facility] and [facility], use my chart or call [phone]. If you have a [place] lab closer to home where you are regularly seen you can give them a call.     Procedures: If a colonoscopy, upper endoscopy, breath test, esophageal manometry, or pH impedence was ordered today, our endoscopy team will call you to schedule this. If you have not heard from our endoscopy team within a week, please call [phone] to schedule.     Imaging Studies: If you were scheduled for a CT scan, X-ray, MRI, ultrasound, HIDA scan or other imaging study, please call [phone] to have this scheduled.     Referral: If a referral to another specialty was ordered, expect a phone call or follow instructions above. If you have not heard from anyone regarding your referral in a week, please call our clinic to check the status.     Who do I call with any questions after my visit?  Please be in touch if there are any further questions that arise following today's visit.  There are multiple ways to contact your gastroenterology care team.      During business hours, you may reach a Gastroenterology nurse at [phone]    To schedule or reschedule an appointment, please  call [phone].     You can always send a secure message through [employer].  [employer] messages are answered by your nurse or doctor typically within 24 hours.  Please allow extra time on weekends and holidays.      For urgent/emergent questions after business hours, you may reach the on-call GI Fellow by contacting the [facility]  at [phone].     How will I get the results of any tests ordered?    You will receive all of your results.  If you have signed up for [employer], any tests ordered at your visit will be available to you after your provider reviews them.  Typically this takes 1-2 weeks.  If there are urgent results that require a change in your care plan, your provider or nurse will call you to discuss the next steps.      What is [employer]?  [employer] is a secure way for you to access all of your healthcare records from the [facility].  It is a web based computer program, so you can sign on to it from any location.  It also allows you to send secure messages to your care team.  I recommend signing up for [employer] access if you have not already done so and are comfortable with using a computer.      How to I schedule a follow-up visit?  If you did not schedule a follow-up visit today, please call [phone] to schedule a follow-up office visit.      Sincerely,    Tree Duque PA-C  Division of Gastroenterology, Hepatology & Nutrition  [facility]

## 2023-09-06 NOTE — LETTER
9/6/2023         RE: Rhonda Cordero  [address] [facility] [address]        Dear Colleague,    Thank you for referring your patient, Rhonda Cordero, to the [facility]. Please see a copy of my visit note below.    GI CLINIC VISIT    CC/REFERRING MD:  No ref. provider found  REASON FOR CONSULTATION: Atrophic gastritis    ASSESSMENT/PLAN:    #Autoimmune atrophic gastritis   #gastric polyps  #dysphagia  Patient had a recent EGD which was concerning for atrophic gastritis - labs were done which were consistent with autoimmune atrophic gastritis. We discussed the clinical features of atrophic gastritis as well as the need for monitoring given increased risk for the development of gastric neuroendocrine tumors and adenocarcinomas. Patients are also at risk of iron deficiency anemia and pernicious anemia. Most recent CBC done in June shows no evidence of anemia, but recent iron labs show evidence of iron deficiency. B12 was normal. Will plan to obtain folate as well. She will need a repeat EGD in the near future for removal of gastric polyps, so an order was placed for this. She reports dysphagia only to bread, and no other solids - denies dysphagia to liquids. We discussed further evaluation including a video swallow study and xr esophagram, but a motility disorder seems less likely given her description of symptoms. She would not like to proceed with any further testing at this time, which is reasonable.   --obtain folate level.   --schedule EGD for gastric polyp removal.   --consider repleting iron.        Colorectal cancer screening: due in 2030    [facility] 3 months    Thank you for this consultation.  It was a pleasure to participate in the care of this patient; please contact us with any further questions.       40 minutes spent on the date of the encounter doing chart review, review of test results, patient visit, and documentation    This note was created with  voice recognition software, and while reviewed for accuracy, typos may remain.    Tree Duque PA-C  Division of Gastroenterology, Hepatology and Nutrition  [facility]      HPI  51 y/o F presents for follow up after recent EGD was concerning for autoimmune gastritis.    Patient had an EGD done on 7/28/23 - for evaluation of dysphagia, esophagus was normal, likely hiatal hernia present, a few gastric polyps were seen, biopsies showed reactive gastropathy and some eroded hyperplastic polyps, as well as concerns for atrophic gastritis. Labs were subsequently done which showed elevated parietal cell antibody IgG -- raising concern for autoimmune gastritis.    She reports dysphagia that has been ongoing for many years, particularly breads seemed to be getting stuck in the subxiphoid area. She would have to swallow multiple times to get it to go down. Denies any difficulties swallowing liquids. Denies any odynophagia. She did start taking omeprazole 40mg daily, but has not noticed any difference in her symptoms. She denies any classic symptoms of reflux - denies burning in her epigastric pain or chest. Denies nausea or vomiting. Denies abdominal pain.     In regards to her bowel movement, she has a bowel movement daily - described as as a bristol type IV. Denies BRBPR. Denies pushing/straining.     Denies NSAIDS. She takes tylenol 1-3x/week.  Denies use of OTC herbal supplements/weight loss products.      Occasional alcohol use. Denies tobacco products. No recreational drug use.     No family history or GI related malignancy (esophageal, gastric, pancreatic, liver or colon) or family history of IBD/celiac disease.     ROS:    No fevers or chills  No weight loss  No shortness of breath or wheezing  No chest pain or pressure  No odynophagia or dysphagia  No BRBPR, hematochezia, melena  No anxiety or depression    PROBLEM LIST  Patient Active Problem List    Diagnosis Date Noted      Postoperative hypothyroidism 09/19/2018     Priority: Medium     Hypocalcemia 09/19/2018     Priority: Medium     History of thyroidectomy 09/26/2017     Priority: Medium     Anup's deformity of left heel 02/24/2017     Priority: Medium     Essential hypertension with goal blood pressure less than 140/90 11/09/2016     Priority: Medium     Hypertension goal BP (blood pressure) < 140/90 01/21/2016     Priority: Medium     Gastroesophageal reflux disease, esophagitis presence not specified 01/15/2016     Priority: Medium     IMO Regulatory Load OCT 2020       Cervical adenopathy 01/15/2016     Priority: Medium     Shoulder joint pain 03/15/2015     Priority: Medium     Hashimoto's thyroiditis 03/04/2015     Priority: Medium     Nontoxic multinodular goiter 03/04/2015     Priority: Medium     Impingement syndrome, shoulder 02/10/2015     Priority: Medium     Rotator cuff tendonitis 02/10/2015     Priority: Medium     CARDIOVASCULAR SCREENING; LDL GOAL LESS THAN 130 09/13/2013     Priority: Medium       PERTINENT PAST MEDICAL HISTORY:    Past Medical History:   Diagnosis Date     Anemia      Hypertension goal BP (blood pressure) < 130/80 05/06/2011     Hypocalcemia      Multinodular goiter      Postsurgical hypothyroidism      Rotator cuff tendonitis 02/10/2015     Vaginal Papanicolaou smear not required due to history of hysterectomy        PREVIOUS SURGERIES:    Past Surgical History:   Procedure Laterality Date     COLONOSCOPY N/A 3/24/2023    Procedure: COLONOSCOPY, FLEXIBLE, WITH LESION REMOVAL USING SNARE;  Surgeon: Chet Escobar MD;  Location:  OR     COLONOSCOPY WITH CO2 INSUFFLATION N/A 3/24/2023    Procedure: COLONOSCOPY, WITH CO2 INSUFFLATION;  Surgeon: Chet Escobar MD;  Location:  OR     COMBINED ESOPHAGOSCOPY, GASTROSCOPY, DUODENOSCOPY (EGD) WITH CO2 INSUFFLATION N/A 7/28/2023    Procedure: Combined Esophagoscopy, Gastroscopy, Duodenoscopy (Egd) With Co2 Insufflation;  Surgeon:  Sarita Montes DO;  Location:  OR     ESOPHAGOSCOPY, GASTROSCOPY, DUODENOSCOPY (EGD), COMBINED N/A 7/28/2023    Procedure: ESOPHAGOGASTRODUODENOSCOPY, WITH BIOPSY;  Surgeon: Sarita Montes DO;  Location:  OR     LAPAROSCOPY,TUBAL CAUTERY  2005     SALPINGO OOPHORECTOMY,R/L/LUIS  2005    left     THYROIDECTOMY N/A 8/22/2017    Procedure: THYROIDECTOMY;  Total Thyroidectomy;  Surgeon: Skylar Costa MD;  Location:  OR     [facility] TOTAL ABDOM HYSTERECTOMY  12/9/09    [facility]       PREVIOUS ENDOSCOPY:    EGD (7/8/23):  - Normal esophagus.                             - Gastroesophageal flap valve classified as Hill                             Grade III (minimal fold, loose to endoscope, hiatal                             hernia likely).                             - A single gastric polyp. Biopsied.                             - A single gastric polyp. Biopsied.                             - Two gastric polyps. Biopsied.                             - Erythematous mucosa in the antrum. Biopsied.                             - Normal examined duodenum.     A.  GASTRIC, ANTRUM, POLYPS:  - Reactive gastropathy with mild chronic inactive gastritis; see comment  - No H. pylori-like organisms identified on routine staining  - No intestinal metaplasia identified     B.  GASTRIC, ANTRUM, BIOPSY:  - Reactive gastropathy with mild chronic inactive gastritis  - No H. pylori-like organisms identified on routine staining  - No intestinal metaplasia identified     C.  GASTRIC, BODY, BIOPSY:  - Chronic atrophic gastritis; see comment  - No H. pylori-like organisms identified on routine staining  - No intestinal metaplasia identified     D.  GASTRIC, BODY POLYP, BIOPSY:  - Eroded gastric hyperplastic polyp  - No H. pylori-like organisms identified on routine staining  - No intestinal metaplasia identified     E.  GASTRIC, LARGER BODY POLYP, BIOPSY:  - Eroded gastric hyperplastic polyp  - No H. pylori-like organisms  identified on routine staining  - No intestinal metaplasia identified      Colonoscopy (3/4/23):  - Non-thrombosed external hemorrhoids found on                             digital rectal exam.                             - One 2 mm polyp in the ascending colon, removed                             with a cold snare. Resected and retrieved.                             - One 2 mm polyp in the proximal transverse colon,                             removed with a cold snare. Resected and retrieved.                             - The examined portion of the ileum was normal.                             - The examination was otherwise normal on direct                             and retroflexion views.     A.  COLON, ASCENDING, POLYP:  - Colonic mucosa with superficial hyperplastic changes and a benign lymphoid aggregate    B.  COLON, TRANSVERSE, POLYP:  - Tubular adenoma  - No high-grade dysplasia or malignancy identified      ALLERGIES:     Allergies   Allergen Reactions     Advil [Ibuprofen] Swelling     Hands swelled     Cephalexin Itching       PERTINENT MEDICATIONS:    Current Outpatient Medications:      aspirin 81 MG EC tablet, Take 1 tablet (81 mg) by mouth daily, Disp: , Rfl:      atorvastatin (LIPITOR) 10 MG tablet, Take 1 tablet (10 mg) by mouth daily, Disp: 30 tablet, Rfl: 2     calcitRIOL (ROCALTROL) 0.25 MCG capsule, TAKE 2 CAPSULES (0.5 MCG) BY MOUTH DAILY, Disp: 180 capsule, Rfl: 1     calcium carbonate antacid 1000 MG CHEW, Take 2 tablets by mouth 3 times daily, Disp: 60 tablet, Rfl: 3     clobetasol (TEMOVATE) 0.05 % external ointment, Apply sparingly twice to three times daily for 3-4 weeks to affected areas on buttock. Please dispense 60 gram quantity., Disp: 60 g, Rfl: 4     hydrochlorothiazide (HYDRODIURIL) 25 MG tablet, Take 1 tablet (25 mg) by mouth daily, Disp: 90 tablet, Rfl: 3     levothyroxine (SYNTHROID/LEVOTHROID) 100 MCG tablet, TAKE ONE TABLET BY MOUTH ONCE DAILY, Disp: 90 tablet, Rfl: 0      lisinopril (ZESTRIL) 30 MG tablet, Take 1 tablet (30 mg) by mouth daily, Disp: 90 tablet, Rfl: 3     metFORMIN (GLUCOPHAGE XR) 500 MG 24 hr tablet, 1 po daily for one week.  Then could increase to 2 po daily if tolerated., Disp: 60 tablet, Rfl: 1     omeprazole (PRILOSEC) 40 MG DR capsule, Take 1 capsule (40 mg) by mouth daily, Disp: 30 capsule, Rfl: 3    Current Facility-Administered Medications:      lidocaine 1 % injection 2 mL, 2 mL, , , Yanique Leon MD, 2 mL at 07/28/22 1618     lidocaine 1 % injection 2 mL, 2 mL, , , Yanique Leon MD, 2 mL at 07/28/22 1618     triamcinolone (KENALOG-40) injection 40 mg, 40 mg, , , Yanique Leon MD, 40 mg at 07/28/22 1618     triamcinolone (KENALOG-40) injection 40 mg, 40 mg, , , Yanique Leon MD, 40 mg at 07/28/22 1618    SOCIAL HISTORY:    Social History     Socioeconomic History     Marital status:      Spouse name: Not on file     Number of children: 2     Years of education: 12     Highest education level: Not on file   Occupational History     Employer: [employer]   Tobacco Use     Smoking status: Never     Smokeless tobacco: Never   Vaping Use     Vaping Use: Never used   Substance and Sexual Activity     Alcohol use: Yes     Comment: occ     Drug use: No     Comment: occasional/ rare     Sexual activity: Not Currently     Partners: Male     Birth control/protection: Surgical   Other Topics Concern     Parent/sibling w/ CABG, MI or angioplasty before 65F 55M? No      Service No     Blood Transfusions Yes     Comment: had 3 units 10/23/2009     Caffeine Concern No     Occupational Exposure No     Hobby Hazards No     Sleep Concern Not Asked     Comment: sleeps about 5-6 hours a night     Stress Concern Not Asked     Weight Concern Not Asked     Special Diet No     Back Care Not Asked     Exercise Yes     Comment: walk     Bike Helmet Not Asked     Comment: doesn't ride a bike     Seat Belt Yes     Self-Exams No     Comment: info given  on SBE   Social History Narrative     Not on file     Social Determinants of Health     Financial Resource Strain: Not on file   Food Insecurity: Not on file   Transportation Needs: Not on file   Physical Activity: Not on file   Stress: Not on file   Social Connections: Not on file   Intimate Partner Violence: Not on file   Housing Stability: Not on file       FAMILY HISTORY:  FH of CRC: None.  FH of IBD: none.  Family History   Problem Relation Age of Onset     Cerebrovascular Disease Mother 50     Cerebrovascular Disease Father 49         49     Hypertension Father      Cancer No family hx of      Diabetes No family hx of      Thyroid Disease No family hx of      Glaucoma No family hx of      Macular Degeneration No family hx of        Past/family/social history reviewed and no changes    PHYSICAL EXAMINATION:  Constitutional: aaox3, cooperative, pleasant, not dyspneic/diaphoretic, no acute distress  Vitals reviewed: LMP 2009   Wt:   Wt Readings from Last 2 Encounters:   23 85.3 kg (188 lb)   08/15/23 85.3 kg (188 lb)      Eyes: Sclera anicteric/injected  Respiratory: Unlabored breathing  Lymph: No axillary, submandibular, supraclavicular or inguinal lymphadenopathy  Skin: warm, perfused, no jaundice  Psych: Normal affect  MSK: Normal gait      PERTINENT STUDIES:    Lab on 2023   Component Date Value Ref Range Status     Vitamin B12 2023 673  232 - 1,245 pg/mL Final     Parietal Cell Antibody IgG 2023 109.4 (H)  0.0 - 24.9 Units Final     Intrinsic Factor Blocking Antibody 2023 Negative  Negative Final     Ferritin 2023 35  11 - 328 ng/mL Final     Iron 2023 28 (L)  37 - 145 ug/dL Final     Iron Binding Capacity 2023 346  240 - 430 ug/dL Final     Iron Sat Index 2023 8 (L)  15 - 46 % Final     Hemoglobin A1C 2023 7.7 (H)  0.0 - 5.6 % Final     Cholesterol 2023 183  <200 mg/dL Final     Triglycerides 2023 141  <150 mg/dL Final      Direct Measure HDL 08/22/2023 47 (L)  >=50 mg/dL Final     LDL Cholesterol Calculated 08/22/2023 108 (H)  <=100 mg/dL Final     Non HDL Cholesterol 08/22/2023 136 (H)  <130 mg/dL Final     TSH 08/22/2023 10.40 (H)  0.30 - 4.20 uIU/mL Final     Free T4 08/22/2023 1.35  0.90 - 1.70 ng/dL Final     Sodium 08/22/2023 140  136 - 145 mmol/L Final     Potassium 08/22/2023 4.4  3.4 - 5.3 mmol/L Final     Chloride 08/22/2023 101  98 - 107 mmol/L Final     Carbon Dioxide (CO2) 08/22/2023 25  22 - 29 mmol/L Final     Anion Gap 08/22/2023 14  7 - 15 mmol/L Final     Urea Nitrogen 08/22/2023 17.8  6.0 - 20.0 mg/dL Final     Creatinine 08/22/2023 0.71  0.51 - 0.95 mg/dL Final     Calcium 08/22/2023 8.4 (L)  8.6 - 10.0 mg/dL Final     Glucose 08/22/2023 163 (H)  70 - 99 mg/dL Final     Alkaline Phosphatase 08/22/2023 95  35 - 104 U/L Final     AST 08/22/2023 37  0 - 45 U/L Final     ALT 08/22/2023 46  0 - 50 U/L Final     Protein Total 08/22/2023 7.7  6.4 - 8.3 g/dL Final     Albumin 08/22/2023 4.3  3.5 - 5.2 g/dL Final     Bilirubin Total 08/22/2023 0.2  <=1.2 mg/dL Final     GFR Estimate 08/22/2023 >90  >60 mL/min/1.73m2 Final         Again, thank you for allowing me to participate in the care of your patient.        Sincerely,        Tree Duque PA-C

## 2023-09-07 ENCOUNTER — TELEPHONE (OUTPATIENT)
Dept: GASTROENTEROLOGY | Facility: CLINIC | Age: 53
End: 2023-09-07
Payer: COMMERCIAL

## 2023-09-07 DIAGNOSIS — K31.7 GASTRIC POLYPS: ICD-10-CM

## 2023-09-07 DIAGNOSIS — K29.40 ATROPHIC GASTRITIS, PRESENCE OF BLEEDING UNSPECIFIED: Primary | ICD-10-CM

## 2023-09-07 NOTE — TELEPHONE ENCOUNTER
"Endoscopy Scheduling Screen    Have you had a positive Covid test in the last 14 days?  No      Are you active on MyChart?   No      What insurance is in the chart?  Other:  BCBS      Ordering/Referring Provider: SWETHA WASHBURN   (If ordering provider performs procedure, schedule with ordering provider unless otherwise instructed. )    BMI: Estimated body mass index is 34.17 kg/m  as calculated from the following:    Height as of 9/6/23: 1.575 m (5' 2\").    Weight as of 9/6/23: 84.7 kg (186 lb 12.8 oz).     Sedation   Ordered  MAC/deep sedation.   BMI<= 45 45 < BMI <= 48 48 < BMI < = 50  BMI > 50   No Restrictions No [facility]  No [facility]  [facility] with exceptions Hospital Only OR Only         Are you taking any prescription medications for pain 3 or more times per week?   No      Do you have a history of malignant hyperthermia or adverse reaction to anesthesia?  No      (Females) Are you currently pregnant?   No       Have you been diagnosed or told you have pulmonary hypertension?   No      Do you have an LVAD?  No      Have you been told you have moderate to severe sleep apnea?  No      Have you been told you have COPD, asthma, or any other lung disease?  No      Do you have any heart conditions?  No       Have you ever had an organ transplant?   No      Have you ever had or are you awaiting a heart or lung transplant?   No    Have you had a stroke or transient ischemic attack (TIA aka \"mini stroke\" in the last 6 months?   No      Have you been diagnosed with or been told you have cirrhosis of the liver?   No      Are you currently on dialysis?   No      Do you need assistance transferring?   No    BMI: Estimated body mass index is 34.17 kg/m  as calculated from the following:    Height as of 9/6/23: 1.575 m (5' 2\").    Weight as of 9/6/23: 84.7 kg (186 lb 12.8 oz).       Is patients BMI > 40 and scheduling location [facility]?  No      Do you take an injectable medication for weight loss or diabetes (excluding " insulin)?  No      Do you take the medication Naltrexone?  No      Do you take blood thinners?  No       Prep   Are you currently on dialysis or do you have chronic kidney disease?  No      Do you have a diagnosis of diabetes?  Yes (Golytely Prep)      Do you have a diagnosis of cystic fibrosis (CF)?  No      On a regular basis do you go 3 -5 days between bowel movements?        BMI > 40?  No      Preferred Pharmacy:    [facility] - [place], [place] - [address]  [address]  [facility] [address]  Phone: [phone] Fax: [phone]    WRITTEN PRESCRIPTION REQUESTED  No address on file      [facility] [place] [address]  [address]  [address]  [facility] [address]  Phone: [phone] Fax: [phone]      Final Scheduling Details   Colonoscopy prep sent?  N/A      Procedure scheduled  Upper endoscopy (EGD)      Surgeon:  MERISSA     Date of procedure:  11/15     Pre-OP / PAC:   Yes - Patient informed of pre-op requirement.    Location  [facility]  AVAILABILITY PRIOR TO CLINIC F/U    Sedation   MAC/Deep Sedation - Per order.      Patient Reminders:   You will receive a call from a Nurse to review instructions and health history.  This assessment must be completed prior to your procedure.  Failure to complete the Nurse assessment may result in the procedure being cancelled.      On the day of your procedure, please designate an adult(s) who can drive you home stay with you for the next 24 hours. The medicines used in the exam will make you sleepy. You will not be able to drive.      You cannot take public transportation, ride share services, or non-medical taxi service without a responsible caregiver.  Medical transport services are allowed with the requirement that a responsible caregiver will receive you at your destination.  We require that drivers and caregivers are confirmed prior to your procedure.

## 2023-09-11 ENCOUNTER — OFFICE VISIT (OUTPATIENT)
Dept: OPTOMETRY | Facility: CLINIC | Age: 53
End: 2023-09-11
Payer: COMMERCIAL

## 2023-09-11 DIAGNOSIS — H52.221 REGULAR ASTIGMATISM OF RIGHT EYE: ICD-10-CM

## 2023-09-11 DIAGNOSIS — E11.9 TYPE 2 DIABETES MELLITUS WITHOUT RETINOPATHY (H): ICD-10-CM

## 2023-09-11 DIAGNOSIS — Z01.01 ENCOUNTER FOR EXAMINATION OF EYES AND VISION WITH ABNORMAL FINDINGS: Primary | ICD-10-CM

## 2023-09-11 DIAGNOSIS — H52.4 PRESBYOPIA: ICD-10-CM

## 2023-09-11 PROCEDURE — 92015 DETERMINE REFRACTIVE STATE: CPT | Performed by: OPTOMETRIST

## 2023-09-11 PROCEDURE — 92014 COMPRE OPH EXAM EST PT 1/>: CPT | Performed by: OPTOMETRIST

## 2023-09-11 ASSESSMENT — CONF VISUAL FIELD
OD_SUPERIOR_NASAL_RESTRICTION: 0
OS_INFERIOR_TEMPORAL_RESTRICTION: 0
OS_INFERIOR_NASAL_RESTRICTION: 0
OD_NORMAL: 1
OD_SUPERIOR_TEMPORAL_RESTRICTION: 0
OS_NORMAL: 1
OS_SUPERIOR_NASAL_RESTRICTION: 0
OS_SUPERIOR_TEMPORAL_RESTRICTION: 0
OD_INFERIOR_TEMPORAL_RESTRICTION: 0
OD_INFERIOR_NASAL_RESTRICTION: 0
METHOD: COUNTING FINGERS

## 2023-09-11 ASSESSMENT — VISUAL ACUITY
OD_SC: 20/120
METHOD: SNELLEN - LINEAR
OS_SC: 20/20
CORRECTION_TYPE: GLASSES
OD_SC: 20/20
OS_SC: 20/80-1
OS_CC: 20/30

## 2023-09-11 ASSESSMENT — TONOMETRY
OS_IOP_MMHG: 16
OD_IOP_MMHG: 16
IOP_METHOD: APPLANATION

## 2023-09-11 ASSESSMENT — REFRACTION_MANIFEST
OD_SPHERE: PLANO
OD_CYLINDER: +0.50
OS_ADD: +2.00
OS_SPHERE: -0.25
OD_ADD: +2.00
OD_AXIS: 165
OS_CYLINDER: SPHERE

## 2023-09-11 ASSESSMENT — SLIT LAMP EXAM - LIDS
COMMENTS: NORMAL
COMMENTS: NORMAL

## 2023-09-11 ASSESSMENT — EXTERNAL EXAM - RIGHT EYE: OD_EXAM: NORMAL

## 2023-09-11 ASSESSMENT — REFRACTION_WEARINGRX
OD_CYLINDER: SPHERE
OS_SPHERE: +1.00
OD_SPHERE: +1.00
SPECS_TYPE: OTC READERS
OS_CYLINDER: SPHERE

## 2023-09-11 ASSESSMENT — CUP TO DISC RATIO
OD_RATIO: 0.35
OS_RATIO: 0.3

## 2023-09-11 ASSESSMENT — EXTERNAL EXAM - LEFT EYE: OS_EXAM: NORMAL

## 2023-09-11 NOTE — PROGRESS NOTES
"Chief Complaint   Patient presents with    Diabetic Eye Exam     Accompanied by , Dano    Chief Complaint(s) and History of Present Illness(es)       Diabetic Eye Exam              Vision: is stable    Diabetes Type: Type 2 and taking oral medications    Duration: 1 month    Blood Sugars: is controlled (Checks every other day )                   Lab Results   Component Value Date    A1C 7.7 08/22/2023    A1C 7.7 05/05/2023    A1C 6.8 10/06/2021    A1C 6.7 07/30/2021    A1C 7.0 02/18/2021    A1C 6.8 12/11/2020    A1C 6.6 08/19/2020    A1C 6.2 02/12/2019    A1C 5.8 01/08/2018       Last Eye Exam: 9/2/2022  Dilated Previously: Yes, side effects of dilation explained today    What are you currently using to see? +1.00 OTC readers - using for everything up close     -No distance glasses     Distance Vision Acuity: Satisfied with vision overall     - noticing vision in AM seems \"fuzzy\" for ~2-3 hours before vision returns to normal - ongoing for a couple of weeks - wondering if it is secondary to DM2 dx 8/2023    Near Vision Acuity: Satisfied with vision while reading and using computer with readers    Eye Comfort: good  Do you use eye drops? 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       OBJECTIVE: See Ophthalmology exam    ASSESSMENT:    ICD-10-CM    1. Encounter for examination of eyes and vision with abnormal findings  Z01.01       2. Type 2 diabetes mellitus without retinopathy (H)  E11.9       3. Regular astigmatism of right eye  H52.221       4. Presbyopia  H52.4           PLAN:    Rhonda Cordero aware  eye exam results will be sent to [facility] D[facility]  Patient Instructions   Patient educated on importance of good blood sugar control.  Letter sent to primary care provider with diabetic eye exam report.     No glasses prescription necessary.   Continue use of OTC reading glasses as needed. "     Return in 1 year for a comprehensive eye exam, or sooner if needed.      The effects of the dilating drops last for 4- 6 hours.  You will be more sensitive to light and vision will be blurry up close.  Mydriatic sunglasses were given if needed.     Major Beasley, OD  [facility]  [address] [place] [address]. [place]  [place]  [id number]    [phone]

## 2023-09-11 NOTE — PATIENT INSTRUCTIONS
Patient educated on importance of good blood sugar control.  Letter sent to primary care provider with diabetic eye exam report.     No glasses prescription necessary.   Continue use of OTC reading glasses as needed.     Return in 1 year for a comprehensive eye exam, or sooner if needed.      The effects of the dilating drops last for 4- 6 hours.  You will be more sensitive to light and vision will be blurry up close.  Mydriatic sunglasses were given if needed.     Major Beasley, OD  [facility]  [address] [place] [address]. [place]  [place]  [address]    [phone]

## 2023-09-11 NOTE — LETTER
"    9/11/2023         RE: Rhonda Cordero  [address]  [facility] [address]        Dear Colleague,    Thank you for referring your patient, Rhonda Cordero, to the [facility]. Please see a copy of my visit note below.    Chief Complaint   Patient presents with     Diabetic Eye Exam     Accompanied by , Dano    Chief Complaint(s) and History of Present Illness(es)       Diabetic Eye Exam              Vision: is stable    Diabetes Type: Type 2 and taking oral medications    Duration: 1 month    Blood Sugars: is controlled (Checks every other day )                   Lab Results   Component Value Date    A1C 7.7 08/22/2023    A1C 7.7 05/05/2023    A1C 6.8 10/06/2021    A1C 6.7 07/30/2021    A1C 7.0 02/18/2021    A1C 6.8 12/11/2020    A1C 6.6 08/19/2020    A1C 6.2 02/12/2019    A1C 5.8 01/08/2018       Last Eye Exam: 9/2/2022  Dilated Previously: Yes, side effects of dilation explained today    What are you currently using to see? +1.00 OTC readers - using for everything up close     -No distance glasses     Distance Vision Acuity: Satisfied with vision overall     - noticing vision in AM seems \"fuzzy\" for ~2-3 hours before vision returns to normal - ongoing for a couple of weeks - wondering if it is secondary to DM2 dx 8/2023    Near Vision Acuity: Satisfied with vision while reading and using computer with readers    Eye Comfort: good  Do you use eye drops? 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       OBJECTIVE: See Ophthalmology exam    ASSESSMENT:    ICD-10-CM    1. Encounter for examination of eyes and vision with abnormal findings  Z01.01       2. Type 2 diabetes mellitus without retinopathy (H)  E11.9       3. Regular astigmatism of right eye  H52.221       4. Presbyopia  H52.4           PLAN:    Rhonda Cordero aware  eye exam results will " be sent to Vasquez Denson  Patient Instructions   Patient educated on importance of good blood sugar control.  Letter sent to primary care provider with diabetic eye exam report.     No glasses prescription necessary.   Continue use of OTC reading glasses as needed.     Return in 1 year for a comprehensive eye exam, or sooner if needed.      The effects of the dilating drops last for 4- 6 hours.  You will be more sensitive to light and vision will be blurry up close.  Mydriatic sunglasses were given if needed.     Major Beasley OD  [address] [place]  [address]    [phone]             Again, thank you for allowing me to participate in the care of your patient.        Sincerely,        Major Beasley OD

## 2023-10-18 ENCOUNTER — TELEPHONE (OUTPATIENT)
Dept: FAMILY MEDICINE | Facility: CLINIC | Age: 53
End: 2023-10-18
Payer: COMMERCIAL

## 2023-10-18 NOTE — TELEPHONE ENCOUNTER
Patient Quality Outreach    Patient is due for the following:       Topic Date Due    Pneumococcal Vaccine (1 - PCV) Never done    Flu Vaccine (1) 09/01/2023    COVID-19 Vaccine (5 - 2023-24 season) 09/01/2023    Hepatitis B Vaccine (2 of 3 - 19+ 3-dose series) 09/19/2023       Next Steps:   Hep B updated in chart    Type of outreach:    Chart review performed, no outreach needed.    Next Steps:  Reach out within 90 days via Phone.    Max number of attempts reached: Yes. Will try again in 90 days if patient still on fail list.    Questions for provider review:    None           Bette Houston, CMA  Chart routed to chart closed.

## 2023-10-20 DIAGNOSIS — E11.9 TYPE 2 DIABETES MELLITUS WITHOUT COMPLICATION, WITHOUT LONG-TERM CURRENT USE OF INSULIN (H): ICD-10-CM

## 2023-10-20 RX ORDER — METFORMIN HCL 500 MG
TABLET, EXTENDED RELEASE 24 HR ORAL
Qty: 60 TABLET | Refills: 1 | Status: SHIPPED | OUTPATIENT
Start: 2023-10-20 | End: 2023-12-20

## 2023-11-02 ENCOUNTER — TELEPHONE (OUTPATIENT)
Dept: GASTROENTEROLOGY | Facility: CLINIC | Age: 53
End: 2023-11-02
Payer: COMMERCIAL

## 2023-11-02 NOTE — LETTER
November 2, 2023      Rhonda Cordero  [address] [facility] [address]          Rhonda,       We apologize that we have been unable to contact you by phone. We are calling in regards to your upcoming Upper endoscopy (EGD) procedure that is scheduled on 11/15/23 to complete pre assessment.    Please contact our pre assessment nursing team at [phone] option 4. We are open Monday through Friday, 7:00am to 5:00pm. Note that failure to complete Nurse assessment phone call will result in your procedure being cancelled.     A Pre-Operative Physical will be required for your upcoming procedure.   Please be sure to schedule a Pre-Operative physical with your Primary Care Provider within 30 days of your procedure date. If your primary care provider is outside the [employer] system, please fax report to [phone]     If you do not have a primary care provider, an in-person clinic appointment can also be scheduled with our Pre-Operative Assessment Center (PAC) at [facility].  Located at [address] [place] [address].     Virtual PAC appointments will not be accepted.    To schedule an in-person PAC visit, please call the Pre-Operative Assessment Center at [phone].     Our schedules fill up quickly and are in high demand. If you know that you need to cancel, please contact us so that we can accommodate scheduling for other patients. Our endoscopy scheduling team can be reached at [phone] option 2.       Thank you,  [facility] Endoscopy Team

## 2023-11-02 NOTE — TELEPHONE ENCOUNTER
Attempted to contact patient in order to complete pre assessment questions.     No answer. Left message to return call to [phone] option 4    Pre op exam needed? Yes.  Patient needs to complete a pre-operative exam prior to procedure.  Informed patient pre-op is needed via voicemail message and Letter      Jerica Barbosa RN  Endoscopy Procedure Pre Assessment RN

## 2023-11-02 NOTE — TELEPHONE ENCOUNTER
Pre assessment completed for upcoming procedure.   (Please see previous telephone encounter notes for complete details)    Patient  returned call.       Procedure details:    Arrival time and facility location reviewed.    Pre op exam: 11.8.2023 with Vasquez Denson    Designated  policy reviewed. Instructed to have someone stay 24 hours post procedure.     COVID policy reviewed.      Medication review:    Medications reviewed. Please see supporting documentation below. Holding recommendations discussed (if applicable).       Prep for procedure:     Procedure prep instructions reviewed.        Additional information needed?  N/A      Patient  verbalized understanding and had no questions or concerns at this time.      Tesha Asencio RN  Endoscopy Procedure Pre Assessment RN  [phone] option 4

## 2023-11-02 NOTE — TELEPHONE ENCOUNTER
Pre visit planning completed.    Procedure details:    Patient scheduled for Upper endoscopy (EGD) on 11/15/23.     Arrival time: 1315. Procedure time 1415    Pre op exam needed? Yes.  Patient needs to complete a pre-operative exam prior to procedure.  Informed patient pre-op is needed via Letter    Facility location: [facility]; [facility] [address] [place] [address]    Sedation type: MAC    Indication for procedure: Atrophic gastritis, presence of bleeding unspecified, Gastric polyps; Gastric polyp removal       Chart review:     Electronic implanted devices? No    Recent diagnosis of diverticulitis within the last 6 weeks? No    Diabetic? Yes. See medication holding recommendations     Diabetic medication HOLDING recommendations: (if applicable)  Oral diabetic medications: Yes:  Metformin (glucophage): HOLD day of procedure.  Diabetic injectables: No  Insulin: No      Medication review:    Anticoagulants? No    NSAIDS? No NSAID medications per patient's medication list.  RN will verify with pre-assessment call.    Other medication HOLDING recommendations:  N/A      Prep for procedure:     Bowel prep recommendation: N/A     Prep instructions sent via letter       After discussion between Dr. Donald and Dr. Montes, it was determined that Dr. Donald can complete the gastric polypectomies as ordered.      Jerica Barbosa RN  Endoscopy Procedure Pre Assessment RN  [phone] option 4

## 2023-11-02 NOTE — LETTER
November 2, 2023      Rhonda Cordero  [address] [facility] [address]      Upper endoscopy (EGD)     Procedure date: 11/15/23    Anticipated arrival time: 1:15 PM   (Procedure Times are Subject to Change)    Facility location: [facility]; [address] [place] [address] - Check in location: 1st Floor Skyway lobby. Parking information: Self pay parking available in [facility] Parking Ramp. The Skyway Ramp is  located across [facility] from the hospital and is connected to the  hospital by a skyway. The skyway access is on Level C of the parking ramp.   parking is available Monday through Friday from 7 a.m.-3 p.m.  parking attendants are stationed at Door 2 at the [facility] entrance,  located off of [address]    Important Procedure Reminders:     A Pre-Operative Physical will be required for your upcoming procedure.   Please be sure to schedule a Pre-Operative physical with your Primary Care Provider within 30 days of your procedure date. If your primary care provider is outside the [employer] system, please fax report to [phone]     If you do not have a primary care provider, an in-person clinic appointment can also be scheduled with our Pre-Operative Assessment Center (PAC) at [facility].  Located at [address] [place] [address].     Virtual PAC appointments will not be accepted.    To schedule an in-person PAC visit, please call the Pre-Operative Assessment Center at [phone].     Prep Instructions:   Instructions on how to prepare for your upcoming procedure are found below. Please read instructions carefully. Deviation from instructions may result in less than desired outcomes and procedure may need to be rescheduled. If you have additional questions regarding how to prepare for your upcoming procedure, please contact our endoscopy pre assessment nurses at [phone] option 4.      Policy:   On the day  of your procedure, please designatean adult(s) who can drive you home stay with you for the next 24 hours. The medicines used in the exam will make you sleepy. You will not be able to drive. You cannot take public transportation, ride share services, or non-medical taxi service without a responsible caregiver.  Medical transport services are allowed with the requirement that a responsible caregiver will receive you at your destination.  We require that drivers and caregivers are confirmed prior to your procedure.    Day of procedure:  Please do not wear jewelry (i.e. earrings, rings, necklaces, watches, etc) . Leave your purse, billfold, credit cards, and other valuables at home.   Bring insurance card and ID.     To cancel or reschedule your procedure:   Please call our endoscopy scheduling team at: [facility] Endoscopy: [phone], option 2. Monday through Friday, 7:00am-5:00pm.      Medication Reminders:    Please note the following medication holding recommendations:   Oral diabetic medication(s): Metformin (glucophage): HOLD day of procedure.    ----------------------------------------------------------------------------------------------------------------------------------------------------------------------------------------------------------------------------------------------------------------------    Instructions for Your Upper Endoscopy    Pre-Assessment Phone Number: [facility] Provider; [phone] option 4      You will receive a call from a Nurse to review instructions and health history.  This assessment must be completed prior to your procedure.  Failure to complete the Nurse assessment phone call may result in the procedure being cancelled.      What is an upper endoscopy?  - This is an exam that checks for problems linked to heartburn, swallowing or belly (abdominal) pain or other symptoms of the upper GI tract. Depending on your symptoms, the doctor will look  at your esophagus (food pipe), stomach and the part of the stomach that enters the small intestine.     Getting ready  - Dress in comfortable, loose clothing.  - Bring your insurance card. Leave your purse, billfold, credit cards and other valuables at home.  - Bring a list of your medicines and known allergies. If you have a pacemaker or ICD, please bring your information card.  - We do our best to stay on time, but there may be a delay. Please bring something to pass the time, such as a newspaper or book.    - Important: You must complete all steps before the exam.     Seven days before the exam   - Talk to your doctor: If you take blood-thinners (such as Coumadin, Plavix, Xarelto), your prescription or schedule may need to change before the test.  - Talk to your prescribing provider: If you take prescription NSAIDS (such as Sulindac, Celebrex, Mobic, Relafen). Your prescribing provider will tell you what to do.   - Continue taking prescribed aspirin; talk to your prescribing doctor with any concerns.    - If you have diabetes: Ask to have your exam early in the morning. Also, ask your doctor if you should change your diet or medicines    One day before the exam   - Stop eating all solid foods 8 hours prior to arrival time. You may drink clear liquids.   **If you have achalasia (treated or untreated) or gastroparesis, please be on a clear liquid diet for 24 hours prior to your exam and stop drinking all liquids at midnight.   - Stop taking sucralfate medication.  - Stop taking NSAID pain relievers, such as Advil, Ibuprofen, Motrin, etc.  You may take Tylenol.  Day of the exam  - You may drink clear liquids until 2 hours before your arrival time.  - You may take all of your morning medicines (except for diabetes pills) as usual with 4 oz. of water up to 2 hours before your arrival time.  - If you take diabetes medicine (pills): do not take them the morning of your test.  - Bring a list of your medicines and known  allergies.  - Please do not wear jewelry (i.e. earrings, rings, necklaces, watches, etc) . Leave your purse, billfold, credit cards, and other valuables at home.   - Please arrive with an adult who can take you home after the test: The medicine will make you sleepy. If you do not have a , we may cancel your test.     What are clear liquids?   You may have:  - Water, tea, coffee (no cream)  - Soda pop, Gatorade   - Clear nutrition drinks (Enlive, Resource Breeze)   - Jell-O, Popsicles (no milk or fruit pieces) or sorbet   - Fat-free soup broth or bouillon  - Plain hard cand, such as clear life savers   - Clear juices and fruit-flavored drinks such as apple juice, white grape juice, Hi-C and Teodoro-Aid    Do not have:  - Milk or milk products such as ice cream, malts or shakes  - Juices with pulp such as orange, grapefruit, pineapple or tomato juice  - Cream soups of any kind  - Alcohol         During the exam  - The exam lasts from 10 to 20 minutes.   - We will review the risks and benefits of the exam. We will then ask you to sign a consent form.  - We will place a small needle (IV) in your hand or arm. We will give you medicine through the IV to keep you comfortable.   - The endoscope will be advanced through your mouth and the exam completed.  - When we put air into your stomach, you may feel full or have mild cramps. We will remove the air at the end of the exam.  - To reduce discomfort, breathe at a slow, even pace. Try to relax the muscles in your neck and shoulders.  - We may take a small piece of tissue (a biopsy) to test in the lab. It will not hurt. We may also take pictures of your insides.     After the exam  - You will rest in the recovery area until you feel ready to leave. This takes about 30 to 60 minutes.   - We will remove the IV.  - You may burp up air left in your stomach.  - You may feel drowsy or a little dizzy from the medicine.   - Your doctor will discuss your results. You will receive  your test results in 7 to 10 business days by letter or MyChart.   - Your throat may feel numb. One hour after the exam, swallow a small amount of cool water. If you can swallow easily, you may go back to your regular diet and medicines.  - Your throat may be sore for the rest of the day. Throat lozenges or ice chips may help.  - Do not drive for 24 hours.    Call us at once if you have:  - Unusual pain or problems swallowing, unusual stomach or chest pain.  - Vomit that looks like coffee grounds or black or bloody stools (bowel movements).  - A fever above 100.6  F (37.5  C) when taken under the tongue.    Test results  - You will receive your results in 7 to 10 business days by phone, letter or MyChart.    For questions or appointments, call:  [facility] Endoscopy   [phone], option 2  Monday through Friday, 7 a.m. to 5 p.m.  (If it is after hours please reach out to the clinic or provider you were scheduled with.)    You should be aware that your endoscopist may be a part of a study to improve endoscopy procedures.  As part of a study, pictures gathered from your procedure may be stored and analyzed in a de-identified manner.

## 2023-11-08 ENCOUNTER — OFFICE VISIT (OUTPATIENT)
Dept: FAMILY MEDICINE | Facility: CLINIC | Age: 53
End: 2023-11-08
Payer: COMMERCIAL

## 2023-11-08 VITALS
HEIGHT: 62 IN | RESPIRATION RATE: 18 BRPM | WEIGHT: 181.38 LBS | DIASTOLIC BLOOD PRESSURE: 87 MMHG | OXYGEN SATURATION: 97 % | TEMPERATURE: 97 F | SYSTOLIC BLOOD PRESSURE: 136 MMHG | HEART RATE: 88 BPM | BODY MASS INDEX: 33.38 KG/M2

## 2023-11-08 DIAGNOSIS — Z01.818 PREOP GENERAL PHYSICAL EXAM: Primary | ICD-10-CM

## 2023-11-08 DIAGNOSIS — M54.81 OCCIPITAL NEURALGIA OF RIGHT SIDE: ICD-10-CM

## 2023-11-08 DIAGNOSIS — E03.9 HYPOTHYROIDISM, UNSPECIFIED TYPE: ICD-10-CM

## 2023-11-08 DIAGNOSIS — K31.7 GASTRIC POLYPS: ICD-10-CM

## 2023-11-08 DIAGNOSIS — E11.9 TYPE 2 DIABETES MELLITUS WITHOUT COMPLICATION, WITHOUT LONG-TERM CURRENT USE OF INSULIN (H): ICD-10-CM

## 2023-11-08 LAB — HBA1C MFR BLD: 7.1 % (ref 0–5.6)

## 2023-11-08 PROCEDURE — 80053 COMPREHEN METABOLIC PANEL: CPT | Performed by: FAMILY MEDICINE

## 2023-11-08 PROCEDURE — 84439 ASSAY OF FREE THYROXINE: CPT | Performed by: FAMILY MEDICINE

## 2023-11-08 PROCEDURE — 99214 OFFICE O/P EST MOD 30 MIN: CPT | Performed by: FAMILY MEDICINE

## 2023-11-08 PROCEDURE — 36415 COLL VENOUS BLD VENIPUNCTURE: CPT | Performed by: FAMILY MEDICINE

## 2023-11-08 PROCEDURE — 84443 ASSAY THYROID STIM HORMONE: CPT | Performed by: FAMILY MEDICINE

## 2023-11-08 PROCEDURE — 83036 HEMOGLOBIN GLYCOSYLATED A1C: CPT | Performed by: FAMILY MEDICINE

## 2023-11-08 PROCEDURE — 85027 COMPLETE CBC AUTOMATED: CPT | Performed by: FAMILY MEDICINE

## 2023-11-08 PROCEDURE — 85007 BL SMEAR W/DIFF WBC COUNT: CPT | Performed by: FAMILY MEDICINE

## 2023-11-08 ASSESSMENT — PAIN SCALES - GENERAL: PAINLEVEL: NO PAIN (0)

## 2023-11-08 NOTE — PATIENT INSTRUCTIONS
Hold aspirin for one week prior    Hold metformin day before and day of procedure    Hold all meds the morning of procedure    We will send you lab results

## 2023-11-08 NOTE — PROGRESS NOTES
[facility]  [address] [place] [address]  [place] [address]  Phone: [phone]  Primary Provider: Ailin Benz  Pre-op Performing Provider: AILIN BENZ       PREOPERATIVE EVALUATION:  Today's date: 11/8/2023    Sue is a 52 year old female who presents for a preoperative evaluation.    Surgical Information:  Surgery/Procedure: EGD  Surgery Location:   Surgeon: Odilon  Surgery Date: 11/15/2023  Time of Surgery: TBD  Where patient plans to recover: At home with family  Fax number for surgical facility: Note does not need to be faxed, will be available electronically in Epic.        Subjective       HPI related to upcoming procedure: 52 year old female to have egd done for removal of gastric polyps.  Also has some swallowing issues.         11/8/2023     4:57 PM   Preop Questions   1. Have you ever had a heart attack or stroke? No   2. Have you ever had surgery on your heart or blood vessels, such as a stent placement, a coronary artery bypass, or surgery on an artery in your head, neck, heart, or legs? No   3. Do you have chest pain with activity? No   4. Do you have a history of  heart failure? No   5. Do you currently have a cold, bronchitis or symptoms of other infection? No   6. Do you have a cough, shortness of breath, or wheezing? No   7. Do you or anyone in your family have previous history of blood clots? No   8. Do you or does anyone in your family have a serious bleeding problem such as prolonged bleeding following surgeries or cuts? No   9. Have you ever had problems with anemia or been told to take iron pills? No   10. Have you had any abnormal blood loss such as black, tarry or bloody stools, or abnormal vaginal bleeding? No   11. Have you ever had a blood transfusion? No   12. Are you willing to have a blood transfusion if it is medically needed before, during, or after your surgery? Yes   13. Have you or any of your relatives ever had problems with anesthesia? No   14.  Do you have sleep apnea, excessive snoring or daytime drowsiness? No   15. Do you have any artifical heart valves or other implanted medical devices like a pacemaker, defibrillator, or continuous glucose monitor? No   16. Do you have artificial joints? No   17. Are you allergic to latex? No   18. Is there any chance that you may be pregnant? No       Health Care Directive:  Patient does not have a Health Care Directive or Living Will    Preoperative Review of :  Patient not on any controlled substance           Review of Systems  Constitutional, neuro, ENT, endocrine, pulmonary, cardiac, gastrointestinal, genitourinary, musculoskeletal, integument and psychiatric systems are negative, except as otherwise noted.    Right side of neck sore recently    No fever/ chills     Patient Active Problem List    Diagnosis Date Noted    Postoperative hypothyroidism 09/19/2018     Priority: Medium    Hypocalcemia 09/19/2018     Priority: Medium    History of thyroidectomy 09/26/2017     Priority: Medium    Anup's deformity of left heel 02/24/2017     Priority: Medium    Essential hypertension with goal blood pressure less than 140/90 11/09/2016     Priority: Medium    Hypertension goal BP (blood pressure) < 140/90 01/21/2016     Priority: Medium    Gastroesophageal reflux disease, esophagitis presence not specified 01/15/2016     Priority: Medium     IMO Regulatory Load OCT 2020      Cervical adenopathy 01/15/2016     Priority: Medium    Shoulder joint pain 03/15/2015     Priority: Medium    Hashimoto's thyroiditis 03/04/2015     Priority: Medium    Nontoxic multinodular goiter 03/04/2015     Priority: Medium    Impingement syndrome, shoulder 02/10/2015     Priority: Medium    Rotator cuff tendonitis 02/10/2015     Priority: Medium    CARDIOVASCULAR SCREENING; LDL GOAL LESS THAN 130 09/13/2013     Priority: Medium      Past Medical History:   Diagnosis Date    Anemia     Diabetes (H) 08/2023    Dr. Denson    Hypertension goal  BP (blood pressure) < 130/80 05/06/2011    Hypocalcemia     Multinodular goiter     Postsurgical hypothyroidism     Rotator cuff tendonitis 02/10/2015    Vaginal Papanicolaou smear not required due to history of hysterectomy      Past Surgical History:   Procedure Laterality Date    COLONOSCOPY N/A 3/24/2023    Procedure: COLONOSCOPY, FLEXIBLE, WITH LESION REMOVAL USING SNARE;  Surgeon: Chet Escobar MD;  Location: [facility]    COLONOSCOPY WITH CO2 INSUFFLATION N/A 3/24/2023    Procedure: COLONOSCOPY, WITH CO2 INSUFFLATION;  Surgeon: Chet Escobar MD;  Location: [facility]    COMBINED ESOPHAGOSCOPY, GASTROSCOPY, DUODENOSCOPY (EGD) WITH CO2 INSUFFLATION N/A 7/28/2023    Procedure: Combined Esophagoscopy, Gastroscopy, Duodenoscopy (Egd) With Co2 Insufflation;  Surgeon: Sarita Montes DO;  Location: [facility]    ESOPHAGOSCOPY, GASTROSCOPY, DUODENOSCOPY (EGD), COMBINED N/A 7/28/2023    Procedure: ESOPHAGOGASTRODUODENOSCOPY, WITH BIOPSY;  Surgeon: Sarita Montes DO;  Location: [facility]    LAPAROSCOPY,TUBAL CAUTERY  2005    SALPINGO OOPHORECTOMY,R/L/LUIS  2005    left    THYROIDECTOMY N/A 8/22/2017    Procedure: THYROIDECTOMY;  Total Thyroidectomy;  Surgeon: Skylar Costa MD;  Location:  OR    [facility] TOTAL ABDOM HYSTERECTOMY  12/9/09    [facility]     Current Outpatient Medications   Medication Sig Dispense Refill    aspirin 81 MG EC tablet Take 1 tablet (81 mg) by mouth daily      atorvastatin (LIPITOR) 10 MG tablet Take 1 tablet (10 mg) by mouth daily 30 tablet 2    calcitRIOL (ROCALTROL) 0.25 MCG capsule TAKE 2 CAPSULES (0.5 MCG) BY MOUTH DAILY 180 capsule 1    calcium carbonate antacid 1000 MG CHEW Take 2 tablets by mouth 3 times daily 60 tablet 3    clobetasol (TEMOVATE) 0.05 % external ointment Apply sparingly twice to three times daily for 3-4 weeks to affected areas on buttock. Please dispense 60 gram quantity. 60 g 4    hydrochlorothiazide (HYDRODIURIL) 25 MG tablet Take 1 tablet (25 mg) by  "mouth daily 90 tablet 3    levothyroxine (SYNTHROID/LEVOTHROID) 100 MCG tablet TAKE ONE TABLET BY MOUTH ONCE DAILY 90 tablet 0    lisinopril (ZESTRIL) 30 MG tablet Take 1 tablet (30 mg) by mouth daily 90 tablet 3    metFORMIN (GLUCOPHAGE XR) 500 MG 24 hr tablet TAKE ONE TABLET BY MOUTH ONCE DAILY FOR 7 DAYS THEN TAKE TWO TABLETS BY MOUTH ONCE DAILY IF TOLERATED 60 tablet 1    omeprazole (PRILOSEC) 40 MG DR capsule Take 1 capsule (40 mg) by mouth daily 30 capsule 3       Allergies   Allergen Reactions    Advil [Ibuprofen] Swelling     Hands swelled    Cephalexin Itching        Social History     Tobacco Use    Smoking status: Never    Smokeless tobacco: Never   Substance Use Topics    Alcohol use: Yes     Comment: occ        History   Drug Use No     Comment: occasional/ rare         Objective     BP (!) 143/98 (BP Location: Right arm, Patient Position: Chair, Cuff Size: Adult Regular)   Pulse 88   Temp 97  F (36.1  C) (Temporal)   Resp 18   Ht 1.575 m (5' 2\")   Wt 82.3 kg (181 lb 6 oz)   LMP 11/23/2009   SpO2 97%   BMI 33.17 kg/m      Physical Exam    GENERAL APPEARANCE: healthy, alert and no distress     EYES: EOMI, PERRL     HENT: ear canals and TM's normal and nose and mouth without ulcers or lesions     NECK: no adenopathy, no asymmetry, masses, or scars and thyroid normal to palpation     RESP: lungs clear to auscultation - no rales, rhonchi or wheezes     CV: regular rates and rhythm, normal S1 S2, no S3 or S4 and no murmur, click or rub     ABDOMEN:  soft, nontender, no HSM or masses and bowel sounds normal     MS: extremities normal- no gross deformities noted, no evidence of inflammation in joints, FROM in all extremities.     SKIN: no suspicious lesions or rashes     NEURO: Normal strength and tone, sensory exam grossly normal, mentation intact and speech normal     PSYCH: mentation appears normal. and affect normal/bright     LYMPHATICS: No cervical adenopathy     Patient has some tenderness right " side of posterior neck, especially at right occipital notch area  Range of motion of neck  okay, no radiculopathy     Recent Labs   Lab Test 08/22/23  1503 05/05/23  0757   HGB  --  12.8   PLT  --  297    141   POTASSIUM 4.4 4.3   CR 0.71 0.60   A1C 7.7* 7.7*        Diagnostics:      No EKG needed today; had normal EKG done July 2023    Labs done, pending          ASSESSMENT / PLAN:  (Z01.818) Preop general physical exam  (primary encounter diagnosis)  Comment: patient to have egd.  Check labs. Should be okay for procedure  Plan: CBC with Platelets & Differential,         Comprehensive metabolic panel             (K31.7) Gastric polyps  Comment: to have egd  Plan: as above     (E11.9) Type 2 diabetes mellitus without complication, without long-term current use of insulin (H)  Comment: check; wt is down some   Plan: Hemoglobin A1c             (E03.9) Hypothyroidism, unspecified type  Comment: check labs   Plan: TSH, T4 free        On levothoxine    No cardiac history or symptoms.     No history of anesthesia problems    No bleeding or clotting disorders.    Patient may have occipital neuralgia. Monitor symptoms.     Hold metformin day before and day of     Hold aspirin for week prior    Hold lisinopril and hydrochlorothiazide day of procedure      I reviewed the patient's medications, allergies, medical history, family history, and social history.    Vasquez Denson MD        Revised Cardiac Risk Index (RCRI):  The patient has the following serious cardiovascular risks for perioperative complications:   - No serious cardiac risks = 0 points     RCRI Interpretation: 0 points: Class I (very low risk - 0.4% complication rate)         Signed Electronically by: Vasquez Denson MD  Copy of this evaluation report is provided to requesting physician.

## 2023-11-09 DIAGNOSIS — D72.828 NEUTROPHILIA: Primary | ICD-10-CM

## 2023-11-09 LAB
ALBUMIN SERPL BCG-MCNC: 4.2 G/DL (ref 3.5–5.2)
ALP SERPL-CCNC: 101 U/L (ref 35–104)
ALT SERPL W P-5'-P-CCNC: 17 U/L (ref 0–50)
ANION GAP SERPL CALCULATED.3IONS-SCNC: 14 MMOL/L (ref 7–15)
AST SERPL W P-5'-P-CCNC: 19 U/L (ref 0–45)
BASOPHILS # BLD AUTO: ABNORMAL 10*3/UL
BASOPHILS # BLD MANUAL: 0 10E3/UL (ref 0–0.2)
BASOPHILS NFR BLD AUTO: ABNORMAL %
BASOPHILS NFR BLD MANUAL: 0 %
BILIRUB SERPL-MCNC: 0.2 MG/DL
BUN SERPL-MCNC: 19.3 MG/DL (ref 6–20)
CALCIUM SERPL-MCNC: 8.9 MG/DL (ref 8.6–10)
CHLORIDE SERPL-SCNC: 100 MMOL/L (ref 98–107)
CREAT SERPL-MCNC: 0.57 MG/DL (ref 0.51–0.95)
DEPRECATED HCO3 PLAS-SCNC: 25 MMOL/L (ref 22–29)
EGFRCR SERPLBLD CKD-EPI 2021: >90 ML/MIN/1.73M2
EOSINOPHIL # BLD AUTO: ABNORMAL 10*3/UL
EOSINOPHIL # BLD MANUAL: 0.2 10E3/UL (ref 0–0.7)
EOSINOPHIL NFR BLD AUTO: ABNORMAL %
EOSINOPHIL NFR BLD MANUAL: 1 %
ERYTHROCYTE [DISTWIDTH] IN BLOOD BY AUTOMATED COUNT: 14.2 % (ref 10–15)
GLUCOSE SERPL-MCNC: 128 MG/DL (ref 70–99)
HCT VFR BLD AUTO: 40.3 % (ref 35–47)
HGB BLD-MCNC: 12.7 G/DL (ref 11.7–15.7)
IMM GRANULOCYTES # BLD: ABNORMAL 10*3/UL
IMM GRANULOCYTES NFR BLD: ABNORMAL %
LYMPHOCYTES # BLD AUTO: ABNORMAL 10*3/UL
LYMPHOCYTES # BLD MANUAL: 7.4 10E3/UL (ref 0.8–5.3)
LYMPHOCYTES NFR BLD AUTO: ABNORMAL %
LYMPHOCYTES NFR BLD MANUAL: 47 %
MCH RBC QN AUTO: 24.8 PG (ref 26.5–33)
MCHC RBC AUTO-ENTMCNC: 31.5 G/DL (ref 31.5–36.5)
MCV RBC AUTO: 79 FL (ref 78–100)
MONOCYTES # BLD AUTO: ABNORMAL 10*3/UL
MONOCYTES # BLD MANUAL: 0.5 10E3/UL (ref 0–1.3)
MONOCYTES NFR BLD AUTO: ABNORMAL %
MONOCYTES NFR BLD MANUAL: 3 %
NEUTROPHILS # BLD AUTO: ABNORMAL 10*3/UL
NEUTROPHILS # BLD MANUAL: 7.7 10E3/UL (ref 1.6–8.3)
NEUTROPHILS NFR BLD AUTO: ABNORMAL %
NEUTROPHILS NFR BLD MANUAL: 49 %
NRBC # BLD AUTO: 0 10E3/UL
NRBC BLD AUTO-RTO: 0 /100
PLAT MORPH BLD: ABNORMAL
PLATELET # BLD AUTO: 345 10E3/UL (ref 150–450)
POTASSIUM SERPL-SCNC: 4.5 MMOL/L (ref 3.4–5.3)
PROT SERPL-MCNC: 7.6 G/DL (ref 6.4–8.3)
RBC # BLD AUTO: 5.13 10E6/UL (ref 3.8–5.2)
RBC MORPH BLD: ABNORMAL
SODIUM SERPL-SCNC: 139 MMOL/L (ref 135–145)
T4 FREE SERPL-MCNC: 1.48 NG/DL (ref 0.9–1.7)
TSH SERPL DL<=0.005 MIU/L-ACNC: 3.48 UIU/ML (ref 0.3–4.2)
WBC # BLD AUTO: 15.8 10E3/UL (ref 4–11)

## 2023-11-09 RX ORDER — LEVOTHYROXINE SODIUM 100 UG/1
TABLET ORAL
Qty: 90 TABLET | Refills: 0 | Status: SHIPPED | OUTPATIENT
Start: 2023-11-09 | End: 2024-01-31

## 2023-11-09 NOTE — RESULT ENCOUNTER NOTE
Please call patient:    Your white blood cell count is high. Let's recheck this in 2 - 4 weeks.     Your thyroid, anemia, kidney and liver tests are normal. Your blood glucose is improved.     Maribeth Amezcua MD

## 2023-11-13 ENCOUNTER — TELEPHONE (OUTPATIENT)
Dept: FAMILY MEDICINE | Facility: CLINIC | Age: 53
End: 2023-11-13
Payer: COMMERCIAL

## 2023-11-13 RX ORDER — SODIUM CHLORIDE, SODIUM LACTATE, POTASSIUM CHLORIDE, CALCIUM CHLORIDE 600; 310; 30; 20 MG/100ML; MG/100ML; MG/100ML; MG/100ML
INJECTION, SOLUTION INTRAVENOUS CONTINUOUS
Status: CANCELLED | OUTPATIENT
Start: 2023-11-13

## 2023-11-13 RX ORDER — LIDOCAINE 40 MG/G
CREAM TOPICAL
Status: CANCELLED | OUTPATIENT
Start: 2023-11-13

## 2023-11-13 RX ORDER — ONDANSETRON 2 MG/ML
4 INJECTION INTRAMUSCULAR; INTRAVENOUS
Status: CANCELLED | OUTPATIENT
Start: 2023-11-13

## 2023-11-13 NOTE — TELEPHONE ENCOUNTER
----- Message from Maribeth Amezcua MD sent at 11/9/2023  4:47 PM CST -----  Please call patient:    Your white blood cell count is high. Let's recheck this in 2 - 4 weeks.     Your thyroid, anemia, kidney and liver tests are normal. Your blood glucose is improved.     Maribeth Amezcua MD

## 2023-11-13 NOTE — TELEPHONE ENCOUNTER
Attempted to call, left vm asking return call.     Thanks,  ROBERT San  [facility]

## 2023-11-13 NOTE — TELEPHONE ENCOUNTER
Pt and Spouse returned call. Assisted with scheduling lab only appt.    Sherine Ferrer RN  [facility]

## 2023-11-14 ENCOUNTER — ANESTHESIA EVENT (OUTPATIENT)
Dept: GASTROENTEROLOGY | Facility: CLINIC | Age: 53
End: 2023-11-14
Payer: COMMERCIAL

## 2023-11-14 NOTE — TELEPHONE ENCOUNTER
"Per result note from 11/8:  \"----- Message from Maribeth Amezcua MD sent at 11/9/2023  4:47 PM CST -----  Please call patient:     Your white blood cell count is high. Let's recheck this in 2 - 4 weeks.      Your thyroid, anemia, kidney and liver tests are normal. Your blood glucose is improved.      Maribeth Amezcua MD\"    Patient requesting clarification on white blood cell count being high, requesting to speak to provider if possible.     Please advise.    Megan Grayson, STEPHANIEN RN  [facility]  "

## 2023-11-14 NOTE — TELEPHONE ENCOUNTER
Patient's spouse calling back (signed consent to communicate PHI on file)  He stated that he and patient are concerned about the elevated red blood count  Explained that it was the WBC that was high, not the RBC.  Spouse verbalized understanding and will explain this to patient.  They have no further questions and will plan to keep lab appointment for recheck.  No need for PCP to call    Closing encounter    Ivory Hahn RN  [facility]

## 2023-11-15 ENCOUNTER — HOSPITAL ENCOUNTER (OUTPATIENT)
Facility: CLINIC | Age: 53
Discharge: HOME | End: 2023-11-15
Attending: INTERNAL MEDICINE | Admitting: INTERNAL MEDICINE
Payer: COMMERCIAL

## 2023-11-15 ENCOUNTER — ANESTHESIA (OUTPATIENT)
Dept: GASTROENTEROLOGY | Facility: CLINIC | Age: 53
End: 2023-11-15
Payer: COMMERCIAL

## 2023-11-15 ENCOUNTER — TELEPHONE (OUTPATIENT)
Dept: GASTROENTEROLOGY | Facility: CLINIC | Age: 53
End: 2023-11-15
Payer: COMMERCIAL

## 2023-11-15 VITALS
OXYGEN SATURATION: 100 % | DIASTOLIC BLOOD PRESSURE: 96 MMHG | WEIGHT: 181 LBS | HEIGHT: 62 IN | BODY MASS INDEX: 33.31 KG/M2 | RESPIRATION RATE: 59 BRPM | HEART RATE: 63 BPM | SYSTOLIC BLOOD PRESSURE: 172 MMHG

## 2023-11-15 DIAGNOSIS — K31.7 GASTRIC POLYPS: Primary | ICD-10-CM

## 2023-11-15 LAB — UPPER GI ENDOSCOPY: NORMAL

## 2023-11-15 PROCEDURE — 999N000010 HC STATISTIC ANES STAT CODE-CRNA PER MINUTE: Performed by: INTERNAL MEDICINE

## 2023-11-15 PROCEDURE — 43251 EGD REMOVE LESION SNARE: CPT | Performed by: INTERNAL MEDICINE

## 2023-11-15 PROCEDURE — 258N000003 HC RX IP 258 OP 636: Performed by: NURSE ANESTHETIST, CERTIFIED REGISTERED

## 2023-11-15 PROCEDURE — 250N000011 HC RX IP 250 OP 636: Performed by: NURSE ANESTHETIST, CERTIFIED REGISTERED

## 2023-11-15 PROCEDURE — 88342 IMHCHEM/IMCYTCHM 1ST ANTB: CPT | Mod: 26 | Performed by: PATHOLOGY

## 2023-11-15 PROCEDURE — 370N000017 HC ANESTHESIA TECHNICAL FEE, PER MIN: Performed by: INTERNAL MEDICINE

## 2023-11-15 PROCEDURE — 88342 IMHCHEM/IMCYTCHM 1ST ANTB: CPT | Mod: TC | Performed by: INTERNAL MEDICINE

## 2023-11-15 PROCEDURE — 250N000009 HC RX 250: Performed by: NURSE ANESTHETIST, CERTIFIED REGISTERED

## 2023-11-15 PROCEDURE — 88305 TISSUE EXAM BY PATHOLOGIST: CPT | Mod: 26 | Performed by: PATHOLOGY

## 2023-11-15 PROCEDURE — 272N000104 HC DEVICE CLIP RESOLUTION, EACH: Performed by: INTERNAL MEDICINE

## 2023-11-15 RX ORDER — NALOXONE HYDROCHLORIDE 0.4 MG/ML
0.2 INJECTION, SOLUTION INTRAMUSCULAR; INTRAVENOUS; SUBCUTANEOUS
Status: CANCELLED | OUTPATIENT
Start: 2023-11-15

## 2023-11-15 RX ORDER — ONDANSETRON 2 MG/ML
4 INJECTION INTRAMUSCULAR; INTRAVENOUS EVERY 6 HOURS PRN
Status: CANCELLED | OUTPATIENT
Start: 2023-11-15

## 2023-11-15 RX ORDER — LIDOCAINE HYDROCHLORIDE 20 MG/ML
INJECTION, SOLUTION INFILTRATION; PERINEURAL PRN
Status: DISCONTINUED | OUTPATIENT
Start: 2023-11-15 | End: 2023-11-15

## 2023-11-15 RX ORDER — NALOXONE HYDROCHLORIDE 0.4 MG/ML
0.4 INJECTION, SOLUTION INTRAMUSCULAR; INTRAVENOUS; SUBCUTANEOUS
Status: CANCELLED | OUTPATIENT
Start: 2023-11-15

## 2023-11-15 RX ORDER — PROPOFOL 10 MG/ML
INJECTION, EMULSION INTRAVENOUS CONTINUOUS PRN
Status: DISCONTINUED | OUTPATIENT
Start: 2023-11-15 | End: 2023-11-15

## 2023-11-15 RX ORDER — ONDANSETRON 2 MG/ML
INJECTION INTRAMUSCULAR; INTRAVENOUS PRN
Status: DISCONTINUED | OUTPATIENT
Start: 2023-11-15 | End: 2023-11-15

## 2023-11-15 RX ORDER — ONDANSETRON 4 MG/1
4 TABLET, ORALLY DISINTEGRATING ORAL EVERY 6 HOURS PRN
Status: CANCELLED | OUTPATIENT
Start: 2023-11-15

## 2023-11-15 RX ORDER — ONDANSETRON 2 MG/ML
4 INJECTION INTRAMUSCULAR; INTRAVENOUS EVERY 30 MIN PRN
Status: CANCELLED | OUTPATIENT
Start: 2023-11-15

## 2023-11-15 RX ORDER — OMEPRAZOLE 40 MG/1
40 CAPSULE, DELAYED RELEASE ORAL
Qty: 60 CAPSULE | Refills: 3 | Status: SHIPPED | OUTPATIENT
Start: 2023-11-15

## 2023-11-15 RX ORDER — PROCHLORPERAZINE MALEATE 10 MG
10 TABLET ORAL EVERY 6 HOURS PRN
Status: CANCELLED | OUTPATIENT
Start: 2023-11-15

## 2023-11-15 RX ORDER — ONDANSETRON 4 MG/1
4 TABLET, ORALLY DISINTEGRATING ORAL EVERY 30 MIN PRN
Status: CANCELLED | OUTPATIENT
Start: 2023-11-15

## 2023-11-15 RX ORDER — FLUMAZENIL 0.1 MG/ML
0.2 INJECTION, SOLUTION INTRAVENOUS
Status: CANCELLED | OUTPATIENT
Start: 2023-11-15 | End: 2023-11-16

## 2023-11-15 RX ORDER — SODIUM CHLORIDE, SODIUM LACTATE, POTASSIUM CHLORIDE, CALCIUM CHLORIDE 600; 310; 30; 20 MG/100ML; MG/100ML; MG/100ML; MG/100ML
INJECTION, SOLUTION INTRAVENOUS CONTINUOUS PRN
Status: DISCONTINUED | OUTPATIENT
Start: 2023-11-15 | End: 2023-11-15

## 2023-11-15 RX ADMIN — SODIUM CHLORIDE, POTASSIUM CHLORIDE, SODIUM LACTATE AND CALCIUM CHLORIDE: 600; 310; 30; 20 INJECTION, SOLUTION INTRAVENOUS at 15:19

## 2023-11-15 RX ADMIN — ONDANSETRON: 2 INJECTION INTRAMUSCULAR; INTRAVENOUS at 16:45

## 2023-11-15 RX ADMIN — ONDANSETRON 4 MG: 2 INJECTION INTRAMUSCULAR; INTRAVENOUS at 15:21

## 2023-11-15 RX ADMIN — LIDOCAINE HYDROCHLORIDE 50 MG: 20 INJECTION, SOLUTION INFILTRATION; PERINEURAL at 15:21

## 2023-11-15 RX ADMIN — PROPOFOL 150 MCG/KG/MIN: 10 INJECTION, EMULSION INTRAVENOUS at 15:21

## 2023-11-15 ASSESSMENT — LIFESTYLE VARIABLES: TOBACCO_USE: 0

## 2023-11-15 ASSESSMENT — ACTIVITIES OF DAILY LIVING (ADL)
ADLS_ACUITY_SCORE: 35
ADLS_ACUITY_SCORE: 35
ADLS_ACUITY_SCORE: 33

## 2023-11-15 ASSESSMENT — ENCOUNTER SYMPTOMS
DYSRHYTHMIAS: 0
SEIZURES: 0

## 2023-11-15 NOTE — TELEPHONE ENCOUNTER
Thank you,  Hayden Lee, [facility]  [facility]  @~~~~~~

## 2023-11-15 NOTE — ANESTHESIA POSTPROCEDURE EVALUATION
Patient: Rhonda Cordero    Procedure: Procedure(s):  Esophagoscopy, gastroscopy, duodenoscopy (EGD), combined       Anesthesia Type:  MAC    Note:     Postop Pain Control: Uneventful            Sign Out: Well controlled pain   PONV:    Neuro/Psych: Uneventful            Sign Out: Acceptable/Baseline neuro status   Airway/Respiratory: Uneventful            Sign Out: Acceptable/Baseline resp. status   CV/Hemodynamics: Uneventful            Sign Out: Acceptable CV status; No obvious hypovolemia; No obvious fluid overload   Other NRE:    DID A NON-ROUTINE EVENT OCCUR?            Last vitals:  Vitals Value Taken Time   /95 11/15/23 1620   Temp     Pulse 63 11/15/23 1627   Resp 0 11/15/23 1627   SpO2 100 % 11/15/23 1627   Vitals shown include unfiled device data.    Electronically Signed By: Aidan Walker MD  November 15, 2023  4:28 PM

## 2023-11-15 NOTE — ANESTHESIA PREPROCEDURE EVALUATION
Anesthesia Pre-Procedure Evaluation    Patient: Rhonda Cordero   MRN: [id number] : 1970        Procedure : Procedure(s):  Esophagoscopy, gastroscopy, duodenoscopy (EGD), combined          Past Medical History:   Diagnosis Date    Anemia     Diabetes (H) 2023    Dr. Denson    Hypertension goal BP (blood pressure) < 130/80 2011    Hypocalcemia     Multinodular goiter     Postsurgical hypothyroidism     Rotator cuff tendonitis 02/10/2015    Vaginal Papanicolaou smear not required due to history of hysterectomy       Past Surgical History:   Procedure Laterality Date    COLONOSCOPY N/A 3/24/2023    Procedure: COLONOSCOPY, FLEXIBLE, WITH LESION REMOVAL USING SNARE;  Surgeon: Chet Escobar MD;  Location: [facility]    COLONOSCOPY WITH CO2 INSUFFLATION N/A 3/24/2023    Procedure: COLONOSCOPY, WITH CO2 INSUFFLATION;  Surgeon: Chet Escobar MD;  Location: [facility]    COMBINED ESOPHAGOSCOPY, GASTROSCOPY, DUODENOSCOPY (EGD) WITH CO2 INSUFFLATION N/A 2023    Procedure: Combined Esophagoscopy, Gastroscopy, Duodenoscopy (Egd) With Co2 Insufflation;  Surgeon: Sarita Montes DO;  Location: [facility]    ESOPHAGOSCOPY, GASTROSCOPY, DUODENOSCOPY (EGD), COMBINED N/A 2023    Procedure: ESOPHAGOGASTRODUODENOSCOPY, WITH BIOPSY;  Surgeon: Sarita Montes DO;  Location:  OR    LAPAROSCOPY,TUBAL CAUTERY      SALPINGO OOPHORECTOMY,R/L/LUIS      left    THYROIDECTOMY N/A 2017    Procedure: THYROIDECTOMY;  Total Thyroidectomy;  Surgeon: Skylar Costa MD;  Location:  OR    [facility] TOTAL ABDOM HYSTERECTOMY  09    [facility]      Allergies   Allergen Reactions    Advil [Ibuprofen] Swelling     Hands swelled    Cephalexin Itching      Social History     Tobacco Use    Smoking status: Never    Smokeless tobacco: Never   Substance Use Topics    Alcohol use: Yes     Comment: occ      Wt Readings from Last 1 Encounters:   11/15/23 82.1 kg (181 lb)        Anesthesia Evaluation   Pt  "has had prior anesthetic. Type: General.    No history of anesthetic complications       ROS/MED HX  ENT/Pulmonary:    (-) tobacco use, asthma and sleep apnea   Neurologic:     (+)    no peripheral neuropathy                         (-) no seizures and no CVA   Cardiovascular:     (+)  hypertension- -   -  - -                                   (-) CAD and arrhythmias   METS/Exercise Tolerance:     Hematologic:       Musculoskeletal:       GI/Hepatic: Comment: Atrophic gastritis, presence of bleeding unspecified [K29.40];Gastric polyps [K31.7]      (+) GERD, Asymptomatic on medication,                  Renal/Genitourinary:    (-) renal disease   Endo:     (+)  type II DM (pre-dm),        thyroid problem,            Psychiatric/Substance Use:       Infectious Disease:    (-) Recent Fever   Malignancy:       Other:            Physical Exam    Airway  airway exam normal      Mallampati: II   TM distance: > 3 FB   Neck ROM: full   Mouth opening: > 3 cm    Respiratory Devices and Support         Dental       (+) Minor Abnormalities - some fillings, tiny chips      Cardiovascular   cardiovascular exam normal          Pulmonary   pulmonary exam normal                OUTSIDE LABS:  CBC:   Lab Results   Component Value Date    WBC 15.8 (H) 11/08/2023    WBC 10.0 05/05/2023    HGB 12.7 11/08/2023    HGB 12.8 05/05/2023    HCT 40.3 11/08/2023    HCT 40.3 05/05/2023     11/08/2023     05/05/2023     BMP:   Lab Results   Component Value Date     11/08/2023     08/22/2023    POTASSIUM 4.5 11/08/2023    POTASSIUM 4.4 08/22/2023    CHLORIDE 100 11/08/2023    CHLORIDE 101 08/22/2023    CO2 25 11/08/2023    CO2 25 08/22/2023    BUN 19.3 11/08/2023    BUN 17.8 08/22/2023    CR 0.57 11/08/2023    CR 0.71 08/22/2023     (H) 11/08/2023     (H) 08/22/2023     COAGS: No results found for: \"PTT\", \"INR\", \"FIBR\"  POC: No results found for: \"BGM\", \"HCG\", \"HCGS\"  HEPATIC:   Lab Results   Component Value " Date    ALBUMIN 4.2 11/08/2023    PROTTOTAL 7.6 11/08/2023    ALT 17 11/08/2023    AST 19 11/08/2023    ALKPHOS 101 11/08/2023    BILITOTAL 0.2 11/08/2023     OTHER:   Lab Results   Component Value Date    A1C 7.1 (H) 11/08/2023    CAROLE 8.9 11/08/2023    PHOS 4.7 (H) 01/25/2022    MAG 2.0 01/25/2022    TSH 3.48 11/08/2023    T4 1.48 11/08/2023    CRP 23.4 (H) 02/18/2021    SED 25 02/18/2021       Anesthesia Plan    ASA Status:  2    NPO Status:  NPO Appropriate    Anesthesia Type: MAC.              Consents    Anesthesia Plan(s) and associated risks, benefits, and realistic alternatives discussed. Questions answered and patient/representative(s) expressed understanding.     - Discussed:     - Discussed with:  Patient      - Specific Concerns: Specific risks discussed (but not limited to): Possibility of intraoperative awareness..           Postoperative Care       PONV prophylaxis: Ondansetron (or other 5HT-3)     Comments:                David Butler MD

## 2023-11-15 NOTE — ANESTHESIA CARE TRANSFER NOTE
Patient: Rhonda Cordero    Procedure: Procedure(s):  Esophagoscopy, gastroscopy, duodenoscopy (EGD), combined       Diagnosis: Atrophic gastritis, presence of bleeding unspecified [K29.40]  Gastric polyps [K31.7]  Diagnosis Additional Information: No value filed.    Anesthesia Type:   MAC     Note:    Oropharynx: oropharynx clear of all foreign objects and spontaneously breathing  Level of Consciousness: drowsy  Oxygen Supplementation: nasal cannula  Level of Supplemental Oxygen (L/min / FiO2): 3  Independent Airway: airway patency satisfactory and stable  Dentition: dentition unchanged  Vital Signs Stable: post-procedure vital signs reviewed and stable  Report to RN Given: handoff report given  Patient transferred to: PACU    Handoff Report: Identifed the Patient, Identified the Reponsible Provider, Reviewed the pertinent medical history, Discussed the surgical course, Reviewed Intra-OP anesthesia mangement and issues during anesthesia, Set expectations for post-procedure period and Allowed opportunity for questions and acknowledgement of understanding      Vitals:  Vitals Value Taken Time   /76 11/15/23 1601   Temp     Pulse 77 11/15/23 1602   Resp 10 11/15/23 1602   SpO2 98 % 11/15/23 1602   Vitals shown include unfiled device data.    Electronically Signed By: MADELEINE Vegas CRNA  November 15, 2023  4:04 PM

## 2023-11-16 ENCOUNTER — TELEPHONE (OUTPATIENT)
Dept: FAMILY MEDICINE | Facility: CLINIC | Age: 53
End: 2023-11-16
Payer: COMMERCIAL

## 2023-11-16 DIAGNOSIS — M54.81 OCCIPITAL NEURALGIA, UNSPECIFIED LATERALITY: Primary | ICD-10-CM

## 2023-11-16 NOTE — TELEPHONE ENCOUNTER
Patient Returning Call    Reason for call:  Pain in neck consisting - wondering about neurology referral     Information relayed to patient:  message sent     Patient has additional questions:  No    What are your questions/concerns:  none - just referal     Could we send this information to you in [facility] or would you prefer to receive a phone call?:   Patient would prefer a phone call   Okay to leave a detailed message?: Yes at Other phone number:  [phone]

## 2023-11-17 NOTE — TELEPHONE ENCOUNTER
I did referral for neurology for the occipital neuralgia condition    If she has a hard time getting appointment she could call her insurance to see what other neurologists are covered    Please inform patient    Vasquez Denson MD

## 2023-11-19 NOTE — TELEPHONE ENCOUNTER
PA Initiation    Medication: OMEPRAZOLE 40 MG PO CPDR  Insurance Company: [employer] Clinical [employer] - Phone [phone] Fax [phone]  Pharmacy Filling the Rx: [facility] [address] [facility]  Filling Pharmacy Phone: [phone]  Filling Pharmacy Fax: [phone]  Start Date: 11/19/2023

## 2023-11-20 NOTE — TELEPHONE ENCOUNTER
Prior Authorization Approval    Medication: OMEPRAZOLE 40 MG PO CPDR  Authorization Effective Date: 11/20/2023  Authorization Expiration Date: 11/20/2024  Approved Dose/Quantity:   Reference #:     Insurance Company: [employer] - Phone [phone] Fax [phone]  Expected CoPay: $    CoPay Card Available:      Financial Assistance Needed:   Which Pharmacy is filling the prescription: [facility] [place], [place] - [address] [facility] [address] [facility]  Pharmacy Notified: YES  Patient Notified: **Instructed pharmacy to notify patient when script is ready to /ship.**

## 2023-11-21 NOTE — H&P
Britanyadwarosa Cunninghamine  [id number]  female  52 year old      Reason for procedure/surgery: gastric polyps    Patient Active Problem List   Diagnosis    CARDIOVASCULAR SCREENING; LDL GOAL LESS THAN 130    Impingement syndrome, shoulder    Rotator cuff tendonitis    Hashimoto's thyroiditis    Nontoxic multinodular goiter    Shoulder joint pain    Gastroesophageal reflux disease, esophagitis presence not specified    Cervical adenopathy    Hypertension goal BP (blood pressure) < 140/90    Essential hypertension with goal blood pressure less than 140/90    Anup's deformity of left heel    History of thyroidectomy    Postoperative hypothyroidism    Hypocalcemia       Past Surgical History:    Past Surgical History:   Procedure Laterality Date    COLONOSCOPY N/A 3/24/2023    Procedure: COLONOSCOPY, FLEXIBLE, WITH LESION REMOVAL USING SNARE;  Surgeon: Chet Escobar MD;  Location: [facility]    COLONOSCOPY WITH CO2 INSUFFLATION N/A 3/24/2023    Procedure: COLONOSCOPY, WITH CO2 INSUFFLATION;  Surgeon: Chet Escobar MD;  Location: [facility]    COMBINED ESOPHAGOSCOPY, GASTROSCOPY, DUODENOSCOPY (EGD) WITH CO2 INSUFFLATION N/A 7/28/2023    Procedure: Combined Esophagoscopy, Gastroscopy, Duodenoscopy (Egd) With Co2 Insufflation;  Surgeon: Sarita Montes DO;  Location: [facility]    ESOPHAGOSCOPY, GASTROSCOPY, DUODENOSCOPY (EGD), COMBINED N/A 7/28/2023    Procedure: ESOPHAGOGASTRODUODENOSCOPY, WITH BIOPSY;  Surgeon: Sarita Montes DO;  Location:  OR    LAPAROSCOPY,TUBAL CAUTERY  2005    SALPINGO OOPHORECTOMY,R/L/LUIS  2005    left    THYROIDECTOMY N/A 8/22/2017    Procedure: THYROIDECTOMY;  Total Thyroidectomy;  Surgeon: Skylar Costa MD;  Location:  OR    [facility] TOTAL ABDOM HYSTERECTOMY  12/9/09    [facility]       Past Medical History:   Past Medical History:   Diagnosis Date    Anemia     Diabetes (H) 08/2023    Dr. Denson    Hypertension goal BP (blood pressure) < 130/80 05/06/2011    Hypocalcemia      Multinodular goiter     Postsurgical hypothyroidism     Rotator cuff tendonitis 02/10/2015    Vaginal Papanicolaou smear not required due to history of hysterectomy        Social History:   Social History     Tobacco Use    Smoking status: Never    Smokeless tobacco: Never   Substance Use Topics    Alcohol use: Yes     Comment: occ       Family History:   Family History   Problem Relation Age of Onset    Cerebrovascular Disease Mother 50    Cerebrovascular Disease Father 49         49    Hypertension Father     Cancer No family hx of     Diabetes No family hx of     Thyroid Disease No family hx of     Glaucoma No family hx of     Macular Degeneration No family hx of        Allergies:   Allergies   Allergen Reactions    Advil [Ibuprofen] Swelling     Hands swelled    Cephalexin Itching       Active Medications:   Current Outpatient Medications   Medication Sig Dispense Refill    aspirin 81 MG EC tablet Take 1 tablet (81 mg) by mouth daily      atorvastatin (LIPITOR) 10 MG tablet Take 1 tablet (10 mg) by mouth daily 30 tablet 2    calcitRIOL (ROCALTROL) 0.25 MCG capsule TAKE 2 CAPSULES (0.5 MCG) BY MOUTH DAILY 180 capsule 1    calcium carbonate antacid 1000 MG CHEW Take 2 tablets by mouth 3 times daily 60 tablet 3    hydrochlorothiazide (HYDRODIURIL) 25 MG tablet Take 1 tablet (25 mg) by mouth daily 90 tablet 3    levothyroxine (SYNTHROID/LEVOTHROID) 100 MCG tablet TAKE ONE TABLET BY MOUTH ONCE DAILY 90 tablet 0    lisinopril (ZESTRIL) 30 MG tablet Take 1 tablet (30 mg) by mouth daily 90 tablet 3    metFORMIN (GLUCOPHAGE XR) 500 MG 24 hr tablet TAKE ONE TABLET BY MOUTH ONCE DAILY FOR 7 DAYS THEN TAKE TWO TABLETS BY MOUTH ONCE DAILY IF TOLERATED 60 tablet 1    omeprazole (PRILOSEC) 40 MG DR capsule Take 1 capsule (40 mg) by mouth 2 times daily (before meals) 60 capsule 3    omeprazole (PRILOSEC) 40 MG DR capsule Take 1 capsule (40 mg) by mouth daily 30 capsule 3    clobetasol (TEMOVATE) 0.05 % external ointment  "Apply sparingly twice to three times daily for 3-4 weeks to affected areas on buttock. Please dispense 60 gram quantity. 60 g 4       Systemic Review:   CONSTITUTIONAL: NEGATIVE for fever, chills, change in weight  ENT/MOUTH: NEGATIVE for ear, mouth and throat problems  RESP: NEGATIVE for significant cough or SOB  CV: NEGATIVE for chest pain, palpitations or peripheral edema    Physical Examination:   Vital Signs: BP (!) 172/96   Pulse 63   Resp (!) 59   Ht 1.575 m (5' 2\")   Wt 82.1 kg (181 lb)   LMP 11/23/2009   SpO2 100%   BMI 33.11 kg/m    GENERAL: healthy, alert and no distress  NECK: no adenopathy, no asymmetry, masses, or scars  RESP: lungs clear to auscultation - no rales, rhonchi or wheezes  CV: regular rate and rhythm, normal S1 S2, no S3 or S4, no murmur, click or rub, no peripheral edema and peripheral pulses strong  ABDOMEN: soft, nontender, no hepatosplenomegaly, no masses and bowel sounds normal  MS: no gross musculoskeletal defects noted, no edema    Plan: Appropriate to proceed as scheduled.      Donis Donald MD  11/21/2023    PCP:  aVsquez Denson    "

## 2023-11-22 ENCOUNTER — PRE VISIT (OUTPATIENT)
Dept: GASTROENTEROLOGY | Facility: CLINIC | Age: 53
End: 2023-11-22

## 2023-12-06 ENCOUNTER — LAB (OUTPATIENT)
Dept: LAB | Facility: CLINIC | Age: 53
End: 2023-12-06
Payer: COMMERCIAL

## 2023-12-06 DIAGNOSIS — D72.828 NEUTROPHILIA: ICD-10-CM

## 2023-12-06 DIAGNOSIS — E78.5 HYPERLIPIDEMIA LDL GOAL <70: ICD-10-CM

## 2023-12-06 PROCEDURE — 85007 BL SMEAR W/DIFF WBC COUNT: CPT

## 2023-12-06 PROCEDURE — 85027 COMPLETE CBC AUTOMATED: CPT

## 2023-12-06 PROCEDURE — 36415 COLL VENOUS BLD VENIPUNCTURE: CPT

## 2023-12-07 LAB
BASOPHILS # BLD AUTO: ABNORMAL 10*3/UL
BASOPHILS # BLD MANUAL: 0 10E3/UL (ref 0–0.2)
BASOPHILS NFR BLD AUTO: ABNORMAL %
BASOPHILS NFR BLD MANUAL: 0 %
ELLIPTOCYTES BLD QL SMEAR: SLIGHT
EOSINOPHIL # BLD AUTO: ABNORMAL 10*3/UL
EOSINOPHIL # BLD MANUAL: 0.5 10E3/UL (ref 0–0.7)
EOSINOPHIL NFR BLD AUTO: ABNORMAL %
EOSINOPHIL NFR BLD MANUAL: 4 %
ERYTHROCYTE [DISTWIDTH] IN BLOOD BY AUTOMATED COUNT: 14 % (ref 10–15)
HCT VFR BLD AUTO: 39.3 % (ref 35–47)
HGB BLD-MCNC: 11.9 G/DL (ref 11.7–15.7)
IMM GRANULOCYTES # BLD: ABNORMAL 10*3/UL
IMM GRANULOCYTES NFR BLD: ABNORMAL %
LYMPHOCYTES # BLD AUTO: ABNORMAL 10*3/UL
LYMPHOCYTES # BLD MANUAL: 6.5 10E3/UL (ref 0.8–5.3)
LYMPHOCYTES NFR BLD AUTO: ABNORMAL %
LYMPHOCYTES NFR BLD MANUAL: 51 %
MCH RBC QN AUTO: 24.5 PG (ref 26.5–33)
MCHC RBC AUTO-ENTMCNC: 30.3 G/DL (ref 31.5–36.5)
MCV RBC AUTO: 81 FL (ref 78–100)
MONOCYTES # BLD AUTO: ABNORMAL 10*3/UL
MONOCYTES # BLD MANUAL: 0.6 10E3/UL (ref 0–1.3)
MONOCYTES NFR BLD AUTO: ABNORMAL %
MONOCYTES NFR BLD MANUAL: 5 %
NEUTROPHILS # BLD AUTO: ABNORMAL 10*3/UL
NEUTROPHILS # BLD MANUAL: 5.1 10E3/UL (ref 1.6–8.3)
NEUTROPHILS NFR BLD AUTO: ABNORMAL %
NEUTROPHILS NFR BLD MANUAL: 40 %
NRBC # BLD AUTO: 0 10E3/UL
NRBC BLD AUTO-RTO: 0 /100
PLAT MORPH BLD: ABNORMAL
PLATELET # BLD AUTO: 330 10E3/UL (ref 150–450)
RBC # BLD AUTO: 4.86 10E6/UL (ref 3.8–5.2)
RBC MORPH BLD: ABNORMAL
WBC # BLD AUTO: 12.8 10E3/UL (ref 4–11)

## 2023-12-07 RX ORDER — ATORVASTATIN CALCIUM 10 MG/1
10 TABLET, FILM COATED ORAL DAILY
Qty: 30 TABLET | Refills: 2 | Status: SHIPPED | OUTPATIENT
Start: 2023-12-07 | End: 2024-03-04

## 2023-12-08 NOTE — RESULT ENCOUNTER NOTE
The white blood count is better.  We can recheck in 1-2 months.    See us in clinic at that time.    Vasquez Denson MD

## 2023-12-20 DIAGNOSIS — E11.9 TYPE 2 DIABETES MELLITUS WITHOUT COMPLICATION, WITHOUT LONG-TERM CURRENT USE OF INSULIN (H): ICD-10-CM

## 2023-12-20 RX ORDER — METFORMIN HCL 500 MG
TABLET, EXTENDED RELEASE 24 HR ORAL
Qty: 60 TABLET | Refills: 1 | Status: SHIPPED | OUTPATIENT
Start: 2023-12-20 | End: 2024-02-15

## 2024-01-31 DIAGNOSIS — E03.9 HYPOTHYROIDISM, UNSPECIFIED TYPE: ICD-10-CM

## 2024-01-31 RX ORDER — LEVOTHYROXINE SODIUM 100 UG/1
TABLET ORAL
Qty: 90 TABLET | Refills: 0 | Status: SHIPPED | OUTPATIENT
Start: 2024-01-31 | End: 2024-04-30

## 2024-02-11 ENCOUNTER — TELEPHONE (OUTPATIENT)
Dept: FAMILY MEDICINE | Facility: CLINIC | Age: 54
End: 2024-02-11
Payer: COMMERCIAL

## 2024-02-11 ENCOUNTER — HEALTH MAINTENANCE LETTER (OUTPATIENT)
Age: 54
End: 2024-02-11

## 2024-02-11 DIAGNOSIS — E11.9 TYPE 2 DIABETES MELLITUS WITHOUT COMPLICATION, WITHOUT LONG-TERM CURRENT USE OF INSULIN (H): Primary | ICD-10-CM

## 2024-02-11 NOTE — TELEPHONE ENCOUNTER
Order/Referral Request    Who is requesting: PT    Orders being requested: A1C TEST    Reason service is needed/diagnosis: NEEDED IN DECEMBER BUT IS NOW ABLE    When are orders needed by: ASAP    Has this been discussed with Provider: Yes    Does patient have a preference on a Group/Provider/Facility? NO    Does patient have an appointment scheduled?: No    Where to send orders: Place orders within Epic    Could we send this information to you in [facility] or would you prefer to receive a phone call?:   No preference   Okay to leave a detailed message?: Yes at Cell number on file:    Telephone Information:   Mobile [phone]

## 2024-02-12 NOTE — TELEPHONE ENCOUNTER
I put in order. Patient can schedule lab appointment.    Please inform patient    Vasquez Denson MD

## 2024-02-12 NOTE — TELEPHONE ENCOUNTER
Called patient to assist in scheduling. No answer. LM for patient with providers message. If patient calls back please assist in scheduling a lab only appointment     Gildardo-

## 2024-02-15 ENCOUNTER — LAB (OUTPATIENT)
Dept: LAB | Facility: CLINIC | Age: 54
End: 2024-02-15
Payer: COMMERCIAL

## 2024-02-15 DIAGNOSIS — E11.9 TYPE 2 DIABETES MELLITUS WITHOUT COMPLICATION, WITHOUT LONG-TERM CURRENT USE OF INSULIN (H): ICD-10-CM

## 2024-02-15 LAB — HBA1C MFR BLD: 7.1 % (ref 0–5.6)

## 2024-02-15 PROCEDURE — 36415 COLL VENOUS BLD VENIPUNCTURE: CPT

## 2024-02-15 PROCEDURE — 83036 HEMOGLOBIN GLYCOSYLATED A1C: CPT

## 2024-03-02 DIAGNOSIS — E78.5 HYPERLIPIDEMIA LDL GOAL <70: ICD-10-CM

## 2024-03-04 RX ORDER — ATORVASTATIN CALCIUM 10 MG/1
10 TABLET, FILM COATED ORAL DAILY
Qty: 30 TABLET | Refills: 4 | Status: SHIPPED | OUTPATIENT
Start: 2024-03-04 | End: 2024-07-05

## 2024-03-12 DIAGNOSIS — K31.7 GASTRIC POLYPS: Primary | ICD-10-CM

## 2024-04-11 NOTE — TELEPHONE ENCOUNTER
----- Message from Sharif Anne MD sent at 11/12/2019 12:41 PM CST -----  Vitamin D a little low as is calcium.   Recommend increasing to 4 of the calcium gummies a day and starting 2000 international unit(s) of D3 in addition.     TSH is high.   Increase levothyroxine from 100 to 112 mcg daily.   Labs in 1 month.     Sharif Anne MD on 11/12/2019 at 12:39 PM    
From: Myrtle Ritter  To: Ricardo Aly  Sent: 4/10/2024 11:11 PM CDT  Subject: Lab results you requested     I have attached some of the labs taken during her most recent inpatient stay at [facility]. I believe the 3rd page has the abnormal thyroid result.   
Left detailed message relaying results.  Consent to leave vm in 11/13/19 encounter.    Je Armstrong RN....11/14/2019 8:15 AM     
Left message for patient to return call to clinic.    Je Armstrong RN....11/12/2019 2:54 PM       
Yes

## 2024-04-30 DIAGNOSIS — E03.9 HYPOTHYROIDISM, UNSPECIFIED TYPE: ICD-10-CM

## 2024-04-30 RX ORDER — LEVOTHYROXINE SODIUM 100 UG/1
TABLET ORAL
Qty: 90 TABLET | Refills: 0 | Status: SHIPPED | OUTPATIENT
Start: 2024-04-30 | End: 2024-07-06

## 2024-05-12 DIAGNOSIS — I10 HYPERTENSION GOAL BP (BLOOD PRESSURE) < 140/90: ICD-10-CM

## 2024-05-13 RX ORDER — HYDROCHLOROTHIAZIDE 25 MG/1
TABLET ORAL
Qty: 90 TABLET | Refills: 0 | OUTPATIENT
Start: 2024-05-13

## 2024-06-30 ENCOUNTER — HEALTH MAINTENANCE LETTER (OUTPATIENT)
Age: 54
End: 2024-06-30

## 2024-07-05 ENCOUNTER — ANCILLARY PROCEDURE (OUTPATIENT)
Dept: GENERAL RADIOLOGY | Facility: CLINIC | Age: 54
End: 2024-07-05
Attending: FAMILY MEDICINE
Payer: COMMERCIAL

## 2024-07-05 ENCOUNTER — TELEPHONE (OUTPATIENT)
Dept: GASTROENTEROLOGY | Facility: CLINIC | Age: 54
End: 2024-07-05

## 2024-07-05 ENCOUNTER — OFFICE VISIT (OUTPATIENT)
Dept: FAMILY MEDICINE | Facility: CLINIC | Age: 54
End: 2024-07-05
Payer: COMMERCIAL

## 2024-07-05 VITALS
BODY MASS INDEX: 32.62 KG/M2 | WEIGHT: 177.25 LBS | HEIGHT: 62 IN | DIASTOLIC BLOOD PRESSURE: 75 MMHG | RESPIRATION RATE: 18 BRPM | SYSTOLIC BLOOD PRESSURE: 107 MMHG | TEMPERATURE: 97.5 F | HEART RATE: 90 BPM | OXYGEN SATURATION: 100 %

## 2024-07-05 DIAGNOSIS — E66.9 OBESITY, UNSPECIFIED CLASSIFICATION, UNSPECIFIED OBESITY TYPE, UNSPECIFIED WHETHER SERIOUS COMORBIDITY PRESENT: ICD-10-CM

## 2024-07-05 DIAGNOSIS — E78.5 HYPERLIPIDEMIA LDL GOAL <70: ICD-10-CM

## 2024-07-05 DIAGNOSIS — I10 HYPERTENSION GOAL BP (BLOOD PRESSURE) < 140/90: ICD-10-CM

## 2024-07-05 DIAGNOSIS — E03.9 HYPOTHYROIDISM, UNSPECIFIED TYPE: ICD-10-CM

## 2024-07-05 DIAGNOSIS — M79.672 LEFT FOOT PAIN: Primary | ICD-10-CM

## 2024-07-05 DIAGNOSIS — E11.9 TYPE 2 DIABETES MELLITUS WITHOUT COMPLICATION, WITHOUT LONG-TERM CURRENT USE OF INSULIN (H): ICD-10-CM

## 2024-07-05 LAB
ALBUMIN SERPL BCG-MCNC: 4.1 G/DL (ref 3.5–5.2)
ALP SERPL-CCNC: 110 U/L (ref 40–150)
ALT SERPL W P-5'-P-CCNC: 20 U/L (ref 0–50)
ANION GAP SERPL CALCULATED.3IONS-SCNC: 9 MMOL/L (ref 7–15)
AST SERPL W P-5'-P-CCNC: 22 U/L (ref 0–45)
BILIRUB SERPL-MCNC: 0.3 MG/DL
BUN SERPL-MCNC: 18.6 MG/DL (ref 6–20)
CALCIUM SERPL-MCNC: 8.5 MG/DL (ref 8.6–10)
CHLORIDE SERPL-SCNC: 101 MMOL/L (ref 98–107)
CHOLEST SERPL-MCNC: 123 MG/DL
CREAT SERPL-MCNC: 0.58 MG/DL (ref 0.51–0.95)
DEPRECATED HCO3 PLAS-SCNC: 29 MMOL/L (ref 22–29)
EGFRCR SERPLBLD CKD-EPI 2021: >90 ML/MIN/1.73M2
FASTING STATUS PATIENT QL REPORTED: NO
FASTING STATUS PATIENT QL REPORTED: NO
GLUCOSE SERPL-MCNC: 170 MG/DL (ref 70–99)
HBA1C MFR BLD: 7.1 % (ref 0–5.6)
HDLC SERPL-MCNC: 44 MG/DL
LDLC SERPL CALC-MCNC: 51 MG/DL
NONHDLC SERPL-MCNC: 79 MG/DL
POTASSIUM SERPL-SCNC: 4.8 MMOL/L (ref 3.4–5.3)
PROT SERPL-MCNC: 7.4 G/DL (ref 6.4–8.3)
SODIUM SERPL-SCNC: 139 MMOL/L (ref 135–145)
T4 FREE SERPL-MCNC: 1.6 NG/DL (ref 0.9–1.7)
TRIGL SERPL-MCNC: 138 MG/DL
TSH SERPL DL<=0.005 MIU/L-ACNC: 1.65 UIU/ML (ref 0.3–4.2)

## 2024-07-05 PROCEDURE — 83036 HEMOGLOBIN GLYCOSYLATED A1C: CPT | Performed by: FAMILY MEDICINE

## 2024-07-05 PROCEDURE — 80053 COMPREHEN METABOLIC PANEL: CPT | Performed by: FAMILY MEDICINE

## 2024-07-05 PROCEDURE — 99214 OFFICE O/P EST MOD 30 MIN: CPT | Performed by: FAMILY MEDICINE

## 2024-07-05 PROCEDURE — 84443 ASSAY THYROID STIM HORMONE: CPT | Performed by: FAMILY MEDICINE

## 2024-07-05 PROCEDURE — 80061 LIPID PANEL: CPT | Performed by: FAMILY MEDICINE

## 2024-07-05 PROCEDURE — 36415 COLL VENOUS BLD VENIPUNCTURE: CPT | Performed by: FAMILY MEDICINE

## 2024-07-05 PROCEDURE — 73630 X-RAY EXAM OF FOOT: CPT | Mod: TC | Performed by: RADIOLOGY

## 2024-07-05 PROCEDURE — 84439 ASSAY OF FREE THYROXINE: CPT | Performed by: FAMILY MEDICINE

## 2024-07-05 RX ORDER — ATORVASTATIN CALCIUM 10 MG/1
10 TABLET, FILM COATED ORAL DAILY
Qty: 90 TABLET | Refills: 1 | Status: SHIPPED | OUTPATIENT
Start: 2024-07-05

## 2024-07-05 ASSESSMENT — PAIN SCALES - GENERAL: PAINLEVEL: SEVERE PAIN (7)

## 2024-07-05 NOTE — PROGRESS NOTES
"      Darline Rogers is a 53 year old, presenting for the following health issues:  Ankle Pain (Left ankle pain/swelling for the past couple months)        7/5/2024     1:23 PM   Additional Questions   Roomed by Bette Houston     History of Present Illness       Reason for visit:  Pain on left foot  Symptom onset:  More than a month  Symptom intensity:  Severe  Symptom progression:  Staying the same  Had these symptoms before:  No  What makes it worse:  No  What makes it better:  No    She eats 2-3 servings of fruits and vegetables daily.She consumes 1 sweetened beverage(s) daily.She exercises with enough effort to increase her heart rate 9 or less minutes per day.  She exercises with enough effort to increase her heart rate 3 or less days per week.   She is taking medications regularly.           Pain for 1-2 months    No trauma    On feet all day    Neck better          Objective    /75 (BP Location: Left arm, Patient Position: Chair, Cuff Size: Adult Regular)   Pulse 90   Temp 97.5  F (36.4  C) (Temporal)   Resp 18   Ht 1.575 m (5' 2\")   Wt 80.4 kg (177 lb 4 oz)   LMP 11/23/2009   SpO2 100%   BMI 32.42 kg/m    Body mass index is 32.42 kg/m .  Physical Exam    Full physical not done     Mentation and affect are fine    No tremor of speech or extremity    Patient points to lateral left foot when asked where pain is    Not tender over either malleoli, or calcanus, or on plantar aspect    Tender below lateral malleolus and alond the lateral aspect of foot    Patient has mild swellowing of lateral ankles bilaterally but this is symmetric and nontender    Some pain with resisted plantarflexion and rested inversion/ eversion    Xray normal, only minimal oa      ASSESSMENT / PLAN:  (M79.132) Left foot pain  (primary encounter diagnosis)  Comment: patient should see podiatry.  She will schedule.   Plan: XR Foot Left G/E 3 Views, Orthopedic          Referral             (I10) Hypertension goal " BP (blood pressure) < 140/90  Comment: blood pressure acceptable   Plan: no change in meds     (E78.5) Hyperlipidemia LDL goal <70  Comment: on statin  Plan: atorvastatin (LIPITOR) 10 MG tablet,         Comprehensive metabolic panel, Lipid panel         reflex to direct LDL Non-fasting        Check nonfasting     (E03.9) Hypothyroidism, unspecified type  Comment: check labs   Plan: T4 free, TSH        Adjust dose if needed     (E11.9) Type 2 diabetes mellitus without complication, without long-term current use of insulin (H)  Comment: check; on metformin   Plan: Hemoglobin A1c             (E66.9) Obesity, unspecified classification, unspecified obesity type, unspecified whether serious comorbidity present  Comment:  wt down some      Plan: keep working on healthy diet/exercise and wt loss      I reviewed the patient's medications, allergies, medical history, family history, and social history.    Vasquez Denson MD             Signed Electronically by: Vasquez Denson MD

## 2024-07-05 NOTE — TELEPHONE ENCOUNTER
Endoscopy Scheduling Screen      Final Scheduling Details     Procedure scheduled  Upper endoscopy (EGD)    Surgeon:  Simon     Date of procedure:  11/19     Pre-OP / PAC:   No - Not required for this site.    Location  [facility] - ASC - Patient preference.    Sedation   MAC/Deep Sedation - Per order.      Patient Reminders:   You will receive a call from a Nurse to review instructions and health history.  This assessment must be completed prior to your procedure.  Failure to complete the Nurse assessment may result in the procedure being cancelled.      On the day of your procedure, please designate an adult(s) who can drive you home stay with you for the next 24 hours. The medicines used in the exam will make you sleepy. You will not be able to drive.      You cannot take public transportation, ride share services, or non-medical taxi service without a responsible caregiver.  Medical transport services are allowed with the requirement that a responsible caregiver will receive you at your destination.  We require that drivers and caregivers are confirmed prior to your procedure.

## 2024-07-05 NOTE — TELEPHONE ENCOUNTER
"Endoscopy Scheduling Screen    Have you had a positive Covid test in the last 14 days?  No    What is your communication preference for Instructions and/or Bowel Prep?   MyChart    What insurance is in the chart?  Other:  BCBS    Ordering/Referring Provider: Tree Duque PA-C     (If ordering provider performs procedure, schedule with ordering provider unless otherwise instructed. )    BMI: Estimated body mass index is 32.42 kg/m  as calculated from the following:    Height as of an earlier encounter on 7/5/24: 1.575 m (5' 2\").    Weight as of an earlier encounter on 7/5/24: 80.4 kg (177 lb 4 oz).     Sedation Ordered  MAC/deep sedation.   BMI<= 45 45 < BMI <= 48 48 < BMI < = 50  BMI > 50   No Restrictions No [facility]  No [facility]  [facility] with exceptions Hospital Only OR Only       Do you have a history of malignant hyperthermia?  No    (Females) Are you currently pregnant?   No     Have you been diagnosed or told you have pulmonary hypertension?   No    Do you have an LVAD?  No    Have you been told you have moderate to severe sleep apnea?  No    Have you been told you have COPD, asthma, or any other lung disease?  No    Do you have any heart conditions?  No     Have you ever had or are you waiting for an organ transplant?  No. Continue scheduling, no site restrictions.    Have you had a stroke or transient ischemic attack (TIA aka \"mini stroke\" in the last 6 months?   No    Have you been diagnosed with or been told you have cirrhosis of the liver?   No    Are you currently on dialysis?   No    Do you need assistance transferring?   No    BMI: Estimated body mass index is 32.42 kg/m  as calculated from the following:    Height as of an earlier encounter on 7/5/24: 1.575 m (5' 2\").    Weight as of an earlier encounter on 7/5/24: 80.4 kg (177 lb 4 oz).     Is patients BMI > 40 and scheduling location [facility]?  No    Do you take an injectable medication for weight loss or diabetes (excluding insulin)?  No    Do you take " the medication Naltrexone?  No    Do you take blood thinners?  No       Prep   Are you currently on dialysis or do you have chronic kidney disease?  No    Do you have a diagnosis of diabetes?  Yes (Golytely Prep)    Do you have a diagnosis of cystic fibrosis (CF)?  No    On a regular basis do you go 3 -5 days between bowel movements?  No    BMI > 40?  No    Preferred Pharmacy:      [facility] [place]  [place] - [address]  [address]  [address]  [place] [address]  Phone: [phone] Fax: [phone]      Final Scheduling Details     Procedure scheduled  Upper endoscopy (EGD)    Surgeon:  TBD     Date of procedure:  TBD     Pre-OP / PAC:   TBD    Location   TBD    Sedation    TBD      Patient Reminders:   You will receive a call from a Nurse to review instructions and health history.  This assessment must be completed prior to your procedure.  Failure to complete the Nurse assessment may result in the procedure being cancelled.      On the day of your procedure, please designate an adult(s) who can drive you home stay with you for the next 24 hours. The medicines used in the exam will make you sleepy. You will not be able to drive.      You cannot take public transportation, ride share services, or non-medical taxi service without a responsible caregiver.  Medical transport services are allowed with the requirement that a responsible caregiver will receive you at your destination.  We require that drivers and caregivers are confirmed prior to your procedure.

## 2024-07-06 DIAGNOSIS — E03.9 HYPOTHYROIDISM, UNSPECIFIED TYPE: ICD-10-CM

## 2024-07-06 RX ORDER — LEVOTHYROXINE SODIUM 100 UG/1
100 TABLET ORAL DAILY
Qty: 90 TABLET | Refills: 1 | Status: SHIPPED | OUTPATIENT
Start: 2024-07-06

## 2024-07-06 NOTE — RESULT ENCOUNTER NOTE
Your thyroid labs are normal.  Stay on same dose of thyroid medication.  I will send in refills.  Let pharmacy know when you need refill.    Diabetes test ( hemoglobin a1c ) is okay/ stable.    Other labs are good.    Vasquez Denson MD

## 2024-07-08 DIAGNOSIS — K31.7 GASTRIC POLYPS: Primary | ICD-10-CM

## 2024-07-16 ENCOUNTER — PATIENT OUTREACH (OUTPATIENT)
Dept: CARE COORDINATION | Facility: CLINIC | Age: 54
End: 2024-07-16
Payer: COMMERCIAL

## 2024-07-30 ENCOUNTER — PATIENT OUTREACH (OUTPATIENT)
Dept: CARE COORDINATION | Facility: CLINIC | Age: 54
End: 2024-07-30
Payer: COMMERCIAL

## 2024-07-31 DIAGNOSIS — I10 HYPERTENSION GOAL BP (BLOOD PRESSURE) < 140/90: ICD-10-CM

## 2024-07-31 RX ORDER — HYDROCHLOROTHIAZIDE 25 MG/1
TABLET ORAL
Qty: 90 TABLET | Refills: 0 | Status: SHIPPED | OUTPATIENT
Start: 2024-07-31

## 2024-08-01 ENCOUNTER — OFFICE VISIT (OUTPATIENT)
Dept: PODIATRY | Facility: CLINIC | Age: 54
End: 2024-08-01
Attending: FAMILY MEDICINE
Payer: COMMERCIAL

## 2024-08-01 VITALS — SYSTOLIC BLOOD PRESSURE: 140 MMHG | DIASTOLIC BLOOD PRESSURE: 88 MMHG | HEART RATE: 74 BPM

## 2024-08-01 DIAGNOSIS — M79.672 LEFT FOOT PAIN: ICD-10-CM

## 2024-08-01 PROCEDURE — 99203 OFFICE O/P NEW LOW 30 MIN: CPT | Performed by: PODIATRIST

## 2024-08-01 NOTE — PROGRESS NOTES
Subjective:    Pt is seen today in consult from Dr. Denson with the c/c of painful left foot.  This has been symptomatic for the past 2 months.  Pt denies any hx of trauma.  Aggravated by activity and releaved by rest.  Describes as burning pain.  Is slowly getting worse.   Denies numbness, weakness, ecchymosis.  Patient has edema over this.  Working at a job where she is on her feet all day.  Her work shoes are-year-old.  She is not wearing a good supportive shoe in the house.    ROS:   see above       Allergies   Allergen Reactions    Advil [Ibuprofen] Swelling     Hands swelled    Cephalexin Itching       Current Outpatient Medications   Medication Sig Dispense Refill    aspirin 81 MG EC tablet Take 1 tablet (81 mg) by mouth daily      atorvastatin (LIPITOR) 10 MG tablet Take 1 tablet (10 mg) by mouth daily 90 tablet 1    calcitRIOL (ROCALTROL) 0.25 MCG capsule TAKE TWO CAPSULES BY MOUTH ONCE DAILY 180 capsule 1    calcium carbonate antacid 1000 MG CHEW Take 2 tablets by mouth 3 times daily 60 tablet 3    clobetasol (TEMOVATE) 0.05 % external ointment Apply sparingly twice to three times daily for 3-4 weeks to affected areas on buttock. Please dispense 60 gram quantity. 60 g 4    hydrochlorothiazide (HYDRODIURIL) 25 MG tablet TAKE ONE TABLET BY MOUTH ONCE DAILY. NEEDS TO BE SEEN BY MD. 90 tablet 0    levothyroxine (SYNTHROID/LEVOTHROID) 100 MCG tablet Take 1 tablet (100 mcg) by mouth daily 90 tablet 1    lisinopril (ZESTRIL) 30 MG tablet Take 1 tablet (30 mg) by mouth daily 90 tablet 3    metFORMIN (GLUCOPHAGE XR) 500 MG 24 hr tablet TAKE TWO TABLETS BY MOUTH ONCE DAILY 180 tablet 1    omeprazole (PRILOSEC) 40 MG DR capsule Take 1 capsule (40 mg) by mouth 2 times daily (before meals) 60 capsule 3    omeprazole (PRILOSEC) 40 MG DR capsule Take 1 capsule (40 mg) by mouth daily 30 capsule 3       Patient Active Problem List   Diagnosis    CARDIOVASCULAR SCREENING; LDL GOAL LESS THAN 130    Impingement syndrome,  shoulder    Rotator cuff tendonitis    Hashimoto's thyroiditis    Nontoxic multinodular goiter    Shoulder joint pain    Gastroesophageal reflux disease, esophagitis presence not specified    Cervical adenopathy    Hypertension goal BP (blood pressure) < 140/90    Essential hypertension with goal blood pressure less than 140/90    Anup's deformity of left heel    History of thyroidectomy    Postoperative hypothyroidism    Hypocalcemia       Past Medical History:   Diagnosis Date    Anemia     Diabetes (H) 08/2023    Dr. Denson    Hypertension goal BP (blood pressure) < 130/80 05/06/2011    Hypocalcemia     Multinodular goiter     Postsurgical hypothyroidism     Rotator cuff tendonitis 02/10/2015    Vaginal Papanicolaou smear not required due to history of hysterectomy        Past Surgical History:   Procedure Laterality Date    COLONOSCOPY N/A 3/24/2023    Procedure: COLONOSCOPY, FLEXIBLE, WITH LESION REMOVAL USING SNARE;  Surgeon: Chet Escobar MD;  Location: [facility] OR    COLONOSCOPY WITH CO2 INSUFFLATION N/A 3/24/2023    Procedure: COLONOSCOPY, WITH CO2 INSUFFLATION;  Surgeon: Chet Escobar MD;  Location: [facility] OR    COMBINED ESOPHAGOSCOPY, GASTROSCOPY, DUODENOSCOPY (EGD) WITH CO2 INSUFFLATION N/A 7/28/2023    Procedure: Combined Esophagoscopy, Gastroscopy, Duodenoscopy (Egd) With Co2 Insufflation;  Surgeon: Sarita Montes DO;  Location: [facility] OR    ESOPHAGOSCOPY, GASTROSCOPY, DUODENOSCOPY (EGD), COMBINED N/A 7/28/2023    Procedure: ESOPHAGOGASTRODUODENOSCOPY, WITH BIOPSY;  Surgeon: Sarita Montes DO;  Location:  OR    LAPAROSCOPY,TUBAL CAUTERY  2005    SALPINGO OOPHORECTOMY,R/L/LUIS  2005    left    THYROIDECTOMY N/A 8/22/2017    Procedure: THYROIDECTOMY;  Total Thyroidectomy;  Surgeon: Skylar Costa MD;  Location:  OR    [facility] TOTAL ABDOM HYSTERECTOMY  12/9/09    [facility]       Family History   Problem Relation Age of Onset    Cerebrovascular Disease Mother 50    Cerebrovascular  Disease Father 49         49    Hypertension Father     Cancer No family hx of     Diabetes No family hx of     Thyroid Disease No family hx of     Glaucoma No family hx of     Macular Degeneration No family hx of        Social History     Tobacco Use    Smoking status: Never     Passive exposure: Never    Smokeless tobacco: Never   Substance Use Topics    Alcohol use: Yes     Comment: occ             O:  BP (!) 140/88   Pulse 74   LMP 2009 .       Constitutional/ general:  Pt is in no apparent distress, appears well-nourished.  Cooperative with history and physical exam.     Psych:  The patient answered questions appropriately.  Normal affect.  Seems to have reasonable expectations, in terms of treatment.     Lungs:  Non labored breathing, non labored speech. No cough.  No audible wheezing. Even, quiet breathing.       Vascular:  Pedal pulses are palpable bilaterally for both the DP and PT arteries.  CFT < 3 sec.  No edema.  Pedal hair growth noted.     Neuro:  Alert and oriented x 3. Coordinated gait.  Light touch sensation is intact     Derm: Normal texture and turgor.  No erythema, ecchymosis, or cyanosis.  No open lesions.     Musculoskeletal:   Cavus foot with weightbearing bilateral.  No forefoot or rear foot deformities noted.  MS 5/5 all compartments.  Normal ROM all fore foot and rearfoot joints with no pain or crepitus.  No equinus.  Pain all around styloid process left foot.  Positive edema.  No masses.  No ecchymosis.     Radiographic Exam:  X-Ray Findings:  I personally reviewed the films, normal    A:  Cavus foot with styloid process pain    Plan:  X-rays from past personally reviewed.   Discussed etiology and treatment options in detail w/ the pt.  Will dispense cam walker today.  Patient to wear this at all times while walking.  When not walking patient will take this off and do ROM to prevent blood clot and joint stiffness.  Patient will not sleep with this on.  Wrote work note stating  for the next 3 weeks standing or walking no more than 3 hours/day.  Will get good stiff house shoe.  Will make sure her work shoes know.  Ice at least twice a day for 15 to 20 minutes.  Wear compression on this.  Return to clinic in 3 weeks to ensure getting better.  Thank you for allowing me participate in the care of this patient.        Otis Carson DPM, FACFAS

## 2024-08-01 NOTE — PATIENT INSTRUCTIONS
"We wish you continued good healing. If you have any questions or concerns, please do not hesitate to contact us at  [phone]    [facility] (secure e-mail communication and access to your chart) to send a message or to make an appointment.    Please remember to call and schedule a follow up appointment if one was recommended at your earliest convenience.     PODIATRY CLINIC HOURS  TELEPHONE NUMBER    Dr. Otis MUELLERPNALDO FACFAS        Clinics:  [facility] [place]  Tuesday 1PM-6PM  [place]  Wednesday 745AM-330PM  [place]  Monday 2nd,4th  830AM-4PM  Thursday/Friday 745AM-230PM     [facility] APPOINTMENTS  [phone]    [place] APPOINTMENTS  [phone]          If you need a medication refill, please contact us you may need lab work and/or a follow up visit prior to your refill (i.e. Antifungal medications).  If MRI needed please call Imaging at [phone]   HOW DO I GET MY KNEE SCOOTER? Knee scooters can be picked up at ANY Medical Supply stores with your knee scooter Prescription.  OR  Bring your signed prescription to an [facility] Medical Equipment showroom.   Set up an appointment for your custom Orthotics. Call any Orthotics locations call [phone]         [employer] / CAM WALKING BOOT INSTRUCTIONS  - Do NOT drive with CAM walker on. This is due to safety and legal issues.   - Do NOT wear the CAM walker on long car/train rides or on an airplane.  - Remove the CAM walker several times a day and do ankle range of motion (ROM) exercises/wiggle toes.  - It is recommended that a thick-soled shoe be worn on the other foot to offset any created leg length issue.    - You can purchase an \"even up\" on [employer] to place under the other shoe to help too.  - The boot does not have to be worn at night.   - There is an increased risk of developing a blood clot with lower extremity immobilization. ROM exercises and knee-high compression " (tenso /ACE wrap) is recommended to lower that risk.   - You should seek medical attention if you experience calf swelling and/or pain, chest pain, or shortness of breath.   If you need to return or exchange the boot, please contact [facility] @ [phone]

## 2024-08-01 NOTE — LETTER
August 1, 2024      Rhonda Cordero  [address] [facility] [address]        To Whom It May Concern:    Rhonda Cordero was seen in our clinic. She may return to work with the following: limited to light duty - standing limited to 3 hrs and walking limited to 3 hrs a day Must wear walking boot while at work on or about 3 weeks.      Sincerely,      Dr. Otis Carson D.P.M FAC FAS/lld    (Electronically Signed)

## 2024-08-01 NOTE — LETTER
8/1/2024      Rhonda Cordero  [address]  [facility] [address]      Dear Colleague,    Thank you for referring your patient, Rhonda Cordero, to the [facility]. Please see a copy of my visit note below.    Subjective:    Pt is seen today in consult from Dr. Denson with the c/c of painful left foot.  This has been symptomatic for the past 2 months.  Pt denies any hx of trauma.  Aggravated by activity and releaved by rest.  Describes as burning pain.  Is slowly getting worse.   Denies numbness, weakness, ecchymosis.  Patient has edema over this.  Working at a job where she is on her feet all day.  Her work shoes are-year-old.  She is not wearing a good supportive shoe in the house.    ROS:   see above       Allergies   Allergen Reactions     Advil [Ibuprofen] Swelling     Hands swelled     Cephalexin Itching       Current Outpatient Medications   Medication Sig Dispense Refill     aspirin 81 MG EC tablet Take 1 tablet (81 mg) by mouth daily       atorvastatin (LIPITOR) 10 MG tablet Take 1 tablet (10 mg) by mouth daily 90 tablet 1     calcitRIOL (ROCALTROL) 0.25 MCG capsule TAKE TWO CAPSULES BY MOUTH ONCE DAILY 180 capsule 1     calcium carbonate antacid 1000 MG CHEW Take 2 tablets by mouth 3 times daily 60 tablet 3     clobetasol (TEMOVATE) 0.05 % external ointment Apply sparingly twice to three times daily for 3-4 weeks to affected areas on buttock. Please dispense 60 gram quantity. 60 g 4     hydrochlorothiazide (HYDRODIURIL) 25 MG tablet TAKE ONE TABLET BY MOUTH ONCE DAILY. NEEDS TO BE SEEN BY MD. 90 tablet 0     levothyroxine (SYNTHROID/LEVOTHROID) 100 MCG tablet Take 1 tablet (100 mcg) by mouth daily 90 tablet 1     lisinopril (ZESTRIL) 30 MG tablet Take 1 tablet (30 mg) by mouth daily 90 tablet 3     metFORMIN (GLUCOPHAGE XR) 500 MG 24 hr tablet TAKE TWO TABLETS BY MOUTH ONCE DAILY 180 tablet 1     omeprazole (PRILOSEC) 40 MG DR capsule Take 1 capsule (40 mg) by  mouth 2 times daily (before meals) 60 capsule 3     omeprazole (PRILOSEC) 40 MG DR capsule Take 1 capsule (40 mg) by mouth daily 30 capsule 3       Patient Active Problem List   Diagnosis     CARDIOVASCULAR SCREENING; LDL GOAL LESS THAN 130     Impingement syndrome, shoulder     Rotator cuff tendonitis     Hashimoto's thyroiditis     Nontoxic multinodular goiter     Shoulder joint pain     Gastroesophageal reflux disease, esophagitis presence not specified     Cervical adenopathy     Hypertension goal BP (blood pressure) < 140/90     Essential hypertension with goal blood pressure less than 140/90     Anup's deformity of left heel     History of thyroidectomy     Postoperative hypothyroidism     Hypocalcemia       Past Medical History:   Diagnosis Date     Anemia      Diabetes (H) 08/2023    Dr. Denson     Hypertension goal BP (blood pressure) < 130/80 05/06/2011     Hypocalcemia      Multinodular goiter      Postsurgical hypothyroidism      Rotator cuff tendonitis 02/10/2015     Vaginal Papanicolaou smear not required due to history of hysterectomy        Past Surgical History:   Procedure Laterality Date     COLONOSCOPY N/A 3/24/2023    Procedure: COLONOSCOPY, FLEXIBLE, WITH LESION REMOVAL USING SNARE;  Surgeon: Chet Escobar MD;  Location: [facility] OR     COLONOSCOPY WITH CO2 INSUFFLATION N/A 3/24/2023    Procedure: COLONOSCOPY, WITH CO2 INSUFFLATION;  Surgeon: Chet Escobar MD;  Location: [facility]     COMBINED ESOPHAGOSCOPY, GASTROSCOPY, DUODENOSCOPY (EGD) WITH CO2 INSUFFLATION N/A 7/28/2023    Procedure: Combined Esophagoscopy, Gastroscopy, Duodenoscopy (Egd) With Co2 Insufflation;  Surgeon: Sarita Montes DO;  Location: [facility]     ESOPHAGOSCOPY, GASTROSCOPY, DUODENOSCOPY (EGD), COMBINED N/A 7/28/2023    Procedure: ESOPHAGOGASTRODUODENOSCOPY, WITH BIOPSY;  Surgeon: Sarita Montes DO;  Location: [facility] OR     LAPAROSCOPY,TUBAL CAUTERY  2005     SALPINGO OOPHORECTOMY,R/L/LUIS  2005    left      THYROIDECTOMY N/A 2017    Procedure: THYROIDECTOMY;  Total Thyroidectomy;  Surgeon: Skylar Costa MD;  Location:  OR     [facility] TOTAL ABDOM HYSTERECTOMY  09    [facility]       Family History   Problem Relation Age of Onset     Cerebrovascular Disease Mother 50     Cerebrovascular Disease Father 49         49     Hypertension Father      Cancer No family hx of      Diabetes No family hx of      Thyroid Disease No family hx of      Glaucoma No family hx of      Macular Degeneration No family hx of        Social History     Tobacco Use     Smoking status: Never     Passive exposure: Never     Smokeless tobacco: Never   Substance Use Topics     Alcohol use: Yes     Comment: occ             O:  BP (!) 140/88   Pulse 74   LMP 2009 .       Constitutional/ general:  Pt is in no apparent distress, appears well-nourished.  Cooperative with history and physical exam.     Psych:  The patient answered questions appropriately.  Normal affect.  Seems to have reasonable expectations, in terms of treatment.     Lungs:  Non labored breathing, non labored speech. No cough.  No audible wheezing. Even, quiet breathing.       Vascular:  Pedal pulses are palpable bilaterally for both the DP and PT arteries.  CFT < 3 sec.  No edema.  Pedal hair growth noted.     Neuro:  Alert and oriented x 3. Coordinated gait.  Light touch sensation is intact     Derm: Normal texture and turgor.  No erythema, ecchymosis, or cyanosis.  No open lesions.     Musculoskeletal:   Cavus foot with weightbearing bilateral.  No forefoot or rear foot deformities noted.  MS 5/5 all compartments.  Normal ROM all fore foot and rearfoot joints with no pain or crepitus.  No equinus.  Pain all around styloid process left foot.  Positive edema.  No masses.  No ecchymosis.     Radiographic Exam:  X-Ray Findings:  I personally reviewed the films, normal    A:  Cavus foot with styloid process pain    Plan:  X-rays from past personally  reviewed.   Discussed etiology and treatment options in detail w/ the pt.  Will dispense cam walker today.  Patient to wear this at all times while walking.  When not walking patient will take this off and do ROM to prevent blood clot and joint stiffness.  Patient will not sleep with this on.  Wrote work note stating for the next 3 weeks standing or walking no more than 3 hours/day.  Will get good stiff house shoe.  Will make sure her work shoes know.  Ice at least twice a day for 15 to 20 minutes.  Wear compression on this.  Return to clinic in 3 weeks to ensure getting better.  Thank you for allowing me participate in the care of this patient.        Otis Carson DPM, FACFAS       Again, thank you for allowing me to participate in the care of your patient.        Sincerely,        Otis Carson DPM

## 2024-08-15 DIAGNOSIS — E11.9 TYPE 2 DIABETES MELLITUS WITHOUT COMPLICATION, WITHOUT LONG-TERM CURRENT USE OF INSULIN (H): ICD-10-CM

## 2024-08-15 RX ORDER — METFORMIN HCL 500 MG
TABLET, EXTENDED RELEASE 24 HR ORAL
Qty: 180 TABLET | Refills: 0 | Status: SHIPPED | OUTPATIENT
Start: 2024-08-15

## 2024-08-19 ENCOUNTER — ANCILLARY ORDERS (OUTPATIENT)
Dept: FAMILY MEDICINE | Facility: CLINIC | Age: 54
End: 2024-08-19

## 2024-08-19 DIAGNOSIS — Z12.31 VISIT FOR SCREENING MAMMOGRAM: Primary | ICD-10-CM

## 2024-08-20 ENCOUNTER — OFFICE VISIT (OUTPATIENT)
Dept: OBGYN | Facility: CLINIC | Age: 54
End: 2024-08-20
Payer: COMMERCIAL

## 2024-08-20 ENCOUNTER — ANCILLARY PROCEDURE (OUTPATIENT)
Dept: MAMMOGRAPHY | Facility: CLINIC | Age: 54
End: 2024-08-20
Payer: COMMERCIAL

## 2024-08-20 VITALS
BODY MASS INDEX: 32.24 KG/M2 | DIASTOLIC BLOOD PRESSURE: 90 MMHG | SYSTOLIC BLOOD PRESSURE: 134 MMHG | WEIGHT: 175.2 LBS | HEIGHT: 62 IN | OXYGEN SATURATION: 99 % | HEART RATE: 66 BPM

## 2024-08-20 DIAGNOSIS — Z00.00 ENCOUNTER FOR ANNUAL HEALTH EXAMINATION: Primary | ICD-10-CM

## 2024-08-20 DIAGNOSIS — Z12.31 VISIT FOR SCREENING MAMMOGRAM: ICD-10-CM

## 2024-08-20 PROCEDURE — 77067 SCR MAMMO BI INCL CAD: CPT | Mod: TC | Performed by: RADIOLOGY

## 2024-08-20 PROCEDURE — 99396 PREV VISIT EST AGE 40-64: CPT | Performed by: OBSTETRICS & GYNECOLOGY

## 2024-08-20 PROCEDURE — 77063 BREAST TOMOSYNTHESIS BI: CPT | Mod: TC | Performed by: RADIOLOGY

## 2024-08-20 NOTE — PROGRESS NOTES
Rhonda Cordero is a 53 year old female , who presents for annual exam.   No unusual bleeding, no incontinence, or unusual discharge.   Last cholesterol:   Recent Labs   Lab Test 24  1422 23  1503   CHOL 123 183   HDL 44* 47*   LDL 51 108*   TRIG 138 141     Past Medical History:   Diagnosis Date    Anemia     Diabetes (H) 2023    Dr. Denson    Hypertension goal BP (blood pressure) < 130/80 2011    Hypocalcemia     Multinodular goiter     Postsurgical hypothyroidism     Rotator cuff tendonitis 02/10/2015    Vaginal Papanicolaou smear not required due to history of hysterectomy        Past Surgical History:   Procedure Laterality Date    COLONOSCOPY N/A 3/24/2023    Procedure: COLONOSCOPY, FLEXIBLE, WITH LESION REMOVAL USING SNARE;  Surgeon: Chet Escobar MD;  Location: [facility]    COLONOSCOPY WITH CO2 INSUFFLATION N/A 3/24/2023    Procedure: COLONOSCOPY, WITH CO2 INSUFFLATION;  Surgeon: Chet Escobar MD;  Location: [facility]    COMBINED ESOPHAGOSCOPY, GASTROSCOPY, DUODENOSCOPY (EGD) WITH CO2 INSUFFLATION N/A 2023    Procedure: Combined Esophagoscopy, Gastroscopy, Duodenoscopy (Egd) With Co2 Insufflation;  Surgeon: Sarita Montes DO;  Location: [facility]    ESOPHAGOSCOPY, GASTROSCOPY, DUODENOSCOPY (EGD), COMBINED N/A 2023    Procedure: ESOPHAGOGASTRODUODENOSCOPY, WITH BIOPSY;  Surgeon: Sarita Montes DO;  Location:  OR    LAPAROSCOPY,TUBAL CAUTERY      SALPINGO OOPHORECTOMY,R/L/LUIS      left    THYROIDECTOMY N/A 2017    Procedure: THYROIDECTOMY;  Total Thyroidectomy;  Surgeon: Skylar Costa MD;  Location:  OR    [facility] TOTAL ABDOM HYSTERECTOMY  09    [facility]       OB History    Para Term  AB Living   2 2 0 0 0 2   SAB IAB Ectopic Multiple Live Births   0 0 0 0 0      # Outcome Date GA Lbr Roc/2nd Weight Sex Type Anes PTL Lv   2 Para            1 Para                Gyn History:  Gynecological History             Patient's last menstrual period was 11/23/2009.         history of abnormal pap smear:  No  Last pap:   Lab Results   Component Value Date    PAP NIL 02/21/2014           Current Outpatient Medications   Medication Sig Dispense Refill    aspirin 81 MG EC tablet Take 1 tablet (81 mg) by mouth daily      atorvastatin (LIPITOR) 10 MG tablet Take 1 tablet (10 mg) by mouth daily 90 tablet 1    calcitRIOL (ROCALTROL) 0.25 MCG capsule TAKE TWO CAPSULES BY MOUTH ONCE DAILY 180 capsule 1    calcium carbonate antacid 1000 MG CHEW Take 2 tablets by mouth 3 times daily 60 tablet 3    clobetasol (TEMOVATE) 0.05 % external ointment Apply sparingly twice to three times daily for 3-4 weeks to affected areas on buttock. Please dispense 60 gram quantity. 60 g 4    hydrochlorothiazide (HYDRODIURIL) 25 MG tablet TAKE ONE TABLET BY MOUTH ONCE DAILY. NEEDS TO BE SEEN BY MD. 90 tablet 0    levothyroxine (SYNTHROID/LEVOTHROID) 100 MCG tablet Take 1 tablet (100 mcg) by mouth daily 90 tablet 1    lisinopril (ZESTRIL) 30 MG tablet Take 1 tablet (30 mg) by mouth daily 90 tablet 3    metFORMIN (GLUCOPHAGE XR) 500 MG 24 hr tablet TAKE TWO TABLETS BY MOUTH ONCE DAILY 180 tablet 0    omeprazole (PRILOSEC) 40 MG DR capsule Take 1 capsule (40 mg) by mouth 2 times daily (before meals) 60 capsule 3    omeprazole (PRILOSEC) 40 MG DR capsule Take 1 capsule (40 mg) by mouth daily 30 capsule 3       Allergies   Allergen Reactions    Advil [Ibuprofen] Swelling     Hands swelled    Cephalexin Itching       Social History     Socioeconomic History    Marital status:      Spouse name: Not on file    Number of children: 2    Years of education: 12    Highest education level: Not on file   Occupational History     Employer: [employer]   Tobacco Use    Smoking status: Never     Passive exposure: Never    Smokeless tobacco: Never   Vaping Use    Vaping status: Never Used   Substance and Sexual Activity    Alcohol use: Yes     Comment: occ    Drug  use: No    Sexual activity: Not Currently     Partners: Male     Birth control/protection: Surgical   Other Topics Concern    Parent/sibling w/ CABG, MI or angioplasty before 65F 55M? No     Service No    Blood Transfusions Yes     Comment: had 3 units 10/23/2009    Caffeine Concern No    Occupational Exposure No    Hobby Hazards No    Sleep Concern Not Asked     Comment: sleeps about 5-6 hours a night    Stress Concern Not Asked    Weight Concern Not Asked    Special Diet No    Back Care Not Asked    Exercise Yes     Comment: walk    Bike Helmet Not Asked     Comment: doesn't ride a bike    Seat Belt Yes    Self-Exams No     Comment: info given on SBE   Social History Narrative    Not on file     Social Determinants of Health     Financial Resource Strain: Low Risk  (11/8/2023)    Financial Resource Strain     Within the past 12 months, have you or your family members you live with been unable to get utilities (heat, electricity) when it was really needed?: No   Food Insecurity: Low Risk  (11/8/2023)    Food Insecurity     Within the past 12 months, did you worry that your food would run out before you got money to buy more?: No     Within the past 12 months, did the food you bought just not last and you didn t have money to get more?: No   Transportation Needs: Low Risk  (11/8/2023)    Transportation Needs     Within the past 12 months, has lack of transportation kept you from medical appointments, getting your medicines, non-medical meetings or appointments, work, or from getting things that you need?: No   Physical Activity: Not on file   Stress: Not on file   Social Connections: Not on file   Interpersonal Safety: Low Risk  (7/5/2024)    Interpersonal Safety     Do you feel physically and emotionally safe where you currently live?: Yes     Within the past 12 months, have you been hit, slapped, kicked or otherwise physically hurt by someone?: No     Within the past 12 months, have you been humiliated or  "emotionally abused in other ways by your partner or ex-partner?: No   Housing Stability: Low Risk  (2023)    Housing Stability     Do you have housing? : Yes     Are you worried about losing your housing?: No       Family History   Problem Relation Age of Onset    Cerebrovascular Disease Mother 50    Cerebrovascular Disease Father 49         49    Hypertension Father     Cancer No family hx of     Diabetes No family hx of     Thyroid Disease No family hx of     Glaucoma No family hx of     Macular Degeneration No family hx of          ROS:  All negative except as above.      EXAM:  BP (!) 134/90 (BP Location: Right arm, Cuff Size: Adult Regular)   Pulse 66   Ht 1.568 m (5' 1.75\")   Wt 79.5 kg (175 lb 3.2 oz)   LMP 2009   SpO2 99%   BMI 32.30 kg/m    General:  WNWD female, NAD  Alert  Oriented x 3  Gait:  Normal  Skin:  Normal skin turgor  Neurologic:  CN grossly intact, good sensation.    HEENT:  NC/AT, EOMI  Neck:  No masses palpated, symmetrical, carotids +2/4, no bruits heard  Heart:  RRR  Lungs:  Clear   Breasts:  Symmetrical, no dimpling noted, no masses palpated, no discharge expressed  Abdomen:  Non-tender, non-distended.  Vulva: No external lesions, normal hair distribution, no adenopathy  BUS:  Normal, no masses noted  Urethra:  No hypermobility noted  Urethral meatus:  No masses noted  Vagina: Moist, pink, no abnormal discharge, well rugated, no lesions  Cervix:  Absent   Uterus: Absent   Ovaries: No mass, non-tender, mobile  Perianal:  No masses noted.   Rectal exam: Not performed since recent colonoscopy   Extremities:  No clubbing, cyanosis, or edema      ASSESSMENT/PLAN   Annual examination   Mammogram was today.  Final report is pending.   Low fat diet, weight bearing exercises and self breast exams on a monthly basis have been recommended.  I have discussed with patient the risks, benefits, medications, treatment options and modalities.   I have instructed the patient to call or " schedule a follow-up appointment if any problems.

## 2024-08-23 ENCOUNTER — OFFICE VISIT (OUTPATIENT)
Dept: PODIATRY | Facility: CLINIC | Age: 54
End: 2024-08-23
Payer: COMMERCIAL

## 2024-08-23 ENCOUNTER — ANCILLARY PROCEDURE (OUTPATIENT)
Dept: GENERAL RADIOLOGY | Facility: CLINIC | Age: 54
End: 2024-08-23
Attending: PODIATRIST
Payer: COMMERCIAL

## 2024-08-23 VITALS — OXYGEN SATURATION: 99 % | HEART RATE: 74 BPM

## 2024-08-23 DIAGNOSIS — M79.672 LEFT FOOT PAIN: ICD-10-CM

## 2024-08-23 DIAGNOSIS — M79.672 LEFT FOOT PAIN: Primary | ICD-10-CM

## 2024-08-23 LAB
CRP SERPL-MCNC: 16.2 MG/L
ERYTHROCYTE [SEDIMENTATION RATE] IN BLOOD BY WESTERGREN METHOD: 25 MM/HR (ref 0–30)
URATE SERPL-MCNC: 3.8 MG/DL (ref 2.4–5.7)
WBC # BLD AUTO: 10.9 10E3/UL (ref 4–11)

## 2024-08-23 PROCEDURE — 99213 OFFICE O/P EST LOW 20 MIN: CPT | Performed by: PODIATRIST

## 2024-08-23 PROCEDURE — 86140 C-REACTIVE PROTEIN: CPT | Performed by: PODIATRIST

## 2024-08-23 PROCEDURE — 85048 AUTOMATED LEUKOCYTE COUNT: CPT | Performed by: PODIATRIST

## 2024-08-23 PROCEDURE — 73630 X-RAY EXAM OF FOOT: CPT | Mod: TC | Performed by: RADIOLOGY

## 2024-08-23 PROCEDURE — 84550 ASSAY OF BLOOD/URIC ACID: CPT | Performed by: PODIATRIST

## 2024-08-23 PROCEDURE — 36415 COLL VENOUS BLD VENIPUNCTURE: CPT | Performed by: PODIATRIST

## 2024-08-23 PROCEDURE — 85652 RBC SED RATE AUTOMATED: CPT | Performed by: PODIATRIST

## 2024-08-23 NOTE — PROGRESS NOTES
Subjective:    8/1/24   Pt is seen today in consult from Dr. Denson with the c/c of painful left foot.  This has been symptomatic for the past 2 months.  Pt denies any hx of trauma.  Aggravated by activity and releaved by rest.  Describes as burning pain.  Is slowly getting worse.   Denies numbness, weakness, ecchymosis.  Patient has edema over this.  Working at a job where she is on her feet all day.  Her work shoes are-year-old.  She is not wearing a good supportive shoe in the house.    8/23/24 patient returns for evaluation of painful left foot.  We saw her approximately 3 weeks ago.  Has been wearing cam walker at work.  If in cam walker no pain.  As soon as she walks without it foot painful again.  It has not improved.  We have decreased the amount she is walking at work.  She again points to her styloid process all around and somewhat proximal on her her foot dorsal to this area.  Denies erythema weakness numbness.  Denies history of gout.        ROS:   see above       Allergies   Allergen Reactions    Advil [Ibuprofen] Swelling     Hands swelled    Cephalexin Itching       Current Outpatient Medications   Medication Sig Dispense Refill    aspirin 81 MG EC tablet Take 1 tablet (81 mg) by mouth daily      atorvastatin (LIPITOR) 10 MG tablet Take 1 tablet (10 mg) by mouth daily 90 tablet 1    calcitRIOL (ROCALTROL) 0.25 MCG capsule TAKE TWO CAPSULES BY MOUTH ONCE DAILY 180 capsule 1    calcium carbonate antacid 1000 MG CHEW Take 2 tablets by mouth 3 times daily 60 tablet 3    clobetasol (TEMOVATE) 0.05 % external ointment Apply sparingly twice to three times daily for 3-4 weeks to affected areas on buttock. Please dispense 60 gram quantity. 60 g 4    hydrochlorothiazide (HYDRODIURIL) 25 MG tablet TAKE ONE TABLET BY MOUTH ONCE DAILY. NEEDS TO BE SEEN BY MD. 90 tablet 0    levothyroxine (SYNTHROID/LEVOTHROID) 100 MCG tablet Take 1 tablet (100 mcg) by mouth daily 90 tablet 1    lisinopril (ZESTRIL) 30 MG tablet Take  1 tablet (30 mg) by mouth daily 90 tablet 3    metFORMIN (GLUCOPHAGE XR) 500 MG 24 hr tablet TAKE TWO TABLETS BY MOUTH ONCE DAILY 180 tablet 0    omeprazole (PRILOSEC) 40 MG DR capsule Take 1 capsule (40 mg) by mouth 2 times daily (before meals) 60 capsule 3    omeprazole (PRILOSEC) 40 MG DR capsule Take 1 capsule (40 mg) by mouth daily 30 capsule 3       Patient Active Problem List   Diagnosis    CARDIOVASCULAR SCREENING; LDL GOAL LESS THAN 130    Impingement syndrome, shoulder    Rotator cuff tendonitis    Hashimoto's thyroiditis    Nontoxic multinodular goiter    Shoulder joint pain    Gastroesophageal reflux disease, esophagitis presence not specified    Cervical adenopathy    Hypertension goal BP (blood pressure) < 140/90    Essential hypertension with goal blood pressure less than 140/90    Anup's deformity of left heel    History of thyroidectomy    Postoperative hypothyroidism    Hypocalcemia       Past Medical History:   Diagnosis Date    Anemia     Diabetes (H) 08/2023    Dr. Denson    Hypertension goal BP (blood pressure) < 130/80 05/06/2011    Hypocalcemia     Multinodular goiter     Postsurgical hypothyroidism     Rotator cuff tendonitis 02/10/2015    Vaginal Papanicolaou smear not required due to history of hysterectomy        Past Surgical History:   Procedure Laterality Date    COLONOSCOPY N/A 3/24/2023    Procedure: COLONOSCOPY, FLEXIBLE, WITH LESION REMOVAL USING SNARE;  Surgeon: Chet Escobar MD;  Location: [facility]    COLONOSCOPY WITH CO2 INSUFFLATION N/A 3/24/2023    Procedure: COLONOSCOPY, WITH CO2 INSUFFLATION;  Surgeon: Chet Escobar MD;  Location: [facility]    COMBINED ESOPHAGOSCOPY, GASTROSCOPY, DUODENOSCOPY (EGD) WITH CO2 INSUFFLATION N/A 7/28/2023    Procedure: Combined Esophagoscopy, Gastroscopy, Duodenoscopy (Egd) With Co2 Insufflation;  Surgeon: Sarita Montes DO;  Location: [facility]    ESOPHAGOSCOPY, GASTROSCOPY, DUODENOSCOPY (EGD), COMBINED N/A 7/28/2023    Procedure:  ESOPHAGOGASTRODUODENOSCOPY, WITH BIOPSY;  Surgeon: Sarita Montes DO;  Location:  OR    LAPAROSCOPY,TUBAL CAUTERY      SALPINGO OOPHORECTOMY,R/L/LUIS      left    THYROIDECTOMY N/A 2017    Procedure: THYROIDECTOMY;  Total Thyroidectomy;  Surgeon: Skylar Costa MD;  Location:  OR    [facility] TOTAL ABDOM HYSTERECTOMY  09    [facility]       Family History   Problem Relation Age of Onset    Cerebrovascular Disease Mother 50    Cerebrovascular Disease Father 49         49    Hypertension Father     Cancer No family hx of     Diabetes No family hx of     Thyroid Disease No family hx of     Glaucoma No family hx of     Macular Degeneration No family hx of        Social History     Tobacco Use    Smoking status: Never     Passive exposure: Never    Smokeless tobacco: Never   Substance Use Topics    Alcohol use: Yes     Comment: occ             O:  LMP 2009 .       Constitutional/ general:  Pt is in no apparent distress, appears well-nourished.  Cooperative with history and physical exam.     Psych:  The patient answered questions appropriately.  Normal affect.  Seems to have reasonable expectations, in terms of treatment.     Lungs:  Non labored breathing, non labored speech. No cough.  No audible wheezing. Even, quiet breathing.       Vascular:  Pedal pulses are palpable bilaterally for both the DP and PT arteries.  CFT < 3 sec.  No edema.  Pedal hair growth noted.     Neuro:  Alert and oriented x 3. Coordinated gait.  Light touch sensation is intact     Derm: Normal texture and turgor.  No erythema, ecchymosis, or cyanosis.  No open lesions.     Musculoskeletal:   Cavus foot with weightbearing bilateral.  No forefoot or rear foot deformities noted.  MS 5/5 all compartments.  Normal ROM all fore foot and rearfoot joints with no pain or crepitus.  No equinus.  Pain all around styloid process left foot.  Some pain dorsal lateral foot just proximal to styloid process.  No pain over  ATFL.  No pain stressing peroneus longus.  Some pain in calcaneocuboid joint.  Positive edema.  No masses.  No ecchymosis.     Radiographic Exam:  X-Ray Findings:  I personally reviewed the films, unchanged from last x-rays.    Past labs for inflammatory arthritis unremarkable.    A:  Cavus foot with styloid process pain    Plan:  X-ray taken today of left foot.  Discussed no change and no obvious signs of stress fracture.  Discussed she could have stress reaction.  Explained only way to see this is on MRI.  Discussed that if she has stress reaction may have to be nonweightbearing to heal this.  Discussed gout also a possibility.  Have checked for other forms of inflammatory arthritis but not gout.  Will send to lab and call with results.  If normal and patient not improved may consider MRI.      Otis Carson DPM, FACFAS

## 2024-08-23 NOTE — LETTER
8/23/2024      Rhonda Cordero  [address]  [facility] [address]      Dear Colleague,    Thank you for referring your patient, Rhonda Cordero, to the [facility]. Please see a copy of my visit note below.    Subjective:    8/1/24   Pt is seen today in consult from Dr. Dneson with the c/c of painful left foot.  This has been symptomatic for the past 2 months.  Pt denies any hx of trauma.  Aggravated by activity and releaved by rest.  Describes as burning pain.  Is slowly getting worse.   Denies numbness, weakness, ecchymosis.  Patient has edema over this.  Working at a job where she is on her feet all day.  Her work shoes are-year-old.  She is not wearing a good supportive shoe in the house.    8/23/24 patient returns for evaluation of painful left foot.  We saw her approximately 3 weeks ago.  Has been wearing cam walker at work.  If in cam walker no pain.  As soon as she walks without it foot painful again.  It has not improved.  We have decreased the amount she is walking at work.  She again points to her styloid process all around and somewhat proximal on her her foot dorsal to this area.  Denies erythema weakness numbness.  Denies history of gout.        ROS:   see above       Allergies   Allergen Reactions     Advil [Ibuprofen] Swelling     Hands swelled     Cephalexin Itching       Current Outpatient Medications   Medication Sig Dispense Refill     aspirin 81 MG EC tablet Take 1 tablet (81 mg) by mouth daily       atorvastatin (LIPITOR) 10 MG tablet Take 1 tablet (10 mg) by mouth daily 90 tablet 1     calcitRIOL (ROCALTROL) 0.25 MCG capsule TAKE TWO CAPSULES BY MOUTH ONCE DAILY 180 capsule 1     calcium carbonate antacid 1000 MG CHEW Take 2 tablets by mouth 3 times daily 60 tablet 3     clobetasol (TEMOVATE) 0.05 % external ointment Apply sparingly twice to three times daily for 3-4 weeks to affected areas on buttock. Please dispense 60 gram quantity. 60 g 4      hydrochlorothiazide (HYDRODIURIL) 25 MG tablet TAKE ONE TABLET BY MOUTH ONCE DAILY. NEEDS TO BE SEEN BY MD. 90 tablet 0     levothyroxine (SYNTHROID/LEVOTHROID) 100 MCG tablet Take 1 tablet (100 mcg) by mouth daily 90 tablet 1     lisinopril (ZESTRIL) 30 MG tablet Take 1 tablet (30 mg) by mouth daily 90 tablet 3     metFORMIN (GLUCOPHAGE XR) 500 MG 24 hr tablet TAKE TWO TABLETS BY MOUTH ONCE DAILY 180 tablet 0     omeprazole (PRILOSEC) 40 MG DR capsule Take 1 capsule (40 mg) by mouth 2 times daily (before meals) 60 capsule 3     omeprazole (PRILOSEC) 40 MG DR capsule Take 1 capsule (40 mg) by mouth daily 30 capsule 3       Patient Active Problem List   Diagnosis     CARDIOVASCULAR SCREENING; LDL GOAL LESS THAN 130     Impingement syndrome, shoulder     Rotator cuff tendonitis     Hashimoto's thyroiditis     Nontoxic multinodular goiter     Shoulder joint pain     Gastroesophageal reflux disease, esophagitis presence not specified     Cervical adenopathy     Hypertension goal BP (blood pressure) < 140/90     Essential hypertension with goal blood pressure less than 140/90     Anup's deformity of left heel     History of thyroidectomy     Postoperative hypothyroidism     Hypocalcemia       Past Medical History:   Diagnosis Date     Anemia      Diabetes (H) 08/2023    Dr. Denson     Hypertension goal BP (blood pressure) < 130/80 05/06/2011     Hypocalcemia      Multinodular goiter      Postsurgical hypothyroidism      Rotator cuff tendonitis 02/10/2015     Vaginal Papanicolaou smear not required due to history of hysterectomy        Past Surgical History:   Procedure Laterality Date     COLONOSCOPY N/A 3/24/2023    Procedure: COLONOSCOPY, FLEXIBLE, WITH LESION REMOVAL USING SNARE;  Surgeon: Chet Escobar MD;  Location: [facility] OR     COLONOSCOPY WITH CO2 INSUFFLATION N/A 3/24/2023    Procedure: COLONOSCOPY, WITH CO2 INSUFFLATION;  Surgeon: Chet Escobar MD;  Location: [facility] OR     COMBINED ESOPHAGOSCOPY,  GASTROSCOPY, DUODENOSCOPY (EGD) WITH CO2 INSUFFLATION N/A 2023    Procedure: Combined Esophagoscopy, Gastroscopy, Duodenoscopy (Egd) With Co2 Insufflation;  Surgeon: Sarita Montes DO;  Location: [facility] OR     ESOPHAGOSCOPY, GASTROSCOPY, DUODENOSCOPY (EGD), COMBINED N/A 2023    Procedure: ESOPHAGOGASTRODUODENOSCOPY, WITH BIOPSY;  Surgeon: Sarita Montes DO;  Location: [facility] OR     LAPAROSCOPY,TUBAL CAUTERY       SALPINGO OOPHORECTOMY,R/L/LUIS      left     THYROIDECTOMY N/A 2017    Procedure: THYROIDECTOMY;  Total Thyroidectomy;  Surgeon: Skylar Costa MD;  Location:  OR     [facility] TOTAL ABDOM HYSTERECTOMY  09    [facility]       Family History   Problem Relation Age of Onset     Cerebrovascular Disease Mother 50     Cerebrovascular Disease Father 49         49     Hypertension Father      Cancer No family hx of      Diabetes No family hx of      Thyroid Disease No family hx of      Glaucoma No family hx of      Macular Degeneration No family hx of        Social History     Tobacco Use     Smoking status: Never     Passive exposure: Never     Smokeless tobacco: Never   Substance Use Topics     Alcohol use: Yes     Comment: occ             O:  LMP 2009 .       Constitutional/ general:  Pt is in no apparent distress, appears well-nourished.  Cooperative with history and physical exam.     Psych:  The patient answered questions appropriately.  Normal affect.  Seems to have reasonable expectations, in terms of treatment.     Lungs:  Non labored breathing, non labored speech. No cough.  No audible wheezing. Even, quiet breathing.       Vascular:  Pedal pulses are palpable bilaterally for both the DP and PT arteries.  CFT < 3 sec.  No edema.  Pedal hair growth noted.     Neuro:  Alert and oriented x 3. Coordinated gait.  Light touch sensation is intact     Derm: Normal texture and turgor.  No erythema, ecchymosis, or cyanosis.  No open lesions.     Musculoskeletal:   Cavus  foot with weightbearing bilateral.  No forefoot or rear foot deformities noted.  MS 5/5 all compartments.  Normal ROM all fore foot and rearfoot joints with no pain or crepitus.  No equinus.  Pain all around styloid process left foot.  Some pain dorsal lateral foot just proximal to styloid process.  No pain over ATFL.  No pain stressing peroneus longus.  Some pain in calcaneocuboid joint.  Positive edema.  No masses.  No ecchymosis.     Radiographic Exam:  X-Ray Findings:  I personally reviewed the films, unchanged from last x-rays.    Past labs for inflammatory arthritis unremarkable.    A:  Cavus foot with styloid process pain    Plan:  X-ray taken today of left foot.  Discussed no change and no obvious signs of stress fracture.  Discussed she could have stress reaction.  Explained only way to see this is on MRI.  Discussed that if she has stress reaction may have to be nonweightbearing to heal this.  Discussed gout also a possibility.  Have checked for other forms of inflammatory arthritis but not gout.  Will send to lab and call with results.  If normal and patient not improved may consider MRI.      Otis Carson DPM, FACFAS         Again, thank you for allowing me to participate in the care of your patient.        Sincerely,        Otis Carson DPM

## 2024-08-23 NOTE — PATIENT INSTRUCTIONS
We wish you continued good healing. If you have any questions or concerns, please do not hesitate to contact us at  [phone]    YouEyet (secure e-mail communication and access to your chart) to send a message or to make an appointment.    Please remember to call and schedule a follow up appointment if one was recommended at your earliest convenience.     PODIATRY CLINIC HOURS  TELEPHONE NUMBER    Dr. Otis MUELLERPNALDO FACFAS        Clinics:  [facility]   [place]  Tuesday 1PM-6PM  [place]  Wednesday 745AM-330PM  [place]  Monday 2nd,4th  830AM-4PM  Thursday/Friday 745AM-230PM     [place] APPOINTMENTS  [phone]    [place] APPOINTMENTS  [phone]          If you need a medication refill, please contact us you may need lab work and/or a follow up visit prior to your refill (i.e. Antifungal medications).  If MRI needed please call Imaging at [phone]   HOW DO I GET MY KNEE SCOOTER? Knee scooters can be picked up at ANY Medical Supply stores with your knee scooter Prescription.  OR  Bring your signed prescription to an [facility] showroom.   Set up an appointment for your custom Orthotics. Call any Orthotics locations call [phone]

## 2024-08-26 DIAGNOSIS — I10 HYPERTENSION GOAL BP (BLOOD PRESSURE) < 140/90: ICD-10-CM

## 2024-08-27 ENCOUNTER — MYC MEDICAL ADVICE (OUTPATIENT)
Dept: PODIATRY | Facility: CLINIC | Age: 54
End: 2024-08-27
Payer: COMMERCIAL

## 2024-08-27 DIAGNOSIS — M79.672 LEFT FOOT PAIN: Primary | ICD-10-CM

## 2024-08-29 RX ORDER — LISINOPRIL 30 MG/1
30 TABLET ORAL DAILY
Qty: 90 TABLET | Refills: 3 | Status: SHIPPED | OUTPATIENT
Start: 2024-08-29

## 2024-09-13 ENCOUNTER — ANCILLARY PROCEDURE (OUTPATIENT)
Dept: MRI IMAGING | Facility: CLINIC | Age: 54
End: 2024-09-13
Attending: PODIATRIST
Payer: COMMERCIAL

## 2024-09-13 ENCOUNTER — OFFICE VISIT (OUTPATIENT)
Dept: OPTOMETRY | Facility: CLINIC | Age: 54
End: 2024-09-13
Payer: COMMERCIAL

## 2024-09-13 DIAGNOSIS — M79.672 LEFT FOOT PAIN: ICD-10-CM

## 2024-09-13 DIAGNOSIS — H52.4 PRESBYOPIA: ICD-10-CM

## 2024-09-13 DIAGNOSIS — Z01.01 ENCOUNTER FOR EXAMINATION OF EYES AND VISION WITH ABNORMAL FINDINGS: Primary | ICD-10-CM

## 2024-09-13 DIAGNOSIS — E11.9 TYPE 2 DIABETES MELLITUS WITHOUT RETINOPATHY (H): ICD-10-CM

## 2024-09-13 PROCEDURE — 92015 DETERMINE REFRACTIVE STATE: CPT | Performed by: OPTOMETRIST

## 2024-09-13 PROCEDURE — 73718 MRI LOWER EXTREMITY W/O DYE: CPT | Mod: LT | Performed by: RADIOLOGY

## 2024-09-13 PROCEDURE — 92014 COMPRE OPH EXAM EST PT 1/>: CPT | Performed by: OPTOMETRIST

## 2024-09-13 ASSESSMENT — CONF VISUAL FIELD
OD_SUPERIOR_NASAL_RESTRICTION: 0
OD_INFERIOR_NASAL_RESTRICTION: 0
OD_NORMAL: 1
OS_NORMAL: 1
METHOD: COUNTING FINGERS
OS_INFERIOR_TEMPORAL_RESTRICTION: 0
OD_SUPERIOR_TEMPORAL_RESTRICTION: 0
OS_SUPERIOR_TEMPORAL_RESTRICTION: 0
OS_SUPERIOR_NASAL_RESTRICTION: 0
OD_INFERIOR_TEMPORAL_RESTRICTION: 0
OS_INFERIOR_NASAL_RESTRICTION: 0

## 2024-09-13 ASSESSMENT — EXTERNAL EXAM - LEFT EYE: OS_EXAM: NORMAL

## 2024-09-13 ASSESSMENT — TONOMETRY
IOP_METHOD: APPLANATION
OS_IOP_MMHG: 20
OD_IOP_MMHG: 20

## 2024-09-13 ASSESSMENT — REFRACTION_MANIFEST
OD_ADD: +2.00
OD_CYLINDER: SPHERE
OS_SPHERE: -0.25
OS_ADD: +2.00
OS_CYLINDER: SPHERE
OD_SPHERE: PLANO

## 2024-09-13 ASSESSMENT — EXTERNAL EXAM - RIGHT EYE: OD_EXAM: NORMAL

## 2024-09-13 ASSESSMENT — VISUAL ACUITY
OS_SC: 20/60
OS_SC: 20/20
OD_SC: 20/80
METHOD: SNELLEN - LINEAR
OD_SC: 20/20
CORRECTION_TYPE: GLASSES
OD_CC: 20/20-1
OS_CC: 20/20

## 2024-09-13 ASSESSMENT — SLIT LAMP EXAM - LIDS
COMMENTS: NORMAL
COMMENTS: NORMAL

## 2024-09-13 ASSESSMENT — REFRACTION_WEARINGRX
SPECS_TYPE: OTC READERS
OS_CYLINDER: SPHERE
OS_SPHERE: +1.50
OD_SPHERE: +1.50
OD_CYLINDER: SPHERE

## 2024-09-13 ASSESSMENT — CUP TO DISC RATIO
OS_RATIO: 0.3
OD_RATIO: 0.35

## 2024-09-13 NOTE — PATIENT INSTRUCTIONS
Patient educated on importance of good blood sugar control.  Letter sent to primary care provider with diabetic eye exam report.     Continue use of OTC reading glasses as needed.     Return in 1 year for a comprehensive eye exam, or sooner if needed.      The effects of the dilating drops last for 4- 6 hours.  You will be more sensitive to light and vision will be blurry up close.  Mydriatic sunglasses were given if needed.     Major Beasley, CHRISTIE  [facility]  [place]  [address] [phone]

## 2024-09-13 NOTE — LETTER
9/13/2024      Chengefren Gil  [address]  [facility] [address]      Dear Colleague,    Thank you for referring your patient, Rhonda Cordero, to the [facility]. Please see a copy of my visit note below.    Chief Complaint   Patient presents with     Diabetic Eye Exam     Accompanied by  Dano    Chief Complaint(s) and History of Present Illness(es)       Diabetic Eye Exam              Diabetes Type: Type 2, controlled with diet and taking oral medications    Duration: 1 year    Blood Sugars: is controlled (Checks infrequently)                   Lab Results   Component Value Date    A1C 7.1 07/05/2024    A1C 7.1 02/15/2024    A1C 7.1 11/08/2023    A1C 7.7 08/22/2023    A1C 7.7 05/05/2023    A1C 7.0 02/18/2021    A1C 6.8 12/11/2020    A1C 6.6 08/19/2020    A1C 6.2 02/12/2019    A1C 5.8 01/08/2018       Last Eye Exam: 9/11/2023  Dilated Previously: Yes, side effects of dilation explained today    What are you currently using to see?  +1.50 OTC readers - uses for everything up close    Distance Vision Acuity: Satisfied with vision    Near Vision Acuity: Satisfied with vision while reading and using computer with readers    Eye Comfort: good  Do you use eye drops? 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       OBJECTIVE: See Ophthalmology exam    ASSESSMENT:    ICD-10-CM    1. Encounter for examination of eyes and vision with abnormal findings  Z01.01       2. Type 2 diabetes mellitus without retinopathy (H)  E11.9       3. Presbyopia  H52.4           PLAN:    Rhonda Cordero aware  eye exam results will be sent to Vasquez Denson  Patient Instructions   Patient educated on importance of good blood sugar control.  Letter sent to primary care provider with diabetic eye exam report.     Continue use of OTC reading glasses as needed.     Return in 1  year for a comprehensive eye exam, or sooner if needed.      The effects of the dilating drops last for 4- 6 hours.  You will be more sensitive to light and vision will be blurry up close.  Mydriatic sunglasses were given if needed.     Major Beasley, CHRISTIE  [facility]  [address] [place] [address]. [place]  [place]  [address]    [phone]           Again, thank you for allowing me to participate in the care of your patient.        Sincerely,        Major Beasley OD

## 2024-09-19 ENCOUNTER — MYC MEDICAL ADVICE (OUTPATIENT)
Dept: PODIATRY | Facility: CLINIC | Age: 54
End: 2024-09-19
Payer: COMMERCIAL

## 2024-09-24 ENCOUNTER — TELEPHONE (OUTPATIENT)
Dept: PODIATRY | Facility: CLINIC | Age: 54
End: 2024-09-24
Payer: COMMERCIAL

## 2024-09-24 NOTE — TELEPHONE ENCOUNTER
Called patient with MRI results.  Discussed she has peroneus longus tendinitis.  Discussed mechanism.  Explained why CAM Walker makes her walking pain-free.  Will continue this.  Will also ice.  Avoid activities that bother this.  Discussed ultrasound-guided injection may help this.  Explained process.  She would like to do this.  We placed order.  Discussed possible tear in tendon.  If not resolving may need surgical repair.  Return to clinic in 3 to 4 weeks if still in problems otherwise as needed

## 2024-10-17 ENCOUNTER — OFFICE VISIT (OUTPATIENT)
Dept: ORTHOPEDICS | Facility: CLINIC | Age: 54
End: 2024-10-17
Attending: PODIATRIST
Payer: COMMERCIAL

## 2024-10-17 DIAGNOSIS — M79.672 LEFT FOOT PAIN: ICD-10-CM

## 2024-10-17 PROCEDURE — 20606 DRAIN/INJ JOINT/BURSA W/US: CPT | Mod: LT | Performed by: FAMILY MEDICINE

## 2024-10-17 RX ORDER — BETAMETHASONE SODIUM PHOSPHATE AND BETAMETHASONE ACETATE 3; 3 MG/ML; MG/ML
6 INJECTION, SUSPENSION INTRA-ARTICULAR; INTRALESIONAL; INTRAMUSCULAR; SOFT TISSUE
Status: COMPLETED | OUTPATIENT
Start: 2024-10-17 | End: 2024-10-17

## 2024-10-17 RX ORDER — ROPIVACAINE HYDROCHLORIDE 5 MG/ML
1 INJECTION, SOLUTION EPIDURAL; INFILTRATION; PERINEURAL
Status: COMPLETED | OUTPATIENT
Start: 2024-10-17 | End: 2024-10-17

## 2024-10-17 RX ADMIN — BETAMETHASONE SODIUM PHOSPHATE AND BETAMETHASONE ACETATE 6 MG: 3; 3 INJECTION, SUSPENSION INTRA-ARTICULAR; INTRALESIONAL; INTRAMUSCULAR; SOFT TISSUE at 10:32

## 2024-10-17 RX ADMIN — ROPIVACAINE HYDROCHLORIDE 1 ML: 5 INJECTION, SOLUTION EPIDURAL; INFILTRATION; PERINEURAL at 10:32

## 2024-10-17 NOTE — PATIENT INSTRUCTIONS
[facility] Injection Discharge Instructions    Procedure: left peroneal tendon steroid injection     Follow-up: 1 mon if not improving    You may shower, however avoid swimming, tub baths or hot tubs for 24 hours following your procedure  You may have a mild to moderate increase in pain for several days following the injection.  It may take up to 14 days for the steroid medication to start working although you may feel the effect as early as a few days after the procedure.  You may use ice packs for 10-15 minutes, 3 to 4 times a day at the injection site for comfort  You may use anti-inflammatory medications (such as Ibuprofen or Aleve or Advil) or Tylenol for pain control if necessary  If you were fasting, you may resume your normal diet and medications after the procedure  If you have diabetes, check your blood sugar more frequently than usual as your blood sugar may be higher than normal for 10-14 days following a steroid injection. Contact your doctor who manages your diabetes if your blood sugar is higher than usual    If you experience any of the following, call [facility] @ [phone] or [phone]  -Fever over 100 degree F  -Swelling, bleeding, redness, drainage, warmth at the injection site  - New or worsening pain     It was great seeing you today!    Rufino Dodson

## 2024-10-17 NOTE — PROGRESS NOTES
Medium Joint Injection/Arthrocentesis: L ankle    Date/Time: 10/17/2024 10:32 AM    Performed by: Rufino Dodson MD  Authorized by: Rufino Dodson MD    Indications:  Pain  Needle Size:  25 G  Guidance: ultrasound    Approach:  Lateral  Location:  Ankle  Site:  L ankle  Medications:  6 mg betamethasone acet & sod phos 6 (3-3) MG/ML; 1 mL ROPivacaine 5 MG/ML  Outcome:  Tolerated well, no immediate complications  Procedure discussed: discussed risks, benefits, and alternatives    Consent Given by:  Patient  Timeout: timeout called immediately prior to procedure    Prep: patient was prepped and draped in usual sterile fashion     Ultrasound images of procedure were permanently stored.   Referred by Dr. Carson    Patient reported some  improvement of pain after the numbing portion left peroneal tendon steroid injection over the midfoot.  Ultrasound guided images were permanently stored.  Aftercare instructions given to patient.  Plan to follow-up as previously discussed with referring provider.     Rufino Dodson MD [facility]

## 2024-11-06 ENCOUNTER — TELEPHONE (OUTPATIENT)
Dept: GASTROENTEROLOGY | Facility: CLINIC | Age: 54
End: 2024-11-06
Payer: COMMERCIAL

## 2024-11-06 NOTE — TELEPHONE ENCOUNTER
Pre visit planning completed.      Procedure details:    Patient scheduled for Upper endoscopy (EGD) on 11/19/24.     Arrival time: 1110. Procedure time 1155    Facility location: [facility]; [address] Floor, [place] [address]. Check in location: 2nd Floor at Surgery desk.    Sedation type: MAC    Pre op exam needed? No.    Indication for procedure: hx of gastric polyps, need EGD for surveillance       Chart review:     Electronic implanted devices? No    Recent diagnosis of diverticulitis within the last 6 weeks? No      Medication review:    Diabetic? Yes. Oral diabetic medications: Metformin (glucophage): HOLD day of procedure. (Diabetes not on problems list, however diagnosis for Metformin is DM)    Anticoagulants? No    Weight loss medication/injectable? No GLP-1 medication per patient's medication list.  RN will verify with pre-assessment call.    Other medication HOLDING recommendations:  N/A      Prep for procedure:         Prep instructions sent via [employer]         Carmen Calhoun RN  Endoscopy Procedure Pre Assessment RN  [phone] option 2

## 2024-11-08 NOTE — TELEPHONE ENCOUNTER
Pre assessment completed for upcoming procedure.   (Please see previous telephone encounter notes for complete details)    Patient  and Family member  returned call.       Procedure details:    Arrival time and facility location reviewed.    Pre op exam needed? No.    Designated  policy reviewed. Instructed to have someone stay 24  hours post procedure.       Medication review:    Medications reviewed. Please see supporting documentation below. Holding recommendations discussed (if applicable).   Oral diabetic medication(s): Metformin (glucophage): HOLD day of procedure.      Prep for procedure:     Procedure prep instructions reviewed.        Any additional information needed:  N/A      Patient  verbalized understanding and had no questions or concerns at this time.      Skylar Guillen RN  Endoscopy Procedure Pre Assessment   [phone] option 2

## 2024-11-17 ENCOUNTER — HEALTH MAINTENANCE LETTER (OUTPATIENT)
Age: 54
End: 2024-11-17

## 2024-11-19 ENCOUNTER — ANESTHESIA (OUTPATIENT)
Dept: SURGERY | Facility: AMBULATORY SURGERY CENTER | Age: 54
End: 2024-11-19
Payer: COMMERCIAL

## 2024-11-19 ENCOUNTER — HOSPITAL ENCOUNTER (OUTPATIENT)
Facility: AMBULATORY SURGERY CENTER | Age: 54
Discharge: HOME | End: 2024-11-19
Attending: INTERNAL MEDICINE
Payer: COMMERCIAL

## 2024-11-19 ENCOUNTER — ANESTHESIA EVENT (OUTPATIENT)
Dept: SURGERY | Facility: AMBULATORY SURGERY CENTER | Age: 54
End: 2024-11-19
Payer: COMMERCIAL

## 2024-11-19 VITALS
DIASTOLIC BLOOD PRESSURE: 84 MMHG | TEMPERATURE: 97.1 F | SYSTOLIC BLOOD PRESSURE: 143 MMHG | OXYGEN SATURATION: 98 % | HEART RATE: 64 BPM | RESPIRATION RATE: 18 BRPM

## 2024-11-19 VITALS — HEART RATE: 84 BPM

## 2024-11-19 LAB
GLUCOSE BLDC GLUCOMTR-MCNC: 131 MG/DL (ref 70–99)
UPPER GI ENDOSCOPY: NORMAL

## 2024-11-19 RX ORDER — FLUMAZENIL 0.1 MG/ML
0.2 INJECTION, SOLUTION INTRAVENOUS
Status: CANCELLED | OUTPATIENT
Start: 2024-11-19 | End: 2024-11-20

## 2024-11-19 RX ORDER — LIDOCAINE HYDROCHLORIDE 20 MG/ML
INJECTION, SOLUTION INFILTRATION; PERINEURAL PRN
Status: DISCONTINUED | OUTPATIENT
Start: 2024-11-19 | End: 2024-11-19

## 2024-11-19 RX ORDER — PROCHLORPERAZINE MALEATE 10 MG
10 TABLET ORAL EVERY 6 HOURS PRN
Status: CANCELLED | OUTPATIENT
Start: 2024-11-19

## 2024-11-19 RX ORDER — NALOXONE HYDROCHLORIDE 0.4 MG/ML
0.4 INJECTION, SOLUTION INTRAMUSCULAR; INTRAVENOUS; SUBCUTANEOUS
Status: CANCELLED | OUTPATIENT
Start: 2024-11-19

## 2024-11-19 RX ORDER — NALOXONE HYDROCHLORIDE 0.4 MG/ML
0.2 INJECTION, SOLUTION INTRAMUSCULAR; INTRAVENOUS; SUBCUTANEOUS
Status: CANCELLED | OUTPATIENT
Start: 2024-11-19

## 2024-11-19 RX ORDER — ONDANSETRON 4 MG/1
4 TABLET, ORALLY DISINTEGRATING ORAL EVERY 6 HOURS PRN
Status: CANCELLED | OUTPATIENT
Start: 2024-11-19

## 2024-11-19 RX ORDER — PROPOFOL 10 MG/ML
INJECTION, EMULSION INTRAVENOUS PRN
Status: DISCONTINUED | OUTPATIENT
Start: 2024-11-19 | End: 2024-11-19

## 2024-11-19 RX ORDER — LIDOCAINE 40 MG/G
CREAM TOPICAL
Status: DISCONTINUED | OUTPATIENT
Start: 2024-11-19 | End: 2024-11-20 | Stop reason: HOSPADM

## 2024-11-19 RX ORDER — SODIUM CHLORIDE, SODIUM LACTATE, POTASSIUM CHLORIDE, CALCIUM CHLORIDE 600; 310; 30; 20 MG/100ML; MG/100ML; MG/100ML; MG/100ML
INJECTION, SOLUTION INTRAVENOUS CONTINUOUS PRN
Status: DISCONTINUED | OUTPATIENT
Start: 2024-11-19 | End: 2024-11-19

## 2024-11-19 RX ORDER — ONDANSETRON 2 MG/ML
4 INJECTION INTRAMUSCULAR; INTRAVENOUS
Status: DISCONTINUED | OUTPATIENT
Start: 2024-11-19 | End: 2024-11-20 | Stop reason: HOSPADM

## 2024-11-19 RX ORDER — ONDANSETRON 2 MG/ML
4 INJECTION INTRAMUSCULAR; INTRAVENOUS EVERY 6 HOURS PRN
Status: CANCELLED | OUTPATIENT
Start: 2024-11-19

## 2024-11-19 RX ADMIN — PROPOFOL 20 MG: 10 INJECTION, EMULSION INTRAVENOUS at 12:14

## 2024-11-19 RX ADMIN — PROPOFOL 80 MG: 10 INJECTION, EMULSION INTRAVENOUS at 12:13

## 2024-11-19 RX ADMIN — PROPOFOL 200 MCG/KG/MIN: 10 INJECTION, EMULSION INTRAVENOUS at 12:15

## 2024-11-19 RX ADMIN — LIDOCAINE HYDROCHLORIDE 80 MG: 20 INJECTION, SOLUTION INFILTRATION; PERINEURAL at 12:13

## 2024-11-19 RX ADMIN — SODIUM CHLORIDE, SODIUM LACTATE, POTASSIUM CHLORIDE, CALCIUM CHLORIDE: 600; 310; 30; 20 INJECTION, SOLUTION INTRAVENOUS at 12:00

## 2024-11-19 NOTE — ANESTHESIA CARE TRANSFER NOTE
Patient: Rhonda Cordero    Procedure: Procedure(s):  Combined Esophagoscopy, Gastroscopy, Duodenoscopy (Egd) With Co2 Insufflation  ESOPHAGOGASTRODUODENOSCOPY, WITH BIOPSY       Diagnosis: Gastric polyps [K31.7]  Diagnosis Additional Information: No value filed.    Anesthesia Type:   MAC     Note:    Oropharynx: oropharynx clear of all foreign objects and spontaneously breathing  Level of Consciousness: drowsy  Oxygen Supplementation: room air    Independent Airway: airway patency satisfactory and stable  Dentition: dentition unchanged  Vital Signs Stable: post-procedure vital signs reviewed and stable  Report to RN Given: handoff report given  Patient transferred to: Phase II    Handoff Report: Identifed the Patient, Identified the Reponsible Provider, Reviewed the pertinent medical history, Discussed the surgical course, Reviewed Intra-OP anesthesia mangement and issues during anesthesia, Set expectations for post-procedure period and Allowed opportunity for questions and acknowledgement of understanding  Vitals:  Vitals Value Taken Time   BP     Temp     Pulse 84 11/19/24 1224   Resp     SpO2         Electronically Signed By: MADELEINE Ruiz CRNA  November 19, 2024  12:27 PM

## 2024-11-19 NOTE — H&P
[facility] Anesthesia Pre-op History and Physical    Rhonda Cordero MRN# [id number]   Age: 53 year old YOB: 1970            Date of Exam 11/19/2024         Primary care provider: Vasquez Denson         Chief Complaint and/or Reason for Procedure:     History of gastric polyps, history of atrophic gastritis         Active problem list:     Patient Active Problem List    Diagnosis Date Noted    Postoperative hypothyroidism 09/19/2018     Priority: Medium    Hypocalcemia 09/19/2018     Priority: Medium    History of thyroidectomy 09/26/2017     Priority: Medium    Anup's deformity of left heel 02/24/2017     Priority: Medium    Essential hypertension with goal blood pressure less than 140/90 11/09/2016     Priority: Medium    Hypertension goal BP (blood pressure) < 140/90 01/21/2016     Priority: Medium    Gastroesophageal reflux disease, esophagitis presence not specified 01/15/2016     Priority: Medium     IMO Regulatory Load OCT 2020      Cervical adenopathy 01/15/2016     Priority: Medium    Shoulder joint pain 03/15/2015     Priority: Medium    Hashimoto's thyroiditis 03/04/2015     Priority: Medium    Nontoxic multinodular goiter 03/04/2015     Priority: Medium    Impingement syndrome, shoulder 02/10/2015     Priority: Medium    Rotator cuff tendonitis 02/10/2015     Priority: Medium    CARDIOVASCULAR SCREENING; LDL GOAL LESS THAN 130 09/13/2013     Priority: Medium            Medications (include herbals and vitamins):   Any Plavix use in the last 7 days? No     Current Outpatient Medications   Medication Sig Dispense Refill    aspirin 81 MG EC tablet Take 1 tablet (81 mg) by mouth daily      atorvastatin (LIPITOR) 10 MG tablet Take 1 tablet (10 mg) by mouth daily 90 tablet 1    hydrochlorothiazide (HYDRODIURIL) 25 MG tablet TAKE ONE TABLET BY MOUTH ONCE DAILY. NEEDS TO BE SEEN BY MD. 90 tablet 0    levothyroxine (SYNTHROID/LEVOTHROID) 100 MCG tablet Take 1 tablet (100 mcg) by  mouth daily 90 tablet 1    lisinopril (ZESTRIL) 30 MG tablet TAKE ONE TABLET BY MOUTH ONCE DAILY 90 tablet 3    metFORMIN (GLUCOPHAGE XR) 500 MG 24 hr tablet TAKE TWO TABLETS BY MOUTH ONCE DAILY 180 tablet 0    omeprazole (PRILOSEC) 40 MG DR capsule Take 1 capsule (40 mg) by mouth 2 times daily (before meals) 60 capsule 3    omeprazole (PRILOSEC) 40 MG DR capsule Take 1 capsule (40 mg) by mouth daily 30 capsule 3    calcitRIOL (ROCALTROL) 0.25 MCG capsule TAKE TWO CAPSULES BY MOUTH ONCE DAILY 180 capsule 1    calcium carbonate antacid 1000 MG CHEW Take 2 tablets by mouth 3 times daily 60 tablet 3    clobetasol (TEMOVATE) 0.05 % external ointment Apply sparingly twice to three times daily for 3-4 weeks to affected areas on buttock. Please dispense 60 gram quantity. 60 g 4     Current Facility-Administered Medications   Medication Dose Route Frequency Provider Last Rate Last Admin    lidocaine 1 % injection 2 mL  2 mL   Yanique Leon MD   2 mL at 07/28/22 1618    lidocaine 1 % injection 2 mL  2 mL   Yanique Leon MD   2 mL at 07/28/22 1618    triamcinolone (KENALOG-40) injection 40 mg  40 mg   Yanique Leon MD   40 mg at 07/28/22 1618    triamcinolone (KENALOG-40) injection 40 mg  40 mg   Yanique Leon MD   40 mg at 07/28/22 1618             Allergies:      Allergies   Allergen Reactions    Advil [Ibuprofen] Swelling     Hands swelled    Cephalexin Itching     Allergy to Latex? No  Allergy to tape?   No  Intolerances:             Physical Exam:   All vitals have been reviewed  Patient Vitals for the past 8 hrs:   BP Temp Temp src Pulse Resp SpO2   11/19/24 1058 (!) 149/90 97.5  F (36.4  C) Temporal 78 14 99 %     No intake/output data recorded.  Lungs:   No increased work of breathing     Cardiovascular:     RRR             Lab / Radiology Results:            Anesthetic risk and/or ASA classification:   2    Sarita Montes DO

## 2024-11-19 NOTE — ANESTHESIA POSTPROCEDURE EVALUATION
Patient: Rhonda Cordero    Procedure: Procedure(s):  Combined Esophagoscopy, Gastroscopy, Duodenoscopy (Egd) With Co2 Insufflation  ESOPHAGOGASTRODUODENOSCOPY, WITH BIOPSY       Anesthesia Type:  MAC    Note:  Disposition: Outpatient   Postop Pain Control: Uneventful            Sign Out: Well controlled pain   PONV: No   Neuro/Psych: Uneventful            Sign Out: Acceptable/Baseline neuro status   Airway/Respiratory: Uneventful            Sign Out: Acceptable/Baseline resp. status   CV/Hemodynamics: Uneventful            Sign Out: Acceptable CV status; No obvious hypovolemia; No obvious fluid overload   Other NRE: NONE   DID A NON-ROUTINE EVENT OCCUR?            Last vitals:  Vitals Value Taken Time   /84 11/19/24 1255   Temp 97.1  F (36.2  C) 11/19/24 1255   Pulse 64 11/19/24 1255   Resp 18 11/19/24 1255   SpO2 98 % 11/19/24 1255       Electronically Signed By: Fabricio Ha MD  November 19, 2024  12:59 PM

## 2024-11-19 NOTE — ANESTHESIA PREPROCEDURE EVALUATION
Anesthesia Pre-Procedure Evaluation    Patient: Rhonda Cordero   MRN: [id number] : 1970        Procedure : Procedure(s):  Combined Esophagoscopy, Gastroscopy, Duodenoscopy (Egd) With Co2 Insufflation          Past Medical History:   Diagnosis Date     Anemia      Diabetes (H) 2023    Dr. Denson     Hypertension goal BP (blood pressure) < 130/80 2011     Hypocalcemia      Multinodular goiter      Postsurgical hypothyroidism      Rotator cuff tendonitis 02/10/2015     Vaginal Papanicolaou smear not required due to history of hysterectomy       Past Surgical History:   Procedure Laterality Date     COLONOSCOPY N/A 3/24/2023    Procedure: COLONOSCOPY, FLEXIBLE, WITH LESION REMOVAL USING SNARE;  Surgeon: Chet Escobar MD;  Location: [facility] OR     COLONOSCOPY WITH CO2 INSUFFLATION N/A 3/24/2023    Procedure: COLONOSCOPY, WITH CO2 INSUFFLATION;  Surgeon: Chet Escobar MD;  Location: [facility] OR     COMBINED ESOPHAGOSCOPY, GASTROSCOPY, DUODENOSCOPY (EGD) WITH CO2 INSUFFLATION N/A 2023    Procedure: Combined Esophagoscopy, Gastroscopy, Duodenoscopy (Egd) With Co2 Insufflation;  Surgeon: Sarita Montes DO;  Location: [facility] OR     ESOPHAGOSCOPY, GASTROSCOPY, DUODENOSCOPY (EGD), COMBINED N/A 2023    Procedure: ESOPHAGOGASTRODUODENOSCOPY, WITH BIOPSY;  Surgeon: Sarita Montes DO;  Location:  OR     LAPAROSCOPY,TUBAL CAUTERY       SALPINGO OOPHORECTOMY,R/L/LUIS      left     THYROIDECTOMY N/A 2017    Procedure: THYROIDECTOMY;  Total Thyroidectomy;  Surgeon: Skylar Cotsa MD;  Location:  OR     [facility] TOTAL ABDOM HYSTERECTOMY  09    [facility]      Allergies   Allergen Reactions     Advil [Ibuprofen] Swelling     Hands swelled     Cephalexin Itching      Social History     Tobacco Use     Smoking status: Never     Passive exposure: Never     Smokeless tobacco: Never   Substance Use Topics     Alcohol use: Yes     Comment: occ      Wt Readings from Last 1  "Encounters:   08/20/24 79.5 kg (175 lb 3.2 oz)        Anesthesia Evaluation            ROS/MED HX  ENT/Pulmonary:       Neurologic:       Cardiovascular:       METS/Exercise Tolerance:     Hematologic:       Musculoskeletal:       GI/Hepatic:     (+) GERD,                   Renal/Genitourinary:       Endo:     (+)  type II DM,                    Psychiatric/Substance Use:       Infectious Disease:       Malignancy:       Other:            Physical Exam    Airway        Mallampati: II   TM distance: > 3 FB   Neck ROM: full     Respiratory Devices and Support         Dental       (+) Minor Abnormalities - some fillings, tiny chips      Cardiovascular          Rhythm and rate: regular     Pulmonary           breath sounds clear to auscultation         OUTSIDE LABS:  CBC:   Lab Results   Component Value Date    WBC 10.9 08/23/2024    WBC 12.8 (H) 12/06/2023    HGB 11.9 12/06/2023    HGB 12.7 11/08/2023    HCT 39.3 12/06/2023    HCT 40.3 11/08/2023     12/06/2023     11/08/2023     BMP:   Lab Results   Component Value Date     07/05/2024     11/08/2023    POTASSIUM 4.8 07/05/2024    POTASSIUM 4.5 11/08/2023    CHLORIDE 101 07/05/2024    CHLORIDE 100 11/08/2023    CO2 29 07/05/2024    CO2 25 11/08/2023    BUN 18.6 07/05/2024    BUN 19.3 11/08/2023    CR 0.58 07/05/2024    CR 0.57 11/08/2023     (H) 11/19/2024     (H) 07/05/2024     COAGS: No results found for: \"PTT\", \"INR\", \"FIBR\"  POC: No results found for: \"BGM\", \"HCG\", \"HCGS\"  HEPATIC:   Lab Results   Component Value Date    ALBUMIN 4.1 07/05/2024    PROTTOTAL 7.4 07/05/2024    ALT 20 07/05/2024    AST 22 07/05/2024    ALKPHOS 110 07/05/2024    BILITOTAL 0.3 07/05/2024     OTHER:   Lab Results   Component Value Date    A1C 7.1 (H) 07/05/2024    CAROLE 8.5 (L) 07/05/2024    PHOS 4.7 (H) 01/25/2022    MAG 2.0 01/25/2022    TSH 1.65 07/05/2024    T4 1.60 07/05/2024    CRP 23.4 (H) 02/18/2021    SED 25 08/23/2024       Anesthesia " Plan    ASA Status:  2       Anesthesia Type: MAC.     - Reason for MAC: immobility needed              Consents    Anesthesia Plan(s) and associated risks, benefits, and realistic alternatives discussed. Questions answered and patient/representative(s) expressed understanding.     - Discussed:     - Discussed with:  Patient            Postoperative Care    Pain management: Multi-modal analgesia.   PONV prophylaxis: Ondansetron (or other 5HT-3), Background Propofol Infusion     Comments:               Fabricio Ha MD    I have reviewed the pertinent notes and labs in the chart from the past 30 days and (re)examined the patient.  Any updates or changes from those notes are reflected in this note.             # Drug Induced Platelet Defect: home medication list includes an antiplatelet medication   # Hypertension: Noted on problem list          # DMII: A1C = N/A within past 6 months

## 2024-11-26 ENCOUNTER — OFFICE VISIT (OUTPATIENT)
Dept: PODIATRY | Facility: CLINIC | Age: 54
End: 2024-11-26
Payer: COMMERCIAL

## 2024-11-26 DIAGNOSIS — M76.72 TENDINITIS OF LEFT PERONEUS LONGUS TENDON: Primary | ICD-10-CM

## 2024-11-26 PROBLEM — E11.9 DIABETES MELLITUS, TYPE 2 (H): Status: ACTIVE | Noted: 2024-11-26

## 2024-11-26 PROCEDURE — 99213 OFFICE O/P EST LOW 20 MIN: CPT | Performed by: PODIATRIST

## 2024-11-26 NOTE — LETTER
11/26/2024      Rhonda Cordero  [address]  [facility] [address]      Dear Colleague,    Thank you for referring your patient, Rhonda Cordero, to the [facility]. Please see a copy of my visit note below.    Subjective:    8/1/24   Pt is seen today in consult from Dr. Denson with the c/c of painful left foot.  This has been symptomatic for the past 2 months.  Pt denies any hx of trauma.  Aggravated by activity and releaved by rest.  Describes as burning pain.  Is slowly getting worse.   Denies numbness, weakness, ecchymosis.  Patient has edema over this.  Working at a job where she is on her feet all day.  Her work shoes are-year-old.  She is not wearing a good supportive shoe in the house.    8/23/24 patient returns for evaluation of painful left foot.  We saw her approximately 3 weeks ago.  Has been wearing cam walker at work.  If in cam walker no pain.  As soon as she walks without it foot painful again.  It has not improved.  We have decreased the amount she is walking at work.  She again points to her styloid process all around and somewhat proximal on her her foot dorsal to this area.  Denies erythema weakness numbness.  Denies history of gout.    11/26/24 patient returns for painful left foot.  Since I saw her last we referred her to sports medicine for peroneus longus tendon injection.  She states the injection helped for about 2 days and then the pain returned.  It has not improved at all.  She has been walking with a walking boot for several months and it is still painful.    ROS:   see above       Allergies   Allergen Reactions     Advil [Ibuprofen] Swelling     Hands swelled     Cephalexin Itching       Current Outpatient Medications   Medication Sig Dispense Refill     aspirin 81 MG EC tablet Take 1 tablet (81 mg) by mouth daily       atorvastatin (LIPITOR) 10 MG tablet Take 1 tablet (10 mg) by mouth daily 90 tablet 1     calcitRIOL (ROCALTROL) 0.25 MCG  capsule TAKE TWO CAPSULES BY MOUTH ONCE DAILY 180 capsule 1     calcium carbonate antacid 1000 MG CHEW Take 2 tablets by mouth 3 times daily 60 tablet 3     clobetasol (TEMOVATE) 0.05 % external ointment Apply sparingly twice to three times daily for 3-4 weeks to affected areas on buttock. Please dispense 60 gram quantity. 60 g 4     hydrochlorothiazide (HYDRODIURIL) 25 MG tablet TAKE ONE TABLET BY MOUTH ONCE DAILY. NEEDS TO BE SEEN BY MD. 90 tablet 0     levothyroxine (SYNTHROID/LEVOTHROID) 100 MCG tablet Take 1 tablet (100 mcg) by mouth daily 90 tablet 1     lisinopril (ZESTRIL) 30 MG tablet TAKE ONE TABLET BY MOUTH ONCE DAILY 90 tablet 3     metFORMIN (GLUCOPHAGE XR) 500 MG 24 hr tablet TAKE TWO TABLETS BY MOUTH ONCE DAILY 180 tablet 0     omeprazole (PRILOSEC) 40 MG DR capsule Take 1 capsule (40 mg) by mouth 2 times daily (before meals) 60 capsule 3     omeprazole (PRILOSEC) 40 MG DR capsule Take 1 capsule (40 mg) by mouth daily 30 capsule 3       Patient Active Problem List   Diagnosis     CARDIOVASCULAR SCREENING; LDL GOAL LESS THAN 130     Impingement syndrome, shoulder     Rotator cuff tendonitis     Hashimoto's thyroiditis     Nontoxic multinodular goiter     Shoulder joint pain     Gastroesophageal reflux disease, esophagitis presence not specified     Cervical adenopathy     Hypertension goal BP (blood pressure) < 140/90     Essential hypertension with goal blood pressure less than 140/90     Anup's deformity of left heel     History of thyroidectomy     Postoperative hypothyroidism     Hypocalcemia       Past Medical History:   Diagnosis Date     Anemia      Diabetes (H) 08/2023    Dr. Denson     Hypertension goal BP (blood pressure) < 130/80 05/06/2011     Hypocalcemia      Multinodular goiter      Postsurgical hypothyroidism      Rotator cuff tendonitis 02/10/2015     Vaginal Papanicolaou smear not required due to history of hysterectomy        Past Surgical History:   Procedure Laterality Date      COLONOSCOPY N/A 3/24/2023    Procedure: COLONOSCOPY, FLEXIBLE, WITH LESION REMOVAL USING SNARE;  Surgeon: Chet Escobar MD;  Location: [facility] OR     COLONOSCOPY WITH CO2 INSUFFLATION N/A 3/24/2023    Procedure: COLONOSCOPY, WITH CO2 INSUFFLATION;  Surgeon: Chet Escobar MD;  Location: [facility] OR     COMBINED ESOPHAGOSCOPY, GASTROSCOPY, DUODENOSCOPY (EGD) WITH CO2 INSUFFLATION N/A 2023    Procedure: Combined Esophagoscopy, Gastroscopy, Duodenoscopy (Egd) With Co2 Insufflation;  Surgeon: Sarita Montes DO;  Location: [facility] OR     COMBINED ESOPHAGOSCOPY, GASTROSCOPY, DUODENOSCOPY (EGD) WITH CO2 INSUFFLATION N/A 2024    Procedure: Combined Esophagoscopy, Gastroscopy, Duodenoscopy (Egd) With Co2 Insufflation;  Surgeon: Sarita Montes DO;  Location: [facility] OR     ESOPHAGOSCOPY, GASTROSCOPY, DUODENOSCOPY (EGD), COMBINED N/A 2023    Procedure: ESOPHAGOGASTRODUODENOSCOPY, WITH BIOPSY;  Surgeon: Sarita Montes DO;  Location: [facility] OR     ESOPHAGOSCOPY, GASTROSCOPY, DUODENOSCOPY (EGD), COMBINED N/A 2024    Procedure: ESOPHAGOGASTRODUODENOSCOPY, WITH BIOPSY;  Surgeon: Sarita Montes DO;  Location:  OR     LAPAROSCOPY,TUBAL CAUTERY       SALPINGO OOPHORECTOMY,R/L/LUIS      left     THYROIDECTOMY N/A 2017    Procedure: THYROIDECTOMY;  Total Thyroidectomy;  Surgeon: Skylar Costa MD;  Location:  OR     [facility]BDOM [facility]CTOMY  09    [facility]       Family History   Problem Relation Age of Onset     Cerebrovascular Disease Mother 50     Cerebrovascular Disease Father 49         49     Hypertension Father      Cancer No family hx of      Diabetes No family hx of      Thyroid Disease No family hx of      Glaucoma No family hx of      Macular Degeneration No family hx of        Social History     Tobacco Use     Smoking status: Never     Passive exposure: Never     Smokeless tobacco: Never   Substance Use Topics     Alcohol use: Yes     Comment: occ              O:  LMP 11/23/2009 .       Constitutional/ general:  Pt is in no apparent distress, appears well-nourished.  Cooperative with history and physical exam.     Psych:  The patient answered questions appropriately.  Normal affect.  Seems to have reasonable expectations, in terms of treatment.     Lungs:  Non labored breathing, non labored speech. No cough.  No audible wheezing. Even, quiet breathing.       Vascular:  Pedal pulses are palpable bilaterally for both the DP and PT arteries.  CFT < 3 sec.  No edema.  Pedal hair growth noted.     Neuro:  Alert and oriented x 3. Coordinated gait.  Light touch sensation is intact     Derm: Normal texture and turgor.  No erythema, ecchymosis, or cyanosis.  No open lesions.     Musculoskeletal:   Cavus foot with weightbearing bilateral.  No forefoot or rear foot deformities noted.  MS 5/5 all compartments.  Normal ROM all fore foot and rearfoot joints with no pain or crepitus.  No equinus.  Pain all around styloid process left foot.  Some pain dorsal lateral foot just proximal to styloid process.  No pain over ATFL.  Pain over peroneus longus tendon course over the cuboid.  Positive edema.  No masses.  No ecchymosis.     Radiographic Exam:    Narrative & Impression   MR left foot without  contrast 9/13/2024 4:39 PM     History: Left foot pain     Techniques: Multiplanar multisequence imaging of the left foot was  obtained without  administration of intravenous contrast.     Comparison: 8/23/2024     Findings:     Bones     No fracture, marrow contusion or marrow infiltrative changes.     Subchondral edema-like marrow signal intensity at the second  metatarsal base, consistent with degenerative change.     Focal edema like marrow signal intensity at the peripheral aspect of  the cuboid, maybe reactive peroneus longus pathology.     Partially visualized edema like marrow signal intensity in the medial  talar dome, query osteochondral lesion.     Joints and periarticular soft  tissue     Joint effusion: Small first and second metatarsophalangeal joint  effusion.     Plantar plates: Intersesamoidal ligament and sesamoidal phalangeal  ligaments of the first metatarsophalangeal joints are intact. Plantar  plates of the second through fifth toe at metatarsophalangeal joints  are grossly intact.     Intermetatarsal spaces: Approximately 5 mm in mediolateral dimension  plantar soft tissue effacement at the first interspace, query  interdigital neuroma. Less prominent small plantar soft tissue  effacement at the second interspace.      Small first and third intermetatarsal bursal fluid, query mild  intermetatarsal bursitis.     Ligaments and Tendons     Lisfranc interosseous ligament: Intact.     Tendons: Apparent moderate to high grade intrasubstance tear of the  peroneus longus tendon in the area of cuboid groove entry,  incompletely assessed.     Muscles     No atrophy or edema.     ANCILLARY FINDINGS                                                                         Impression:  1. Suspect peroneus longus moderate to high grade partial  intrasubstance tear at cuboid groove entry area with presumed reactive  cuboid edema. These findings are incompletely assessed on current  study owing to chosen field of view.  2. Second TMT degenerative change.  3. Query small first interdigital neuroma.  *  Nonspecific plantar interspace effacement at second interspace.  4. Mild first and third intermetatarsal bursitis.  5. Query talar dome osteochondral lesion, incompletely assessed.         Past labs for inflammatory arthritis unremarkable.    A: Peroneus longus tendon pathology    Plan:  Discussed MRI results.  Explained course of this tendon.  Unfortunately extensive offloading with walking boot and steroid injection did not help.  She may need to have this addressed surgically.  Will refer to [facility].  RTC..      Otis Carson DPM, FACFAS         Again, thank you for allowing me  to participate in the care of your patient.        Sincerely,        Otis Carson DPM

## 2024-11-26 NOTE — PATIENT INSTRUCTIONS
We wish you continued good healing. If you have any questions or concerns, please do not hesitate to contact us at  [phone]    [email or web address] (secure e-mail communication and access to your chart) to send a message or to make an appointment.    Please remember to call and schedule a follow up appointment if one was recommended at your earliest convenience.     PODIATRY CLINIC HOURS  TELEPHONE NUMBER    Dr. Otis MUELLERPNALDO FACFAS        Clinics:  [facility]   [place]  Tuesday 1PM-6PM  [place]  Wednesday 745AM-330PM  [place]  Monday 2nd,4th  830AM-4PM  Thursday/Friday 745AM-230PM     [place] APPOINTMENTS  [phone]    [place] APPOINTMENTS  [phone]          If you need a medication refill, please contact us you may need lab work and/or a follow up visit prior to your refill (i.e. Antifungal medications).  If MRI needed please call Imaging at [phone]   HOW DO I GET MY KNEE SCOOTER? Knee scooters can be picked up at ANY Medical Supply stores with your knee scooter Prescription.  OR  Bring your signed prescription to an [facility] showroom.   Set up an appointment for your custom Orthotics. Call any Orthotics locations call [phone]

## 2024-11-26 NOTE — PROGRESS NOTES
Subjective:    8/1/24   Pt is seen today in consult from Dr. Denson with the c/c of painful left foot.  This has been symptomatic for the past 2 months.  Pt denies any hx of trauma.  Aggravated by activity and releaved by rest.  Describes as burning pain.  Is slowly getting worse.   Denies numbness, weakness, ecchymosis.  Patient has edema over this.  Working at a job where she is on her feet all day.  Her work shoes are-year-old.  She is not wearing a good supportive shoe in the house.    8/23/24 patient returns for evaluation of painful left foot.  We saw her approximately 3 weeks ago.  Has been wearing cam walker at work.  If in cam walker no pain.  As soon as she walks without it foot painful again.  It has not improved.  We have decreased the amount she is walking at work.  She again points to her styloid process all around and somewhat proximal on her her foot dorsal to this area.  Denies erythema weakness numbness.  Denies history of gout.    11/26/24 patient returns for painful left foot.  Since I saw her last we referred her to sports medicine for peroneus longus tendon injection.  She states the injection helped for about 2 days and then the pain returned.  It has not improved at all.  She has been walking with a walking boot for several months and it is still painful.    ROS:   see above       Allergies   Allergen Reactions    Advil [Ibuprofen] Swelling     Hands swelled    Cephalexin Itching       Current Outpatient Medications   Medication Sig Dispense Refill    aspirin 81 MG EC tablet Take 1 tablet (81 mg) by mouth daily      atorvastatin (LIPITOR) 10 MG tablet Take 1 tablet (10 mg) by mouth daily 90 tablet 1    calcitRIOL (ROCALTROL) 0.25 MCG capsule TAKE TWO CAPSULES BY MOUTH ONCE DAILY 180 capsule 1    calcium carbonate antacid 1000 MG CHEW Take 2 tablets by mouth 3 times daily 60 tablet 3    clobetasol (TEMOVATE) 0.05 % external ointment Apply sparingly twice to three times daily for 3-4 weeks to  affected areas on buttock. Please dispense 60 gram quantity. 60 g 4    hydrochlorothiazide (HYDRODIURIL) 25 MG tablet TAKE ONE TABLET BY MOUTH ONCE DAILY. NEEDS TO BE SEEN BY MD. 90 tablet 0    levothyroxine (SYNTHROID/LEVOTHROID) 100 MCG tablet Take 1 tablet (100 mcg) by mouth daily 90 tablet 1    lisinopril (ZESTRIL) 30 MG tablet TAKE ONE TABLET BY MOUTH ONCE DAILY 90 tablet 3    metFORMIN (GLUCOPHAGE XR) 500 MG 24 hr tablet TAKE TWO TABLETS BY MOUTH ONCE DAILY 180 tablet 0    omeprazole (PRILOSEC) 40 MG DR capsule Take 1 capsule (40 mg) by mouth 2 times daily (before meals) 60 capsule 3    omeprazole (PRILOSEC) 40 MG DR capsule Take 1 capsule (40 mg) by mouth daily 30 capsule 3       Patient Active Problem List   Diagnosis    CARDIOVASCULAR SCREENING; LDL GOAL LESS THAN 130    Impingement syndrome, shoulder    Rotator cuff tendonitis    Hashimoto's thyroiditis    Nontoxic multinodular goiter    Shoulder joint pain    Gastroesophageal reflux disease, esophagitis presence not specified    Cervical adenopathy    Hypertension goal BP (blood pressure) < 140/90    Essential hypertension with goal blood pressure less than 140/90    Anup's deformity of left heel    History of thyroidectomy    Postoperative hypothyroidism    Hypocalcemia       Past Medical History:   Diagnosis Date    Anemia     Diabetes (H) 08/2023    Dr. Denson    Hypertension goal BP (blood pressure) < 130/80 05/06/2011    Hypocalcemia     Multinodular goiter     Postsurgical hypothyroidism     Rotator cuff tendonitis 02/10/2015    Vaginal Papanicolaou smear not required due to history of hysterectomy        Past Surgical History:   Procedure Laterality Date    COLONOSCOPY N/A 3/24/2023    Procedure: COLONOSCOPY, FLEXIBLE, WITH LESION REMOVAL USING SNARE;  Surgeon: Chet Escobar MD;  Location: [facility] OR    COLONOSCOPY WITH CO2 INSUFFLATION N/A 3/24/2023    Procedure: COLONOSCOPY, WITH CO2 INSUFFLATION;  Surgeon: Chet Escobar MD;   Location: [facility] OR    COMBINED ESOPHAGOSCOPY, GASTROSCOPY, DUODENOSCOPY (EGD) WITH CO2 INSUFFLATION N/A 2023    Procedure: Combined Esophagoscopy, Gastroscopy, Duodenoscopy (Egd) With Co2 Insufflation;  Surgeon: Sarita Montes DO;  Location: [facility] OR    COMBINED ESOPHAGOSCOPY, GASTROSCOPY, DUODENOSCOPY (EGD) WITH CO2 INSUFFLATION N/A 2024    Procedure: Combined Esophagoscopy, Gastroscopy, Duodenoscopy (Egd) With Co2 Insufflation;  Surgeon: Sarita Montes DO;  Location: [facility] OR    ESOPHAGOSCOPY, GASTROSCOPY, DUODENOSCOPY (EGD), COMBINED N/A 2023    Procedure: ESOPHAGOGASTRODUODENOSCOPY, WITH BIOPSY;  Surgeon: Sarita Montes DO;  Location: [facility] OR    ESOPHAGOSCOPY, GASTROSCOPY, DUODENOSCOPY (EGD), COMBINED N/A 2024    Procedure: ESOPHAGOGASTRODUODENOSCOPY, WITH BIOPSY;  Surgeon: Sarita Montes DO;  Location: [facility] OR    LAPAROSCOPY,TUBAL CAUTERY      SALPINGO OOPHORECTOMY,R/L/LUIS      left    THYROIDECTOMY N/A 2017    Procedure: THYROIDECTOMY;  Total Thyroidectomy;  Surgeon: Skylar Costa MD;  Location:  OR    [facility] TOTAL ABDOM HYSTERECTOMY  09    [facility]       Family History   Problem Relation Age of Onset    Cerebrovascular Disease Mother 50    Cerebrovascular Disease Father 49         49    Hypertension Father     Cancer No family hx of     Diabetes No family hx of     Thyroid Disease No family hx of     Glaucoma No family hx of     Macular Degeneration No family hx of        Social History     Tobacco Use    Smoking status: Never     Passive exposure: Never    Smokeless tobacco: Never   Substance Use Topics    Alcohol use: Yes     Comment: occ             O:  LMP 2009 .       Constitutional/ general:  Pt is in no apparent distress, appears well-nourished.  Cooperative with history and physical exam.     Psych:  The patient answered questions appropriately.  Normal affect.  Seems to have reasonable expectations, in terms of treatment.     Lungs:  Non  labored breathing, non labored speech. No cough.  No audible wheezing. Even, quiet breathing.       Vascular:  Pedal pulses are palpable bilaterally for both the DP and PT arteries.  CFT < 3 sec.  No edema.  Pedal hair growth noted.     Neuro:  Alert and oriented x 3. Coordinated gait.  Light touch sensation is intact     Derm: Normal texture and turgor.  No erythema, ecchymosis, or cyanosis.  No open lesions.     Musculoskeletal:   Cavus foot with weightbearing bilateral.  No forefoot or rear foot deformities noted.  MS 5/5 all compartments.  Normal ROM all fore foot and rearfoot joints with no pain or crepitus.  No equinus.  Pain all around styloid process left foot.  Some pain dorsal lateral foot just proximal to styloid process.  No pain over ATFL.  Pain over peroneus longus tendon course over the cuboid.  Positive edema.  No masses.  No ecchymosis.     Radiographic Exam:    Narrative & Impression   MR left foot without  contrast 9/13/2024 4:39 PM     History: Left foot pain     Techniques: Multiplanar multisequence imaging of the left foot was  obtained without  administration of intravenous contrast.     Comparison: 8/23/2024     Findings:     Bones     No fracture, marrow contusion or marrow infiltrative changes.     Subchondral edema-like marrow signal intensity at the second  metatarsal base, consistent with degenerative change.     Focal edema like marrow signal intensity at the peripheral aspect of  the cuboid, maybe reactive peroneus longus pathology.     Partially visualized edema like marrow signal intensity in the medial  talar dome, query osteochondral lesion.     Joints and periarticular soft tissue     Joint effusion: Small first and second metatarsophalangeal joint  effusion.     Plantar plates: Intersesamoidal ligament and sesamoidal phalangeal  ligaments of the first metatarsophalangeal joints are intact. Plantar  plates of the second through fifth toe at metatarsophalangeal joints  are grossly  intact.     Intermetatarsal spaces: Approximately 5 mm in mediolateral dimension  plantar soft tissue effacement at the first interspace, query  interdigital neuroma. Less prominent small plantar soft tissue  effacement at the second interspace.      Small first and third intermetatarsal bursal fluid, query mild  intermetatarsal bursitis.     Ligaments and Tendons     Lisfranc interosseous ligament: Intact.     Tendons: Apparent moderate to high grade intrasubstance tear of the  peroneus longus tendon in the area of cuboid groove entry,  incompletely assessed.     Muscles     No atrophy or edema.     ANCILLARY FINDINGS                                                                         Impression:  1. Suspect peroneus longus moderate to high grade partial  intrasubstance tear at cuboid groove entry area with presumed reactive  cuboid edema. These findings are incompletely assessed on current  study owing to chosen field of view.  2. Second TMT degenerative change.  3. Query small first interdigital neuroma.  *  Nonspecific plantar interspace effacement at second interspace.  4. Mild first and third intermetatarsal bursitis.  5. Query talar dome osteochondral lesion, incompletely assessed.         Past labs for inflammatory arthritis unremarkable.    A: Peroneus longus tendon pathology    Plan:  Discussed MRI results.  Explained course of this tendon.  Unfortunately extensive offloading with walking boot and steroid injection did not help.  She may need to have this addressed surgically.  Will refer to [facility].  RTC..      Otis Carson DPM, FACFAS

## 2024-12-23 NOTE — TELEPHONE ENCOUNTER
DIAGNOSIS: Tendinitis of left peroneus longus tendon, Otis Carson DPM, MRI 9/13, [employer]    APPOINTMENT DATE: 1/17/25   NOTES STATUS DETAILS   OFFICE NOTE from referring provider Internal [facility]   11/26/24 -Otis Carson DPM    OFFICE NOTE from other specialist Internal [facility]   10/17/24 -Rufino Dodson MD    MEDICATION LIST Internal    MRI Internal 9/13/24 MR FOOT LEFT   XRAYS (IMAGES & REPORTS) Internal 8/23/24 XR FOOT LEFT  7/5/24 XR FOOT LEFT

## 2025-01-09 ENCOUNTER — TELEPHONE (OUTPATIENT)
Dept: FAMILY MEDICINE | Facility: CLINIC | Age: 55
End: 2025-01-09
Payer: COMMERCIAL

## 2025-01-09 DIAGNOSIS — M79.672 LEFT FOOT PAIN: Primary | ICD-10-CM

## 2025-01-09 DIAGNOSIS — E03.9 HYPOTHYROIDISM, UNSPECIFIED TYPE: ICD-10-CM

## 2025-01-09 RX ORDER — LEVOTHYROXINE SODIUM 100 UG/1
100 TABLET ORAL DAILY
Qty: 90 TABLET | Refills: 1 | Status: SHIPPED | OUTPATIENT
Start: 2025-01-09

## 2025-01-09 NOTE — LETTER
January 9, 2025    To  Rhonda Cordero  [address] [facility] [place] [address]    Your team at [facility] cares about your health. We have reviewed your chart and based on our findings; we are making the following recommendations to better manage your health.     You are in particular need of attention regarding the following:     Schedule a DIABETIC FOLLOW UP appointment for Office Visit. Patients with diabetes should see their provider regularly.  HYPERTENSION FOLLOW UP: Office Visit    If you have already completed these items, please contact the clinic via phone or   [employer] so your care team can review and update your records. Thank you for   choosing [facility] for your healthcare needs. For any questions,   concerns, or to schedule an appointment please contact our clinic.    Healthy Regards,      Your [facility] Care Team            Electronically signed

## 2025-01-09 NOTE — TELEPHONE ENCOUNTER
Patient Quality Outreach    Patient is due for the following:   Diabetes -  A1C, Microalbumin, BP Check, Diabetic Follow-Up Visit, and Foot Exam    Action(s) Taken:   Schedule a office visit for diabetes and blood pressure recheck    Type of outreach:    Sent letter.    Questions for provider review:    None           Bette Houston, [facility]  Chart routed to chart closed.

## 2025-01-17 ENCOUNTER — PRE VISIT (OUTPATIENT)
Dept: ORTHOPEDICS | Facility: CLINIC | Age: 55
End: 2025-01-17

## 2025-01-21 DIAGNOSIS — E78.5 HYPERLIPIDEMIA LDL GOAL <70: ICD-10-CM

## 2025-01-22 DIAGNOSIS — I10 HYPERTENSION GOAL BP (BLOOD PRESSURE) < 140/90: ICD-10-CM

## 2025-01-22 RX ORDER — HYDROCHLOROTHIAZIDE 25 MG/1
TABLET ORAL
Qty: 90 TABLET | Refills: 0 | OUTPATIENT
Start: 2025-01-22

## 2025-01-22 RX ORDER — ATORVASTATIN CALCIUM 10 MG/1
10 TABLET, FILM COATED ORAL DAILY
Qty: 90 TABLET | Refills: 0 | Status: SHIPPED | OUTPATIENT
Start: 2025-01-22

## 2025-01-28 ENCOUNTER — OFFICE VISIT (OUTPATIENT)
Dept: GASTROENTEROLOGY | Facility: CLINIC | Age: 55
End: 2025-01-28
Payer: COMMERCIAL

## 2025-01-28 VITALS
HEART RATE: 77 BPM | OXYGEN SATURATION: 95 % | HEIGHT: 62 IN | DIASTOLIC BLOOD PRESSURE: 90 MMHG | WEIGHT: 177 LBS | BODY MASS INDEX: 32.57 KG/M2 | SYSTOLIC BLOOD PRESSURE: 149 MMHG

## 2025-01-28 DIAGNOSIS — K29.40 ATROPHIC GASTRITIS, PRESENCE OF BLEEDING UNSPECIFIED: Primary | ICD-10-CM

## 2025-01-28 DIAGNOSIS — K31.7 GASTRIC POLYPS: ICD-10-CM

## 2025-01-28 PROCEDURE — 99213 OFFICE O/P EST LOW 20 MIN: CPT | Performed by: PHYSICIAN ASSISTANT

## 2025-01-28 ASSESSMENT — PAIN SCALES - GENERAL: PAINLEVEL_OUTOF10: NO PAIN (0)

## 2025-01-28 NOTE — PATIENT INSTRUCTIONS
It was a pleasure meeting with you today and discussing your healthcare plan. Below is a summary of what we covered:    --obtain labs sometime in the next few weeks.   --schedule upper endoscopy for NOVEMBER 2025.     Follow up in clinic 1 year.      Please see below for any additional questions and scheduling guidelines.    Sign up for [employer]: [employer] patient portal serves as a secure platform for accessing your medical records from the [facility]. Additionally, [employer] facilitates easy, timely, and secure messaging with your care team. If you have not signed up, you may do so by using the provided code or calling [phone].    Coordinating your care after your visit:  There are multiple options for scheduling your follow-up care based on your provider's recommendation.    How do I schedule a follow-up clinic appointment:   After your appointment, you may receive scheduling assistance with the Clinic Coordinators by having a seat in the waiting room and a Clinic Coordinator will call you up to schedule.  Virtual visits or after you leave the clinic:  Your provider has placed a follow-up order in the [employer] portal for scheduling your return appointment. A member of the scheduling team will contact you to schedule.  [employer] Scheduling: Timely scheduling through [employer] is advised to ensure appointment availability.   Call to schedule: You may schedule your follow-up appointment(s) by calling [phone], option 1.    How do I schedule my endoscopy or colonoscopy procedure:  If a procedure, such as a colonoscopy or upper endoscopy was ordered by your provider, the scheduling team will contact you to schedule this procedure. Or you may choose to call to schedule at   [phone], option 2.  Please allow 20-30 minutes when scheduling a procedure.    How do I get my blood work done? To get your blood work done, you need to schedule a lab appointment at an [facility]. There are  multiple ways to schedule:   At the clinic: The Clinic Coordinator you meet after your visit can help you schedule a lab appointment.   [employer] scheduling: [facility] offers online lab scheduling at all [facility] laboratory locations.   Call to schedule: You can call [phone] to schedule your lab appointment.    How do I schedule my imaging study: To schedule imaging studies, such as CT scans, ultrasounds, MRIs, or X-rays, contact Imaging Services at [phone].    How do I schedule a referral to another doctor: If your provider recommended a referral to another specialist(s), the referral order was placed by your provider. You will receive a phone call to schedule this referral, or you may choose to call the number attached to the referral to self-schedule.    For Post-Visit Question(s):  For any inquiries following today's visit:  Please utilize [employer] messaging and allow 48 hours for reply or contact the Call Center during normal business hours at [phone], option 3.  For Emergent After-hours questions, contact the On-Call GI Fellow through the [facility]  at [phone].  In addition, you may contact your Nurse directly using the provided contact information.    Test Results:  Test results will be accessible via [employer] in compliance with the 21st Century Cures Act. This means that your results will be available to you at the same time as your provider. Often you may see your results before your provider does. Results are reviewed by staff within two weeks with communication follow-up. Results may be released in the patient portal prior to your care team review.    Prescription Refill(s):  Medication prescribed by your provider will be addressed during your visit. For future refills, please coordinate with your pharmacy. If you have not had a recent clinic visit or routine labs, for your safety, your provider may not be able to refill your prescription.

## 2025-01-28 NOTE — PROGRESS NOTES
GI CLINIC VISIT    CC/REFERRING MD:  No ref. provider found  REASON FOR CONSULTATION: Atrophic gastritis    ASSESSMENT/PLAN:    #Autoimmune atrophic gastritis   #gastric polyps  Patient had a recent EGD for monitoring of atrophic gastritis - biopsies showed chronic atrophic gastritis, no polyps. We discussed the clinical features of atrophic gastritis as well as the need for monitoring given increased risk for the development of gastric neuroendocrine tumors and adenocarcinomas. Patients are also at risk of iron deficiency anemia and pernicious anemia. Last iron level was low, she has not been taking an iron supplementation. Will check labs today including iron studies, folate, and B12. Will likely need repletion of iron. EGD ordered to be repeated in November 2025. She has otherwise been doing well, and has no other concerns.  --obtain labs.   --schedule EGD for nov 2025.     Colorectal cancer screening: due in 2030    RTC 1 year.    Thank you for this consultation.  It was a pleasure to participate in the care of this patient; please contact us with any further questions.       20 minutes spent on the date of the encounter doing chart review, review of test results, patient visit, and documentation    This note was created with voice recognition software, and while reviewed for accuracy, typos may remain.    Tree Duque PA-C  Division of Gastroenterology, Hepatology and Nutrition  [facility]      HPI  55 y/o F presents for follow up for autoimmune atrophic gastritis.     Patient was initially evaluated on 9/6/23, please see visit details below:   Patient had an EGD done on 7/28/23 - for evaluation of dysphagia, esophagus was normal, likely hiatal hernia present, a few gastric polyps were seen, biopsies showed reactive gastropathy and some eroded hyperplastic polyps, as well as concerns for atrophic gastritis. Labs were subsequently done which showed elevated parietal  cell antibody IgG -- raising concern for autoimmune gastritis.    She reports dysphagia that has been ongoing for many years, particularly breads seemed to be getting stuck in the subxiphoid area. She would have to swallow multiple times to get it to go down. Denies any difficulties swallowing liquids. Denies any odynophagia. She did start taking omeprazole 40mg daily, but has not noticed any difference in her symptoms. She denies any classic symptoms of reflux - denies burning in her epigastric pain or chest. Denies nausea or vomiting. Denies abdominal pain.     In regards to her bowel movement, she has a bowel movement daily - described as as a bristol type IV. Denies BRBPR. Denies pushing/straining.     Denies NSAIDS. She takes tylenol 1-3x/week.  Denies use of OTC herbal supplements/weight loss products.      Occasional alcohol use. Denies tobacco products. No recreational drug use.     No family history or GI related malignancy (esophageal, gastric, pancreatic, liver or colon) or family history of IBD/celiac disease.     1/28/25:  Patient has been doing quite well - denies abdominal pain, nausea, or vomiting. Denies dysphagia or odynophagia. Denies heartburn.   We reviewed EGD done in November 2024 - biopsies again consistent with atrophic gastritis, will need repeat EGD in November 2025.    ROS:    No fevers or chills  No weight loss  No odynophagia or dysphagia    PROBLEM LIST  Patient Active Problem List    Diagnosis Date Noted    Diabetes mellitus, type 2 (H) 11/26/2024     Priority: Medium    Postoperative hypothyroidism 09/19/2018     Priority: Medium    Hypocalcemia 09/19/2018     Priority: Medium    History of thyroidectomy 09/26/2017     Priority: Medium    Anup's deformity of left heel 02/24/2017     Priority: Medium    Essential hypertension with goal blood pressure less than 140/90 11/09/2016     Priority: Medium    Hypertension goal BP (blood pressure) < 140/90 01/21/2016     Priority: Medium     Gastroesophageal reflux disease, esophagitis presence not specified 01/15/2016     Priority: Medium     IMO Regulatory Load OCT 2020      Cervical adenopathy 01/15/2016     Priority: Medium    Shoulder joint pain 03/15/2015     Priority: Medium    Hashimoto's thyroiditis 03/04/2015     Priority: Medium    Nontoxic multinodular goiter 03/04/2015     Priority: Medium    Impingement syndrome, shoulder 02/10/2015     Priority: Medium    Rotator cuff tendonitis 02/10/2015     Priority: Medium    CARDIOVASCULAR SCREENING; LDL GOAL LESS THAN 130 09/13/2013     Priority: Medium       PERTINENT PAST MEDICAL HISTORY:    Past Medical History:   Diagnosis Date    Anemia     Diabetes (H) 08/2023    Dr. Denson    Hypertension goal BP (blood pressure) < 130/80 05/06/2011    Hypocalcemia     Multinodular goiter     Postsurgical hypothyroidism     Rotator cuff tendonitis 02/10/2015    Vaginal Papanicolaou smear not required due to history of hysterectomy        PREVIOUS SURGERIES:    Past Surgical History:   Procedure Laterality Date    COLONOSCOPY N/A 3/24/2023    Procedure: COLONOSCOPY, FLEXIBLE, WITH LESION REMOVAL USING SNARE;  Surgeon: Chet Escobar MD;  Location: [facility] OR    COLONOSCOPY WITH CO2 INSUFFLATION N/A 3/24/2023    Procedure: COLONOSCOPY, WITH CO2 INSUFFLATION;  Surgeon: Chet Escobar MD;  Location: [facility] OR    COMBINED ESOPHAGOSCOPY, GASTROSCOPY, DUODENOSCOPY (EGD) WITH CO2 INSUFFLATION N/A 7/28/2023    Procedure: Combined Esophagoscopy, Gastroscopy, Duodenoscopy (Egd) With Co2 Insufflation;  Surgeon: Sarita Montes DO;  Location: [facility] OR    COMBINED ESOPHAGOSCOPY, GASTROSCOPY, DUODENOSCOPY (EGD) WITH CO2 INSUFFLATION N/A 11/19/2024    Procedure: Combined Esophagoscopy, Gastroscopy, Duodenoscopy (Egd) With Co2 Insufflation;  Surgeon: Sarita Montes DO;  Location: [facility] OR    ESOPHAGOSCOPY, GASTROSCOPY, DUODENOSCOPY (EGD), COMBINED N/A 7/28/2023    Procedure: ESOPHAGOGASTRODUODENOSCOPY, WITH BIOPSY;   Surgeon: Sarita Montes DO;  Location:  OR    ESOPHAGOSCOPY, GASTROSCOPY, DUODENOSCOPY (EGD), COMBINED N/A 11/19/2024    Procedure: ESOPHAGOGASTRODUODENOSCOPY, WITH BIOPSY;  Surgeon: Sarita Montes DO;  Location:  OR    LAPAROSCOPY,TUBAL CAUTERY  2005    SALPINGO OOPHORECTOMY,R/L/LUIS  2005    left    THYROIDECTOMY N/A 8/22/2017    Procedure: THYROIDECTOMY;  Total Thyroidectomy;  Surgeon: Skylar Costa MD;  Location:  OR    [facility] TOTAL ABDOM HYSTERECTOMY  12/9/09    [facility]       PREVIOUS ENDOSCOPY:  EGD(11/20/24)- Normal esophagus.                             - Nodular mucosa in the gastric antrum. Biopsied.                             - An endoclip was found in the stomach.                             - Normal examined duodenum.   Final Diagnosis   A. ANTRUM NODULE, BIOPSY:  - Gastric mucosa with reactive gastropathy.  - Negative for intestinal metaplasia and dysplasia  - No H. pylori-like organisms identified on routine stain     B. ANTRUM, BIOPSY:  - Gastric mucosa with reactive gastropathy  - Negative for intestinal metaplasia and dysplasia  - No H. pylori-like organisms identified on routine stain     C. STOMACH BODY, BIOPSY:  - Chronic atrophic gastritis; see comment  - No H. pylori-like organisms identified on routine and immunohistochemical staining   - No intestinal metaplasia identified         EGD (11/15/23): - Normal esophagus.                             - Z-line regular, 35 cm from the incisors.                             - One gastric polyp. Resected and retrieved. Clips                             (MR conditional) were placed.                             - One gastric polyp. Resected and retrieved. Clips                             (MR conditional) were placed.                             - Two gastric polyps. Resected and retrieved.                             - Gastric mucosal atrophy.                             - Normal duodenal bulb, first portion of the                              duodenum, second portion of the duodenum and third                             portion of the duodenum.     Final Diagnosis   A(1). Stomach, antrum, polyps, polypectomy:  -Gastric hyperplastic type polyps with focal intestinal metaplasia and reactive epithelial changes  -Background gastric mucosa with inactive chronic gastritis.  -Negative for H. Pylori organisms on immunohistochemical stains.  -Negative for dysplasia or malignancy        B(2). Stomach, body, polyp, polypectomy:  -Gastric hyperplastic type polyp with reactive epithelial changes  -Background gastric mucosa with inactive chronic gastritis.  -Negative for H. Pylori organisms on immunohistochemical stains.  -Negative for dysplasia or malignancy        C(3). Stomach, body, polyp, polypectomy:  -Gastric hyperplastic type polyp with focal intestinal metaplasia (see comment).  -Background gastric mucosa with inactive chronic gastritis.  -Negative for H. Pylori organisms on immunohistochemical stains.  -Negative for dysplasia or malignancy       EGD (7/8/23):  - Normal esophagus.                             - Gastroesophageal flap valve classified as Hill                             Grade III (minimal fold, loose to endoscope, hiatal                             hernia likely).                             - A single gastric polyp. Biopsied.                             - A single gastric polyp. Biopsied.                             - Two gastric polyps. Biopsied.                             - Erythematous mucosa in the antrum. Biopsied.                             - Normal examined duodenum.         A.  GASTRIC, ANTRUM, POLYPS:  - Reactive gastropathy with mild chronic inactive gastritis; see comment  - No H. pylori-like organisms identified on routine staining  - No intestinal metaplasia identified     B.  GASTRIC, ANTRUM, BIOPSY:  - Reactive gastropathy with mild chronic inactive gastritis  - No H. pylori-like organisms identified on routine  staining  - No intestinal metaplasia identified     C.  GASTRIC, BODY, BIOPSY:  - Chronic atrophic gastritis; see comment  - No H. pylori-like organisms identified on routine staining  - No intestinal metaplasia identified     D.  GASTRIC, BODY POLYP, BIOPSY:  - Eroded gastric hyperplastic polyp  - No H. pylori-like organisms identified on routine staining  - No intestinal metaplasia identified     E.  GASTRIC, LARGER BODY POLYP, BIOPSY:  - Eroded gastric hyperplastic polyp  - No H. pylori-like organisms identified on routine staining  - No intestinal metaplasia identified      Colonoscopy (3/4/23):  - Non-thrombosed external hemorrhoids found on                             digital rectal exam.                             - One 2 mm polyp in the ascending colon, removed                             with a cold snare. Resected and retrieved.                             - One 2 mm polyp in the proximal transverse colon,                             removed with a cold snare. Resected and retrieved.                             - The examined portion of the ileum was normal.                             - The examination was otherwise normal on direct                             and retroflexion views.     A.  COLON, ASCENDING, POLYP:  - Colonic mucosa with superficial hyperplastic changes and a benign lymphoid aggregate    B.  COLON, TRANSVERSE, POLYP:  - Tubular adenoma  - No high-grade dysplasia or malignancy identified      ALLERGIES:     Allergies   Allergen Reactions    Advil [Ibuprofen] Swelling     Hands swelled    Cephalexin Itching       PERTINENT MEDICATIONS:    Current Outpatient Medications:     aspirin 81 MG EC tablet, Take 1 tablet (81 mg) by mouth daily, Disp: , Rfl:     atorvastatin (LIPITOR) 10 MG tablet, TAKE ONE TABLET BY MOUTH ONCE DAILY, Disp: 90 tablet, Rfl: 0    calcitRIOL (ROCALTROL) 0.25 MCG capsule, TAKE TWO CAPSULES BY MOUTH ONCE DAILY, Disp: 180 capsule, Rfl: 1    calcium carbonate antacid 1000  MG CHEW, Take 2 tablets by mouth 3 times daily, Disp: 60 tablet, Rfl: 3    clobetasol (TEMOVATE) 0.05 % external ointment, Apply sparingly twice to three times daily for 3-4 weeks to affected areas on buttock. Please dispense 60 gram quantity., Disp: 60 g, Rfl: 4    hydrochlorothiazide (HYDRODIURIL) 25 MG tablet, TAKE ONE TABLET BY MOUTH ONCE DAILY. NEEDS TO BE SEEN BY MD., Disp: 90 tablet, Rfl: 0    levothyroxine (SYNTHROID/LEVOTHROID) 100 MCG tablet, TAKE ONE TABLET BY MOUTH ONCE DAILY., Disp: 90 tablet, Rfl: 1    lisinopril (ZESTRIL) 30 MG tablet, TAKE ONE TABLET BY MOUTH ONCE DAILY, Disp: 90 tablet, Rfl: 3    metFORMIN (GLUCOPHAGE XR) 500 MG 24 hr tablet, TAKE TWO TABLETS BY MOUTH ONCE DAILY, Disp: 180 tablet, Rfl: 0    omeprazole (PRILOSEC) 40 MG DR capsule, Take 1 capsule (40 mg) by mouth daily, Disp: 30 capsule, Rfl: 3    Current Facility-Administered Medications:     lidocaine 1 % injection 2 mL, 2 mL, , , Yanique Leon MD, 2 mL at 07/28/22 1618    lidocaine 1 % injection 2 mL, 2 mL, , , Yanique Leon MD, 2 mL at 07/28/22 1618    triamcinolone (KENALOG-40) injection 40 mg, 40 mg, , , Yanique Leon MD, 40 mg at 07/28/22 1618    triamcinolone (KENALOG-40) injection 40 mg, 40 mg, , , Yanique Leon MD, 40 mg at 07/28/22 1618    SOCIAL HISTORY:    Social History     Socioeconomic History    Marital status:      Spouse name: Not on file    Number of children: 2    Years of education: 12    Highest education level: Not on file   Occupational History     Employer: [employer]   Tobacco Use    Smoking status: Never     Passive exposure: Never    Smokeless tobacco: Never   Vaping Use    Vaping status: Never Used   Substance and Sexual Activity    Alcohol use: Yes     Comment: occ    Drug use: No    Sexual activity: Not Currently     Partners: Male     Birth control/protection: Surgical   Other Topics Concern    Parent/sibling w/ CABG, MI or angioplasty before 65F 55M? No     Service No     Blood Transfusions Yes     Comment: had 3 units 10/23/2009    Caffeine Concern No    Occupational Exposure No    Hobby Hazards No    Sleep Concern Not Asked     Comment: sleeps about 5-6 hours a night    Stress Concern Not Asked    Weight Concern Not Asked    Special Diet No    Back Care Not Asked    Exercise Yes     Comment: walk    Bike Helmet Not Asked     Comment: doesn't ride a bike    Seat Belt Yes    Self-Exams No     Comment: info given on SBE   Social History Narrative    Not on file     Social Drivers of Health     Financial Resource Strain: Low Risk  (11/8/2023)    Financial Resource Strain     Within the past 12 months, have you or your family members you live with been unable to get utilities (heat, electricity) when it was really needed?: No   Food Insecurity: Low Risk  (11/8/2023)    Food Insecurity     Within the past 12 months, did you worry that your food would run out before you got money to buy more?: No     Within the past 12 months, did the food you bought just not last and you didn t have money to get more?: No   Transportation Needs: Low Risk  (11/8/2023)    Transportation Needs     Within the past 12 months, has lack of transportation kept you from medical appointments, getting your medicines, non-medical meetings or appointments, work, or from getting things that you need?: No   Physical Activity: Not on file   Stress: Not on file   Social Connections: Not on file   Interpersonal Safety: Low Risk  (7/5/2024)    Interpersonal Safety     Do you feel physically and emotionally safe where you currently live?: Yes     Within the past 12 months, have you been hit, slapped, kicked or otherwise physically hurt by someone?: No     Within the past 12 months, have you been humiliated or emotionally abused in other ways by your partner or ex-partner?: No   Housing Stability: Low Risk  (11/8/2023)    Housing Stability     Do you have housing? : Yes     Are you worried about losing your housing?: No  "      FAMILY HISTORY:  FH of CRC: None.  FH of IBD: none.  Family History   Problem Relation Age of Onset    Cerebrovascular Disease Mother 50    Cerebrovascular Disease Father 49         49    Hypertension Father     Cancer No family hx of     Diabetes No family hx of     Thyroid Disease No family hx of     Glaucoma No family hx of     Macular Degeneration No family hx of        Past/family/social history reviewed and no changes    PHYSICAL EXAMINATION:  Constitutional: aaox3, cooperative, pleasant, not dyspneic/diaphoretic, no acute distress  Vitals reviewed: BP (!) 149/90   Pulse 77   Ht 1.568 m (5' 1.75\")   Wt 80.3 kg (177 lb)   LMP 2009   SpO2 95%   BMI 32.64 kg/m    Wt:   Wt Readings from Last 2 Encounters:   25 80.3 kg (177 lb)   24 79.5 kg (175 lb 3.2 oz)      Eyes: Sclera anicteric/injected  Respiratory: Unlabored breathing  Skin: warm, perfused, no jaundice  Psych: Normal affect  MSK: Normal gait        "

## 2025-01-28 NOTE — NURSING NOTE
"Chief Complaint   Patient presents with    Follow Up     She requests these members of her care team be copied on today's visit information:  PCP: Vasquez Denson    Referring Provider:  Referred Self, MD  No address on file    Vitals:    01/28/25 0713   BP: (!) 149/90   Pulse: 77   SpO2: 95%   Weight: 80.3 kg (177 lb)   Height: 1.568 m (5' 1.75\")     Body mass index is 32.64 kg/m .    Do you have a history of colon cancer in your immediate family? NO    Medications were reconciled.    Does patient need any medication refills at today's visit? ?    Eleni Solorzano, Clinical Assistant      "

## 2025-01-28 NOTE — LETTER
1/28/2025      Rhonda Cordero  [address] [facility] [address]      Dear Colleague,    Thank you for referring your patient, Rhonda Cordero, to the [facility]. Please see a copy of my visit note below.    GI CLINIC VISIT    CC/REFERRING MD:  No ref. provider found  REASON FOR CONSULTATION: Atrophic gastritis    ASSESSMENT/PLAN:    #Autoimmune atrophic gastritis   #gastric polyps  Patient had a recent EGD for monitoring of atrophic gastritis - biopsies showed chronic atrophic gastritis, no polyps. We discussed the clinical features of atrophic gastritis as well as the need for monitoring given increased risk for the development of gastric neuroendocrine tumors and adenocarcinomas. Patients are also at risk of iron deficiency anemia and pernicious anemia. Last iron level was low, she has not been taking an iron supplementation. Will check labs today including iron studies, folate, and B12. Will likely need repletion of iron. EGD ordered to be repeated in November 2025. She has otherwise been doing well, and has no other concerns.  --obtain labs.   --schedule EGD for nov 2025.     Colorectal cancer screening: due in 2030    RTC 1 year.    Thank you for this consultation.  It was a pleasure to participate in the care of this patient; please contact us with any further questions.       20 minutes spent on the date of the encounter doing chart review, review of test results, patient visit, and documentation    This note was created with voice recognition software, and while reviewed for accuracy, typos may remain.    Tree Duqeu PA-C  Division of Gastroenterology, Hepatology and Nutrition  [facility] [place]      HPI  53 y/o F presents for follow up for autoimmune atrophic gastritis.     Patient was initially evaluated on 9/6/23, please see visit details below:   Patient had an EGD done on 7/28/23 - for evaluation of dysphagia,  esophagus was normal, likely hiatal hernia present, a few gastric polyps were seen, biopsies showed reactive gastropathy and some eroded hyperplastic polyps, as well as concerns for atrophic gastritis. Labs were subsequently done which showed elevated parietal cell antibody IgG -- raising concern for autoimmune gastritis.    She reports dysphagia that has been ongoing for many years, particularly breads seemed to be getting stuck in the subxiphoid area. She would have to swallow multiple times to get it to go down. Denies any difficulties swallowing liquids. Denies any odynophagia. She did start taking omeprazole 40mg daily, but has not noticed any difference in her symptoms. She denies any classic symptoms of reflux - denies burning in her epigastric pain or chest. Denies nausea or vomiting. Denies abdominal pain.     In regards to her bowel movement, she has a bowel movement daily - described as as a bristol type IV. Denies BRBPR. Denies pushing/straining.     Denies NSAIDS. She takes tylenol 1-3x/week.  Denies use of OTC herbal supplements/weight loss products.      Occasional alcohol use. Denies tobacco products. No recreational drug use.     No family history or GI related malignancy (esophageal, gastric, pancreatic, liver or colon) or family history of IBD/celiac disease.     1/28/25:  Patient has been doing quite well - denies abdominal pain, nausea, or vomiting. Denies dysphagia or odynophagia. Denies heartburn.   We reviewed EGD done in November 2024 - biopsies again consistent with atrophic gastritis, will need repeat EGD in November 2025.    ROS:    No fevers or chills  No weight loss  No odynophagia or dysphagia    PROBLEM LIST  Patient Active Problem List    Diagnosis Date Noted     Diabetes mellitus, type 2 (H) 11/26/2024     Priority: Medium     Postoperative hypothyroidism 09/19/2018     Priority: Medium     Hypocalcemia 09/19/2018     Priority: Medium     History of thyroidectomy 09/26/2017      Priority: Medium     Anup's deformity of left heel 02/24/2017     Priority: Medium     Essential hypertension with goal blood pressure less than 140/90 11/09/2016     Priority: Medium     Hypertension goal BP (blood pressure) < 140/90 01/21/2016     Priority: Medium     Gastroesophageal reflux disease, esophagitis presence not specified 01/15/2016     Priority: Medium     IMO Regulatory Load OCT 2020       Cervical adenopathy 01/15/2016     Priority: Medium     Shoulder joint pain 03/15/2015     Priority: Medium     Hashimoto's thyroiditis 03/04/2015     Priority: Medium     Nontoxic multinodular goiter 03/04/2015     Priority: Medium     Impingement syndrome, shoulder 02/10/2015     Priority: Medium     Rotator cuff tendonitis 02/10/2015     Priority: Medium     CARDIOVASCULAR SCREENING; LDL GOAL LESS THAN 130 09/13/2013     Priority: Medium       PERTINENT PAST MEDICAL HISTORY:    Past Medical History:   Diagnosis Date     Anemia      Diabetes (H) 08/2023    Dr. Denson     Hypertension goal BP (blood pressure) < 130/80 05/06/2011     Hypocalcemia      Multinodular goiter      Postsurgical hypothyroidism      Rotator cuff tendonitis 02/10/2015     Vaginal Papanicolaou smear not required due to history of hysterectomy        PREVIOUS SURGERIES:    Past Surgical History:   Procedure Laterality Date     COLONOSCOPY N/A 3/24/2023    Procedure: COLONOSCOPY, FLEXIBLE, WITH LESION REMOVAL USING SNARE;  Surgeon: Chet Escobar MD;  Location:  OR     COLONOSCOPY WITH CO2 INSUFFLATION N/A 3/24/2023    Procedure: COLONOSCOPY, WITH CO2 INSUFFLATION;  Surgeon: Chet Escobar MD;  Location: [facility] OR     COMBINED ESOPHAGOSCOPY, GASTROSCOPY, DUODENOSCOPY (EGD) WITH CO2 INSUFFLATION N/A 7/28/2023    Procedure: Combined Esophagoscopy, Gastroscopy, Duodenoscopy (Egd) With Co2 Insufflation;  Surgeon: Sarita Montes DO;  Location: [facility] OR     COMBINED ESOPHAGOSCOPY, GASTROSCOPY, DUODENOSCOPY (EGD) WITH CO2 INSUFFLATION  N/A 11/19/2024    Procedure: Combined Esophagoscopy, Gastroscopy, Duodenoscopy (Egd) With Co2 Insufflation;  Surgeon: Sarita Montes DO;  Location: [facility] OR     ESOPHAGOSCOPY, GASTROSCOPY, DUODENOSCOPY (EGD), COMBINED N/A 7/28/2023    Procedure: ESOPHAGOGASTRODUODENOSCOPY, WITH BIOPSY;  Surgeon: Sarita Montes DO;  Location: [facility] OR     ESOPHAGOSCOPY, GASTROSCOPY, DUODENOSCOPY (EGD), COMBINED N/A 11/19/2024    Procedure: ESOPHAGOGASTRODUODENOSCOPY, WITH BIOPSY;  Surgeon: Sarita Montes DO;  Location: [facility] OR     LAPAROSCOPY,TUBAL CAUTERY  2005     SALPINGO OOPHORECTOMY,R/L/LUIS  2005    left     THYROIDECTOMY N/A 8/22/2017    Procedure: THYROIDECTOMY;  Total Thyroidectomy;  Surgeon: Skylar Costa MD;  Location:  OR     [facility] TOTAL ABDOM HYSTERECTOMY  12/9/09    [facility]       PREVIOUS ENDOSCOPY:  EGD(11/20/24)- Normal esophagus.                             - Nodular mucosa in the gastric antrum. Biopsied.                             - An endoclip was found in the stomach.                             - Normal examined duodenum.   Final Diagnosis   A. ANTRUM NODULE, BIOPSY:  - Gastric mucosa with reactive gastropathy.  - Negative for intestinal metaplasia and dysplasia  - No H. pylori-like organisms identified on routine stain     B. ANTRUM, BIOPSY:  - Gastric mucosa with reactive gastropathy  - Negative for intestinal metaplasia and dysplasia  - No H. pylori-like organisms identified on routine stain     C. STOMACH BODY, BIOPSY:  - Chronic atrophic gastritis; see comment  - No H. pylori-like organisms identified on routine and immunohistochemical staining   - No intestinal metaplasia identified         EGD (11/15/23): - Normal esophagus.                             - Z-line regular, 35 cm from the incisors.                             - One gastric polyp. Resected and retrieved. Clips                             (MR conditional) were placed.                             - One gastric polyp. Resected and  retrieved. Clips                             (MR conditional) were placed.                             - Two gastric polyps. Resected and retrieved.                             - Gastric mucosal atrophy.                             - Normal duodenal bulb, first portion of the                             duodenum, second portion of the duodenum and third                             portion of the duodenum.     Final Diagnosis   A(1). Stomach, antrum, polyps, polypectomy:  -Gastric hyperplastic type polyps with focal intestinal metaplasia and reactive epithelial changes  -Background gastric mucosa with inactive chronic gastritis.  -Negative for H. Pylori organisms on immunohistochemical stains.  -Negative for dysplasia or malignancy        B(2). Stomach, body, polyp, polypectomy:  -Gastric hyperplastic type polyp with reactive epithelial changes  -Background gastric mucosa with inactive chronic gastritis.  -Negative for H. Pylori organisms on immunohistochemical stains.  -Negative for dysplasia or malignancy        C(3). Stomach, body, polyp, polypectomy:  -Gastric hyperplastic type polyp with focal intestinal metaplasia (see comment).  -Background gastric mucosa with inactive chronic gastritis.  -Negative for H. Pylori organisms on immunohistochemical stains.  -Negative for dysplasia or malignancy       EGD (7/8/23):  - Normal esophagus.                             - Gastroesophageal flap valve classified as Hill                             Grade III (minimal fold, loose to endoscope, hiatal                             hernia likely).                             - A single gastric polyp. Biopsied.                             - A single gastric polyp. Biopsied.                             - Two gastric polyps. Biopsied.                             - Erythematous mucosa in the antrum. Biopsied.                             - Normal examined duodenum.         A.  GASTRIC, ANTRUM, POLYPS:  - Reactive gastropathy with mild  chronic inactive gastritis; see comment  - No H. pylori-like organisms identified on routine staining  - No intestinal metaplasia identified     B.  GASTRIC, ANTRUM, BIOPSY:  - Reactive gastropathy with mild chronic inactive gastritis  - No H. pylori-like organisms identified on routine staining  - No intestinal metaplasia identified     C.  GASTRIC, BODY, BIOPSY:  - Chronic atrophic gastritis; see comment  - No H. pylori-like organisms identified on routine staining  - No intestinal metaplasia identified     D.  GASTRIC, BODY POLYP, BIOPSY:  - Eroded gastric hyperplastic polyp  - No H. pylori-like organisms identified on routine staining  - No intestinal metaplasia identified     E.  GASTRIC, LARGER BODY POLYP, BIOPSY:  - Eroded gastric hyperplastic polyp  - No H. pylori-like organisms identified on routine staining  - No intestinal metaplasia identified      Colonoscopy (3/4/23):  - Non-thrombosed external hemorrhoids found on                             digital rectal exam.                             - One 2 mm polyp in the ascending colon, removed                             with a cold snare. Resected and retrieved.                             - One 2 mm polyp in the proximal transverse colon,                             removed with a cold snare. Resected and retrieved.                             - The examined portion of the ileum was normal.                             - The examination was otherwise normal on direct                             and retroflexion views.     A.  COLON, ASCENDING, POLYP:  - Colonic mucosa with superficial hyperplastic changes and a benign lymphoid aggregate    B.  COLON, TRANSVERSE, POLYP:  - Tubular adenoma  - No high-grade dysplasia or malignancy identified      ALLERGIES:     Allergies   Allergen Reactions     Advil [Ibuprofen] Swelling     Hands swelled     Cephalexin Itching       PERTINENT MEDICATIONS:    Current Outpatient Medications:      aspirin 81 MG EC tablet, Take  1 tablet (81 mg) by mouth daily, Disp: , Rfl:      atorvastatin (LIPITOR) 10 MG tablet, TAKE ONE TABLET BY MOUTH ONCE DAILY, Disp: 90 tablet, Rfl: 0     calcitRIOL (ROCALTROL) 0.25 MCG capsule, TAKE TWO CAPSULES BY MOUTH ONCE DAILY, Disp: 180 capsule, Rfl: 1     calcium carbonate antacid 1000 MG CHEW, Take 2 tablets by mouth 3 times daily, Disp: 60 tablet, Rfl: 3     clobetasol (TEMOVATE) 0.05 % external ointment, Apply sparingly twice to three times daily for 3-4 weeks to affected areas on buttock. Please dispense 60 gram quantity., Disp: 60 g, Rfl: 4     hydrochlorothiazide (HYDRODIURIL) 25 MG tablet, TAKE ONE TABLET BY MOUTH ONCE DAILY. NEEDS TO BE SEEN BY MD., Disp: 90 tablet, Rfl: 0     levothyroxine (SYNTHROID/LEVOTHROID) 100 MCG tablet, TAKE ONE TABLET BY MOUTH ONCE DAILY., Disp: 90 tablet, Rfl: 1     lisinopril (ZESTRIL) 30 MG tablet, TAKE ONE TABLET BY MOUTH ONCE DAILY, Disp: 90 tablet, Rfl: 3     metFORMIN (GLUCOPHAGE XR) 500 MG 24 hr tablet, TAKE TWO TABLETS BY MOUTH ONCE DAILY, Disp: 180 tablet, Rfl: 0     omeprazole (PRILOSEC) 40 MG DR capsule, Take 1 capsule (40 mg) by mouth daily, Disp: 30 capsule, Rfl: 3    Current Facility-Administered Medications:      lidocaine 1 % injection 2 mL, 2 mL, , , Yanique Leon MD, 2 mL at 07/28/22 1618     lidocaine 1 % injection 2 mL, 2 mL, , , Yanique Leon MD, 2 mL at 07/28/22 1618     triamcinolone (KENALOG-40) injection 40 mg, 40 mg, , , Yanique Leon MD, 40 mg at 07/28/22 1618     triamcinolone (KENALOG-40) injection 40 mg, 40 mg, , , Yanique Leon MD, 40 mg at 07/28/22 1618    SOCIAL HISTORY:    Social History     Socioeconomic History     Marital status:      Spouse name: Not on file     Number of children: 2     Years of education: 12     Highest education level: Not on file   Occupational History     Employer: [employer]   Tobacco Use     Smoking status: Never     Passive exposure: Never     Smokeless tobacco: Never   Vaping Use      Vaping status: Never Used   Substance and Sexual Activity     Alcohol use: Yes     Comment: occ     Drug use: No     Sexual activity: Not Currently     Partners: Male     Birth control/protection: Surgical   Other Topics Concern     Parent/sibling w/ CABG, MI or angioplasty before 65F 55M? No      Service No     Blood Transfusions Yes     Comment: had 3 units 10/23/2009     Caffeine Concern No     Occupational Exposure No     Hobby Hazards No     Sleep Concern Not Asked     Comment: sleeps about 5-6 hours a night     Stress Concern Not Asked     Weight Concern Not Asked     Special Diet No     Back Care Not Asked     Exercise Yes     Comment: walk     Bike Helmet Not Asked     Comment: doesn't ride a bike     Seat Belt Yes     Self-Exams No     Comment: info given on SBE   Social History Narrative     Not on file     Social Drivers of Health     Financial Resource Strain: Low Risk  (11/8/2023)    Financial Resource Strain      Within the past 12 months, have you or your family members you live with been unable to get utilities (heat, electricity) when it was really needed?: No   Food Insecurity: Low Risk  (11/8/2023)    Food Insecurity      Within the past 12 months, did you worry that your food would run out before you got money to buy more?: No      Within the past 12 months, did the food you bought just not last and you didn t have money to get more?: No   Transportation Needs: Low Risk  (11/8/2023)    Transportation Needs      Within the past 12 months, has lack of transportation kept you from medical appointments, getting your medicines, non-medical meetings or appointments, work, or from getting things that you need?: No   Physical Activity: Not on file   Stress: Not on file   Social Connections: Not on file   Interpersonal Safety: Low Risk  (7/5/2024)    Interpersonal Safety      Do you feel physically and emotionally safe where you currently live?: Yes      Within the past 12 months, have you been  "hit, slapped, kicked or otherwise physically hurt by someone?: No      Within the past 12 months, have you been humiliated or emotionally abused in other ways by your partner or ex-partner?: No   Housing Stability: Low Risk  (2023)    Housing Stability      Do you have housing? : Yes      Are you worried about losing your housing?: No       FAMILY HISTORY:  FH of CRC: None.  FH of IBD: none.  Family History   Problem Relation Age of Onset     Cerebrovascular Disease Mother 50     Cerebrovascular Disease Father 49         49     Hypertension Father      Cancer No family hx of      Diabetes No family hx of      Thyroid Disease No family hx of      Glaucoma No family hx of      Macular Degeneration No family hx of        Past/family/social history reviewed and no changes    PHYSICAL EXAMINATION:  Constitutional: aaox3, cooperative, pleasant, not dyspneic/diaphoretic, no acute distress  Vitals reviewed: BP (!) 149/90   Pulse 77   Ht 1.568 m (5' 1.75\")   Wt 80.3 kg (177 lb)   LMP 2009   SpO2 95%   BMI 32.64 kg/m    Wt:   Wt Readings from Last 2 Encounters:   25 80.3 kg (177 lb)   24 79.5 kg (175 lb 3.2 oz)      Eyes: Sclera anicteric/injected  Respiratory: Unlabored breathing  Skin: warm, perfused, no jaundice  Psych: Normal affect  MSK: Normal gait          Again, thank you for allowing me to participate in the care of your patient.        Sincerely,        Tree Duque PA-C    Electronically signed"

## 2025-02-04 ENCOUNTER — TELEPHONE (OUTPATIENT)
Dept: GASTROENTEROLOGY | Facility: CLINIC | Age: 55
End: 2025-02-04
Payer: COMMERCIAL

## 2025-02-04 DIAGNOSIS — E61.1 IRON DEFICIENCY: Primary | ICD-10-CM

## 2025-02-04 RX ORDER — ALBUTEROL SULFATE 0.83 MG/ML
2.5 SOLUTION RESPIRATORY (INHALATION)
OUTPATIENT
Start: 2025-02-04

## 2025-02-04 RX ORDER — METHYLPREDNISOLONE SODIUM SUCCINATE 40 MG/ML
40 INJECTION INTRAMUSCULAR; INTRAVENOUS
Start: 2025-02-04

## 2025-02-04 RX ORDER — MEPERIDINE HYDROCHLORIDE 25 MG/ML
25 INJECTION INTRAMUSCULAR; INTRAVENOUS; SUBCUTANEOUS
OUTPATIENT
Start: 2025-02-04

## 2025-02-04 RX ORDER — DIPHENHYDRAMINE HYDROCHLORIDE 50 MG/ML
50 INJECTION INTRAMUSCULAR; INTRAVENOUS
Start: 2025-02-04

## 2025-02-04 RX ORDER — DIPHENHYDRAMINE HYDROCHLORIDE 50 MG/ML
25 INJECTION INTRAMUSCULAR; INTRAVENOUS
Start: 2025-02-04

## 2025-02-04 RX ORDER — EPINEPHRINE 1 MG/ML
0.3 INJECTION, SOLUTION, CONCENTRATE INTRAVENOUS EVERY 5 MIN PRN
OUTPATIENT
Start: 2025-02-04

## 2025-02-04 RX ORDER — HEPARIN SODIUM (PORCINE) LOCK FLUSH IV SOLN 100 UNIT/ML 100 UNIT/ML
5 SOLUTION INTRAVENOUS
OUTPATIENT
Start: 2025-02-04

## 2025-02-04 RX ORDER — HEPARIN SODIUM,PORCINE 10 UNIT/ML
5-20 VIAL (ML) INTRAVENOUS DAILY PRN
OUTPATIENT
Start: 2025-02-04

## 2025-02-04 RX ORDER — ALBUTEROL SULFATE 90 UG/1
1-2 INHALANT RESPIRATORY (INHALATION)
Start: 2025-02-04

## 2025-02-04 NOTE — TELEPHONE ENCOUNTER
Venofer infusion plan placed.   Left message with call back number  Sent mychart message with infusion scheduling information.

## 2025-02-04 NOTE — TELEPHONE ENCOUNTER
----- Message from Misha Duque sent at 2/4/2025 10:13 AM CST -----  Regarding: iron infusion  Fermin ho,    Can we place orders for iron infusion for this patient - the 3 dose one?    Thanks  misha

## 2025-02-05 ENCOUNTER — TELEPHONE (OUTPATIENT)
Dept: GASTROENTEROLOGY | Facility: CLINIC | Age: 55
End: 2025-02-05
Payer: COMMERCIAL

## 2025-02-05 NOTE — TELEPHONE ENCOUNTER
"Endoscopy Scheduling Screen    Have you had any respiratory illness or flu-like symptoms in the last 10 days?  No    What is your communication preference for Instructions and/or Bowel Prep?   MyChart    What insurance is in the chart?  Other:  BCBS    Ordering/Referring Provider:   SWETHA WASHBURN        (If ordering provider performs procedure, schedule with ordering provider unless otherwise instructed. )    BMI: Estimated body mass index is 33.05 kg/m  as calculated from the following:    Height as of 1/31/25: 1.568 m (5' 1.75\").    Weight as of 1/31/25: 81.3 kg (179 lb 4 oz).     Sedation Ordered  MAC/deep sedation.   BMI<= 45 45 < BMI <= 48 48 < BMI < = 50  BMI > 50   No Restrictions No [facility]  No [facility]  [facility] with exceptions Hospital Only OR Only       Do you have a history of malignant hyperthermia?  No    (Females) Are you currently pregnant?   No     Have you been diagnosed or told you have pulmonary hypertension?   No    Do you have an LVAD?  No    Have you been told you have moderate to severe sleep apnea?  No.    Have you been told you have COPD, asthma, or any other lung disease?  No    Do you have any heart conditions?  No     Have you ever had or are you waiting for an organ transplant?  No. Continue scheduling, no site restrictions.    Have you had a stroke or transient ischemic attack (TIA aka \"mini stroke\" in the last 6 months?   No    Have you been diagnosed with or been told you have cirrhosis of the liver?   No.    Are you currently on dialysis?   No    Do you need assistance transferring?   No    BMI: Estimated body mass index is 33.05 kg/m  as calculated from the following:    Height as of 1/31/25: 1.568 m (5' 1.75\").    Weight as of 1/31/25: 81.3 kg (179 lb 4 oz).     Is patients BMI > 40 and scheduling location [facility]?  No    Do you take an injectable or oral medication for weight loss or diabetes (excluding insulin)?  Yes, hold time can be up to 7 days. Please consult with you prescribing " provider to discuss endoscopy recommendations. (Please schedule at least 7 days out.) METFORMIN    Do you take the medication Naltrexone?  No    Do you take blood thinners?  No       Prep   Are you currently on dialysis or do you have chronic kidney disease?  No    Do you have a diagnosis of diabetes?  Yes (Golytely Prep)    Do you have a diagnosis of cystic fibrosis (CF)?  No    On a regular basis do you go 3 -5 days between bowel movements?  No    BMI > 40?  No    Preferred Pharmacy:    [facility] - [place], [place] - [address] \[facility].\"" [id number]  Phone: [phone] Fax: [phone]    WRITTEN PRESCRIPTION REQUESTED  No address on file      [facility] [place]  [place] [address]  [address]  [address]  [facility] [address]  Phone: [phone] Fax: [phone]      Final Scheduling Details   Pt is not due until November per pt's spouse.

## 2025-02-13 ENCOUNTER — INFUSION THERAPY VISIT (OUTPATIENT)
Dept: INFUSION THERAPY | Facility: CLINIC | Age: 55
End: 2025-02-13
Attending: PHYSICIAN ASSISTANT
Payer: COMMERCIAL

## 2025-02-13 VITALS
SYSTOLIC BLOOD PRESSURE: 145 MMHG | TEMPERATURE: 97.9 F | RESPIRATION RATE: 16 BRPM | HEART RATE: 78 BPM | DIASTOLIC BLOOD PRESSURE: 92 MMHG | BODY MASS INDEX: 30.42 KG/M2 | OXYGEN SATURATION: 97 % | WEIGHT: 178.2 LBS | HEIGHT: 64 IN

## 2025-02-13 DIAGNOSIS — E61.1 IRON DEFICIENCY: ICD-10-CM

## 2025-02-13 DIAGNOSIS — D64.9 ANEMIA: Primary | ICD-10-CM

## 2025-02-13 PROCEDURE — 258N000003 HC RX IP 258 OP 636: Performed by: PHYSICIAN ASSISTANT

## 2025-02-13 PROCEDURE — 250N000011 HC RX IP 250 OP 636: Performed by: PHYSICIAN ASSISTANT

## 2025-02-13 RX ORDER — DIPHENHYDRAMINE HYDROCHLORIDE 50 MG/ML
25 INJECTION INTRAMUSCULAR; INTRAVENOUS
Start: 2025-02-15

## 2025-02-13 RX ORDER — DIPHENHYDRAMINE HYDROCHLORIDE 50 MG/ML
50 INJECTION INTRAMUSCULAR; INTRAVENOUS
Start: 2025-02-15

## 2025-02-13 RX ORDER — HEPARIN SODIUM (PORCINE) LOCK FLUSH IV SOLN 100 UNIT/ML 100 UNIT/ML
5 SOLUTION INTRAVENOUS
OUTPATIENT
Start: 2025-02-15

## 2025-02-13 RX ORDER — HEPARIN SODIUM,PORCINE 10 UNIT/ML
5-20 VIAL (ML) INTRAVENOUS DAILY PRN
OUTPATIENT
Start: 2025-02-15

## 2025-02-13 RX ORDER — ALBUTEROL SULFATE 0.83 MG/ML
2.5 SOLUTION RESPIRATORY (INHALATION)
OUTPATIENT
Start: 2025-02-15

## 2025-02-13 RX ORDER — METHYLPREDNISOLONE SODIUM SUCCINATE 40 MG/ML
40 INJECTION INTRAMUSCULAR; INTRAVENOUS
Start: 2025-02-15

## 2025-02-13 RX ORDER — EPINEPHRINE 1 MG/ML
0.3 INJECTION, SOLUTION INTRAMUSCULAR; SUBCUTANEOUS EVERY 5 MIN PRN
OUTPATIENT
Start: 2025-02-15

## 2025-02-13 RX ORDER — MEPERIDINE HYDROCHLORIDE 25 MG/ML
25 INJECTION INTRAMUSCULAR; INTRAVENOUS; SUBCUTANEOUS
OUTPATIENT
Start: 2025-02-15

## 2025-02-13 RX ORDER — ALBUTEROL SULFATE 90 UG/1
1-2 INHALANT RESPIRATORY (INHALATION)
Start: 2025-02-15

## 2025-02-13 RX ADMIN — IRON SUCROSE 300 MG: 20 INJECTION, SOLUTION INTRAVENOUS at 10:21

## 2025-02-13 NOTE — PROGRESS NOTES
Infusion Nursing Note:  Rhonda Cordero presents today for NEW Venofer.    Patient seen by provider today: No   present during visit today: Not Applicable.    Note: Patient new to  Infusion Center, oriented to facility including call light use. Written information on iron reviewed with patient and spouse, questions answered to patient satisfaction.    Intravenous Access:  Peripheral IV placed.    Treatment Conditions:  Not Applicable.    Post Infusion Assessment:  Patient tolerated infusion without incident.  Site patent and intact, free from redness, edema or discomfort.  No evidence of extravasations.  Access discontinued per protocol.     Discharge Plan:   Future appts have been reviewed and crosschecked with appt note and plan.  AVS to patient via [employer].  Patient will return 2/19/25 for next appointment.   Patient discharged in stable condition accompanied by: .  Departure Mode: Ambulatory.      Elisha Triana RN BSN OCN

## 2025-02-19 ENCOUNTER — INFUSION THERAPY VISIT (OUTPATIENT)
Dept: INFUSION THERAPY | Facility: CLINIC | Age: 55
End: 2025-02-19
Attending: PHYSICIAN ASSISTANT
Payer: COMMERCIAL

## 2025-02-19 VITALS
OXYGEN SATURATION: 100 % | DIASTOLIC BLOOD PRESSURE: 91 MMHG | SYSTOLIC BLOOD PRESSURE: 159 MMHG | HEART RATE: 74 BPM | TEMPERATURE: 98.1 F

## 2025-02-19 DIAGNOSIS — E61.1 IRON DEFICIENCY: Primary | ICD-10-CM

## 2025-02-19 DIAGNOSIS — D64.9 ANEMIA: ICD-10-CM

## 2025-02-19 PROCEDURE — 99207 PR NO CHARGE LOS: CPT

## 2025-02-19 PROCEDURE — 96366 THER/PROPH/DIAG IV INF ADDON: CPT

## 2025-02-19 PROCEDURE — 258N000003 HC RX IP 258 OP 636: Performed by: PHYSICIAN ASSISTANT

## 2025-02-19 PROCEDURE — 250N000011 HC RX IP 250 OP 636: Performed by: PHYSICIAN ASSISTANT

## 2025-02-19 PROCEDURE — 96365 THER/PROPH/DIAG IV INF INIT: CPT

## 2025-02-19 RX ORDER — ALBUTEROL SULFATE 0.83 MG/ML
2.5 SOLUTION RESPIRATORY (INHALATION)
OUTPATIENT
Start: 2025-02-21

## 2025-02-19 RX ORDER — EPINEPHRINE 1 MG/ML
0.3 INJECTION, SOLUTION INTRAMUSCULAR; SUBCUTANEOUS EVERY 5 MIN PRN
OUTPATIENT
Start: 2025-02-21

## 2025-02-19 RX ORDER — HEPARIN SODIUM (PORCINE) LOCK FLUSH IV SOLN 100 UNIT/ML 100 UNIT/ML
5 SOLUTION INTRAVENOUS
OUTPATIENT
Start: 2025-02-21

## 2025-02-19 RX ORDER — HEPARIN SODIUM,PORCINE 10 UNIT/ML
5-20 VIAL (ML) INTRAVENOUS DAILY PRN
OUTPATIENT
Start: 2025-02-21

## 2025-02-19 RX ORDER — ALBUTEROL SULFATE 90 UG/1
1-2 INHALANT RESPIRATORY (INHALATION)
Start: 2025-02-21

## 2025-02-19 RX ORDER — MEPERIDINE HYDROCHLORIDE 25 MG/ML
25 INJECTION INTRAMUSCULAR; INTRAVENOUS; SUBCUTANEOUS
OUTPATIENT
Start: 2025-02-21

## 2025-02-19 RX ORDER — METHYLPREDNISOLONE SODIUM SUCCINATE 40 MG/ML
40 INJECTION INTRAMUSCULAR; INTRAVENOUS
Start: 2025-02-21

## 2025-02-19 RX ORDER — DIPHENHYDRAMINE HYDROCHLORIDE 50 MG/ML
25 INJECTION INTRAMUSCULAR; INTRAVENOUS
Start: 2025-02-21

## 2025-02-19 RX ORDER — DIPHENHYDRAMINE HYDROCHLORIDE 50 MG/ML
50 INJECTION INTRAMUSCULAR; INTRAVENOUS
Start: 2025-02-21

## 2025-02-19 RX ADMIN — IRON SUCROSE 300 MG: 20 INJECTION, SOLUTION INTRAVENOUS at 14:43

## 2025-02-19 ASSESSMENT — PAIN SCALES - GENERAL: PAINLEVEL_OUTOF10: NO PAIN (0)

## 2025-02-19 NOTE — PROGRESS NOTES
Infusion Nursing Note:  Rhonda Cordero presents today for Venofer 2/3.    Patient seen by provider today: No   present during visit today: Not Applicable.    Note: Pt states her first venofer went well, denies any negative side effects or medical concerns today. See flow sheet for assessment.      Intravenous Access:  Peripheral IV placed.    Treatment Conditions:  Not Applicable.      Post Infusion Assessment:  Patient tolerated infusion without incident.  Blood return noted pre and post infusion.  Site patent and intact, free from redness, edema or discomfort.  No evidence of extravasations.  Access discontinued per protocol.       Discharge Plan:   Patient discharged in stable condition accompanied by: self.  Departure Mode: Ambulatory.  Pt will RTC 2/21/25 for Venofer 3/3. Appts verified and pt aware.      Carlos Pope RN

## 2025-05-11 ENCOUNTER — HEALTH MAINTENANCE LETTER (OUTPATIENT)
Age: 55
End: 2025-05-11

## 2025-05-19 ENCOUNTER — LAB (OUTPATIENT)
Dept: LAB | Facility: CLINIC | Age: 55
End: 2025-05-19
Payer: COMMERCIAL

## 2025-05-19 ENCOUNTER — DOCUMENTATION ONLY (OUTPATIENT)
Dept: FAMILY MEDICINE | Facility: CLINIC | Age: 55
End: 2025-05-19

## 2025-05-19 DIAGNOSIS — D64.9 ANEMIA, UNSPECIFIED TYPE: ICD-10-CM

## 2025-05-19 DIAGNOSIS — I10 ESSENTIAL HYPERTENSION WITH GOAL BLOOD PRESSURE LESS THAN 140/90: Primary | ICD-10-CM

## 2025-05-19 DIAGNOSIS — I10 ESSENTIAL HYPERTENSION WITH GOAL BLOOD PRESSURE LESS THAN 140/90: ICD-10-CM

## 2025-05-19 DIAGNOSIS — E11.9 TYPE 2 DIABETES MELLITUS WITHOUT COMPLICATION, WITHOUT LONG-TERM CURRENT USE OF INSULIN (H): ICD-10-CM

## 2025-05-19 LAB
EST. AVERAGE GLUCOSE BLD GHB EST-MCNC: 163 MG/DL
HBA1C MFR BLD: 7.3 % (ref 0–5.6)

## 2025-05-19 PROCEDURE — 80053 COMPREHEN METABOLIC PANEL: CPT

## 2025-05-19 PROCEDURE — 85025 COMPLETE CBC W/AUTO DIFF WBC: CPT

## 2025-05-19 PROCEDURE — 83036 HEMOGLOBIN GLYCOSYLATED A1C: CPT

## 2025-05-19 PROCEDURE — 36415 COLL VENOUS BLD VENIPUNCTURE: CPT

## 2025-05-19 NOTE — PROGRESS NOTES
Antrosa PatelGil has an upcoming lab appointment:    Future Appointments   Date Time Provider Department Center   5/19/2025  3:30 PM [facility] LAB [facility]   9/19/2025  8:30 AM Major Beasley, CHRISTIE [facility]   1/28/2026  9:15 AM [facility]     Patient is scheduled for the following lab(s): Pt needs A1c    There is no order available. Please review and place either future orders or HMPO (Review of Health Maintenance Protocol Orders), as appropriate.    Health Maintenance Due   Topic    HIV SCREENING     HEPATITIS C SCREENING     ANNUAL REVIEW OF HM ORDERS     A1C      Douglas Beard    · Stable, mild-intermittent, no acute exacerbation  · Continue Advair

## 2025-05-20 ENCOUNTER — RESULTS FOLLOW-UP (OUTPATIENT)
Dept: FAMILY MEDICINE | Facility: CLINIC | Age: 55
End: 2025-05-20

## 2025-05-20 LAB
ALBUMIN SERPL BCG-MCNC: 4.2 G/DL (ref 3.5–5.2)
ALP SERPL-CCNC: 116 U/L (ref 40–150)
ALT SERPL W P-5'-P-CCNC: 24 U/L (ref 0–50)
ANION GAP SERPL CALCULATED.3IONS-SCNC: 14 MMOL/L (ref 7–15)
AST SERPL W P-5'-P-CCNC: 21 U/L (ref 0–45)
BASOPHILS # BLD AUTO: 0.1 10E3/UL (ref 0–0.2)
BASOPHILS NFR BLD AUTO: 1 %
BILIRUB SERPL-MCNC: 0.3 MG/DL
BUN SERPL-MCNC: 16.7 MG/DL (ref 6–20)
CALCIUM SERPL-MCNC: 8.4 MG/DL (ref 8.8–10.4)
CHLORIDE SERPL-SCNC: 100 MMOL/L (ref 98–107)
CREAT SERPL-MCNC: 0.84 MG/DL (ref 0.51–0.95)
EGFRCR SERPLBLD CKD-EPI 2021: 82 ML/MIN/1.73M2
EOSINOPHIL # BLD AUTO: 0.2 10E3/UL (ref 0–0.7)
EOSINOPHIL NFR BLD AUTO: 1 %
ERYTHROCYTE [DISTWIDTH] IN BLOOD BY AUTOMATED COUNT: 14.8 % (ref 10–15)
GLUCOSE SERPL-MCNC: 151 MG/DL (ref 70–99)
HCO3 SERPL-SCNC: 27 MMOL/L (ref 22–29)
HCT VFR BLD AUTO: 42 % (ref 35–47)
HGB BLD-MCNC: 13.5 G/DL (ref 11.7–15.7)
IMM GRANULOCYTES # BLD: 0.1 10E3/UL
IMM GRANULOCYTES NFR BLD: 1 %
LYMPHOCYTES # BLD AUTO: 6.5 10E3/UL (ref 0.8–5.3)
LYMPHOCYTES NFR BLD AUTO: 44 %
MCH RBC QN AUTO: 26.5 PG (ref 26.5–33)
MCHC RBC AUTO-ENTMCNC: 32.1 G/DL (ref 31.5–36.5)
MCV RBC AUTO: 82 FL (ref 78–100)
MONOCYTES # BLD AUTO: 0.7 10E3/UL (ref 0–1.3)
MONOCYTES NFR BLD AUTO: 5 %
NEUTROPHILS # BLD AUTO: 7.2 10E3/UL (ref 1.6–8.3)
NEUTROPHILS NFR BLD AUTO: 49 %
NRBC # BLD AUTO: 0 10E3/UL
NRBC BLD AUTO-RTO: 0 /100
PLAT MORPH BLD: NORMAL
PLATELET # BLD AUTO: 331 10E3/UL (ref 150–450)
POTASSIUM SERPL-SCNC: 4.5 MMOL/L (ref 3.4–5.3)
PROT SERPL-MCNC: 7.4 G/DL (ref 6.4–8.3)
RBC # BLD AUTO: 5.1 10E6/UL (ref 3.8–5.2)
RBC MORPH BLD: NORMAL
SODIUM SERPL-SCNC: 141 MMOL/L (ref 135–145)
WBC # BLD AUTO: 14.9 10E3/UL (ref 4–11)

## 2025-05-20 NOTE — RESULT ENCOUNTER NOTE
Your red blood count ( hemoglobin ) is back to normal    Diabetes test ( hemoglobin a1c ) is better.  Stay on metformin.    Other labs are okay/ stable.    Vasquez Denson MD

## 2025-05-24 ENCOUNTER — MYC REFILL (OUTPATIENT)
Dept: FAMILY MEDICINE | Facility: CLINIC | Age: 55
End: 2025-05-24
Payer: COMMERCIAL

## 2025-05-24 DIAGNOSIS — I10 HYPERTENSION GOAL BP (BLOOD PRESSURE) < 140/90: ICD-10-CM

## 2025-05-27 RX ORDER — HYDROCHLOROTHIAZIDE 25 MG/1
25 TABLET ORAL DAILY
Qty: 90 TABLET | Refills: 3 | Status: SHIPPED | OUTPATIENT
Start: 2025-05-27

## 2025-07-21 ENCOUNTER — PATIENT OUTREACH (OUTPATIENT)
Dept: CARE COORDINATION | Facility: CLINIC | Age: 55
End: 2025-07-21
Payer: COMMERCIAL

## 2025-08-04 ENCOUNTER — PATIENT OUTREACH (OUTPATIENT)
Dept: CARE COORDINATION | Facility: CLINIC | Age: 55
End: 2025-08-04
Payer: COMMERCIAL

## 2025-08-05 ENCOUNTER — OFFICE VISIT (OUTPATIENT)
Dept: FAMILY MEDICINE | Facility: CLINIC | Age: 55
End: 2025-08-05
Payer: COMMERCIAL

## 2025-08-05 VITALS
HEART RATE: 69 BPM | SYSTOLIC BLOOD PRESSURE: 134 MMHG | HEIGHT: 64 IN | RESPIRATION RATE: 18 BRPM | OXYGEN SATURATION: 100 % | TEMPERATURE: 97.6 F | BODY MASS INDEX: 30.94 KG/M2 | DIASTOLIC BLOOD PRESSURE: 87 MMHG | WEIGHT: 181.25 LBS

## 2025-08-05 DIAGNOSIS — Z23 ENCOUNTER FOR IMMUNIZATION: ICD-10-CM

## 2025-08-05 DIAGNOSIS — L40.9 PSORIASIS: Primary | ICD-10-CM

## 2025-08-05 DIAGNOSIS — I10 HYPERTENSION GOAL BP (BLOOD PRESSURE) < 140/90: ICD-10-CM

## 2025-08-05 DIAGNOSIS — E11.9 TYPE 2 DIABETES MELLITUS WITHOUT COMPLICATION, WITHOUT LONG-TERM CURRENT USE OF INSULIN (H): ICD-10-CM

## 2025-08-05 PROCEDURE — 1125F AMNT PAIN NOTED PAIN PRSNT: CPT | Performed by: FAMILY MEDICINE

## 2025-08-05 PROCEDURE — 90677 PCV20 VACCINE IM: CPT | Performed by: FAMILY MEDICINE

## 2025-08-05 PROCEDURE — 99214 OFFICE O/P EST MOD 30 MIN: CPT | Mod: 25 | Performed by: FAMILY MEDICINE

## 2025-08-05 PROCEDURE — 3079F DIAST BP 80-89 MM HG: CPT | Performed by: FAMILY MEDICINE

## 2025-08-05 PROCEDURE — 3075F SYST BP GE 130 - 139MM HG: CPT | Performed by: FAMILY MEDICINE

## 2025-08-05 PROCEDURE — 90471 IMMUNIZATION ADMIN: CPT | Performed by: FAMILY MEDICINE

## 2025-08-05 RX ORDER — CLOBETASOL PROPIONATE 0.5 MG/G
OINTMENT TOPICAL
Qty: 120 G | Refills: 1 | Status: SHIPPED | OUTPATIENT
Start: 2025-08-05

## 2025-08-05 ASSESSMENT — PAIN SCALES - GENERAL: PAINLEVEL_OUTOF10: MILD PAIN (2)

## 2025-08-06 ENCOUNTER — PATIENT OUTREACH (OUTPATIENT)
Dept: CARE COORDINATION | Facility: CLINIC | Age: 55
End: 2025-08-06
Payer: COMMERCIAL

## 2025-08-18 DIAGNOSIS — E11.9 TYPE 2 DIABETES MELLITUS WITHOUT COMPLICATION, WITHOUT LONG-TERM CURRENT USE OF INSULIN (H): ICD-10-CM

## 2025-08-18 RX ORDER — METFORMIN HYDROCHLORIDE 500 MG/1
1000 TABLET, EXTENDED RELEASE ORAL DAILY
Qty: 180 TABLET | Refills: 0 | Status: SHIPPED | OUTPATIENT
Start: 2025-08-18

## 2025-08-19 ENCOUNTER — TELEPHONE (OUTPATIENT)
Dept: GASTROENTEROLOGY | Facility: CLINIC | Age: 55
End: 2025-08-19
Payer: COMMERCIAL

## 2025-08-24 ENCOUNTER — HEALTH MAINTENANCE LETTER (OUTPATIENT)
Age: 55
End: 2025-08-24

## 2025-09-02 DIAGNOSIS — E03.9 HYPOTHYROIDISM, UNSPECIFIED TYPE: ICD-10-CM

## 2025-09-02 RX ORDER — LEVOTHYROXINE SODIUM 100 UG/1
100 TABLET ORAL DAILY
Qty: 90 TABLET | Refills: 1 | Status: SHIPPED | OUTPATIENT
Start: 2025-09-02

## (undated) DEVICE — CLIP HORIZON MED BLUE 002200

## (undated) DEVICE — SU MONOCRYL 5-0 P-3 18" UND Y493G

## (undated) DEVICE — DRSG TEGADERM 2 3/8X2 3/4" 1624W

## (undated) DEVICE — ESU GROUND PAD ADULT W/CORD E7507

## (undated) DEVICE — PREP CHLORAPREP W/ORANGE TINT 10.5ML 260715

## (undated) DEVICE — GLOVE PROTEXIS MICRO 6.5  2D73PM65

## (undated) DEVICE — SUCTION SLEEVE NEPTUNE 2 125MM 0703-005-125

## (undated) DEVICE — ESU LIGASURE OPEN SEALER/DIVIDER SM JAW 16.5MM LF1212A

## (undated) DEVICE — ADHESIVE SWIFTSET 0.8ML OCTYL SS6

## (undated) DEVICE — SU SILK 3-0 TIE 12X30" A304H

## (undated) DEVICE — SURGICEL FIBRILLAR HEMOSTAT 2"X4" JJ1962

## (undated) DEVICE — SU CHROMIC 3-0 SH 27" G122H

## (undated) DEVICE — ESU ELEC BLADE 2.75" COATED/INSULATED E1455

## (undated) DEVICE — SOL NACL 0.9% IRRIG 500ML BOTTLE 2F7123

## (undated) DEVICE — PACK ENT MINOR CUSTOM ASC

## (undated) DEVICE — SPECIMEN CONTAINER W/10% BUFFERED FORMALIN 120ML 591201

## (undated) DEVICE — GOWN LG DISP 9515

## (undated) DEVICE — PREP PAD ALCOHOL 6818

## (undated) DEVICE — SOL WATER IRRIG 1000ML BOTTLE 07139-09

## (undated) DEVICE — GLOVE PROTEXIS W/NEU-THERA 6.5  2D73TE65

## (undated) DEVICE — SUCTION MANIFOLD NEPTUNE 2 SYS 4 PORT 0702-020-000

## (undated) DEVICE — SU SILK 2-0 TIE 12X30" A305H

## (undated) DEVICE — NIM PROBE PRASS INCREMENTING TIP 8225825

## (undated) DEVICE — CLIP HORIZON SM RED WIDE SLOT 001201

## (undated) DEVICE — LINEN TOWEL PACK X5 5464

## (undated) DEVICE — PREP CHLORAPREP 26ML TINTED ORANGE  260815

## (undated) DEVICE — ESU PENCIL SMOKE EVAC W/ROCKER SWITCH 0703-047-000

## (undated) DEVICE — TUBE ENDOTRACHEAL NIM TRIVANTAGE 7.0MM 8229707

## (undated) RX ORDER — PROPOFOL 10 MG/ML
INJECTION, EMULSION INTRAVENOUS
Status: DISPENSED
Start: 2024-11-19

## (undated) RX ORDER — EPHEDRINE SULFATE 50 MG/ML
INJECTION, SOLUTION INTRAMUSCULAR; INTRAVENOUS; SUBCUTANEOUS
Status: DISPENSED
Start: 2017-08-22

## (undated) RX ORDER — GABAPENTIN 300 MG/1
CAPSULE ORAL
Status: DISPENSED
Start: 2017-08-22

## (undated) RX ORDER — ONDANSETRON 2 MG/ML
INJECTION INTRAMUSCULAR; INTRAVENOUS
Status: DISPENSED
Start: 2023-11-15

## (undated) RX ORDER — FENTANYL CITRATE 50 UG/ML
INJECTION, SOLUTION INTRAMUSCULAR; INTRAVENOUS
Status: DISPENSED
Start: 2023-07-28

## (undated) RX ORDER — HYDROCODONE BITARTRATE AND ACETAMINOPHEN 5; 325 MG/1; MG/1
TABLET ORAL
Status: DISPENSED
Start: 2017-08-22

## (undated) RX ORDER — PHENYLEPHRINE HCL IN 0.9% NACL 1 MG/10 ML
SYRINGE (ML) INTRAVENOUS
Status: DISPENSED
Start: 2017-08-22

## (undated) RX ORDER — SIMETHICONE 40MG/0.6ML
SUSPENSION, DROPS(FINAL DOSAGE FORM)(ML) ORAL
Status: DISPENSED
Start: 2023-07-28

## (undated) RX ORDER — FENTANYL CITRATE 50 UG/ML
INJECTION, SOLUTION INTRAMUSCULAR; INTRAVENOUS
Status: DISPENSED
Start: 2023-03-24

## (undated) RX ORDER — ONDANSETRON 2 MG/ML
INJECTION INTRAMUSCULAR; INTRAVENOUS
Status: DISPENSED
Start: 2017-08-22

## (undated) RX ORDER — ACETAMINOPHEN 325 MG/1
TABLET ORAL
Status: DISPENSED
Start: 2017-08-22

## (undated) RX ORDER — PROPOFOL 10 MG/ML
INJECTION, EMULSION INTRAVENOUS
Status: DISPENSED
Start: 2017-08-22

## (undated) RX ORDER — KETAMINE HYDROCHLORIDE 10 MG/ML
INJECTION, SOLUTION INTRAMUSCULAR; INTRAVENOUS
Status: DISPENSED
Start: 2017-08-22

## (undated) RX ORDER — DEXAMETHASONE SODIUM PHOSPHATE 10 MG/ML
INJECTION, SOLUTION INTRAMUSCULAR; INTRAVENOUS
Status: DISPENSED
Start: 2017-08-22

## (undated) RX ORDER — REMIFENTANIL HYDROCHLORIDE 1 MG/ML
INJECTION, POWDER, LYOPHILIZED, FOR SOLUTION INTRAVENOUS
Status: DISPENSED
Start: 2017-08-22